# Patient Record
Sex: MALE | Race: OTHER | Employment: OTHER | URBAN - METROPOLITAN AREA
[De-identification: names, ages, dates, MRNs, and addresses within clinical notes are randomized per-mention and may not be internally consistent; named-entity substitution may affect disease eponyms.]

---

## 2017-01-01 ENCOUNTER — HOSPITAL ENCOUNTER (OUTPATIENT)
Dept: RADIATION ONCOLOGY | Facility: HOSPITAL | Age: 67
End: 2017-01-01
Attending: RADIOLOGY
Payer: MEDICARE

## 2017-01-03 ENCOUNTER — TELEPHONE (OUTPATIENT)
Dept: HEMATOLOGY/ONCOLOGY | Facility: HOSPITAL | Age: 67
End: 2017-01-03

## 2017-01-05 PROCEDURE — 77338 DESIGN MLC DEVICE FOR IMRT: CPT | Performed by: RADIOLOGY

## 2017-01-05 PROCEDURE — 77301 RADIOTHERAPY DOSE PLAN IMRT: CPT | Performed by: RADIOLOGY

## 2017-01-05 PROCEDURE — 77300 RADIATION THERAPY DOSE PLAN: CPT | Performed by: RADIOLOGY

## 2017-01-06 ENCOUNTER — TELEPHONE (OUTPATIENT)
Dept: HEMATOLOGY/ONCOLOGY | Facility: HOSPITAL | Age: 67
End: 2017-01-06

## 2017-01-09 ENCOUNTER — HOSPITAL ENCOUNTER (OUTPATIENT)
Dept: RADIATION ONCOLOGY | Facility: HOSPITAL | Age: 67
Discharge: HOME | End: 2017-01-09
Attending: RADIOLOGY
Payer: MEDICARE

## 2017-01-09 ENCOUNTER — APPOINTMENT (OUTPATIENT)
Dept: RADIATION ONCOLOGY | Facility: HOSPITAL | Age: 67
End: 2017-01-09
Attending: RADIOLOGY
Payer: MEDICARE

## 2017-01-09 VITALS — BODY MASS INDEX: 21 KG/M2 | WEIGHT: 143.5 LBS

## 2017-01-09 DIAGNOSIS — C22.1 CHOLANGIOCARCINOMA OF BILIARY TRACT (HCC): Primary | ICD-10-CM

## 2017-01-09 PROCEDURE — 77386 HC IMRT COMPLEX: CPT | Performed by: RADIOLOGY

## 2017-01-09 PROCEDURE — 77280 THER RAD SIMULAJ FIELD SMPL: CPT | Performed by: RADIOLOGY

## 2017-01-09 NOTE — PROGRESS NOTES
[facility] Radiation Treatment Management Note 1-5    Patient:  Kvng Mar  Age:  77year old  Visit Diagnosis:    1.  Cholangiocarcinoma of biliary tract ([address]      Primary Rad/Onc:  Dr. Abhijeet Sotelo    Site Delivered Dose (Gy) Pre

## 2017-01-10 ENCOUNTER — TELEPHONE (OUTPATIENT)
Dept: HEMATOLOGY/ONCOLOGY | Facility: HOSPITAL | Age: 67
End: 2017-01-10

## 2017-01-10 PROCEDURE — 77386 HC IMRT COMPLEX: CPT | Performed by: RADIOLOGY

## 2017-01-10 NOTE — TELEPHONE ENCOUNTER
MD Xavier Cardenas APN        Cc: Darien Heredia RN                     Daily.  No break        JACQUELINE Mcginnis MD       Cc: Darien Heredia RN                     Is xeloda BID daily as per your note or Mond

## 2017-01-11 PROCEDURE — 77386 HC IMRT COMPLEX: CPT | Performed by: RADIOLOGY

## 2017-01-12 ENCOUNTER — APPOINTMENT (OUTPATIENT)
Dept: RADIATION ONCOLOGY | Facility: HOSPITAL | Age: 67
End: 2017-01-12
Payer: MEDICARE

## 2017-01-12 PROCEDURE — 77386 HC IMRT COMPLEX: CPT | Performed by: RADIOLOGY

## 2017-01-13 PROCEDURE — 77386 HC IMRT COMPLEX: CPT | Performed by: RADIOLOGY

## 2017-01-16 ENCOUNTER — OFFICE VISIT (OUTPATIENT)
Dept: HEMATOLOGY/ONCOLOGY | Facility: HOSPITAL | Age: 67
End: 2017-01-16
Attending: SPECIALIST
Payer: MEDICARE

## 2017-01-16 ENCOUNTER — HOSPITAL ENCOUNTER (OUTPATIENT)
Dept: RADIATION ONCOLOGY | Facility: HOSPITAL | Age: 67
Discharge: HOME | End: 2017-01-16
Attending: RADIOLOGY
Payer: MEDICARE

## 2017-01-16 VITALS
WEIGHT: 144 LBS | RESPIRATION RATE: 20 BRPM | TEMPERATURE: 97 F | OXYGEN SATURATION: 99 % | HEART RATE: 78 BPM | SYSTOLIC BLOOD PRESSURE: 115 MMHG | BODY MASS INDEX: 20.62 KG/M2 | DIASTOLIC BLOOD PRESSURE: 70 MMHG | HEIGHT: 70 IN

## 2017-01-16 DIAGNOSIS — E87.1 HYPONATREMIA: ICD-10-CM

## 2017-01-16 DIAGNOSIS — C77.9 CHOLANGIOCARCINOMA METASTATIC TO LYMPH NODE (HCC): Primary | ICD-10-CM

## 2017-01-16 DIAGNOSIS — C22.1 CHOLANGIOCARCINOMA OF BILIARY TRACT (HCC): Primary | ICD-10-CM

## 2017-01-16 DIAGNOSIS — C22.1 CHOLANGIOCARCINOMA METASTATIC TO LYMPH NODE (HCC): Primary | ICD-10-CM

## 2017-01-16 DIAGNOSIS — C22.1 CHOLANGIOCARCINOMA OF BILIARY TRACT (HCC): ICD-10-CM

## 2017-01-16 LAB
ALBUMIN SERPL-MCNC: 3.3 G/DL (ref 3.5–4.8)
ALP LIVER SERPL-CCNC: 103 U/L (ref 45–117)
ALT SERPL-CCNC: 15 U/L (ref 17–63)
AST SERPL-CCNC: 13 U/L (ref 15–41)
BASOPHILS # BLD AUTO: 0.07 X10(3) UL (ref 0–0.1)
BASOPHILS NFR BLD AUTO: 0.8 %
BILIRUB SERPL-MCNC: 0.6 MG/DL (ref 0.1–2)
BUN BLD-MCNC: 8 MG/DL (ref 8–20)
CALCIUM BLD-MCNC: 9.7 MG/DL (ref 8.3–10.3)
CHLORIDE: 104 MMOL/L (ref 101–111)
CO2: 29 MMOL/L (ref 22–32)
CREAT BLD-MCNC: 0.77 MG/DL (ref 0.7–1.3)
EOSINOPHIL # BLD AUTO: 0.28 X10(3) UL (ref 0–0.3)
EOSINOPHIL NFR BLD AUTO: 3.1 %
ERYTHROCYTE [DISTWIDTH] IN BLOOD BY AUTOMATED COUNT: 22.7 % (ref 11.5–16)
GLUCOSE BLD-MCNC: 99 MG/DL (ref 70–99)
HCT VFR BLD AUTO: 33.6 % (ref 37–53)
HGB BLD-MCNC: 11.2 G/DL (ref 13–17)
IMMATURE GRANULOCYTE COUNT: 0.05 X10(3) UL (ref 0–1)
IMMATURE GRANULOCYTE RATIO %: 0.6 %
LYMPHOCYTES # BLD AUTO: 1.52 X10(3) UL (ref 0.9–4)
LYMPHOCYTES NFR BLD AUTO: 16.9 %
M PROTEIN MFR SERPL ELPH: 7.3 G/DL (ref 6.1–8.3)
MCH RBC QN AUTO: 33.5 PG (ref 27–33.2)
MCHC RBC AUTO-ENTMCNC: 33.3 G/DL (ref 31–37)
MCV RBC AUTO: 100.6 FL (ref 80–99)
MONOCYTES # BLD AUTO: 1.26 X10(3) UL (ref 0.1–0.6)
MONOCYTES NFR BLD AUTO: 14 %
NEUTROPHIL ABS PRELIM: 5.84 X10 (3) UL (ref 1.3–6.7)
NEUTROPHILS # BLD AUTO: 5.84 X10(3) UL (ref 1.3–6.7)
NEUTROPHILS NFR BLD AUTO: 64.6 %
PLATELET # BLD AUTO: 325 10(3)UL (ref 150–450)
PLATELET MORPHOLOGY: NORMAL
POTASSIUM SERPL-SCNC: 4.3 MMOL/L (ref 3.6–5.1)
RBC # BLD AUTO: 3.34 X10(6)UL (ref 3.8–5.8)
RED CELL DISTRIBUTION WIDTH-SD: 83.2 FL (ref 35.1–46.3)
SODIUM SERPL-SCNC: 138 MMOL/L (ref 136–144)
WBC # BLD AUTO: 9 X10(3) UL (ref 4–13)

## 2017-01-16 PROCEDURE — 77386 HC IMRT COMPLEX: CPT | Performed by: RADIOLOGY

## 2017-01-16 PROCEDURE — 99213 OFFICE O/P EST LOW 20 MIN: CPT | Performed by: SPECIALIST

## 2017-01-16 NOTE — PROGRESS NOTES
Patient is here today for follow up with Larissa Sheridan for cholangiocarcinoma. Patient denies pain. Patient is currently on Xeloda therapy 1150mg BID daily - concurrent radiation. Stated appetite is good.  Medication list, medical history and toxicities were

## 2017-01-16 NOTE — PROGRESS NOTES
[facility] Radiation Treatment Management Note 6-10    Patient:  Gopal Watson  Age:  77year old  Visit Diagnosis:    1.  Cholangiocarcinoma of biliary tract ([address]      Primary Rad/Onc:  Dr. Mary Jimenez    Site Delivered Dose (Gy) Pr

## 2017-01-16 NOTE — PROGRESS NOTES
[facility] Progress Note      Patient Name: Ilan Brandt   YOB: 1950  Medical Record Number: [id number]  Attending Physician: Philly Cali. Henry Bush M.D.      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 On 10/13/2016 patient began adjuvant chemotherapy with gemcitabine and capecitabine.  Cycle 1 was K, Phytonadione, 100 MCG Oral Tab Take 1 tablet by mouth. Disp:  Rfl:    SELENIUM OR Take 1 tablet by mouth daily. Disp:  Rfl:    furosemide (LASIX) 20 MG Oral Tab Take 1 tablet (20 mg total) by mouth daily.  Disp: 30 tablet Rfl: 0   maalox/diphenhydramine/ anicteric. ENMT                 External nose normal; external ears normal.  Neck   Supple. Respiratory          Normal effort; no respiratory distress; clear to auscultation bilaterally. Cardiovascular     Regular rate and rhythm; normal S1S2.   Abdomen Basophil % 0.8 %   Immature Granulocyte % 0.6 %   -RBC MORPHOLOGY SCAN   Collection Time: 01/16/17 10:06 AM   Result Value Ref Range   RBC Morphology See morphology below (A) Normal   Platelet Morphology Normal Normal   Microcytosis Small (A) (none)   Ma

## 2017-01-17 PROCEDURE — 77386 HC IMRT COMPLEX: CPT | Performed by: RADIOLOGY

## 2017-01-18 PROCEDURE — 77386 HC IMRT COMPLEX: CPT | Performed by: RADIOLOGY

## 2017-01-19 PROCEDURE — 77386 HC IMRT COMPLEX: CPT | Performed by: RADIOLOGY

## 2017-01-20 ENCOUNTER — OFFICE VISIT (OUTPATIENT)
Dept: HEMATOLOGY/ONCOLOGY | Facility: HOSPITAL | Age: 67
End: 2017-01-20
Attending: SPECIALIST
Payer: MEDICARE

## 2017-01-20 PROCEDURE — 77386 HC IMRT COMPLEX: CPT | Performed by: RADIOLOGY

## 2017-01-20 PROCEDURE — 77336 RADIATION PHYSICS CONSULT: CPT | Performed by: RADIOLOGY

## 2017-01-23 ENCOUNTER — NURSE ONLY (OUTPATIENT)
Dept: HEMATOLOGY/ONCOLOGY | Facility: HOSPITAL | Age: 67
End: 2017-01-23
Attending: SPECIALIST
Payer: MEDICARE

## 2017-01-23 ENCOUNTER — HOSPITAL ENCOUNTER (OUTPATIENT)
Dept: RADIATION ONCOLOGY | Facility: HOSPITAL | Age: 67
Discharge: HOME | End: 2017-01-23
Attending: RADIOLOGY
Payer: MEDICARE

## 2017-01-23 VITALS
WEIGHT: 141.38 LBS | HEART RATE: 67 BPM | TEMPERATURE: 99 F | SYSTOLIC BLOOD PRESSURE: 111 MMHG | DIASTOLIC BLOOD PRESSURE: 55 MMHG | RESPIRATION RATE: 20 BRPM | BODY MASS INDEX: 20 KG/M2

## 2017-01-23 DIAGNOSIS — C77.9 CHOLANGIOCARCINOMA METASTATIC TO LYMPH NODE (HCC): Primary | ICD-10-CM

## 2017-01-23 DIAGNOSIS — C22.1 CHOLANGIOCARCINOMA METASTATIC TO LYMPH NODE (HCC): Primary | ICD-10-CM

## 2017-01-23 DIAGNOSIS — C22.1 CHOLANGIOCARCINOMA OF BILIARY TRACT (HCC): ICD-10-CM

## 2017-01-23 DIAGNOSIS — C22.1 CHOLANGIOCARCINOMA OF BILIARY TRACT (HCC): Primary | ICD-10-CM

## 2017-01-23 LAB
ALBUMIN SERPL-MCNC: 3.3 G/DL (ref 3.5–4.8)
ALP LIVER SERPL-CCNC: 90 U/L (ref 45–117)
ALT SERPL-CCNC: 16 U/L (ref 17–63)
AST SERPL-CCNC: 18 U/L (ref 15–41)
BASOPHILS # BLD AUTO: 0.07 X10(3) UL (ref 0–0.1)
BASOPHILS NFR BLD AUTO: 0.9 %
BILIRUB SERPL-MCNC: 0.6 MG/DL (ref 0.1–2)
BUN BLD-MCNC: 8 MG/DL (ref 8–20)
CALCIUM BLD-MCNC: 8.9 MG/DL (ref 8.3–10.3)
CHLORIDE: 101 MMOL/L (ref 101–111)
CO2: 28 MMOL/L (ref 22–32)
CREAT BLD-MCNC: 0.71 MG/DL (ref 0.7–1.3)
EOSINOPHIL # BLD AUTO: 0.85 X10(3) UL (ref 0–0.3)
EOSINOPHIL NFR BLD AUTO: 10.9 %
ERYTHROCYTE [DISTWIDTH] IN BLOOD BY AUTOMATED COUNT: 21.9 % (ref 11.5–16)
GLUCOSE BLD-MCNC: 79 MG/DL (ref 70–99)
HCT VFR BLD AUTO: 33.5 % (ref 37–53)
HGB BLD-MCNC: 11.6 G/DL (ref 13–17)
IMMATURE GRANULOCYTE COUNT: 0.03 X10(3) UL (ref 0–1)
IMMATURE GRANULOCYTE RATIO %: 0.4 %
LYMPHOCYTES # BLD AUTO: 0.99 X10(3) UL (ref 0.9–4)
LYMPHOCYTES NFR BLD AUTO: 12.7 %
M PROTEIN MFR SERPL ELPH: 7 G/DL (ref 6.1–8.3)
MCH RBC QN AUTO: 34.5 PG (ref 27–33.2)
MCHC RBC AUTO-ENTMCNC: 34.6 G/DL (ref 31–37)
MCV RBC AUTO: 99.7 FL (ref 80–99)
MONOCYTES # BLD AUTO: 1.09 X10(3) UL (ref 0.1–0.6)
MONOCYTES NFR BLD AUTO: 13.9 %
NEUTROPHIL ABS PRELIM: 4.79 X10 (3) UL (ref 1.3–6.7)
NEUTROPHILS # BLD AUTO: 4.79 X10(3) UL (ref 1.3–6.7)
NEUTROPHILS NFR BLD AUTO: 61.2 %
PLATELET # BLD AUTO: 214 10(3)UL (ref 150–450)
PLATELET MORPHOLOGY: NORMAL
POTASSIUM SERPL-SCNC: 3.8 MMOL/L (ref 3.6–5.1)
RBC # BLD AUTO: 3.36 X10(6)UL (ref 3.8–5.8)
RED CELL DISTRIBUTION WIDTH-SD: 79.1 FL (ref 35.1–46.3)
SODIUM SERPL-SCNC: 136 MMOL/L (ref 136–144)
WBC # BLD AUTO: 7.8 X10(3) UL (ref 4–13)

## 2017-01-23 PROCEDURE — 36415 COLL VENOUS BLD VENIPUNCTURE: CPT

## 2017-01-23 PROCEDURE — 77386 HC IMRT COMPLEX: CPT | Performed by: RADIOLOGY

## 2017-01-23 PROCEDURE — 80053 COMPREHEN METABOLIC PANEL: CPT

## 2017-01-23 PROCEDURE — 85025 COMPLETE CBC W/AUTO DIFF WBC: CPT

## 2017-01-23 RX ORDER — CAPECITABINE 500 MG/1
TABLET, FILM COATED ORAL
COMMUNITY
Start: 2017-01-12 | End: 2017-03-23 | Stop reason: ALTCHOICE

## 2017-01-23 NOTE — PROGRESS NOTES
[facility] Radiation Treatment Management Note 11-15    Patient:  Genoveva Shukla  Age:  77year old  Visit Diagnosis:    1.  Cholangiocarcinoma of biliary tract ([address]      Primary Rad/Onc:  Dr. Laura Valdes    Site Delivered Dose (Gy) P

## 2017-01-23 NOTE — PROGRESS NOTES
NUTRITION F/U NOTE:     Pertinent Dx/PMH: Cholangiocarcinoma of common bile duct, T3N1M0          TX: RT w/ daily capecitabine     Other pertinent subjective/objective information:  TX: 08/16/2016 pt underwent Whipple, splenectomy, cholecystectomy, left ad

## 2017-01-24 ENCOUNTER — TELEPHONE (OUTPATIENT)
Dept: HEMATOLOGY/ONCOLOGY | Facility: HOSPITAL | Age: 67
End: 2017-01-24

## 2017-01-24 PROCEDURE — 77386 HC IMRT COMPLEX: CPT | Performed by: RADIOLOGY

## 2017-01-25 PROCEDURE — 77386 HC IMRT COMPLEX: CPT | Performed by: RADIOLOGY

## 2017-01-26 PROCEDURE — 77386 HC IMRT COMPLEX: CPT | Performed by: RADIOLOGY

## 2017-01-27 PROCEDURE — 77336 RADIATION PHYSICS CONSULT: CPT | Performed by: RADIOLOGY

## 2017-01-27 PROCEDURE — 77386 HC IMRT COMPLEX: CPT | Performed by: RADIOLOGY

## 2017-01-30 ENCOUNTER — NURSE ONLY (OUTPATIENT)
Dept: HEMATOLOGY/ONCOLOGY | Facility: HOSPITAL | Age: 67
End: 2017-01-30
Attending: SPECIALIST
Payer: MEDICARE

## 2017-01-30 ENCOUNTER — HOSPITAL ENCOUNTER (OUTPATIENT)
Dept: RADIATION ONCOLOGY | Facility: HOSPITAL | Age: 67
Discharge: HOME | End: 2017-01-30
Attending: RADIOLOGY
Payer: MEDICARE

## 2017-01-30 ENCOUNTER — TELEPHONE (OUTPATIENT)
Dept: HEMATOLOGY/ONCOLOGY | Facility: HOSPITAL | Age: 67
End: 2017-01-30

## 2017-01-30 VITALS
BODY MASS INDEX: 20 KG/M2 | RESPIRATION RATE: 20 BRPM | WEIGHT: 142.69 LBS | DIASTOLIC BLOOD PRESSURE: 66 MMHG | TEMPERATURE: 97 F | HEART RATE: 46 BPM | SYSTOLIC BLOOD PRESSURE: 120 MMHG

## 2017-01-30 DIAGNOSIS — C77.9 CHOLANGIOCARCINOMA METASTATIC TO LYMPH NODE (HCC): Primary | ICD-10-CM

## 2017-01-30 DIAGNOSIS — C22.1 CHOLANGIOCARCINOMA OF BILIARY TRACT (HCC): Primary | ICD-10-CM

## 2017-01-30 DIAGNOSIS — C22.1 CHOLANGIOCARCINOMA METASTATIC TO LYMPH NODE (HCC): Primary | ICD-10-CM

## 2017-01-30 LAB
ALBUMIN SERPL-MCNC: 3.3 G/DL (ref 3.5–4.8)
ALP LIVER SERPL-CCNC: 87 U/L (ref 45–117)
ALT SERPL-CCNC: 17 U/L (ref 17–63)
AST SERPL-CCNC: 18 U/L (ref 15–41)
BASOPHILS # BLD AUTO: 0.03 X10(3) UL (ref 0–0.1)
BASOPHILS NFR BLD AUTO: 0.4 %
BILIRUB SERPL-MCNC: 0.6 MG/DL (ref 0.1–2)
BUN BLD-MCNC: 6 MG/DL (ref 8–20)
CALCIUM BLD-MCNC: 9.3 MG/DL (ref 8.3–10.3)
CHLORIDE: 98 MMOL/L (ref 101–111)
CO2: 29 MMOL/L (ref 22–32)
CREAT BLD-MCNC: 0.75 MG/DL (ref 0.7–1.3)
EOSINOPHIL # BLD AUTO: 0.3 X10(3) UL (ref 0–0.3)
EOSINOPHIL NFR BLD AUTO: 4.5 %
ERYTHROCYTE [DISTWIDTH] IN BLOOD BY AUTOMATED COUNT: 21.2 % (ref 11.5–16)
GLUCOSE BLD-MCNC: 106 MG/DL (ref 70–99)
HCT VFR BLD AUTO: 33.1 % (ref 37–53)
HGB BLD-MCNC: 11.2 G/DL (ref 13–17)
IMMATURE GRANULOCYTE COUNT: 0.04 X10(3) UL (ref 0–1)
IMMATURE GRANULOCYTE RATIO %: 0.6 %
LYMPHOCYTES # BLD AUTO: 0.59 X10(3) UL (ref 0.9–4)
LYMPHOCYTES NFR BLD AUTO: 8.8 %
M PROTEIN MFR SERPL ELPH: 7 G/DL (ref 6.1–8.3)
MCH RBC QN AUTO: 35 PG (ref 27–33.2)
MCHC RBC AUTO-ENTMCNC: 33.8 G/DL (ref 31–37)
MCV RBC AUTO: 103.4 FL (ref 80–99)
MONOCYTES # BLD AUTO: 0.95 X10(3) UL (ref 0.1–0.6)
MONOCYTES NFR BLD AUTO: 14.2 %
NEUTROPHIL ABS PRELIM: 4.79 X10 (3) UL (ref 1.3–6.7)
NEUTROPHILS # BLD AUTO: 4.79 X10(3) UL (ref 1.3–6.7)
NEUTROPHILS NFR BLD AUTO: 71.5 %
PLATELET # BLD AUTO: 141 10(3)UL (ref 150–450)
PLATELET MORPHOLOGY: NORMAL
POTASSIUM SERPL-SCNC: 3.9 MMOL/L (ref 3.6–5.1)
RBC # BLD AUTO: 3.2 X10(6)UL (ref 3.8–5.8)
RED CELL DISTRIBUTION WIDTH-SD: 80 FL (ref 35.1–46.3)
SODIUM SERPL-SCNC: 132 MMOL/L (ref 136–144)
WBC # BLD AUTO: 6.7 X10(3) UL (ref 4–13)

## 2017-01-30 PROCEDURE — 36415 COLL VENOUS BLD VENIPUNCTURE: CPT

## 2017-01-30 PROCEDURE — 80053 COMPREHEN METABOLIC PANEL: CPT

## 2017-01-30 PROCEDURE — 85025 COMPLETE CBC W/AUTO DIFF WBC: CPT

## 2017-01-30 PROCEDURE — 77386 HC IMRT COMPLEX: CPT | Performed by: RADIOLOGY

## 2017-01-30 NOTE — PROGRESS NOTES
[facility] Radiation Treatment Management Note 11-15    Patient:  Tisha Shows  Age:  77year old  Visit Diagnosis:    1.  Cholangiocarcinoma of biliary tract ([address]      Primary Rad/Onc:  Dr. Lani Avila    Site Delivered Dose (Gy) P

## 2017-01-31 ENCOUNTER — SNF/IP PROF CHARGE ONLY (OUTPATIENT)
Dept: HEMATOLOGY/ONCOLOGY | Facility: HOSPITAL | Age: 67
End: 2017-01-31

## 2017-01-31 DIAGNOSIS — C22.1 CHOLANGIOCARCINOMA OF BILIARY TRACT (HCC): ICD-10-CM

## 2017-01-31 DIAGNOSIS — C77.9 CHOLANGIOCARCINOMA METASTATIC TO LYMPH NODE (HCC): ICD-10-CM

## 2017-01-31 DIAGNOSIS — C22.1 CHOLANGIOCARCINOMA (HCC): Primary | ICD-10-CM

## 2017-01-31 DIAGNOSIS — C22.1 CHOLANGIOCARCINOMA METASTATIC TO LYMPH NODE (HCC): ICD-10-CM

## 2017-01-31 PROCEDURE — 77386 HC IMRT COMPLEX: CPT | Performed by: RADIOLOGY

## 2017-02-01 ENCOUNTER — HOSPITAL ENCOUNTER (OUTPATIENT)
Dept: RADIATION ONCOLOGY | Facility: HOSPITAL | Age: 67
Discharge: HOME | End: 2017-02-01
Attending: RADIOLOGY
Payer: MEDICARE

## 2017-02-01 PROCEDURE — 77386 HC IMRT COMPLEX: CPT | Performed by: RADIOLOGY

## 2017-02-02 PROCEDURE — 77386 HC IMRT COMPLEX: CPT | Performed by: RADIOLOGY

## 2017-02-02 NOTE — PROGRESS NOTES
NUTRITION F/U NOTE:       Pertinent Dx/PMH: Cholangiocarcinoma of common bile duct, T3N1M0          TX: RT w/ daily capecitabine     Other pertinent subjective/objective information:  08/16/2016 pt underwent Whipple, splenectomy, cholecystectomy, left adre

## 2017-02-03 ENCOUNTER — OFFICE VISIT (OUTPATIENT)
Dept: HEMATOLOGY/ONCOLOGY | Facility: HOSPITAL | Age: 67
End: 2017-02-03
Attending: SPECIALIST
Payer: MEDICARE

## 2017-02-03 PROCEDURE — 77386 HC IMRT COMPLEX: CPT | Performed by: RADIOLOGY

## 2017-02-03 PROCEDURE — 77336 RADIATION PHYSICS CONSULT: CPT | Performed by: RADIOLOGY

## 2017-02-06 ENCOUNTER — NURSE ONLY (OUTPATIENT)
Dept: HEMATOLOGY/ONCOLOGY | Facility: HOSPITAL | Age: 67
End: 2017-02-06
Attending: SPECIALIST
Payer: MEDICARE

## 2017-02-06 ENCOUNTER — TELEPHONE (OUTPATIENT)
Dept: HEMATOLOGY/ONCOLOGY | Facility: HOSPITAL | Age: 67
End: 2017-02-06

## 2017-02-06 ENCOUNTER — HOSPITAL ENCOUNTER (OUTPATIENT)
Dept: RADIATION ONCOLOGY | Facility: HOSPITAL | Age: 67
Discharge: HOME | End: 2017-02-06
Attending: RADIOLOGY
Payer: MEDICARE

## 2017-02-06 VITALS
DIASTOLIC BLOOD PRESSURE: 75 MMHG | TEMPERATURE: 98 F | RESPIRATION RATE: 20 BRPM | WEIGHT: 141.69 LBS | HEART RATE: 48 BPM | BODY MASS INDEX: 20 KG/M2 | SYSTOLIC BLOOD PRESSURE: 128 MMHG

## 2017-02-06 DIAGNOSIS — C77.9 CHOLANGIOCARCINOMA METASTATIC TO LYMPH NODE (HCC): Primary | ICD-10-CM

## 2017-02-06 DIAGNOSIS — C22.1 CHOLANGIOCARCINOMA OF BILIARY TRACT (HCC): Primary | ICD-10-CM

## 2017-02-06 DIAGNOSIS — C22.1 CHOLANGIOCARCINOMA METASTATIC TO LYMPH NODE (HCC): Primary | ICD-10-CM

## 2017-02-06 LAB
ALBUMIN SERPL-MCNC: 3.5 G/DL (ref 3.5–4.8)
ALP LIVER SERPL-CCNC: 109 U/L (ref 45–117)
ALT SERPL-CCNC: 26 U/L (ref 17–63)
AST SERPL-CCNC: 29 U/L (ref 15–41)
BASOPHILS # BLD AUTO: 0.02 X10(3) UL (ref 0–0.1)
BASOPHILS NFR BLD AUTO: 0.3 %
BILIRUB SERPL-MCNC: 0.6 MG/DL (ref 0.1–2)
BUN BLD-MCNC: 8 MG/DL (ref 8–20)
CALCIUM BLD-MCNC: 9.1 MG/DL (ref 8.3–10.3)
CHLORIDE: 96 MMOL/L (ref 101–111)
CO2: 24 MMOL/L (ref 22–32)
CREAT BLD-MCNC: 0.78 MG/DL (ref 0.7–1.3)
EOSINOPHIL # BLD AUTO: 0.21 X10(3) UL (ref 0–0.3)
EOSINOPHIL NFR BLD AUTO: 3.4 %
ERYTHROCYTE [DISTWIDTH] IN BLOOD BY AUTOMATED COUNT: 20.8 % (ref 11.5–16)
GLUCOSE BLD-MCNC: 105 MG/DL (ref 70–99)
HCT VFR BLD AUTO: 35.2 % (ref 37–53)
HGB BLD-MCNC: 12.4 G/DL (ref 13–17)
IMMATURE GRANULOCYTE COUNT: 0.02 X10(3) UL (ref 0–1)
IMMATURE GRANULOCYTE RATIO %: 0.3 %
LYMPHOCYTES # BLD AUTO: 0.45 X10(3) UL (ref 0.9–4)
LYMPHOCYTES NFR BLD AUTO: 7.4 %
M PROTEIN MFR SERPL ELPH: 7.7 G/DL (ref 6.1–8.3)
MCH RBC QN AUTO: 35.8 PG (ref 27–33.2)
MCHC RBC AUTO-ENTMCNC: 35.2 G/DL (ref 31–37)
MCV RBC AUTO: 101.7 FL (ref 80–99)
MONOCYTES # BLD AUTO: 0.84 X10(3) UL (ref 0.1–0.6)
MONOCYTES NFR BLD AUTO: 13.8 %
NEUTROPHIL ABS PRELIM: 4.56 X10 (3) UL (ref 1.3–6.7)
NEUTROPHILS # BLD AUTO: 4.56 X10(3) UL (ref 1.3–6.7)
NEUTROPHILS NFR BLD AUTO: 74.8 %
PLATELET # BLD AUTO: 129 10(3)UL (ref 150–450)
PLATELET MORPHOLOGY: NORMAL
POTASSIUM SERPL-SCNC: 3.9 MMOL/L (ref 3.6–5.1)
RBC # BLD AUTO: 3.46 X10(6)UL (ref 3.8–5.8)
RED CELL DISTRIBUTION WIDTH-SD: 76.9 FL (ref 35.1–46.3)
SODIUM SERPL-SCNC: 130 MMOL/L (ref 136–144)
WBC # BLD AUTO: 6.1 X10(3) UL (ref 4–13)

## 2017-02-06 PROCEDURE — 85025 COMPLETE CBC W/AUTO DIFF WBC: CPT

## 2017-02-06 PROCEDURE — 77386 HC IMRT COMPLEX: CPT | Performed by: RADIOLOGY

## 2017-02-06 PROCEDURE — 80053 COMPREHEN METABOLIC PANEL: CPT

## 2017-02-06 PROCEDURE — 36415 COLL VENOUS BLD VENIPUNCTURE: CPT

## 2017-02-06 NOTE — TELEPHONE ENCOUNTER
MD Geovany Joy, TOREY                     He needs to restart the salt tablets if he has stopped. Other labs ok.

## 2017-02-07 PROCEDURE — 77300 RADIATION THERAPY DOSE PLAN: CPT | Performed by: RADIOLOGY

## 2017-02-07 PROCEDURE — 77386 HC IMRT COMPLEX: CPT | Performed by: RADIOLOGY

## 2017-02-07 PROCEDURE — 77338 DESIGN MLC DEVICE FOR IMRT: CPT | Performed by: RADIOLOGY

## 2017-02-08 PROCEDURE — 77386 HC IMRT COMPLEX: CPT | Performed by: RADIOLOGY

## 2017-02-09 PROCEDURE — 77386 HC IMRT COMPLEX: CPT | Performed by: RADIOLOGY

## 2017-02-10 PROCEDURE — 77386 HC IMRT COMPLEX: CPT | Performed by: RADIOLOGY

## 2017-02-10 PROCEDURE — 77336 RADIATION PHYSICS CONSULT: CPT | Performed by: RADIOLOGY

## 2017-02-10 NOTE — PATIENT INSTRUCTIONS
POST-RADIATION INSTRUCTIONS:   - Please stop at the  or call [phone] FOR A FOLLOW-UP WITH DR. Stevo Stewart in April  - Migue Cottrell as planned  - Migue Harris, Dr Sarah Ibarra as needed  - SIDE EFFECTS OF RADIATION WILL GRADUAL

## 2017-02-13 ENCOUNTER — OFFICE VISIT (OUTPATIENT)
Dept: HEMATOLOGY/ONCOLOGY | Facility: HOSPITAL | Age: 67
End: 2017-02-13
Attending: SPECIALIST
Payer: MEDICARE

## 2017-02-13 ENCOUNTER — HOSPITAL ENCOUNTER (OUTPATIENT)
Dept: RADIATION ONCOLOGY | Facility: HOSPITAL | Age: 67
Discharge: HOME | End: 2017-02-13
Attending: RADIOLOGY
Payer: MEDICARE

## 2017-02-13 VITALS
BODY MASS INDEX: 20.11 KG/M2 | WEIGHT: 140.5 LBS | HEART RATE: 68 BPM | DIASTOLIC BLOOD PRESSURE: 70 MMHG | SYSTOLIC BLOOD PRESSURE: 137 MMHG | RESPIRATION RATE: 20 BRPM | TEMPERATURE: 97 F | OXYGEN SATURATION: 100 % | HEIGHT: 70 IN

## 2017-02-13 DIAGNOSIS — E87.1 HYPONATREMIA: ICD-10-CM

## 2017-02-13 DIAGNOSIS — C22.1 CHOLANGIOCARCINOMA OF BILIARY TRACT (HCC): Primary | ICD-10-CM

## 2017-02-13 DIAGNOSIS — C77.9 CHOLANGIOCARCINOMA METASTATIC TO LYMPH NODE (HCC): Primary | ICD-10-CM

## 2017-02-13 DIAGNOSIS — C22.1 CHOLANGIOCARCINOMA METASTATIC TO LYMPH NODE (HCC): Primary | ICD-10-CM

## 2017-02-13 DIAGNOSIS — C22.1 CHOLANGIOCARCINOMA OF BILIARY TRACT (HCC): ICD-10-CM

## 2017-02-13 PROCEDURE — 99213 OFFICE O/P EST LOW 20 MIN: CPT | Performed by: SPECIALIST

## 2017-02-13 PROCEDURE — 77280 THER RAD SIMULAJ FIELD SMPL: CPT | Performed by: RADIOLOGY

## 2017-02-13 PROCEDURE — 77386 HC IMRT COMPLEX: CPT | Performed by: RADIOLOGY

## 2017-02-13 RX ORDER — SODIUM CHLORIDE 1000 MG
1 TABLET, SOLUBLE MISCELLANEOUS 2 TIMES DAILY WITH MEALS
Qty: 60 TABLET | Refills: 0 | Status: SHIPPED | OUTPATIENT
Start: 2017-02-13 | End: 2017-03-15

## 2017-02-13 NOTE — PROGRESS NOTES
[facility] Progress Note      Patient Name: Donna Martin   YOB: 1950  Medical Record Number: [id number]  Attending Physician: Quinn Izaguirre. Heber Goldberg M.D.      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 On 10/13/2016 patient began adjuvant chemotherapy with gemcitabine and capecitabine.  Cycle 1 was Multiple Vitamins-Minerals (TAB-A-ADRIA MAXIMUM) Oral Tab Take 1 tablet by mouth daily. Disp:  Rfl:    vitamin E 400 UNITS Oral Cap Take 400 Units by mouth daily. Disp:  Rfl:         Allergies   Mr. Lauren Gillis has No Known Allergies.        Review of Systems Lynnette Graham M.D.   [facility]  Director, Bone and Soft Tissue Sarcoma Program  Section of Hematology/Oncology, [facility]

## 2017-02-13 NOTE — PROGRESS NOTES
Patient is here today for follow up with Dr. Yogesh Atkinson for cholangiocarcinoma. Patient denies pain. Stated mild to moderate fatigue. Appetite is good. Current dose of Xeloda is 1,150mg twice daily. Intermittent mild nausea - takes felipa with nausea.  Darshan May

## 2017-02-14 PROCEDURE — 77386 HC IMRT COMPLEX: CPT | Performed by: RADIOLOGY

## 2017-02-15 PROCEDURE — 77386 HC IMRT COMPLEX: CPT | Performed by: RADIOLOGY

## 2017-02-16 PROCEDURE — 77386 HC IMRT COMPLEX: CPT | Performed by: RADIOLOGY

## 2017-02-17 ENCOUNTER — MEDICAL CORRESPONDENCE (OUTPATIENT)
Dept: RADIATION ONCOLOGY | Facility: HOSPITAL | Age: 67
End: 2017-02-17

## 2017-02-17 PROCEDURE — 77336 RADIATION PHYSICS CONSULT: CPT | Performed by: RADIOLOGY

## 2017-02-17 PROCEDURE — 77386 HC IMRT COMPLEX: CPT | Performed by: RADIOLOGY

## 2017-02-24 PROCEDURE — 77336 RADIATION PHYSICS CONSULT: CPT | Performed by: RADIOLOGY

## 2017-02-28 ENCOUNTER — SNF/IP PROF CHARGE ONLY (OUTPATIENT)
Dept: HEMATOLOGY/ONCOLOGY | Facility: HOSPITAL | Age: 67
End: 2017-02-28

## 2017-02-28 DIAGNOSIS — C22.1 CHOLANGIOCARCINOMA (HCC): Primary | ICD-10-CM

## 2017-02-28 DIAGNOSIS — C22.1 CHOLANGIOCARCINOMA METASTATIC TO LYMPH NODE (HCC): ICD-10-CM

## 2017-02-28 DIAGNOSIS — C22.1 CHOLANGIOCARCINOMA OF BILIARY TRACT (HCC): ICD-10-CM

## 2017-02-28 DIAGNOSIS — C77.9 CHOLANGIOCARCINOMA METASTATIC TO LYMPH NODE (HCC): ICD-10-CM

## 2017-03-07 NOTE — PROGRESS NOTES
[facility] RADIATION ONCOLOGY TREATMENT SUMMARY    PATIENT:  Alma Teague MD: Dr. Ana Villanueva    DIAGNOSIS:  Adenocarcinoma of the common bile duct    CANCER HISTORY:  25-year-old man presented with pain, weight loss and jaundice wi

## 2017-03-20 ENCOUNTER — HOSPITAL ENCOUNTER (OUTPATIENT)
Dept: CT IMAGING | Facility: HOSPITAL | Age: 67
Discharge: HOME | End: 2017-03-20
Attending: SPECIALIST
Payer: MEDICARE

## 2017-03-20 DIAGNOSIS — C77.9 CHOLANGIOCARCINOMA METASTATIC TO LYMPH NODE (HCC): ICD-10-CM

## 2017-03-20 DIAGNOSIS — C22.1 CHOLANGIOCARCINOMA OF BILIARY TRACT (HCC): ICD-10-CM

## 2017-03-20 DIAGNOSIS — C22.1 CHOLANGIOCARCINOMA METASTATIC TO LYMPH NODE (HCC): ICD-10-CM

## 2017-03-20 PROCEDURE — 71260 CT THORAX DX C+: CPT

## 2017-03-20 PROCEDURE — 74177 CT ABD & PELVIS W/CONTRAST: CPT

## 2017-03-23 ENCOUNTER — OFFICE VISIT (OUTPATIENT)
Dept: HEMATOLOGY/ONCOLOGY | Facility: HOSPITAL | Age: 67
End: 2017-03-23
Attending: SPECIALIST
Payer: MEDICARE

## 2017-03-23 ENCOUNTER — HOSPITAL ENCOUNTER (OUTPATIENT)
Dept: RADIATION ONCOLOGY | Facility: HOSPITAL | Age: 67
Discharge: HOME | End: 2017-03-23
Attending: RADIOLOGY
Payer: MEDICARE

## 2017-03-23 VITALS
DIASTOLIC BLOOD PRESSURE: 78 MMHG | WEIGHT: 148.81 LBS | HEIGHT: 70 IN | SYSTOLIC BLOOD PRESSURE: 142 MMHG | RESPIRATION RATE: 20 BRPM | OXYGEN SATURATION: 100 % | HEART RATE: 64 BPM | TEMPERATURE: 98 F | BODY MASS INDEX: 21.3 KG/M2

## 2017-03-23 DIAGNOSIS — C22.1 CHOLANGIOCARCINOMA METASTATIC TO LYMPH NODE (HCC): Primary | ICD-10-CM

## 2017-03-23 DIAGNOSIS — R16.0 LIVER MASS, LEFT LOBE: ICD-10-CM

## 2017-03-23 DIAGNOSIS — E87.1 HYPONATREMIA: ICD-10-CM

## 2017-03-23 DIAGNOSIS — C77.9 CHOLANGIOCARCINOMA METASTATIC TO LYMPH NODE (HCC): Primary | ICD-10-CM

## 2017-03-23 DIAGNOSIS — C22.1 CHOLANGIOCARCINOMA OF BILIARY TRACT (HCC): ICD-10-CM

## 2017-03-23 DIAGNOSIS — C22.1 CHOLANGIOCARCINOMA OF BILIARY TRACT (HCC): Primary | ICD-10-CM

## 2017-03-23 PROCEDURE — 99214 OFFICE O/P EST MOD 30 MIN: CPT | Performed by: SPECIALIST

## 2017-03-23 PROCEDURE — 99213 OFFICE O/P EST LOW 20 MIN: CPT

## 2017-03-23 NOTE — PATIENT INSTRUCTIONS
- CALL [phone] FOR A FOLLOW-UP WITH DR. Melva Riggs in August. Please call in July for an August appointment.  - Sanket Bustos today as planned  - CALL IF YOU HAVE ANY QUESTIONS/CONCERNS REGARDING RADIATION THERAPY 21

## 2017-03-23 NOTE — PROGRESS NOTES
Patient is here today for follow up with Greg Neely for Cholangiocarcinoma. Patient denies pain. Stated he is feeling good - gaining weight. Last dose of Xeloda was February 17th, 2017. Medication list and medical history were reviewed and updated.     Educ

## 2017-03-23 NOTE — PROGRESS NOTES
Nursing Follow-Up Note    Patient: Howard Espinal  YOB: 1950  Age: 77year old  Radiation Oncologist: Dr. Crissie Hatchet  Referring Physician:   Chief Complaint: Patient presents with:   Follow - Up    Date: 3/23/2017    Toxicities: n/a    Vit

## 2017-03-23 NOTE — PROGRESS NOTES
[facility] RADIATION ONCOLOGY FOLLOW UP    PATIENT:  Rossy Huang MD: Dr. Jaylin Cardoza  DIAGNOSIS:  Adenocarcinoma of the common bile duct (extrahepatic bile duct)    CANCER HISTORY:  60-year-old man presented with pain, weight loss 148 pounds, /78, heart rate 64 and regular, afebrile, pain score 0  Well-appearing and in no acute distress  No cervical or supraclavicular adenopathy  No axillary adenopathy  Heart shows regular rate and rhythm  Abdomen is soft nontender nondistende

## 2017-03-29 ENCOUNTER — OFFICE VISIT (OUTPATIENT)
Dept: INTERNAL MEDICINE CLINIC | Facility: CLINIC | Age: 67
End: 2017-03-29

## 2017-03-29 VITALS
SYSTOLIC BLOOD PRESSURE: 126 MMHG | DIASTOLIC BLOOD PRESSURE: 80 MMHG | WEIGHT: 147 LBS | TEMPERATURE: 98 F | HEIGHT: 71 IN | BODY MASS INDEX: 20.58 KG/M2 | RESPIRATION RATE: 16 BRPM | HEART RATE: 76 BPM

## 2017-03-29 DIAGNOSIS — R74.8 ELEVATED ALKALINE PHOSPHATASE LEVEL: Primary | ICD-10-CM

## 2017-03-29 DIAGNOSIS — Z01.818 PRE-OP EVALUATION: ICD-10-CM

## 2017-03-29 DIAGNOSIS — C22.1 CHOLANGIOCARCINOMA OF BILIARY TRACT (HCC): ICD-10-CM

## 2017-03-29 DIAGNOSIS — I49.3 PVC (PREMATURE VENTRICULAR CONTRACTION): ICD-10-CM

## 2017-03-29 DIAGNOSIS — R16.0 LIVER MASS: ICD-10-CM

## 2017-03-29 DIAGNOSIS — D69.6 THROMBOCYTOPENIA (HCC): ICD-10-CM

## 2017-03-29 PROCEDURE — 93000 ELECTROCARDIOGRAM COMPLETE: CPT | Performed by: INTERNAL MEDICINE

## 2017-03-29 PROCEDURE — 99214 OFFICE O/P EST MOD 30 MIN: CPT | Performed by: INTERNAL MEDICINE

## 2017-03-29 NOTE — PROGRESS NOTES
Patient presents with:  Pre-Op Exam: for liver biopsy by Dr. Gonsalo Valdes on 3/31/17. HPI:  Here for pre op for liver mass needing biopsy, US guided with elevated alk phos, hx of cholagiocarcinoma. Pt denies chest pain or sob. No abd pain.  Eating well at th Multiple Vitamins-Minerals (TAB-A-ADRIA MAXIMUM) Oral Tab Take 1 tablet by mouth daily. Disp:  Rfl:    vitamin E 400 UNITS Oral Cap Take 400 Units by mouth daily.  Disp:  Rfl:        Physical Exam  /80 mmHg  Pulse 76  Temp(Src) 98.2 °F (36.8 °C) (Ora

## 2017-03-30 NOTE — IMAGING NOTE
Called the patient and left messages regarding pre procedure instructions. .Instructed to be on NPO x 6 hours prior,be at the hospital not later than 1000am, have a  and the recovery is 3-4 hours,Not to take over the counter meds like ASA, Ibuprofen p

## 2017-03-31 ENCOUNTER — SNF/IP PROF CHARGE ONLY (OUTPATIENT)
Dept: HEMATOLOGY/ONCOLOGY | Facility: HOSPITAL | Age: 67
End: 2017-03-31

## 2017-03-31 ENCOUNTER — HOSPITAL ENCOUNTER (OUTPATIENT)
Dept: ULTRASOUND IMAGING | Facility: HOSPITAL | Age: 67
Discharge: HOME | End: 2017-03-31
Attending: NURSE PRACTITIONER
Payer: MEDICARE

## 2017-03-31 ENCOUNTER — APPOINTMENT (OUTPATIENT)
Dept: LAB | Facility: HOSPITAL | Age: 67
End: 2017-03-31
Attending: NURSE PRACTITIONER
Payer: MEDICARE

## 2017-03-31 VITALS
RESPIRATION RATE: 18 BRPM | SYSTOLIC BLOOD PRESSURE: 130 MMHG | WEIGHT: 148 LBS | OXYGEN SATURATION: 100 % | DIASTOLIC BLOOD PRESSURE: 79 MMHG | BODY MASS INDEX: 20.72 KG/M2 | HEIGHT: 71 IN | TEMPERATURE: 99 F | HEART RATE: 88 BPM

## 2017-03-31 DIAGNOSIS — C22.1 CHOLANGIOCARCINOMA METASTATIC TO LYMPH NODE (HCC): Primary | ICD-10-CM

## 2017-03-31 DIAGNOSIS — D36.15: ICD-10-CM

## 2017-03-31 DIAGNOSIS — D36.15: Primary | ICD-10-CM

## 2017-03-31 DIAGNOSIS — C22.1 CHOLANGIOCARCINOMA (HCC): ICD-10-CM

## 2017-03-31 DIAGNOSIS — C22.1 CHOLANGIOCARCINOMA OF BILIARY TRACT (HCC): ICD-10-CM

## 2017-03-31 DIAGNOSIS — C77.9 CHOLANGIOCARCINOMA METASTATIC TO LYMPH NODE (HCC): Primary | ICD-10-CM

## 2017-03-31 DIAGNOSIS — C77.9 CHOLANGIOCARCINOMA METASTATIC TO LYMPH NODE (HCC): ICD-10-CM

## 2017-03-31 DIAGNOSIS — C22.1 CHOLANGIOCARCINOMA METASTATIC TO LYMPH NODE (HCC): ICD-10-CM

## 2017-03-31 PROCEDURE — 47000 NEEDLE BIOPSY OF LIVER PERQ: CPT

## 2017-03-31 PROCEDURE — 88321 CONSLTJ&REPRT SLD PREP ELSWR: CPT

## 2017-03-31 PROCEDURE — 99152 MOD SED SAME PHYS/QHP 5/>YRS: CPT

## 2017-03-31 PROCEDURE — 36415 COLL VENOUS BLD VENIPUNCTURE: CPT

## 2017-03-31 PROCEDURE — 76942 ECHO GUIDE FOR BIOPSY: CPT

## 2017-03-31 PROCEDURE — 88307 TISSUE EXAM BY PATHOLOGIST: CPT | Performed by: NURSE PRACTITIONER

## 2017-03-31 PROCEDURE — 88313 SPECIAL STAINS GROUP 2: CPT | Performed by: NURSE PRACTITIONER

## 2017-03-31 PROCEDURE — 85610 PROTHROMBIN TIME: CPT

## 2017-03-31 PROCEDURE — 99153 MOD SED SAME PHYS/QHP EA: CPT

## 2017-03-31 RX ORDER — MIDAZOLAM HYDROCHLORIDE 1 MG/ML
INJECTION INTRAMUSCULAR; INTRAVENOUS
Status: COMPLETED
Start: 2017-03-31 | End: 2017-03-31

## 2017-03-31 RX ORDER — FLUMAZENIL 0.1 MG/ML
INJECTION, SOLUTION INTRAVENOUS
Status: DISCONTINUED
Start: 2017-03-31 | End: 2017-03-31 | Stop reason: WASHOUT

## 2017-03-31 RX ORDER — SODIUM CHLORIDE 9 MG/ML
INJECTION, SOLUTION INTRAVENOUS CONTINUOUS
Status: DISCONTINUED | OUTPATIENT
Start: 2017-03-31 | End: 2017-04-04

## 2017-03-31 RX ORDER — MIDAZOLAM HYDROCHLORIDE 1 MG/ML
1 INJECTION INTRAMUSCULAR; INTRAVENOUS EVERY 5 MIN PRN
Status: DISCONTINUED | OUTPATIENT
Start: 2017-03-31 | End: 2017-04-04

## 2017-03-31 RX ORDER — NALOXONE HYDROCHLORIDE 0.4 MG/ML
80 INJECTION, SOLUTION INTRAMUSCULAR; INTRAVENOUS; SUBCUTANEOUS AS NEEDED
Status: DISCONTINUED | OUTPATIENT
Start: 2017-03-31 | End: 2017-04-04

## 2017-03-31 RX ORDER — FLUMAZENIL 0.1 MG/ML
0.2 INJECTION, SOLUTION INTRAVENOUS AS NEEDED
Status: DISCONTINUED | OUTPATIENT
Start: 2017-03-31 | End: 2017-04-04

## 2017-03-31 RX ORDER — NALOXONE HYDROCHLORIDE 0.4 MG/ML
INJECTION, SOLUTION INTRAMUSCULAR; INTRAVENOUS; SUBCUTANEOUS
Status: DISCONTINUED
Start: 2017-03-31 | End: 2017-03-31 | Stop reason: WASHOUT

## 2017-03-31 RX ADMIN — SODIUM CHLORIDE: 9 INJECTION, SOLUTION INTRAVENOUS at 11:20:00

## 2017-03-31 RX ADMIN — MIDAZOLAM HYDROCHLORIDE 1 MG: 1 INJECTION INTRAMUSCULAR; INTRAVENOUS at 11:40:00

## 2017-03-31 NOTE — PROCEDURES
[facility]  Procedure Note    Eulalia Garduno Patient Status:  Outpatient    1950 MRN [id number]   Location [address] [place] [address] Attending David Mac, APANA   Hosp Day # 0 PCP Shalnoda Oconnell MD     Procedure: US guided liver biopsy    P

## 2017-03-31 NOTE — PRE-SEDATION ASSESSMENT
H&P dated 3/29/17 reviewed, no changes. Pt for US guided liver biopsy. Previous sedation without complication. Airway checked.   I have discussed with the patient the potential benefits, risks and side effects of this procedure, the likelihood of the patien

## 2017-03-31 NOTE — IMAGING NOTE
Dr. Hansel Pappas explained procedure to patient. Patient signed consent. Time out performed. Patient tolerated procedure well. Denies CP or SOB. Kept patient rested and comfortable. Report to [facility]. Transported to [address] [facility].

## 2017-04-01 PROBLEM — R16.0 LIVER MASS, LEFT LOBE: Status: ACTIVE | Noted: 2017-04-01

## 2017-04-01 NOTE — PROGRESS NOTES
[facility] Progress Note      Patient Name: Jorge Rico   YOB: 1950  Medical Record Number: [id number]  Attending Physician: Tabitha Brandt M.D.      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 On 10/13/2016 patient began adjuvant chemotherapy with gemcitabine and capecitabine.  Cycle 1 was (TAB-A-ADRIA MAXIMUM) Oral Tab Take 1 tablet by mouth daily. Disp:  Rfl:    vitamin E 400 UNITS Oral Cap Take 400 Units by mouth daily. Disp:  Rfl:         Allergies   Mr. Zaki Harper has No Known Allergies.        Review of Systems   Constitutional No fevers, chi mmol/L   Chloride 102 101-111 mmol/L   CO2 29.0 22.0-32.0 mmol/L   -CARBOHYDRATE ANTIGEN 19-9   Collection Time: 03/23/17 12:54 PM   Result Value Ref Range   Carbohydrate Ag 19-9 15.0 1.2-35.0 U/mL   -CBC W/ DIFFERENTIAL   Collection Time: 03/23/17 12:54 P Cholecystectomy with postoperative changes of Whipple. SPLEEN:  SplenectomyPANCREAS:  Postoperative changes of Whipple with pancreatic ductal dilatation within the remaining portion of the pancreas measuring 4 mm. ADRENALS:  Left adrenalectomy.  No right adre Emphysematous changes within the lungs. I independently visualized the radiologic images in addition to reviewing the written report: There is a new hypodensity in the left lobe of the liver worrisome for metastatic disease.      Impr

## 2017-04-10 ENCOUNTER — TELEPHONE (OUTPATIENT)
Dept: HEMATOLOGY/ONCOLOGY | Facility: HOSPITAL | Age: 67
End: 2017-04-10

## 2017-04-10 NOTE — TELEPHONE ENCOUNTER
Per Dr. Heber Goldberg. Biopsy is negative for cancer. Could not see liver mass on [employer] that was seen on CT. Dr. Heber Goldberg will discuss at GI conference. Will only call you if there is something to update.   Follow up in mid May - CT day or 2 prior to appointm

## 2017-05-12 NOTE — Clinical Note
FYI, TCM call made, see notes. DOM attempted to schedule a TCM HFU, patient states he will call himself to schedule. DOM sent message to MD's office along with condition update. no

## 2017-05-31 NOTE — PROGRESS NOTES
[facility] Progress Note      Patient Name: Sandip Coto   YOB: 1950  Medical Record Number: [id number]  Attending Physician: Nickolas Chakraborty. Vishal Jenkins M.D.      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 On 10/13/2016 patient began adjuvant chemotherapy with gemcitabine and capecitabine.  Cycle 1 was SELENIUM OR Take 1 tablet by mouth daily. Disp:  Rfl:    Cyanocobalamin (VITAMIN B 12 OR) Take 50 mcg by mouth daily. Disp:  Rfl:    IRON OR 1 tablet daily. Disp:  Rfl:    VITAMIN D-400 OR Take 1 capsule by mouth daily.  With vitamin A Disp:  Rfl:    vi Glucose 98 70-99 mg/dL   BUN 11 8-20 mg/dL   Creatinine 0.97 0.70-1.30 mg/dL   GFR 81 >=60   Calcium, Total 9.3 8.3-10.3 mg/dL   Alkaline Phosphatase 173 (H)  U/L   AST 22 15-41 U/L   Alt 25 17-63 U/L   Bilirubin, Total 0.5 0.1-2.0 mg/dL   Total Pr [facility]ematology/Oncology, [facility]

## 2017-06-02 ENCOUNTER — OFFICE VISIT (OUTPATIENT)
Dept: HEMATOLOGY/ONCOLOGY | Facility: HOSPITAL | Age: 67
End: 2017-06-02
Attending: SPECIALIST
Payer: MEDICARE

## 2017-06-02 ENCOUNTER — TELEPHONE (OUTPATIENT)
Dept: HEMATOLOGY/ONCOLOGY | Facility: HOSPITAL | Age: 67
End: 2017-06-02

## 2017-06-02 VITALS
RESPIRATION RATE: 20 BRPM | OXYGEN SATURATION: 99 % | WEIGHT: 148 LBS | HEART RATE: 70 BPM | TEMPERATURE: 97 F | BODY MASS INDEX: 21.19 KG/M2 | DIASTOLIC BLOOD PRESSURE: 79 MMHG | HEIGHT: 70 IN | SYSTOLIC BLOOD PRESSURE: 138 MMHG

## 2017-06-02 DIAGNOSIS — C22.1 CHOLANGIOCARCINOMA OF BILIARY TRACT (HCC): ICD-10-CM

## 2017-06-02 DIAGNOSIS — C22.1 CHOLANGIOCARCINOMA (HCC): Primary | ICD-10-CM

## 2017-06-02 DIAGNOSIS — C22.1 CHOLANGIOCARCINOMA METASTATIC TO LYMPH NODE (HCC): ICD-10-CM

## 2017-06-02 DIAGNOSIS — C77.9 CHOLANGIOCARCINOMA METASTATIC TO LYMPH NODE (HCC): ICD-10-CM

## 2017-06-02 PROCEDURE — 99213 OFFICE O/P EST LOW 20 MIN: CPT | Performed by: SPECIALIST

## 2017-06-02 NOTE — PROGRESS NOTES
Patient is here today for follow up with Hair Hernández for cholangiocarcinoma of biliary tract. Patient denies pain. Stated he is feeling good. Energy is improving. Medication list, medical history and toxicities were reviewed and updated.       Education Rec

## 2017-06-02 NOTE — TELEPHONE ENCOUNTER
MD Sheree Valentin, RN                     Let him know all labs look good including tumor marker which is normal.       Left detailed message on voicemail. Instructed patient to call as needed.

## 2017-06-08 ENCOUNTER — SOCIAL WORK SERVICES (OUTPATIENT)
Dept: HEMATOLOGY/ONCOLOGY | Facility: HOSPITAL | Age: 67
End: 2017-06-08

## 2017-06-08 ENCOUNTER — HOSPITAL ENCOUNTER (OUTPATIENT)
Dept: CT IMAGING | Facility: HOSPITAL | Age: 67
Discharge: HOME | End: 2017-06-08
Attending: SPECIALIST
Payer: MEDICARE

## 2017-06-08 ENCOUNTER — TELEPHONE (OUTPATIENT)
Dept: HEMATOLOGY/ONCOLOGY | Facility: HOSPITAL | Age: 67
End: 2017-06-08

## 2017-06-08 DIAGNOSIS — C22.1 CHOLANGIOCARCINOMA OF BILIARY TRACT (HCC): ICD-10-CM

## 2017-06-08 DIAGNOSIS — C77.9 CHOLANGIOCARCINOMA METASTATIC TO LYMPH NODE (HCC): ICD-10-CM

## 2017-06-08 DIAGNOSIS — C22.1 CHOLANGIOCARCINOMA METASTATIC TO LYMPH NODE (HCC): ICD-10-CM

## 2017-06-08 DIAGNOSIS — C22.1 CHOLANGIOCARCINOMA (HCC): ICD-10-CM

## 2017-06-08 PROCEDURE — 71260 CT THORAX DX C+: CPT | Performed by: SPECIALIST

## 2017-06-08 PROCEDURE — 74177 CT ABD & PELVIS W/CONTRAST: CPT | Performed by: SPECIALIST

## 2017-06-08 NOTE — TELEPHONE ENCOUNTER
When should he have a colonoscopy? Per Dr. Ganesh Noel -- per GI. Pt informed and verbalized understanding. Pt needs handicap sticker. Shannon Seals informed.

## 2017-06-09 ENCOUNTER — TELEPHONE (OUTPATIENT)
Dept: HEMATOLOGY/ONCOLOGY | Facility: HOSPITAL | Age: 67
End: 2017-06-09

## 2017-06-09 NOTE — TELEPHONE ENCOUNTER
MD Caprice Vela, RN                     Let him know CT scan shows no cancer. Want him to get repeat scan few days before his appt with me in September. Order is in [facility] [address]. He'll need oral contrast.       Patient notified.

## 2017-07-03 ENCOUNTER — TELEPHONE (OUTPATIENT)
Dept: HEMATOLOGY/ONCOLOGY | Facility: HOSPITAL | Age: 67
End: 2017-07-03

## 2017-07-03 NOTE — TELEPHONE ENCOUNTER
Pt states for the past couple of months he has lower back pain-- sacral, iliac area. He states it moves from side to side. It gets worse as the day goes on. He takes Excedrin with no relief. Dr. Franklin Coad informed.   Pt should take 600 mg ibuprofen every

## 2017-07-28 ENCOUNTER — HOSPITAL ENCOUNTER (EMERGENCY)
Facility: HOSPITAL | Age: 67
Discharge: HOME | End: 2017-07-28
Attending: EMERGENCY MEDICINE
Payer: MEDICARE

## 2017-07-28 ENCOUNTER — APPOINTMENT (OUTPATIENT)
Dept: GENERAL RADIOLOGY | Facility: HOSPITAL | Age: 67
End: 2017-07-28
Attending: EMERGENCY MEDICINE
Payer: MEDICARE

## 2017-07-28 ENCOUNTER — TELEPHONE (OUTPATIENT)
Dept: HEMATOLOGY/ONCOLOGY | Facility: HOSPITAL | Age: 67
End: 2017-07-28

## 2017-07-28 VITALS
BODY MASS INDEX: 20.76 KG/M2 | WEIGHT: 145 LBS | HEIGHT: 70 IN | OXYGEN SATURATION: 100 % | HEART RATE: 90 BPM | DIASTOLIC BLOOD PRESSURE: 86 MMHG | SYSTOLIC BLOOD PRESSURE: 144 MMHG | RESPIRATION RATE: 18 BRPM | TEMPERATURE: 97 F

## 2017-07-28 DIAGNOSIS — M43.10 RETROLISTHESIS OF VERTEBRAE: ICD-10-CM

## 2017-07-28 DIAGNOSIS — M54.40 BACK PAIN OF LUMBAR REGION WITH SCIATICA: Primary | ICD-10-CM

## 2017-07-28 DIAGNOSIS — IMO0001 SPINAL COMPRESSION FRACTURE, INITIAL ENCOUNTER: ICD-10-CM

## 2017-07-28 PROCEDURE — 72110 X-RAY EXAM L-2 SPINE 4/>VWS: CPT | Performed by: EMERGENCY MEDICINE

## 2017-07-28 PROCEDURE — 99283 EMERGENCY DEPT VISIT LOW MDM: CPT

## 2017-07-28 RX ORDER — RIBOFLAVIN (VITAMIN B2) 100 MG
100 TABLET ORAL DAILY
COMMUNITY

## 2017-07-28 RX ORDER — CYCLOBENZAPRINE HCL 10 MG
10 TABLET ORAL 3 TIMES DAILY PRN
Qty: 15 TABLET | Refills: 0 | Status: SHIPPED | OUTPATIENT
Start: 2017-07-28 | End: 2018-04-30 | Stop reason: ALTCHOICE

## 2017-07-28 NOTE — ED NOTES
Patient to and from radiology without difficulty. Patient in NAD, using phone in bed.   Patient aware and agree with plan, will continue to monitor while awaiting x-ray result

## 2017-07-28 NOTE — ED PROVIDER NOTES
Patient Seen in: [facility] Emergency Department    History   Patient presents with:  Back Pain (musculoskeletal)    Stated Complaint: back pain    HPI    61-year-old male presents with back pain.   Patient reports 3 months of bilateral lower back pa resection segment 4b liver mass. Medications :   Ascorbic Acid (VITAMIN C) 100 MG Oral Tab,  Take 100 mg by mouth daily. Cyclobenzaprine HCl 10 MG Oral Tab,  Take 1 tablet (10 mg total) by mouth 3 (three) times daily as needed for Muscle spasms.    Gin (Temporal)   Resp 18   Ht 177.8 cm (5' 10\")   Wt 65.8 kg   SpO2 100%   BMI 20.81 kg/m²         Physical Exam    General:  Vitals as listed. No acute distress   HEENT: Sclerae anicteric. Conjunctivae show no pallor.   Oropharynx clear, mucous membranes mo along its right aspect which is new in the interval. Height loss involving the posterior T12 endplate is stable.   There is marked disc narrowing L4-L5 and moderate narrowing the remaining levels including L5-S1 with there is extensive facet arthritis at mu visit.     Follow-up:  Aj Mclean MD  [address] [place] [address]  [phone]            Medications Prescribed:  Current Discharge Medication List    START taking these medications    Cyclobenzaprine HCl 10 MG Oral Tab  Take

## 2017-07-28 NOTE — ED INITIAL ASSESSMENT (HPI)
Patient reports with back pain since May, thinks he strained his back muscles while fixing his car. Pain continues to increase. 2 Norco's taken at 8:30 this am.  Intermittent tingling down right, left, and then both legs.   Denies loss of bladder or bow

## 2017-08-15 ENCOUNTER — TELEPHONE (OUTPATIENT)
Dept: HEMATOLOGY/ONCOLOGY | Facility: HOSPITAL | Age: 67
End: 2017-08-15

## 2017-08-16 ENCOUNTER — NURSE ONLY (OUTPATIENT)
Dept: HEMATOLOGY/ONCOLOGY | Facility: HOSPITAL | Age: 67
End: 2017-08-16
Attending: SPECIALIST
Payer: MEDICARE

## 2017-08-16 DIAGNOSIS — C22.1 CHOLANGIOCARCINOMA METASTATIC TO LYMPH NODE (HCC): Primary | ICD-10-CM

## 2017-08-16 DIAGNOSIS — C77.9 CHOLANGIOCARCINOMA METASTATIC TO LYMPH NODE (HCC): Primary | ICD-10-CM

## 2017-08-16 DIAGNOSIS — C22.1 CHOLANGIOCARCINOMA OF BILIARY TRACT (HCC): ICD-10-CM

## 2017-08-16 LAB
ALBUMIN SERPL-MCNC: 3.3 G/DL (ref 3.5–4.8)
ALP LIVER SERPL-CCNC: 163 U/L (ref 45–117)
ALT SERPL-CCNC: 31 U/L (ref 17–63)
AST SERPL-CCNC: 25 U/L (ref 15–41)
BASOPHILS # BLD AUTO: 0.03 X10(3) UL (ref 0–0.1)
BASOPHILS NFR BLD AUTO: 0.4 %
BILIRUB SERPL-MCNC: 0.6 MG/DL (ref 0.1–2)
BUN BLD-MCNC: 11 MG/DL (ref 8–20)
CALCIUM BLD-MCNC: 9.1 MG/DL (ref 8.3–10.3)
CARBOHYDRATE AG 19-9: 4.9 U/ML (ref 1.2–35)
CHLORIDE: 101 MMOL/L (ref 101–111)
CO2: 23 MMOL/L (ref 22–32)
CREAT BLD-MCNC: 0.9 MG/DL (ref 0.7–1.3)
EOSINOPHIL # BLD AUTO: 0.14 X10(3) UL (ref 0–0.3)
EOSINOPHIL NFR BLD AUTO: 2 %
ERYTHROCYTE [DISTWIDTH] IN BLOOD BY AUTOMATED COUNT: 14.9 % (ref 11.5–16)
GLUCOSE BLD-MCNC: 145 MG/DL (ref 70–99)
HCT VFR BLD AUTO: 35.4 % (ref 37–53)
HGB BLD-MCNC: 12.1 G/DL (ref 13–17)
IMMATURE GRANULOCYTE COUNT: 0.05 X10(3) UL (ref 0–1)
IMMATURE GRANULOCYTE RATIO %: 0.7 %
LYMPHOCYTES # BLD AUTO: 1.52 X10(3) UL (ref 0.9–4)
LYMPHOCYTES NFR BLD AUTO: 21.7 %
M PROTEIN MFR SERPL ELPH: 7.4 G/DL (ref 6.1–8.3)
MCH RBC QN AUTO: 30.5 PG (ref 27–33.2)
MCHC RBC AUTO-ENTMCNC: 34.2 G/DL (ref 31–37)
MCV RBC AUTO: 89.2 FL (ref 80–99)
MONOCYTES # BLD AUTO: 0.85 X10(3) UL (ref 0.1–0.6)
MONOCYTES NFR BLD AUTO: 12.1 %
NEUTROPHIL ABS PRELIM: 4.41 X10 (3) UL (ref 1.3–6.7)
NEUTROPHILS # BLD AUTO: 4.41 X10(3) UL (ref 1.3–6.7)
NEUTROPHILS NFR BLD AUTO: 63.1 %
PLATELET # BLD AUTO: 235 10(3)UL (ref 150–450)
POTASSIUM SERPL-SCNC: 3.6 MMOL/L (ref 3.6–5.1)
RBC # BLD AUTO: 3.97 X10(6)UL (ref 3.8–5.8)
RED CELL DISTRIBUTION WIDTH-SD: 48 FL (ref 35.1–46.3)
SODIUM SERPL-SCNC: 134 MMOL/L (ref 136–144)
WBC # BLD AUTO: 7 X10(3) UL (ref 4–13)

## 2017-08-16 PROCEDURE — 80053 COMPREHEN METABOLIC PANEL: CPT

## 2017-08-16 PROCEDURE — 85025 COMPLETE CBC W/AUTO DIFF WBC: CPT

## 2017-08-16 PROCEDURE — 36415 COLL VENOUS BLD VENIPUNCTURE: CPT

## 2017-08-16 PROCEDURE — 86301 IMMUNOASSAY TUMOR CA 19-9: CPT

## 2017-08-28 PROBLEM — M81.0 OSTEOPOROSIS: Status: ACTIVE | Noted: 2017-08-28

## 2017-08-28 PROCEDURE — 84403 ASSAY OF TOTAL TESTOSTERONE: CPT | Performed by: PHYSICIAN ASSISTANT

## 2017-08-28 PROCEDURE — 83970 ASSAY OF PARATHORMONE: CPT | Performed by: PHYSICIAN ASSISTANT

## 2017-08-29 ENCOUNTER — TELEPHONE (OUTPATIENT)
Dept: INTERNAL MEDICINE CLINIC | Facility: CLINIC | Age: 67
End: 2017-08-29

## 2017-08-29 NOTE — TELEPHONE ENCOUNTER
Outreach. Spoke with pt informed him he is due for his [id number]. Pt stated he will call back to schedule.

## 2017-09-01 ENCOUNTER — OFFICE VISIT (OUTPATIENT)
Dept: HEMATOLOGY/ONCOLOGY | Facility: HOSPITAL | Age: 67
End: 2017-09-01
Attending: SPECIALIST
Payer: MEDICARE

## 2017-09-01 VITALS
OXYGEN SATURATION: 97 % | HEIGHT: 70 IN | WEIGHT: 142 LBS | TEMPERATURE: 98 F | HEART RATE: 67 BPM | BODY MASS INDEX: 20.33 KG/M2 | SYSTOLIC BLOOD PRESSURE: 133 MMHG | RESPIRATION RATE: 18 BRPM | DIASTOLIC BLOOD PRESSURE: 78 MMHG

## 2017-09-01 DIAGNOSIS — C77.9 CHOLANGIOCARCINOMA METASTATIC TO LYMPH NODE (HCC): ICD-10-CM

## 2017-09-01 DIAGNOSIS — K86.89 SECONDARY PANCREATIC INSUFFICIENCY: ICD-10-CM

## 2017-09-01 DIAGNOSIS — R14.1 GAS PAIN: ICD-10-CM

## 2017-09-01 DIAGNOSIS — C22.1 CHOLANGIOCARCINOMA METASTATIC TO LYMPH NODE (HCC): ICD-10-CM

## 2017-09-01 DIAGNOSIS — C22.1 CHOLANGIOCARCINOMA OF BILIARY TRACT (HCC): Primary | ICD-10-CM

## 2017-09-01 LAB — CARBOHYDRATE AG 19-9: 2.2 U/ML (ref 1.2–35)

## 2017-09-01 PROCEDURE — 99214 OFFICE O/P EST MOD 30 MIN: CPT | Performed by: SPECIALIST

## 2017-09-01 NOTE — PROGRESS NOTES
Patient is here today for follow up with Grisel Fenton for cholangiocarcinoma. Patient denies pain. Stated has had intermittent severe abdominal pain or right sided pain rates a 10 last for about 5 minutes than resolves. Appetite is good.  Medication list and

## 2017-09-01 NOTE — PROGRESS NOTES
[facility] Progress Note      Patient Name: Donna Martni   YOB: 1950  Medical Record Number: [id number]  Attending Physician: Quinn Izaguirre. Heber Goldberg M.D.      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 On 10/13/2016 patient began adjuvant chemotherapy with gemcitabine and capecitabine.  Cycle 1 was mouth 3 (three) times daily as needed for Muscle spasms. Disp: 15 tablet Rfl: 0   Zinc 50 MG Oral Cap Take by mouth daily. Disp:  Rfl:    Vitamin K, Phytonadione, 100 MCG Oral Tab Take 1 tablet by mouth.  Disp:  Rfl:    SELENIUM OR Take 1 tablet by mouth da discussions today. Laboratory    Recent Results (from the past 24 hour(s))  -CARBOHYDRATE ANTIGEN 19-9   Collection Time: 09/01/17 10:03 AM   Result Value Ref Range   Carbohydrate Ag 19-9 2.2 1.2 - 35.0 U/mL       Impression and Plan   1.    Extrahepatic

## 2017-09-08 ENCOUNTER — HOSPITAL ENCOUNTER (OUTPATIENT)
Dept: CT IMAGING | Facility: HOSPITAL | Age: 67
Discharge: HOME | End: 2017-09-08
Attending: SPECIALIST
Payer: MEDICARE

## 2017-09-08 DIAGNOSIS — C77.9 CHOLANGIOCARCINOMA METASTATIC TO LYMPH NODE (HCC): ICD-10-CM

## 2017-09-08 DIAGNOSIS — C22.1 CHOLANGIOCARCINOMA METASTATIC TO LYMPH NODE (HCC): ICD-10-CM

## 2017-09-08 DIAGNOSIS — C22.1 CHOLANGIOCARCINOMA OF BILIARY TRACT (HCC): ICD-10-CM

## 2017-09-08 PROCEDURE — 74177 CT ABD & PELVIS W/CONTRAST: CPT | Performed by: SPECIALIST

## 2017-09-08 PROCEDURE — 71260 CT THORAX DX C+: CPT | Performed by: SPECIALIST

## 2017-09-12 ENCOUNTER — TELEPHONE (OUTPATIENT)
Dept: HEMATOLOGY/ONCOLOGY | Facility: HOSPITAL | Age: 67
End: 2017-09-12

## 2017-09-12 NOTE — TELEPHONE ENCOUNTER
MD Syeda Heath, TOREY             Let him know that CT scan negative for cancer     Left detailed message on voicemail regarding negative results.

## 2017-09-20 PROBLEM — M54.50 ACUTE BILATERAL LOW BACK PAIN WITHOUT SCIATICA: Status: ACTIVE | Noted: 2017-09-20

## 2017-10-13 ENCOUNTER — OFFICE VISIT (OUTPATIENT)
Dept: INTERNAL MEDICINE CLINIC | Facility: CLINIC | Age: 67
End: 2017-10-13

## 2017-10-13 VITALS
BODY MASS INDEX: 20.04 KG/M2 | DIASTOLIC BLOOD PRESSURE: 70 MMHG | SYSTOLIC BLOOD PRESSURE: 136 MMHG | HEART RATE: 76 BPM | OXYGEN SATURATION: 98 % | WEIGHT: 140 LBS | HEIGHT: 70 IN | RESPIRATION RATE: 17 BRPM

## 2017-10-13 DIAGNOSIS — M80.80XA OTHER OSTEOPOROSIS WITH CURRENT PATHOLOGICAL FRACTURE, INITIAL ENCOUNTER: ICD-10-CM

## 2017-10-13 DIAGNOSIS — Z00.00 MEDICARE ANNUAL WELLNESS VISIT, SUBSEQUENT: Primary | ICD-10-CM

## 2017-10-13 DIAGNOSIS — D17.79 MYELOLIPOMA OF LEFT ADRENAL GLAND: ICD-10-CM

## 2017-10-13 DIAGNOSIS — C22.1 CHOLANGIOCARCINOMA METASTATIC TO LYMPH NODE (HCC): ICD-10-CM

## 2017-10-13 DIAGNOSIS — D35.02 PHEOCHROMOCYTOMA, LEFT: ICD-10-CM

## 2017-10-13 DIAGNOSIS — M54.50 ACUTE BILATERAL LOW BACK PAIN WITHOUT SCIATICA: ICD-10-CM

## 2017-10-13 DIAGNOSIS — C22.1 CHOLANGIOCARCINOMA OF BILIARY TRACT (HCC): ICD-10-CM

## 2017-10-13 DIAGNOSIS — Z12.5 SCREENING PSA (PROSTATE SPECIFIC ANTIGEN): ICD-10-CM

## 2017-10-13 DIAGNOSIS — R16.0 LIVER MASS, LEFT LOBE: ICD-10-CM

## 2017-10-13 DIAGNOSIS — D64.9 NORMOCYTIC NORMOCHROMIC ANEMIA: ICD-10-CM

## 2017-10-13 DIAGNOSIS — Z72.0 TOBACCO ABUSE: ICD-10-CM

## 2017-10-13 DIAGNOSIS — Z13.220 SCREENING, LIPID: ICD-10-CM

## 2017-10-13 DIAGNOSIS — E87.1 HYPONATREMIA: ICD-10-CM

## 2017-10-13 DIAGNOSIS — I70.0 AORTIC ATHEROSCLEROSIS (HCC): ICD-10-CM

## 2017-10-13 DIAGNOSIS — D63.8 ANEMIA OF CHRONIC DISEASE: ICD-10-CM

## 2017-10-13 DIAGNOSIS — D69.6 THROMBOCYTOPENIA (HCC): ICD-10-CM

## 2017-10-13 DIAGNOSIS — D36.15: ICD-10-CM

## 2017-10-13 DIAGNOSIS — S22.000A CLOSED COMPRESSION FRACTURE OF THORACIC VERTEBRA, INITIAL ENCOUNTER (HCC): ICD-10-CM

## 2017-10-13 DIAGNOSIS — Z00.00 ENCOUNTER FOR ANNUAL HEALTH EXAMINATION: ICD-10-CM

## 2017-10-13 DIAGNOSIS — C22.1 CHOLANGIOCARCINOMA (HCC): ICD-10-CM

## 2017-10-13 DIAGNOSIS — S32.010A CLOSED COMPRESSION FRACTURE OF FIRST LUMBAR VERTEBRA, INITIAL ENCOUNTER: ICD-10-CM

## 2017-10-13 DIAGNOSIS — C77.9 CHOLANGIOCARCINOMA METASTATIC TO LYMPH NODE (HCC): ICD-10-CM

## 2017-10-13 PROCEDURE — 90670 PCV13 VACCINE IM: CPT | Performed by: INTERNAL MEDICINE

## 2017-10-13 PROCEDURE — 99397 PER PM REEVAL EST PAT 65+ YR: CPT | Performed by: INTERNAL MEDICINE

## 2017-10-13 PROCEDURE — G0439 PPPS, SUBSEQ VISIT: HCPCS | Performed by: INTERNAL MEDICINE

## 2017-10-13 PROCEDURE — G0009 ADMIN PNEUMOCOCCAL VACCINE: HCPCS | Performed by: INTERNAL MEDICINE

## 2017-10-13 PROCEDURE — 96160 PT-FOCUSED HLTH RISK ASSMT: CPT | Performed by: INTERNAL MEDICINE

## 2017-10-13 NOTE — PROGRESS NOTES
HPI:   Cammie Lo is a 77year old male who presents for a MA (Medicare Advantage) [facility] (Once per calendar year). Here for supervisit. Pt has thoracic and lumbar compression frctures, getting PT, prolia. Managed by ortho.  Pt states pain is m Marked as Taking for the 10/13/17 encounter (Office Visit) with Nikhil Ortiz MD:  Ascorbic Acid (VITAMIN C) 100 MG Oral Tab Take 100 mg by mouth daily. Zinc 50 MG Oral Cap Take by mouth daily.    Vitamin K, Phytonadione, 100 MCG Oral Tab Take 1 tablet incontinence  MUSCULOSKELETAL: denies back pain  NEURO: denies headaches  PSYCHE: denies depression or anxiety  HEMATOLOGIC: denies hx of anemia  ENDOCRINE: denies thyroid history  ALL/ASTHMA: denies hx of allergy or asthma    EXAM:   /70   Pulse 76 Administered Date(s) Administered   • Influenza Vaccine, High Dose, Preserv Free 10/06/2017   • Pneumococcal (Prevnar 13) 10/13/2017   • Prolia Im Inj, Up To 60 Mg 09/01/2017       ASSESSMENT AND OTHER RELEVANT CHRONIC CONDITIONS:   Gilbert Simpson is a 6 Directive:  Living Will on file in [facility]? Zheng Gasca does not have a Living Will on file in [facility].  Discussed with patient and provided information       Healthcare Power of [place] on file in Epic:    Zheng Gasca does not have a Power of [place] fo 1-Yes    Do you have any tripping hazards?: 0-No    Are you on multiple medications?: 0-No    Does pain affect your day to day activities?: 1-Yes     Have you had any memory issues?: 0-No    Fall/Risk Scorin    Scoring Interpretation: 0 - 3 No Risk Screening      Ophthalmology Visit Annually: Diabetics, FHx Glaucoma, AA>50, > 65 No flowsheet data found.     Prostate Cancer Screening      PSA  Annually PSA due on 11/14/2000  Update Health Maintenance if applicable   Immunizations      Influenza

## 2017-10-13 NOTE — PATIENT INSTRUCTIONS
Flora Bryan's SCREENING SCHEDULE   Tests on this list are recommended by your physician but may not be covered, or covered at this frequency, by your insurer. Please check with your insurance carrier before scheduling to verify coverage.     PREVENTATI Sigmoidoscopy Screen  Covered every 5 years No results found for this or any previous visit. No flowsheet data found. Fecal Occult Blood   Covered Annually No results found for: FOB, OCCULTSTOOL No flowsheet data found.      Barium Enema-   uncomfortabl found for this or any previous visit. This may be covered with your pharmacy  prescription benefits     Recommended Websites for Advanced Directives    [email or web address] pdf  An information packet, including necessary

## 2017-10-23 ENCOUNTER — LAB ENCOUNTER (OUTPATIENT)
Dept: LAB | Age: 67
End: 2017-10-23
Attending: INTERNAL MEDICINE
Payer: MEDICARE

## 2017-10-23 DIAGNOSIS — Z13.220 SCREENING, LIPID: ICD-10-CM

## 2017-10-23 DIAGNOSIS — C22.1 CHOLANGIOCARCINOMA OF BILIARY TRACT (HCC): ICD-10-CM

## 2017-10-23 DIAGNOSIS — D69.6 THROMBOCYTOPENIA (HCC): ICD-10-CM

## 2017-10-23 DIAGNOSIS — Z12.5 SCREENING PSA (PROSTATE SPECIFIC ANTIGEN): ICD-10-CM

## 2017-10-23 PROCEDURE — 80053 COMPREHEN METABOLIC PANEL: CPT | Performed by: INTERNAL MEDICINE

## 2017-10-23 PROCEDURE — G0103 PSA SCREENING: HCPCS | Performed by: INTERNAL MEDICINE

## 2017-10-23 PROCEDURE — 85025 COMPLETE CBC W/AUTO DIFF WBC: CPT | Performed by: INTERNAL MEDICINE

## 2017-10-23 PROCEDURE — 80061 LIPID PANEL: CPT | Performed by: INTERNAL MEDICINE

## 2017-10-23 PROCEDURE — 84443 ASSAY THYROID STIM HORMONE: CPT | Performed by: INTERNAL MEDICINE

## 2018-03-27 ENCOUNTER — TELEPHONE (OUTPATIENT)
Dept: HEMATOLOGY/ONCOLOGY | Facility: HOSPITAL | Age: 68
End: 2018-03-27

## 2018-04-19 ENCOUNTER — APPOINTMENT (OUTPATIENT)
Dept: LAB | Age: 68
End: 2018-04-19
Attending: PHYSICIAN ASSISTANT
Payer: MEDICARE

## 2018-04-19 DIAGNOSIS — Z51.81 MEDICATION MONITORING ENCOUNTER: ICD-10-CM

## 2018-04-19 PROCEDURE — 36415 COLL VENOUS BLD VENIPUNCTURE: CPT

## 2018-04-19 PROCEDURE — 82310 ASSAY OF CALCIUM: CPT

## 2018-04-23 ENCOUNTER — OFFICE VISIT (OUTPATIENT)
Dept: HEMATOLOGY/ONCOLOGY | Facility: HOSPITAL | Age: 68
End: 2018-04-23
Attending: SPECIALIST
Payer: MEDICARE

## 2018-04-23 VITALS
HEIGHT: 70 IN | DIASTOLIC BLOOD PRESSURE: 84 MMHG | WEIGHT: 148 LBS | HEART RATE: 91 BPM | BODY MASS INDEX: 21.19 KG/M2 | OXYGEN SATURATION: 97 % | RESPIRATION RATE: 18 BRPM | TEMPERATURE: 98 F | SYSTOLIC BLOOD PRESSURE: 144 MMHG

## 2018-04-23 DIAGNOSIS — R14.0 BLOATING: ICD-10-CM

## 2018-04-23 DIAGNOSIS — K86.89 SECONDARY PANCREATIC INSUFFICIENCY: ICD-10-CM

## 2018-04-23 DIAGNOSIS — K86.89 PANCREATIC INSUFFICIENCY: ICD-10-CM

## 2018-04-23 DIAGNOSIS — C22.1 CHOLANGIOCARCINOMA (HCC): ICD-10-CM

## 2018-04-23 DIAGNOSIS — C22.1 CHOLANGIOCARCINOMA OF BILIARY TRACT (HCC): Primary | ICD-10-CM

## 2018-04-23 PROCEDURE — 99215 OFFICE O/P EST HI 40 MIN: CPT | Performed by: SPECIALIST

## 2018-04-23 NOTE — PROGRESS NOTES
Patient is here today for follow up with Irasema Flores for Cholangiocarcinoma. Patient denies pain. Stated appetite is good. Medication list and medical history were reviewed and updated.      Education Record    Learner:  Patient and Spouse    Disease / Lisandra Goncalves

## 2018-04-25 ENCOUNTER — TELEPHONE (OUTPATIENT)
Dept: HEMATOLOGY/ONCOLOGY | Facility: HOSPITAL | Age: 68
End: 2018-04-25

## 2018-04-25 NOTE — TELEPHONE ENCOUNTER
MD Tanika Latif, RN             Please call patient. Case discussed in conference. Dr. Case Londono wants a better CT scan to make sure all is OK. CT chest, abdomen, pelvis order in Epic.  Needs oral contrast. Should get it done before he

## 2018-04-30 ENCOUNTER — OFFICE VISIT (OUTPATIENT)
Dept: INTERNAL MEDICINE CLINIC | Facility: CLINIC | Age: 68
End: 2018-04-30

## 2018-04-30 VITALS
SYSTOLIC BLOOD PRESSURE: 132 MMHG | TEMPERATURE: 98 F | BODY MASS INDEX: 21.19 KG/M2 | RESPIRATION RATE: 14 BRPM | HEIGHT: 70 IN | DIASTOLIC BLOOD PRESSURE: 70 MMHG | WEIGHT: 148 LBS | HEART RATE: 70 BPM

## 2018-04-30 DIAGNOSIS — D63.8 ANEMIA OF CHRONIC DISEASE: ICD-10-CM

## 2018-04-30 DIAGNOSIS — S32.010A CLOSED COMPRESSION FRACTURE OF FIRST LUMBAR VERTEBRA, INITIAL ENCOUNTER: ICD-10-CM

## 2018-04-30 DIAGNOSIS — D36.15: ICD-10-CM

## 2018-04-30 DIAGNOSIS — D64.9 NORMOCYTIC NORMOCHROMIC ANEMIA: ICD-10-CM

## 2018-04-30 DIAGNOSIS — M81.8 OTHER OSTEOPOROSIS WITHOUT CURRENT PATHOLOGICAL FRACTURE: ICD-10-CM

## 2018-04-30 DIAGNOSIS — E87.1 HYPONATREMIA: ICD-10-CM

## 2018-04-30 DIAGNOSIS — Z72.0 TOBACCO ABUSE: ICD-10-CM

## 2018-04-30 DIAGNOSIS — D35.02 PHEOCHROMOCYTOMA, LEFT: ICD-10-CM

## 2018-04-30 DIAGNOSIS — C77.9 CHOLANGIOCARCINOMA METASTATIC TO LYMPH NODE (HCC): ICD-10-CM

## 2018-04-30 DIAGNOSIS — Z00.00 ENCOUNTER FOR ANNUAL HEALTH EXAMINATION: ICD-10-CM

## 2018-04-30 DIAGNOSIS — S22.000A CLOSED COMPRESSION FRACTURE OF THORACIC VERTEBRA, INITIAL ENCOUNTER (HCC): ICD-10-CM

## 2018-04-30 DIAGNOSIS — D17.79 MYELOLIPOMA OF LEFT ADRENAL GLAND: ICD-10-CM

## 2018-04-30 DIAGNOSIS — C22.1 CHOLANGIOCARCINOMA METASTATIC TO LYMPH NODE (HCC): ICD-10-CM

## 2018-04-30 DIAGNOSIS — D69.6 THROMBOCYTOPENIA (HCC): ICD-10-CM

## 2018-04-30 DIAGNOSIS — Z00.00 MEDICARE ANNUAL WELLNESS VISIT, SUBSEQUENT: Primary | ICD-10-CM

## 2018-04-30 DIAGNOSIS — I70.0 AORTIC ATHEROSCLEROSIS (HCC): ICD-10-CM

## 2018-04-30 DIAGNOSIS — R16.0 LIVER MASS, LEFT LOBE: ICD-10-CM

## 2018-04-30 DIAGNOSIS — C22.1 CHOLANGIOCARCINOMA OF BILIARY TRACT (HCC): ICD-10-CM

## 2018-04-30 DIAGNOSIS — Z12.11 SCREEN FOR COLON CANCER: ICD-10-CM

## 2018-04-30 PROBLEM — M54.50 ACUTE BILATERAL LOW BACK PAIN WITHOUT SCIATICA: Status: RESOLVED | Noted: 2017-09-20 | Resolved: 2018-04-30

## 2018-04-30 PROCEDURE — 90732 PPSV23 VACC 2 YRS+ SUBQ/IM: CPT | Performed by: INTERNAL MEDICINE

## 2018-04-30 PROCEDURE — G0439 PPPS, SUBSEQ VISIT: HCPCS | Performed by: INTERNAL MEDICINE

## 2018-04-30 PROCEDURE — G0009 ADMIN PNEUMOCOCCAL VACCINE: HCPCS | Performed by: INTERNAL MEDICINE

## 2018-04-30 PROCEDURE — 99397 PER PM REEVAL EST PAT 65+ YR: CPT | Performed by: INTERNAL MEDICINE

## 2018-04-30 PROCEDURE — 96160 PT-FOCUSED HLTH RISK ASSMT: CPT | Performed by: INTERNAL MEDICINE

## 2018-04-30 NOTE — PROGRESS NOTES
HPI:   Vikram Cedeño is a 79year old male who presents for a MA (Medicare Advantage) [facility] (Once per calendar year). Here for supervisit. Pt has cholangiocarcinoma, he is stable, has intermittent abd pain. No n/v. Weight is stable.    Annual P Team:  Sanya Chin MD as PCP - Harjit Balderas MD (Carlee Lay)  Dianne Marte MD (Carlee Lay)  Ewa Simmons MD (ENDOCRINOLOGY)  Jaiden Parsons MD (Surgical Oncology)  Bola Louis MD (Radiation Oncology)  Roxane Nowak K, Phytonadione, 100 MCG Oral Tab Take 1 tablet by mouth. SELENIUM OR Take 1 tablet by mouth daily. Cyanocobalamin (VITAMIN B 12 OR) Take 50 mcg by mouth daily. IRON OR 1 tablet daily. VITAMIN D-400 OR Take 1 capsule by mouth daily.  With vitamin /70   Pulse 70   Temp 98.3 °F (36.8 °C) (Oral)   Resp 14   Ht 70\"   Wt 148 lb   BMI 21.24 kg/m²   Estimated body mass index is 21.24 kg/m² as calculated from the following:    Height as of this encounter: 70\".     Weight as of this encounter: 148 CONDITIONS:   Micky Ortiz is a 79year old male who presents for a Medicare Assessment.      PLAN SUMMARY:   Diagnoses and all orders for this visit:    Medicare annual wellness visit, subsequent    Pheochromocytoma, left  -s/p surgery, stable  Thrombocy well-being?: Social Interaction;Games; Visiting Friends; Visiting Family    This section provided for quick review of chart, separate sheet to patient  [address] [place],[address] Internal Lab or Procedure External Lab or Procedure   Diab abusers     Tetanus Toxoid  Only covered with a cut with metal- TD and TDaP Not covered by Medicare Part B) No vaccine history found This may be covered with your prescription benefits, but Medicare does not cover unless Medically needed    Zoster   Not co

## 2018-04-30 NOTE — PATIENT INSTRUCTIONS
Jenny Bryan's SCREENING SCHEDULE   Tests on this list are recommended by your physician but may not be covered, or covered at this frequency, by your insurer. Please check with your insurance carrier before scheduling to verify coverage.     PREVENTATI every 10 years- more often if abnormal Colonoscopy,5 Years due on 12/13/2016 Update Health Maintenance if applicable    Flex Sigmoidoscopy Screen  Covered every 5 years No results found for this or any previous visit. No flowsheet data found.      Fecal Occ visit. This may be covered with your prescription benefits, but Medicare does not cover unless Medically needed    Zoster (Not covered by Medicare Part B) No orders found for this or any previous visit.  This may be covered with your pharmacy  prescription

## 2018-05-01 ENCOUNTER — HOSPITAL ENCOUNTER (OUTPATIENT)
Dept: CT IMAGING | Facility: HOSPITAL | Age: 68
Discharge: HOME | End: 2018-05-01
Attending: SPECIALIST
Payer: MEDICARE

## 2018-05-01 DIAGNOSIS — C22.1 CHOLANGIOCARCINOMA OF BILIARY TRACT (HCC): ICD-10-CM

## 2018-05-01 PROCEDURE — 71260 CT THORAX DX C+: CPT | Performed by: SPECIALIST

## 2018-05-01 PROCEDURE — 74177 CT ABD & PELVIS W/CONTRAST: CPT | Performed by: SPECIALIST

## 2018-05-03 ENCOUNTER — OFFICE VISIT (OUTPATIENT)
Dept: HEMATOLOGY/ONCOLOGY | Facility: HOSPITAL | Age: 68
End: 2018-05-03
Attending: SPECIALIST
Payer: MEDICARE

## 2018-05-03 VITALS
SYSTOLIC BLOOD PRESSURE: 160 MMHG | DIASTOLIC BLOOD PRESSURE: 80 MMHG | RESPIRATION RATE: 18 BRPM | HEIGHT: 70 IN | WEIGHT: 148.81 LBS | OXYGEN SATURATION: 97 % | HEART RATE: 94 BPM | BODY MASS INDEX: 21.3 KG/M2 | TEMPERATURE: 97 F

## 2018-05-03 DIAGNOSIS — K86.89 SECONDARY PANCREATIC INSUFFICIENCY: ICD-10-CM

## 2018-05-03 DIAGNOSIS — C22.1 CHOLANGIOCARCINOMA METASTATIC TO LYMPH NODE (HCC): Primary | ICD-10-CM

## 2018-05-03 DIAGNOSIS — C22.1 CHOLANGIOCARCINOMA (HCC): ICD-10-CM

## 2018-05-03 DIAGNOSIS — C77.9 CHOLANGIOCARCINOMA METASTATIC TO LYMPH NODE (HCC): Primary | ICD-10-CM

## 2018-05-03 DIAGNOSIS — C22.1 CHOLANGIOCARCINOMA OF BILIARY TRACT (HCC): ICD-10-CM

## 2018-05-03 PROCEDURE — 99213 OFFICE O/P EST LOW 20 MIN: CPT | Performed by: SPECIALIST

## 2018-05-03 NOTE — PROGRESS NOTES
Patient is here today for follow up with Mary Jo Osborne for cholangiocarcinoma. Patient denies pain. Stated constipation from pancrelipase. Medication list and medical history were reviewed and updated.      Education Record    Learner:  Patient and Spouse

## 2018-05-03 NOTE — PROGRESS NOTES
BRIEF NUTRITION NOTE: oncologist referral     DX: Cholangiocarcinoma of common bile duct, T3N1M0     TX: 01/09/2017 patient began adjuvant chemoradiotherapy with capecitabine. He completed treatment on 02/13/2017; On 08/16/2016 he underwent Whipple, splenec

## 2018-05-12 NOTE — PROGRESS NOTES
[facility] Progress Note      Patient Name: Nikki Montenegro   YOB: 1950  Medical Record Number: [id number]  Attending Physician: Martha Burrows. Tyson Duenas M.D.      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 On 10/13/2016 patient began adjuvant chemotherapy with gemcitabine and capecitabine.  Cycle 1 was 72591 units Oral Cap DR Particles Take 2 tablets by mouth 4 (four) times daily. Take before meals and snacks. Use 2 tablets for meals and 1 tablet for snacks. Disp: 240 capsule Rfl: 3   Ascorbic Acid (VITAMIN C) 100 MG Oral Tab Take 100 mg by mouth daily. normal S1S2. Abdomen Soft; no masses; no hepatosplenomegaly. Extremities          No lower extremity edema. Integumentary      Skin is warm and dry; no rashes. Neurologic           Alert and oriented x 3; motor and sensory grossly intact.   Psychiatri x10(3) uL   Neutrophil % 61.5 %   Lymphocyte % 21.4 %   Monocyte % 13.0 %   Eosinophil % 2.9 %   Basophil % 0.9 %   Immature Granulocyte % 0.3 %     Radiology  CT abdomen/pelvis: I independently visualized the radiologic images in addition to reviewing the

## 2018-05-12 NOTE — PROGRESS NOTES
[facility] Progress Note      Patient Name: Ashkan Mckoy   YOB: 1950  Medical Record Number: [id number]  Attending Physician: Emili Foster. Cassie Lopez M.D.      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 On 10/13/2016 patient began adjuvant chemotherapy with gemcitabine and capecitabine.  Cycle 1 was 1 tablet by mouth daily. Disp:  Rfl:    Cyanocobalamin (VITAMIN B 12 OR) Take 50 mcg by mouth daily. Disp:  Rfl:    IRON OR 1 tablet daily. Disp:  Rfl:    VITAMIN D-400 OR Take 1 capsule by mouth daily.  With vitamin A Disp:  Rfl:    vitamin A 87206 UNITS upper lobe  predominance. biapical ground-glass density measures 3 mm on the right and 4 mm on the left, without significant change. Small,  2 mm, right apical nodule is stable. No new nodule appreciated. MEDIASTINUM:  No mass or adenopathy.   Few small s .    Impression and Plan   1. Extrahepatic cholangiocarcinoma of common bile duct: No clinical, radiographic, or laboratory evidence of recurrent disease. Continue active surveillance.      Planned Follow Up   Patient will return for follow up in 3 months

## 2018-08-02 ENCOUNTER — OFFICE VISIT (OUTPATIENT)
Dept: HEMATOLOGY/ONCOLOGY | Facility: HOSPITAL | Age: 68
End: 2018-08-02
Attending: SPECIALIST
Payer: MEDICARE

## 2018-08-02 VITALS
HEIGHT: 70 IN | HEART RATE: 82 BPM | RESPIRATION RATE: 18 BRPM | BODY MASS INDEX: 21.02 KG/M2 | OXYGEN SATURATION: 99 % | WEIGHT: 146.81 LBS | TEMPERATURE: 97 F | DIASTOLIC BLOOD PRESSURE: 80 MMHG | SYSTOLIC BLOOD PRESSURE: 175 MMHG

## 2018-08-02 DIAGNOSIS — C22.1 CHOLANGIOCARCINOMA OF BILIARY TRACT (HCC): Primary | ICD-10-CM

## 2018-08-02 LAB
ALBUMIN SERPL-MCNC: 3.6 G/DL (ref 3.5–4.8)
ALBUMIN/GLOB SERPL: 0.9 {RATIO} (ref 1–2)
ALP LIVER SERPL-CCNC: 110 U/L (ref 45–117)
ALT SERPL-CCNC: 29 U/L (ref 17–63)
ANION GAP SERPL CALC-SCNC: 6 MMOL/L (ref 0–18)
AST SERPL-CCNC: 25 U/L (ref 15–41)
BASOPHILS # BLD AUTO: 0.05 X10(3) UL (ref 0–0.1)
BASOPHILS NFR BLD AUTO: 0.6 %
BILIRUB SERPL-MCNC: 0.6 MG/DL (ref 0.1–2)
BUN BLD-MCNC: 10 MG/DL (ref 8–20)
BUN/CREAT SERPL: 9.7 (ref 10–20)
CALCIUM BLD-MCNC: 9 MG/DL (ref 8.3–10.3)
CANCER AG19-9 SERPL-ACNC: 11.9 U/ML (ref ?–37)
CHLORIDE SERPL-SCNC: 102 MMOL/L (ref 101–111)
CO2 SERPL-SCNC: 26 MMOL/L (ref 22–32)
CREAT BLD-MCNC: 1.03 MG/DL (ref 0.7–1.3)
EOSINOPHIL # BLD AUTO: 0.38 X10(3) UL (ref 0–0.3)
EOSINOPHIL NFR BLD AUTO: 4.9 %
ERYTHROCYTE [DISTWIDTH] IN BLOOD BY AUTOMATED COUNT: 13.8 % (ref 11.5–16)
GLOBULIN PLAS-MCNC: 4.1 G/DL (ref 2.5–3.7)
GLUCOSE BLD-MCNC: 106 MG/DL (ref 70–99)
HCT VFR BLD AUTO: 38.6 % (ref 37–53)
HGB BLD-MCNC: 13.3 G/DL (ref 13–17)
IMMATURE GRANULOCYTE COUNT: 0.02 X10(3) UL (ref 0–1)
IMMATURE GRANULOCYTE RATIO %: 0.3 %
LYMPHOCYTES # BLD AUTO: 1.73 X10(3) UL (ref 0.9–4)
LYMPHOCYTES NFR BLD AUTO: 22.2 %
M PROTEIN MFR SERPL ELPH: 7.7 G/DL (ref 6.1–8.3)
MCH RBC QN AUTO: 31.6 PG (ref 27–33.2)
MCHC RBC AUTO-ENTMCNC: 34.5 G/DL (ref 31–37)
MCV RBC AUTO: 91.7 FL (ref 80–99)
MONOCYTES # BLD AUTO: 0.86 X10(3) UL (ref 0.1–1)
MONOCYTES NFR BLD AUTO: 11 %
NEUTROPHIL ABS PRELIM: 4.76 X10 (3) UL (ref 1.3–6.7)
NEUTROPHILS # BLD AUTO: 4.76 X10(3) UL (ref 1.3–6.7)
NEUTROPHILS NFR BLD AUTO: 61 %
OSMOLALITY SERPL CALC.SUM OF ELEC: 277 MOSM/KG (ref 275–295)
PLATELET # BLD AUTO: 219 10(3)UL (ref 150–450)
POTASSIUM SERPL-SCNC: 4.3 MMOL/L (ref 3.6–5.1)
RBC # BLD AUTO: 4.21 X10(6)UL (ref 3.8–5.8)
RED CELL DISTRIBUTION WIDTH-SD: 46.6 FL (ref 35.1–46.3)
SODIUM SERPL-SCNC: 134 MMOL/L (ref 136–144)
WBC # BLD AUTO: 7.8 X10(3) UL (ref 4–13)

## 2018-08-02 PROCEDURE — 99213 OFFICE O/P EST LOW 20 MIN: CPT | Performed by: SPECIALIST

## 2018-08-02 NOTE — PROGRESS NOTES
[facility] Progress Note      Patient Name: [facility]   YOB: 1950  Medical Record Number: [id number]  Attending Physician: Rajendra Jackson M.D.      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 On 10/13/2016 patient began adjuvant chemotherapy with gemcitabine and capecitabine.  Cycle 1 was Cyanocobalamin (VITAMIN B 12 OR) Take 50 mcg by mouth daily. Disp:  Rfl:    IRON OR 1 tablet daily. Disp:  Rfl:    VITAMIN D-400 OR Take 1 capsule by mouth daily.  With vitamin A Disp:  Rfl:    vitamin A 43870 UNITS Oral Cap Take 10,000 Units by mouth d Non- 75 >=60   GFR, -American 86 >=60   Calcium, Total 9.0 8.3 - 10.3 mg/dL   Alkaline Phosphatase 110 45 - 117 U/L   AST 25 15 - 41 U/L   Alt 29 17 - 63 U/L   Bilirubin, Total 0.6 0.1 - 2.0 mg/dL   Total Protein 7.7 6.1 - 8.3 g/dL cholangiocarcinoma. Electronically signed by:    ANISH [facility]  Director, Bone and Soft Tissue Sarcoma Program  Section of Hematology/Oncology, [address] [facility]

## 2018-08-02 NOTE — PROGRESS NOTES
Patient is here today for follow up with [employer] for Cholangiocarcinoma biliary tract. Patient stated intermittent abdominal pain \"gas spasms\". Decreased appetite at times. Medication list and medical history were reviewed and updated.      Education R

## 2018-10-18 ENCOUNTER — NURSE ONLY (OUTPATIENT)
Dept: HEMATOLOGY/ONCOLOGY | Facility: HOSPITAL | Age: 68
End: 2018-10-18
Attending: SPECIALIST
Payer: MEDICARE

## 2018-10-18 DIAGNOSIS — M81.8 OTHER OSTEOPOROSIS WITHOUT CURRENT PATHOLOGICAL FRACTURE: ICD-10-CM

## 2018-10-18 PROCEDURE — 36415 COLL VENOUS BLD VENIPUNCTURE: CPT

## 2018-10-18 PROCEDURE — 82306 VITAMIN D 25 HYDROXY: CPT

## 2018-10-18 PROCEDURE — 82310 ASSAY OF CALCIUM: CPT

## 2018-10-18 NOTE — PROGRESS NOTES
Results reviewed and reveal vitamin D deficiency. Patient to start 12 week course of 50,000 IU vitamin D PO once weekly, then transition to a daily maintenance dose of 2,000-5,000 IU vitamin D3 daily.  Recommend Prolia injection be held for 3-4 weeks to all

## 2018-10-27 NOTE — PROGRESS NOTES
[facility] Progress Note      Patient Name: Ry Mcmahon   YOB: 1950  Medical Record Number: [id number]  Attending Physician: Dee Dee Hutchinson. Clark Concepcion M.D.      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 On 10/13/2016 patient began adjuvant chemotherapy with gemcitabine and capecitabine. Cycle 1 was uncomplicated.  Cyc MCG Oral Tab Take 1 tablet by mouth. Disp:  Rfl:    SELENIUM OR Take 1 tablet by mouth daily. Disp:  Rfl:    Cyanocobalamin (VITAMIN B 12 OR) Take 50 mcg by mouth daily. Disp:  Rfl:    IRON OR 1 tablet daily.  Disp:  Rfl:    VITAMIN D-400 OR Take 1 capsul 3.6 - 5.1 mmol/L    Chloride 98 (L) 101 - 111 mmol/L    CO2 26.0 22.0 - 32.0 mmol/L    Anion Gap 8 0 - 18 mmol/L    BUN 10 8 - 20 mg/dL    Creatinine 1.06 0.70 - 1.30 mg/dL    BUN/CREA Ratio 9.4 (L) 10.0 - 20.0    Calcium, Total 8.6 8.3 - 10.3 mg/dL    Neel when measured at a similar level. There are scattered 2-3 mm subpleural nodules in the left upper lobe unchanged. MEDIASTINUM:  Stable mediastinal and hilar lymph nodes. Largest lymph node in the AP window measures 1.6 x 1.0 cm.   CARDIAC:  No pericardia Extrahepatic cholangiocarcinoma of common bile duct: No clinical, radiographic, or laboratory evidence of recurrent disease. Continue active surveillance. Obtain CT imaging studies in 6 months when he returns from [place].      2.   Abdominal bloating: Refe

## 2018-10-30 ENCOUNTER — HOSPITAL ENCOUNTER (OUTPATIENT)
Dept: CT IMAGING | Facility: HOSPITAL | Age: 68
Discharge: HOME | End: 2018-10-30
Attending: SPECIALIST
Payer: MEDICARE

## 2018-10-30 DIAGNOSIS — C22.1 CHOLANGIOCARCINOMA OF BILIARY TRACT (HCC): ICD-10-CM

## 2018-10-30 PROCEDURE — 71260 CT THORAX DX C+: CPT | Performed by: SPECIALIST

## 2018-10-30 PROCEDURE — 74177 CT ABD & PELVIS W/CONTRAST: CPT | Performed by: SPECIALIST

## 2018-10-30 PROCEDURE — 82565 ASSAY OF CREATININE: CPT

## 2018-11-02 ENCOUNTER — SOCIAL WORK SERVICES (OUTPATIENT)
Dept: HEMATOLOGY/ONCOLOGY | Facility: HOSPITAL | Age: 68
End: 2018-11-02

## 2018-11-02 ENCOUNTER — OFFICE VISIT (OUTPATIENT)
Dept: HEMATOLOGY/ONCOLOGY | Facility: HOSPITAL | Age: 68
End: 2018-11-02
Attending: SPECIALIST
Payer: MEDICARE

## 2018-11-02 VITALS
BODY MASS INDEX: 22 KG/M2 | OXYGEN SATURATION: 98 % | WEIGHT: 153 LBS | RESPIRATION RATE: 18 BRPM | SYSTOLIC BLOOD PRESSURE: 166 MMHG | DIASTOLIC BLOOD PRESSURE: 77 MMHG | TEMPERATURE: 96 F | HEART RATE: 78 BPM

## 2018-11-02 DIAGNOSIS — C22.1 CHOLANGIOCARCINOMA OF BILIARY TRACT (HCC): Primary | ICD-10-CM

## 2018-11-02 DIAGNOSIS — C22.1 CHOLANGIOCARCINOMA (HCC): ICD-10-CM

## 2018-11-02 DIAGNOSIS — C77.9 CHOLANGIOCARCINOMA METASTATIC TO LYMPH NODE (HCC): ICD-10-CM

## 2018-11-02 DIAGNOSIS — C22.1 CHOLANGIOCARCINOMA METASTATIC TO LYMPH NODE (HCC): ICD-10-CM

## 2018-11-02 DIAGNOSIS — R14.0 BLOATING: ICD-10-CM

## 2018-11-02 PROCEDURE — 99214 OFFICE O/P EST MOD 30 MIN: CPT | Performed by: SPECIALIST

## 2018-11-02 NOTE — PROGRESS NOTES
Patient is here today for follow up with Osvaldo Godoy for cholangiocarcinoma. Patient stated intermittent pain in abdomen - gas / bloating. Patient feeling good other than gas and bloating. Medication list and medical history were reviewed and updated.

## 2018-11-02 NOTE — PROGRESS NOTES
IBAN completed patients handicapped form for a permanent placard. IBAN mailed to [employer] as requested by patient.

## 2018-11-06 ENCOUNTER — SOCIAL WORK SERVICES (OUTPATIENT)
Dept: HEMATOLOGY/ONCOLOGY | Facility: HOSPITAL | Age: 68
End: 2018-11-06

## 2018-11-12 ENCOUNTER — NURSE ONLY (OUTPATIENT)
Dept: HEMATOLOGY/ONCOLOGY | Facility: HOSPITAL | Age: 68
End: 2018-11-12
Attending: SPECIALIST
Payer: MEDICARE

## 2018-11-12 PROCEDURE — 83970 ASSAY OF PARATHORMONE: CPT | Performed by: PHYSICIAN ASSISTANT

## 2018-11-12 PROCEDURE — 82306 VITAMIN D 25 HYDROXY: CPT | Performed by: PHYSICIAN ASSISTANT

## 2018-11-12 PROCEDURE — 36415 COLL VENOUS BLD VENIPUNCTURE: CPT

## 2018-11-12 PROCEDURE — 36415 COLL VENOUS BLD VENIPUNCTURE: CPT | Performed by: PHYSICIAN ASSISTANT

## 2018-12-05 ENCOUNTER — LAB ENCOUNTER (OUTPATIENT)
Dept: LAB | Facility: HOSPITAL | Age: 68
End: 2018-12-05
Attending: INTERNAL MEDICINE
Payer: MEDICARE

## 2018-12-05 DIAGNOSIS — K92.89 GAS BLOAT SYNDROME: ICD-10-CM

## 2018-12-05 PROCEDURE — 83516 IMMUNOASSAY NONANTIBODY: CPT

## 2018-12-05 PROCEDURE — 82784 ASSAY IGA/IGD/IGG/IGM EACH: CPT

## 2018-12-06 ENCOUNTER — LAB ENCOUNTER (OUTPATIENT)
Dept: LAB | Facility: HOSPITAL | Age: 68
End: 2018-12-06
Attending: INTERNAL MEDICINE
Payer: MEDICARE

## 2018-12-06 DIAGNOSIS — K92.89 GAS BLOAT SYNDROME: ICD-10-CM

## 2018-12-06 PROCEDURE — 82656 EL-1 FECAL QUAL/SEMIQ: CPT

## 2018-12-16 NOTE — PROGRESS NOTES
Date: 2018    To: Cammiegarrison Lo  : 1950    I hope this letter finds you doing well. I am writing to inform you of the following:          The results of your recent lab tests showed Severe exocrine pancreatic insufficiency       Please call

## 2019-01-01 ENCOUNTER — EXTERNAL RECORD (OUTPATIENT)
Dept: HEALTH INFORMATION MANAGEMENT | Facility: OTHER | Age: 69
End: 2019-01-01

## 2019-01-01 NOTE — PROGRESS NOTES
[facility] Radiation Treatment Management Note 26-30    Patient:  Cammie Lo  Age:  77year old  Visit Diagnosis:      1.  Cholangiocarcinoma of biliary tract ([address]      Primary Rad/Onc:  Dr. Ocampo Prim    Site Delivered Dose (Gy)
5498

## 2019-01-03 PROBLEM — K86.81 EXOCRINE PANCREATIC INSUFFICIENCY (HCC): Status: ACTIVE | Noted: 2019-01-03

## 2019-01-03 PROBLEM — K86.81 EXOCRINE PANCREATIC INSUFFICIENCY: Status: ACTIVE | Noted: 2019-01-03

## 2019-04-02 ENCOUNTER — TELEPHONE (OUTPATIENT)
Dept: HEMATOLOGY/ONCOLOGY | Facility: HOSPITAL | Age: 69
End: 2019-04-02

## 2019-04-02 NOTE — TELEPHONE ENCOUNTER
Spoke with Dr. Kayce Calvin. Patient to see Gastroenterology. Patient to bring CT reports. (does not have CD's). Patient aware. Patient wife called has flight home tonight. Telephone call to 's office. Will expedite appointment for patient.  Will

## 2019-04-02 NOTE — TELEPHONE ENCOUNTER
Spoke with patient wife. Instructed patient has an appointment tomorrow at 820am with Shaina Short. Wife will inform patient. If patient cannot wait till morning may go to the ER. Wife [facility] will relay message to patient.

## 2019-04-03 ENCOUNTER — LAB ENCOUNTER (OUTPATIENT)
Dept: LAB | Facility: HOSPITAL | Age: 69
End: 2019-04-03
Attending: INTERNAL MEDICINE
Payer: MEDICARE

## 2019-04-03 ENCOUNTER — HOSPITAL ENCOUNTER (OUTPATIENT)
Dept: CT IMAGING | Facility: HOSPITAL | Age: 69
Discharge: HOME | End: 2019-04-03
Attending: INTERNAL MEDICINE
Payer: MEDICARE

## 2019-04-03 DIAGNOSIS — R10.84 GENERALIZED ABDOMINAL PAIN: ICD-10-CM

## 2019-04-03 PROCEDURE — 36415 COLL VENOUS BLD VENIPUNCTURE: CPT

## 2019-04-03 PROCEDURE — 74177 CT ABD & PELVIS W/CONTRAST: CPT | Performed by: INTERNAL MEDICINE

## 2019-04-03 PROCEDURE — 85025 COMPLETE CBC W/AUTO DIFF WBC: CPT

## 2019-04-03 PROCEDURE — 82565 ASSAY OF CREATININE: CPT

## 2019-04-03 NOTE — IMAGING NOTE
Pt with hx of pheochromocytoma dx in 2016. Pt states it was removed along with 4 other tumors in 8/16. I spoke with Dr Aly Doll about his history. In addition, last three BP recorded in Epic were elevated.  Order received to hold glucagon from the enterograph

## 2019-04-19 ENCOUNTER — OFFICE VISIT (OUTPATIENT)
Dept: HEMATOLOGY/ONCOLOGY | Facility: HOSPITAL | Age: 69
End: 2019-04-19
Attending: SPECIALIST
Payer: MEDICARE

## 2019-04-19 ENCOUNTER — TELEPHONE (OUTPATIENT)
Dept: HEMATOLOGY/ONCOLOGY | Facility: HOSPITAL | Age: 69
End: 2019-04-19

## 2019-04-19 VITALS
BODY MASS INDEX: 22 KG/M2 | SYSTOLIC BLOOD PRESSURE: 161 MMHG | OXYGEN SATURATION: 99 % | RESPIRATION RATE: 18 BRPM | HEART RATE: 85 BPM | WEIGHT: 152 LBS | TEMPERATURE: 98 F | DIASTOLIC BLOOD PRESSURE: 95 MMHG

## 2019-04-19 DIAGNOSIS — E87.1 HYPONATREMIA: ICD-10-CM

## 2019-04-19 DIAGNOSIS — C22.1 CHOLANGIOCARCINOMA (HCC): ICD-10-CM

## 2019-04-19 DIAGNOSIS — R14.0 BLOATING: ICD-10-CM

## 2019-04-19 DIAGNOSIS — C22.1 CHOLANGIOCARCINOMA METASTATIC TO LYMPH NODE (HCC): ICD-10-CM

## 2019-04-19 DIAGNOSIS — C22.1 CHOLANGIOCARCINOMA OF BILIARY TRACT (HCC): Primary | ICD-10-CM

## 2019-04-19 DIAGNOSIS — C77.9 CHOLANGIOCARCINOMA METASTATIC TO LYMPH NODE (HCC): ICD-10-CM

## 2019-04-19 DIAGNOSIS — C77.2 SECONDARY MALIGNANT NEOPLASM OF INTRA-ABDOMINAL LYMPH NODES (HCC): ICD-10-CM

## 2019-04-19 PROCEDURE — 99215 OFFICE O/P EST HI 40 MIN: CPT | Performed by: SPECIALIST

## 2019-04-19 NOTE — TELEPHONE ENCOUNTER
MD Esperanza Loredo, TOREY             Let him know that his tumor marker is normal but is now 13 when it was 5. Probably means nothing but I want him to come back and see me in 3 months instead of 6 months.      Also his sodium level is l

## 2019-04-19 NOTE — PROGRESS NOTES
Patient is here today for follow up with Becky Dave for Cholangiocarcinoma. Patient stated pain in his abdomen cramping. Seen by Dr. Uyen Joshua. Patient is currently on [employer]. Medication list and medical history were reviewed and updated.      Education Rec

## 2019-04-22 ENCOUNTER — TELEPHONE (OUTPATIENT)
Dept: HEMATOLOGY/ONCOLOGY | Facility: HOSPITAL | Age: 69
End: 2019-04-22

## 2019-04-22 ENCOUNTER — NURSE ONLY (OUTPATIENT)
Dept: HEMATOLOGY/ONCOLOGY | Facility: HOSPITAL | Age: 69
End: 2019-04-22
Attending: SPECIALIST
Payer: MEDICARE

## 2019-04-22 DIAGNOSIS — E87.1 HYPONATREMIA: Primary | ICD-10-CM

## 2019-04-22 PROCEDURE — 84300 ASSAY OF URINE SODIUM: CPT | Performed by: SPECIALIST

## 2019-04-22 PROCEDURE — 36415 COLL VENOUS BLD VENIPUNCTURE: CPT

## 2019-04-23 ENCOUNTER — TELEPHONE (OUTPATIENT)
Dept: SURGERY | Facility: CLINIC | Age: 69
End: 2019-04-23

## 2019-04-23 NOTE — TELEPHONE ENCOUNTER
Call made to patient for updated information as to why he has appointment with Dr. Nilsa Marie. Patient is complaining of persistent abdominal pain and spasms.  Reports he is followed by his GI MD but these symptoms have not resolved and he would like Dr. Carlos Enrique Villasenor

## 2019-04-24 ENCOUNTER — OFFICE VISIT (OUTPATIENT)
Dept: SURGERY | Facility: CLINIC | Age: 69
End: 2019-04-24
Payer: COMMERCIAL

## 2019-04-24 ENCOUNTER — OFFICE VISIT (OUTPATIENT)
Dept: HEMATOLOGY/ONCOLOGY | Facility: HOSPITAL | Age: 69
End: 2019-04-24
Attending: SPECIALIST
Payer: MEDICARE

## 2019-04-24 VITALS
OXYGEN SATURATION: 98 % | BODY MASS INDEX: 21.93 KG/M2 | HEART RATE: 89 BPM | WEIGHT: 153.19 LBS | RESPIRATION RATE: 18 BRPM | DIASTOLIC BLOOD PRESSURE: 92 MMHG | SYSTOLIC BLOOD PRESSURE: 162 MMHG | HEIGHT: 70 IN

## 2019-04-24 DIAGNOSIS — C22.1 CHOLANGIOCARCINOMA OF BILIARY TRACT (HCC): Primary | ICD-10-CM

## 2019-04-24 DIAGNOSIS — R10.10 PAIN OF UPPER ABDOMEN: ICD-10-CM

## 2019-04-24 DIAGNOSIS — D35.02 PHEOCHROMOCYTOMA, LEFT: ICD-10-CM

## 2019-04-24 PROCEDURE — 99213 OFFICE O/P EST LOW 20 MIN: CPT | Performed by: SURGERY

## 2019-04-25 PROBLEM — R10.9 UNSPECIFIED ABDOMINAL PAIN: Status: ACTIVE | Noted: 2019-04-25

## 2019-04-25 NOTE — PROGRESS NOTES
[facility] Surgical Oncology    Patient Name:  Zheng Gasca   YOB: 1950   Gender:  Male   Appt Date:  4/24/2019   Provider:  Antoni Whelan MD   Insurance:  Darby Goldberg, MD   Address: [employer] prior to meals.      Vital Signs:  BP (!) 162/92 (BP Location: Left arm, Patient Position: Sitting, Cuff Size: adult)   Pulse 89   Resp 18   Ht 1.778 m (5' 10\")   Wt 69.5 kg (153 lb 3.2 oz)   SpO2 98%   BMI 21.98 kg/m²      Medications Reviewed: right   • Pancreatic cancer [facility])     chloangiocarcinoma   • Personal history of antineoplastic chemotherapy     Completed chemo 2/17/17   • Pheochromocytoma, left     Dx in 6/2016: 1.7 cm mass near inferior adrenal gland      Reviewed:  Past Surgical Hist loss/gain  · PSYCH: no mood changes         Physical Examination:  Constitutional: General Appearance: healthy-appearing, well-nourished, and well-developed. Level of Distress: NAD. Eyes: Sclerae: non-icteric. Neck: Neck: supple.    Lymph Nodes: Lymph N

## 2019-04-26 ENCOUNTER — APPOINTMENT (OUTPATIENT)
Dept: CT IMAGING | Facility: HOSPITAL | Age: 69
End: 2019-04-26
Attending: EMERGENCY MEDICINE
Payer: MEDICARE

## 2019-04-26 ENCOUNTER — HOSPITAL ENCOUNTER (OUTPATIENT)
Facility: HOSPITAL | Age: 69
Setting detail: OBSERVATION
Discharge: LEFT BEFORE DISCHARGE | End: 2019-04-28
Attending: EMERGENCY MEDICINE | Admitting: HOSPITALIST
Payer: MEDICARE

## 2019-04-26 DIAGNOSIS — R10.9 INTRACTABLE ABDOMINAL PAIN: Primary | ICD-10-CM

## 2019-04-26 PROBLEM — E87.6 HYPOKALEMIA: Status: ACTIVE | Noted: 2019-04-26

## 2019-04-26 PROBLEM — R73.9 HYPERGLYCEMIA: Status: ACTIVE | Noted: 2019-04-26

## 2019-04-26 PROCEDURE — 74177 CT ABD & PELVIS W/CONTRAST: CPT | Performed by: EMERGENCY MEDICINE

## 2019-04-26 PROCEDURE — 99220 INITIAL OBSERVATION CARE,LEVL III: CPT | Performed by: HOSPITALIST

## 2019-04-26 RX ORDER — ONDANSETRON 2 MG/ML
4 INJECTION INTRAMUSCULAR; INTRAVENOUS ONCE
Status: COMPLETED | OUTPATIENT
Start: 2019-04-26 | End: 2019-04-26

## 2019-04-26 RX ORDER — HYDROMORPHONE HYDROCHLORIDE 1 MG/ML
1 INJECTION, SOLUTION INTRAMUSCULAR; INTRAVENOUS; SUBCUTANEOUS ONCE
Status: DISCONTINUED | OUTPATIENT
Start: 2019-04-26 | End: 2019-04-28

## 2019-04-26 RX ORDER — HYDROMORPHONE HYDROCHLORIDE 1 MG/ML
1 INJECTION, SOLUTION INTRAMUSCULAR; INTRAVENOUS; SUBCUTANEOUS ONCE
Status: COMPLETED | OUTPATIENT
Start: 2019-04-26 | End: 2019-04-26

## 2019-04-26 RX ORDER — SODIUM CHLORIDE 9 MG/ML
1000 INJECTION, SOLUTION INTRAVENOUS ONCE
Status: COMPLETED | OUTPATIENT
Start: 2019-04-26 | End: 2019-04-26

## 2019-04-26 RX ORDER — DICYCLOMINE HYDROCHLORIDE 10 MG/ML
20 INJECTION INTRAMUSCULAR ONCE
Status: COMPLETED | OUTPATIENT
Start: 2019-04-26 | End: 2019-04-26

## 2019-04-26 RX ORDER — SODIUM CHLORIDE 9 MG/ML
1000 INJECTION, SOLUTION INTRAVENOUS ONCE
Status: COMPLETED | OUTPATIENT
Start: 2019-04-26 | End: 2019-04-27

## 2019-04-26 NOTE — ED INITIAL ASSESSMENT (HPI)
Patient with c/o abdominal pain after endoscopy yesterday. Patient states he has been having chronic abdominal pain for years but this is more severe and constant. Patient denies bloating but c/o lack of appetite.

## 2019-04-27 PROCEDURE — 99225 SUBSEQUENT OBSERVATION CARE: CPT | Performed by: STUDENT IN AN ORGANIZED HEALTH CARE EDUCATION/TRAINING PROGRAM

## 2019-04-27 RX ORDER — SODIUM CHLORIDE 9 MG/ML
INJECTION, SOLUTION INTRAVENOUS CONTINUOUS
Status: DISCONTINUED | OUTPATIENT
Start: 2019-04-27 | End: 2019-04-27

## 2019-04-27 RX ORDER — ONDANSETRON 2 MG/ML
4 INJECTION INTRAMUSCULAR; INTRAVENOUS EVERY 4 HOURS PRN
Status: DISCONTINUED | OUTPATIENT
Start: 2019-04-27 | End: 2019-04-28

## 2019-04-27 RX ORDER — HYOSCYAMINE SULFATE 0.125 MG
0.12 TABLET,DISINTEGRATING ORAL 3 TIMES DAILY PRN
Status: DISCONTINUED | OUTPATIENT
Start: 2019-04-27 | End: 2019-04-28

## 2019-04-27 RX ORDER — MORPHINE SULFATE 4 MG/ML
1 INJECTION, SOLUTION INTRAMUSCULAR; INTRAVENOUS EVERY 2 HOUR PRN
Status: DISCONTINUED | OUTPATIENT
Start: 2019-04-27 | End: 2019-04-28

## 2019-04-27 RX ORDER — PANTOPRAZOLE SODIUM 40 MG/1
40 TABLET, DELAYED RELEASE ORAL
Status: DISCONTINUED | OUTPATIENT
Start: 2019-04-27 | End: 2019-04-27

## 2019-04-27 RX ORDER — POTASSIUM CHLORIDE 20 MEQ/1
40 TABLET, EXTENDED RELEASE ORAL EVERY 4 HOURS
Status: COMPLETED | OUTPATIENT
Start: 2019-04-27 | End: 2019-04-27

## 2019-04-27 RX ORDER — HYDROMORPHONE HYDROCHLORIDE 1 MG/ML
0.5 INJECTION, SOLUTION INTRAMUSCULAR; INTRAVENOUS; SUBCUTANEOUS EVERY 2 HOUR PRN
Status: ACTIVE | OUTPATIENT
Start: 2019-04-27 | End: 2019-04-27

## 2019-04-27 RX ORDER — MORPHINE SULFATE 4 MG/ML
2 INJECTION, SOLUTION INTRAMUSCULAR; INTRAVENOUS EVERY 2 HOUR PRN
Status: DISCONTINUED | OUTPATIENT
Start: 2019-04-27 | End: 2019-04-27

## 2019-04-27 RX ORDER — HYDRALAZINE HYDROCHLORIDE 20 MG/ML
10 INJECTION INTRAMUSCULAR; INTRAVENOUS EVERY 4 HOURS PRN
Status: DISCONTINUED | OUTPATIENT
Start: 2019-04-27 | End: 2019-04-28

## 2019-04-27 RX ORDER — SODIUM CHLORIDE 9 MG/ML
INJECTION, SOLUTION INTRAVENOUS CONTINUOUS
Status: DISCONTINUED | OUTPATIENT
Start: 2019-04-27 | End: 2019-04-28

## 2019-04-27 RX ORDER — HYDROCODONE BITARTRATE AND ACETAMINOPHEN 5; 325 MG/1; MG/1
1 TABLET ORAL EVERY 6 HOURS PRN
Status: DISCONTINUED | OUTPATIENT
Start: 2019-04-27 | End: 2019-04-28

## 2019-04-27 RX ORDER — ONDANSETRON 2 MG/ML
4 INJECTION INTRAMUSCULAR; INTRAVENOUS EVERY 6 HOURS PRN
Status: DISCONTINUED | OUTPATIENT
Start: 2019-04-27 | End: 2019-04-28

## 2019-04-27 RX ORDER — METOCLOPRAMIDE HYDROCHLORIDE 5 MG/ML
10 INJECTION INTRAMUSCULAR; INTRAVENOUS EVERY 8 HOURS PRN
Status: DISCONTINUED | OUTPATIENT
Start: 2019-04-27 | End: 2019-04-28

## 2019-04-27 RX ORDER — ENOXAPARIN SODIUM 100 MG/ML
40 INJECTION SUBCUTANEOUS DAILY
Status: DISCONTINUED | OUTPATIENT
Start: 2019-04-27 | End: 2019-04-28

## 2019-04-27 RX ORDER — SODIUM CHLORIDE 9 MG/ML
INJECTION, SOLUTION INTRAVENOUS CONTINUOUS
Status: ACTIVE | OUTPATIENT
Start: 2019-04-27 | End: 2019-04-27

## 2019-04-27 NOTE — PLAN OF CARE
NURSING ADMISSION NOTE      Patient admitted via Cart  Oriented to room. Safety precautions initiated. Bed in low position. Call light in reach. Alert and oriented X 4, chronic abd pain, more severe after EGD.  Pt rated pain at 3 on a scale of 1-10

## 2019-04-27 NOTE — ED PROVIDER NOTES
Patient Seen in: [facility] Emergency Department    History   Patient presents with:  Abdomen/Flank Pain (GI/)    Stated Complaint: abd pain    HPI    Patient is a 58-year-old male who presents emergency room with a history of abdominal pain after 7/25/2016    Performed by Nikolas Jung MD at [address] [place] [address] (EUS) N/A 6/23/2016    Performed by Nikolas Jung MD at [facility] ENDOSCOPY   • ESOPHAGOGASTRODUODENOSCOPY (EGD) N/A 6/23/2016    Performed by Nikolas Jung MD at membranes are moist.  NECK: There is no focal tenderness to palpation appreciated. There is no JVD. No meningeal signs or nuchal rigidity appreciated. No stridor. LUNGS: Clear to auscultation bilaterally with no wheeze.  There is good equal air entry bilat -----------         ------                     CBC W/ DIFFERENTIAL[[id number]]          Abnormal            Final result                 Please view results for these tests on the individual orders.      EKG    Rate, intervals and axes as noted on specified.       Medications Prescribed:  Current Discharge Medication List        Present on Admission  Date Reviewed: 4/25/2019          ICD-10-CM Noted POA    Hyperglycemia R73.9 4/26/2019 Yes    Hypokalemia E87.6 4/26/2019 Yes    Intractable abdominal p

## 2019-04-27 NOTE — CONSULTS
[facility]                       Gastroenterology [address] [facility] Gastroenterology    Eulalia Garduno Patient Status:  Observation    1950 MRN [id number]   [facility] 4NW-A Attending Ventura Cates MD   [facility] Rolando Delgado MD at [facility] ENDOSCOPY   • ESOPHAGOGASTRODUODENOSCOPY (EGD) N/A 6/23/2016    Performed by Rolando Delgado MD at [address] [place] [address]      over 35 yrs ago   • OTHER      right index finger surgery   • OTHER SURGICAL HISTORY  6/2 (OMNIPAQUE) 350 MG/ML injection 81 mL 81 mL Intravenous ONCE PRN   HYDROmorphone HCl (DILAUDID) 1 MG/ML injection 1 mg 1 mg Intravenous Once   [COMPLETED] 0.9%  NaCl infusion 1,000 mL Intravenous Once   [COMPLETED] Pantoprazole Sodium (PROTONIX) 40 mg in S stroke, or frequent headaches    PE: BP (!) 167/92 (BP Location: Left arm)   Pulse 84   Temp 98.3 °F (36.8 °C) (Oral)   Resp 17   Ht 70\"   Wt 152 lb   SpO2 97%   BMI 21.81 kg/m²   Gen: AAO x 3, able to speak in complete sentences  HENT: NCAT, EOMI, PERRL, There is wall thickening of the 2nd and 3rd portions of the duodenum. This may be reactive. Differential includes duodenitis. Stable postsurgical changes of Whipple procedure.      Stable fluid-filled scattered small bowel loops throughout the abdomen

## 2019-04-27 NOTE — ED NOTES
Patient requesting pain medication rating pain a 3-4/10 but then stated he would rather wait and would call when he needed them.

## 2019-04-27 NOTE — PROGRESS NOTES
[facility]  Progress Note     Mg Serrano Patient Status:  Observation    1950 MRN [id number]   [facility] 4NW-A Attending Migdalia Wilde MD   Hosp Day # 0 PCP Татьяна Valle MD     Chief Complaint: Abdominal pain     S: P Creatinine Clearance: 72.5 mL/min (based on SCr of 0.95 mg/dL). Recent Labs   Lab 04/26/19 1817   PTP 13.2   INR 0.96       Recent Labs   Lab 04/26/19 1817   TROP <0.045            Imaging: Imaging data reviewed in Epic.     Medications:   • pancrelipa

## 2019-04-27 NOTE — PROGRESS NOTES
0888-- rounded;new orders written;explained to pt.  New orders;  0930--spouse at bedside;pt.stated \"im leaving with or without permission;they don't know and can't treat me for my ulcers\";tried to calm pt.and explained what doctor Wendy Finch just o

## 2019-04-27 NOTE — H&P
[facility]  History and Physical     Eulalia Garduno Patient Status:  Emergency    1950 MRN [id number]   Location [address] [facility] Attending Rose Mary Duffy MD   Hosp Day # 0 PCP Shalonda Oconnell MD     Chief Complaint 12/13/11    Dr. Sofia Higginbotham repeat 5 yrs   • ENDOSCOPIC RETROGRADE CHOLANGIOPANCREATOGRAPHY (ERCP) N/A 8/8/2016    Performed by Severo Posey, MD at [address] [facility] (ERCP) N/A 7/25/2016    Performed by Kya Barnes Take 1 capsule (40 mg total) by mouth daily. Disp: 90 capsule Rfl: 3   Hyoscyamine Sulfate 0.125 MG Oral Tab Take 1 tablet (125 mcg total) by mouth 3 (three) times daily as needed for Cramping.  Disp: 90 tablet Rfl: 1   ERGOCALCIFEROL 18636 units Oral Cap T deformity. Abdomen: Soft, TTP in epigastric and umbilical regions, nondistended. Positive bowel sounds. No rebound, guarding or organomegaly. Neurologic: No focal neurological deficits. CNII-XII grossly intact. Musculoskeletal: Moves all extremities.

## 2019-04-28 VITALS
BODY MASS INDEX: 20.36 KG/M2 | TEMPERATURE: 98 F | SYSTOLIC BLOOD PRESSURE: 150 MMHG | DIASTOLIC BLOOD PRESSURE: 116 MMHG | OXYGEN SATURATION: 96 % | HEIGHT: 70 IN | WEIGHT: 142.19 LBS | RESPIRATION RATE: 18 BRPM | HEART RATE: 94 BPM

## 2019-04-28 PROCEDURE — 99217 OBSERVATION CARE DISCHARGE: CPT | Performed by: STUDENT IN AN ORGANIZED HEALTH CARE EDUCATION/TRAINING PROGRAM

## 2019-04-28 NOTE — PROGRESS NOTES
[facility] Progress Note      Patient Name: Kory David   YOB: 1950  Medical Record Number: [id number]  Attending Physician: Katherine Potter. Kayce Calvin M.D.      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 On 10/13/2016 patient began adjuvant chemotherapy with gemcitabine and capecitabine. Cycle 1 was uncomplicated.  Cyc Take 100 mg by mouth daily. Disp:  Rfl:    Zinc 50 MG Oral Cap Take by mouth daily. Disp:  Rfl:    Vitamin K, Phytonadione, 100 MCG Oral Tab Take 1 tablet by mouth. Disp:  Rfl:    SELENIUM OR Take 1 tablet by mouth daily.  Disp:  Rfl:    Cyanocobalamin (VIT 04/19/19 10:37 AM   Result Value Ref Range    Glucose 80 70 - 99 mg/dL    Sodium 128 (L) 136 - 145 mmol/L    Potassium 4.4 3.5 - 5.1 mmol/L    Chloride 98 98 - 112 mmol/L    CO2 25.0 21.0 - 32.0 mmol/L    Anion Gap 5 0 - 18 mmol/L    BUN 12 7 - 18 mg/dL BOWEL/MESENTERY:  The appendix is normal.  The pancreatic 0 enteric anastomosis is identified and does not appear unusual.  The small bowel loops are fluid-filled part of the protocol. No obstruction.   There is no definite evidence of obstruction or infla

## 2019-04-28 NOTE — PLAN OF CARE
Pt voiced \"feels better and wants to eat, can not wait for test\" (MRCP). stated' No one is giving him answers\" Pt educated on the importance of the procedure and pt states understanding yet still insisting AMA. AMA papers signed and MD notified.

## 2019-04-28 NOTE — DISCHARGE SUMMARY
[facility]PITAL[facility]  DISCHARGE SUMMARY     Gilbert Simpson Patient Status:  Observation    1950 MRN [id number]   [facility] 4NW-A Attending No att. providers found   Hosp Day # 0 PCP Humble Anand MD     Date of Admission: 2019 readmission after discharge from the hospital.    Procedures during hospitalization:   • CT A/P    Incidental or significant findings and recommendations (brief descriptions):  • As above    Lab/Test results pending at Discharge:   · no    Consultants:  • Next 30 Days 4/28/2019 - 5/28/2019    None          Vital signs:  Temp:  [98.2 °F (36.8 °C)-98.6 °F (37 °C)] 98.2 °F (36.8 °C)  Pulse:  [89-94] 94  Resp:  [18] 18  BP: (150-164)/() 150/116    Physical Exam:    Patient left AMA prior to rounding on hi

## 2019-04-29 ENCOUNTER — PATIENT OUTREACH (OUTPATIENT)
Dept: CASE MANAGEMENT | Age: 69
End: 2019-04-29

## 2019-04-29 ENCOUNTER — TELEPHONE (OUTPATIENT)
Dept: INTERNAL MEDICINE CLINIC | Facility: CLINIC | Age: 69
End: 2019-04-29

## 2019-04-29 DIAGNOSIS — Z02.9 ENCOUNTERS FOR UNSPECIFIED ADMINISTRATIVE PURPOSE: ICD-10-CM

## 2019-04-29 NOTE — PROGRESS NOTES
NUTRITION CONSULT AS PER SURGICAL ONCOLOGIST    DX: Cholangiocarcinoma of common bile duct, T3N1M0      TX: 01/09/2017 patient began adjuvant chemoradiotherapy with capecitabine. He completed treatment on 02/13/2017; On 08/16/2016 he underwent Whipple, sple

## 2019-04-29 NOTE — TELEPHONE ENCOUNTER
Patient was at [facility] with intractable abdominal pain, patient left AMA on 4/18/19. [facility] attempted to schedule a TCM HFU, patient states he will call himself to schedule.      Condition update: , Patient with history of Cholangiocarcinoma, S/P Whipp

## 2019-04-29 NOTE — PROGRESS NOTES
Initial Post Discharge Follow Up   Discharge Date: 4/28/19  Contact Date: 4/29/2019    Consent Verification:  Assessment Completed With: Patient  HIPAA Verified?   Yes    Discharge Dx:    Abdominal pain, epigastric area  HX: cholangiocarcinoma, s/P Whipp diagnoses? He would like to get to the bottom of what is causing his pain. • Are you able to perform normal daily activities of living as you have prior to your hospital stay? no  o If No, What are some barriers or concerns?   When he is in pain, he states anything? yes  • Are there any reasons that keep you from taking your medication as prescribed? No  Are you having any concerns with constipation? No    Referrals/orders at D/C:  Home Health ordered at D/C? No     DME ordered at D/C? No     Services ordered provided you with a copy of the order, you must bring this order with you to  the oral contrast and drinking instructions.  Please plan on picking up oral contrast no later than the day before your exam as you will be drinking contrast at home prior Reviewed/scheduled/rescheduled PCP TCM/HFU appointment with pt:  Yes, NCM attempted to schedule a TCM HFU, patient states he will call himself to schedule.        Have you made all of your follow up appointments? no    Is there any reason as to why you dmitriy

## 2019-05-01 ENCOUNTER — TELEPHONE (OUTPATIENT)
Dept: HEMATOLOGY/ONCOLOGY | Facility: HOSPITAL | Age: 69
End: 2019-05-01

## 2019-05-01 ENCOUNTER — OFFICE VISIT (OUTPATIENT)
Dept: HEMATOLOGY/ONCOLOGY | Facility: HOSPITAL | Age: 69
End: 2019-05-01
Attending: SPECIALIST
Payer: MEDICARE

## 2019-05-01 VITALS
HEIGHT: 70 IN | BODY MASS INDEX: 21.28 KG/M2 | SYSTOLIC BLOOD PRESSURE: 167 MMHG | OXYGEN SATURATION: 99 % | DIASTOLIC BLOOD PRESSURE: 78 MMHG | RESPIRATION RATE: 18 BRPM | WEIGHT: 148.63 LBS | TEMPERATURE: 98 F | HEART RATE: 87 BPM

## 2019-05-01 DIAGNOSIS — C22.1 CHOLANGIOCARCINOMA OF BILIARY TRACT (HCC): Primary | ICD-10-CM

## 2019-05-01 DIAGNOSIS — E87.1 HYPONATREMIA: ICD-10-CM

## 2019-05-01 PROCEDURE — 99213 OFFICE O/P EST LOW 20 MIN: CPT | Performed by: CLINICAL NURSE SPECIALIST

## 2019-05-01 NOTE — PROGRESS NOTES
[facility] Progress Note    Patient Name: Brayden Ruelas   YOB: 1950   Medical Record Number: [id number]   CSN: [id number]   Date of visit: 5/1/2019   Provider: JEWEL Banda  Referring Physician: No ref.  provider found    Proble paraaortic lymph node measuring 1.7 cm, a left adrenal gland mass measuring 1.8 cm, dilated pancreatic duct, dilated common hepatic duct. A plastic stent was placed.  Fine needle aspiration of paraaortic lymph node was negative for malignancy and fine needl pain.  He was found to have duodenitis on CT. He was placed on PPI. He presents today for an acute visit. Reported cramping in the hands, feet. He had episode of tremors and lightheadedness. All symptoms resolved after optimizing his oral intake.   He 10,000 Units by mouth daily. , Disp: , Rfl:   •  Multiple Vitamins-Minerals (TAB-A-ADRIA MAXIMUM) Oral Tab, Take 1 tablet by mouth daily. , Disp: , Rfl:   •  vitamin E 400 UNITS Oral Cap, Take 400 Units by mouth daily. , Disp: , Rfl:     Review of Systems:   A

## 2019-05-01 NOTE — PROGRESS NOTES
Patient presents with: Follow - Up: APN assessment; sick call    Pt is here for a sick call; pt reports he had 1 episode of foot/leg cramping; intermittent finger tingling on both hands; and states he is dizzy & light headed at times.  Pt is concerned abou

## 2019-05-01 NOTE — TELEPHONE ENCOUNTER
Per  patient to see APN for acute care visit and labs. Will see patient at 1pm today. PSR notified. Apn Notified.

## 2019-05-25 ENCOUNTER — HOSPITAL ENCOUNTER (OUTPATIENT)
Dept: MRI IMAGING | Facility: HOSPITAL | Age: 69
Discharge: HOME | End: 2019-05-25
Attending: INTERNAL MEDICINE
Payer: MEDICARE

## 2019-05-25 DIAGNOSIS — C22.1 CHOLANGIOCARCINOMA (HCC): ICD-10-CM

## 2019-05-25 DIAGNOSIS — R10.9 ABDOMINAL PAIN, UNSPECIFIED ABDOMINAL LOCATION: ICD-10-CM

## 2019-05-25 PROCEDURE — 76376 3D RENDER W/INTRP POSTPROCES: CPT | Performed by: INTERNAL MEDICINE

## 2019-05-25 PROCEDURE — 74181 MRI ABDOMEN W/O CONTRAST: CPT | Performed by: INTERNAL MEDICINE

## 2019-06-24 ENCOUNTER — APPOINTMENT (OUTPATIENT)
Dept: GENERAL RADIOLOGY | Facility: HOSPITAL | Age: 69
DRG: 233 | End: 2019-06-24
Attending: EMERGENCY MEDICINE
Payer: MEDICARE

## 2019-06-24 ENCOUNTER — TELEPHONE (OUTPATIENT)
Dept: INTERNAL MEDICINE CLINIC | Facility: CLINIC | Age: 69
End: 2019-06-24

## 2019-06-24 ENCOUNTER — APPOINTMENT (OUTPATIENT)
Dept: CT IMAGING | Facility: HOSPITAL | Age: 69
DRG: 233 | End: 2019-06-24
Attending: EMERGENCY MEDICINE
Payer: MEDICARE

## 2019-06-24 ENCOUNTER — APPOINTMENT (OUTPATIENT)
Dept: ULTRASOUND IMAGING | Facility: HOSPITAL | Age: 69
DRG: 233 | End: 2019-06-24
Attending: EMERGENCY MEDICINE
Payer: MEDICARE

## 2019-06-24 ENCOUNTER — OFFICE VISIT (OUTPATIENT)
Dept: INTERNAL MEDICINE CLINIC | Facility: CLINIC | Age: 69
End: 2019-06-24
Payer: COMMERCIAL

## 2019-06-24 ENCOUNTER — HOSPITAL ENCOUNTER (INPATIENT)
Facility: HOSPITAL | Age: 69
LOS: 8 days | Discharge: HOME | DRG: 233 | End: 2019-07-02
Attending: EMERGENCY MEDICINE | Admitting: HOSPITALIST
Payer: MEDICARE

## 2019-06-24 ENCOUNTER — LAB ENCOUNTER (OUTPATIENT)
Dept: LAB | Facility: HOSPITAL | Age: 69
DRG: 233 | End: 2019-06-24
Attending: NURSE PRACTITIONER
Payer: MEDICARE

## 2019-06-24 VITALS
DIASTOLIC BLOOD PRESSURE: 78 MMHG | HEIGHT: 70 IN | OXYGEN SATURATION: 97 % | WEIGHT: 154 LBS | BODY MASS INDEX: 22.05 KG/M2 | SYSTOLIC BLOOD PRESSURE: 138 MMHG | TEMPERATURE: 98 F | HEART RATE: 82 BPM

## 2019-06-24 DIAGNOSIS — R77.8 ELEVATED TROPONIN: Primary | ICD-10-CM

## 2019-06-24 DIAGNOSIS — M79.89 LEG SWELLING: ICD-10-CM

## 2019-06-24 DIAGNOSIS — R06.02 SOB (SHORTNESS OF BREATH) ON EXERTION: ICD-10-CM

## 2019-06-24 DIAGNOSIS — M79.662 PAIN OF LEFT CALF: Primary | ICD-10-CM

## 2019-06-24 DIAGNOSIS — J96.00 ACUTE RESPIRATORY FAILURE, UNSPECIFIED WHETHER WITH HYPOXIA OR HYPERCAPNIA (HCC): ICD-10-CM

## 2019-06-24 DIAGNOSIS — M79.662 PAIN OF LEFT CALF: ICD-10-CM

## 2019-06-24 PROBLEM — R79.89 ELEVATED TROPONIN: Status: ACTIVE | Noted: 2019-06-24

## 2019-06-24 LAB
ALBUMIN SERPL-MCNC: 3.5 G/DL (ref 3.4–5)
ALBUMIN/GLOB SERPL: 0.9 {RATIO} (ref 1–2)
ALLENS TEST: POSITIVE
ALLENS TEST: POSITIVE
ALP LIVER SERPL-CCNC: 195 U/L (ref 45–117)
ALT SERPL-CCNC: 31 U/L (ref 16–61)
ANION GAP SERPL CALC-SCNC: 8 MMOL/L (ref 0–18)
APTT PPP: 25.8 SECONDS (ref 25.4–36.1)
ARTERIAL BLD GAS O2 SATURATION: 92 % (ref 92–100)
ARTERIAL BLD GAS O2 SATURATION: 97 % (ref 92–100)
ARTERIAL BLOOD GAS BASE EXCESS: -13.5 MMOL/L (ref ?–2)
ARTERIAL BLOOD GAS BASE EXCESS: -4.5 MMOL/L (ref ?–2)
ARTERIAL BLOOD GAS HCO3: 15.1 MEQ/L (ref 22–26)
ARTERIAL BLOOD GAS HCO3: 20.6 MEQ/L (ref 22–26)
ARTERIAL BLOOD GAS PCO2: 37 MM HG (ref 35–45)
ARTERIAL BLOOD GAS PCO2: 45 MM HG (ref 35–45)
ARTERIAL BLOOD GAS PH: 7.14 (ref 7.35–7.45)
ARTERIAL BLOOD GAS PH: 7.36 (ref 7.35–7.45)
ARTERIAL BLOOD GAS PO2: 301 MM HG (ref 80–105)
ARTERIAL BLOOD GAS PO2: 67 MM HG (ref 80–105)
AST SERPL-CCNC: 26 U/L (ref 15–37)
BASOPHILS # BLD AUTO: 0.04 X10(3) UL (ref 0–0.2)
BASOPHILS NFR BLD AUTO: 0.5 %
BILIRUB SERPL-MCNC: 0.8 MG/DL (ref 0.1–2)
BUN BLD-MCNC: 13 MG/DL (ref 7–18)
BUN/CREAT SERPL: 10.9 (ref 10–20)
CALCIUM BLD-MCNC: 8.4 MG/DL (ref 8.5–10.1)
CALCULATED O2 SATURATION: 100 % (ref 92–100)
CALCULATED O2 SATURATION: 93 % (ref 92–100)
CARBOXYHEMOGLOBIN: 2 % SAT (ref 0–3)
CARBOXYHEMOGLOBIN: 2 % SAT (ref 0–3)
CHLORIDE SERPL-SCNC: 104 MMOL/L (ref 98–112)
CO2 SERPL-SCNC: 23 MMOL/L (ref 21–32)
CREAT BLD-MCNC: 1.19 MG/DL (ref 0.7–1.3)
DEPRECATED RDW RBC AUTO: 50.4 FL (ref 35.1–46.3)
DEPRECATED RDW RBC AUTO: 50.9 FL (ref 35.1–46.3)
EOSINOPHIL # BLD AUTO: 0.16 X10(3) UL (ref 0–0.7)
EOSINOPHIL NFR BLD AUTO: 1.8 %
ERYTHROCYTE [DISTWIDTH] IN BLOOD BY AUTOMATED COUNT: 15 % (ref 11–15)
ERYTHROCYTE [DISTWIDTH] IN BLOOD BY AUTOMATED COUNT: 15.1 % (ref 11–15)
EXPIRATORY PRESSURE: 6 CM H2O
FIO2: 100 %
FIO2: 60 %
GLOBULIN PLAS-MCNC: 4 G/DL (ref 2.8–4.4)
GLUCOSE BLD-MCNC: 91 MG/DL (ref 70–99)
HCT VFR BLD AUTO: 33.2 % (ref 39–53)
HCT VFR BLD AUTO: 34.1 % (ref 39–53)
HGB BLD-MCNC: 11.3 G/DL (ref 13–17.5)
HGB BLD-MCNC: 11.4 G/DL (ref 13–17.5)
IMM GRANULOCYTES # BLD AUTO: 0.03 X10(3) UL (ref 0–1)
IMM GRANULOCYTES NFR BLD: 0.3 %
INSP PRESSURE: 14 CM H2O
IONIZED CALCIUM: 1.15 MMOL/L (ref 1.12–1.32)
IONIZED CALCIUM: 1.17 MMOL/L (ref 1.12–1.32)
LACTIC ACID ARTERIAL: 8.6 MMOL/L (ref 0.5–2)
LACTIC ACID ARTERIAL: <1.6 MMOL/L (ref 0.5–2)
LYMPHOCYTES # BLD AUTO: 1.67 X10(3) UL (ref 1–4)
LYMPHOCYTES NFR BLD AUTO: 19.3 %
M PROTEIN MFR SERPL ELPH: 7.5 G/DL (ref 6.4–8.2)
MCH RBC QN AUTO: 30.9 PG (ref 26–34)
MCH RBC QN AUTO: 31 PG (ref 26–34)
MCHC RBC AUTO-ENTMCNC: 33.4 G/DL (ref 31–37)
MCHC RBC AUTO-ENTMCNC: 34 G/DL (ref 31–37)
MCV RBC AUTO: 91 FL (ref 80–100)
MCV RBC AUTO: 92.4 FL (ref 80–100)
METHEMOGLOBIN: 0.4 % SAT (ref 0.4–1.5)
METHEMOGLOBIN: 0.5 % SAT (ref 0.4–1.5)
MONOCYTES # BLD AUTO: 0.98 X10(3) UL (ref 0.1–1)
MONOCYTES NFR BLD AUTO: 11.3 %
NEUTROPHILS # BLD AUTO: 5.77 X10 (3) UL (ref 1.5–7.7)
NEUTROPHILS # BLD AUTO: 5.77 X10(3) UL (ref 1.5–7.7)
NEUTROPHILS NFR BLD AUTO: 66.8 %
OSMOLALITY SERPL CALC.SUM OF ELEC: 280 MOSM/KG (ref 275–295)
P/F RATIO: 116.9 MMHG
P/F RATIO: 302.2 MMHG
PATIENT TEMPERATURE: 97.4 F
PATIENT TEMPERATURE: 98.2 F
PEEP: 5 CM H2O
PLATELET # BLD AUTO: 293 10(3)UL (ref 150–450)
PLATELET # BLD AUTO: 317 10(3)UL (ref 150–450)
POTASSIUM BLOOD GAS: 3.5 MMOL/L (ref 3.6–5.1)
POTASSIUM BLOOD GAS: 3.7 MMOL/L (ref 3.6–5.1)
POTASSIUM SERPL-SCNC: 3.8 MMOL/L (ref 3.5–5.1)
RBC # BLD AUTO: 3.65 X10(6)UL (ref 3.8–5.8)
RBC # BLD AUTO: 3.69 X10(6)UL (ref 3.8–5.8)
SODIUM BLOOD GAS: 135 MMOL/L (ref 136–144)
SODIUM BLOOD GAS: 136 MMOL/L (ref 136–144)
SODIUM SERPL-SCNC: 135 MMOL/L (ref 136–145)
TIDAL VOLUME: 500 ML
TOTAL HEMOGLOBIN: 10.9 G/DL (ref 12.6–17.4)
TOTAL HEMOGLOBIN: 11.9 G/DL (ref 12.6–17.4)
TROPONIN I SERPL-MCNC: 1.06 NG/ML (ref ?–0.04)
TROPONIN I SERPL-MCNC: 1.07 NG/ML (ref ?–0.04)
VENT RATE: 18 /MIN
WBC # BLD AUTO: 16.8 X10(3) UL (ref 4–11)
WBC # BLD AUTO: 8.7 X10(3) UL (ref 4–11)

## 2019-06-24 PROCEDURE — 99214 OFFICE O/P EST MOD 30 MIN: CPT | Performed by: NURSE PRACTITIONER

## 2019-06-24 PROCEDURE — 71045 X-RAY EXAM CHEST 1 VIEW: CPT | Performed by: EMERGENCY MEDICINE

## 2019-06-24 PROCEDURE — 36415 COLL VENOUS BLD VENIPUNCTURE: CPT

## 2019-06-24 PROCEDURE — 85378 FIBRIN DEGRADE SEMIQUANT: CPT

## 2019-06-24 PROCEDURE — 5A1935Z RESPIRATORY VENTILATION, LESS THAN 24 CONSECUTIVE HOURS: ICD-10-PCS | Performed by: EMERGENCY MEDICINE

## 2019-06-24 PROCEDURE — 71275 CT ANGIOGRAPHY CHEST: CPT | Performed by: EMERGENCY MEDICINE

## 2019-06-24 PROCEDURE — 0BH17EZ INSERTION OF ENDOTRACHEAL AIRWAY INTO TRACHEA, VIA NATURAL OR ARTIFICIAL OPENING: ICD-10-PCS | Performed by: EMERGENCY MEDICINE

## 2019-06-24 PROCEDURE — 93970 EXTREMITY STUDY: CPT | Performed by: EMERGENCY MEDICINE

## 2019-06-24 PROCEDURE — 99291 CRITICAL CARE FIRST HOUR: CPT | Performed by: INTERNAL MEDICINE

## 2019-06-24 RX ORDER — ERGOCALCIFEROL (VITAMIN D2) 1250 MCG
50000 CAPSULE ORAL WEEKLY
COMMUNITY
End: 2019-06-24 | Stop reason: CLARIF

## 2019-06-24 RX ORDER — NITROGLYCERIN 0.4 MG/1
0.4 TABLET SUBLINGUAL EVERY 5 MIN PRN
Status: DISCONTINUED | OUTPATIENT
Start: 2019-06-24 | End: 2019-06-27

## 2019-06-24 RX ORDER — ONDANSETRON 2 MG/ML
4 INJECTION INTRAMUSCULAR; INTRAVENOUS EVERY 6 HOURS PRN
Status: DISCONTINUED | OUTPATIENT
Start: 2019-06-24 | End: 2019-06-27

## 2019-06-24 RX ORDER — AMOXICILLIN AND CLAVULANATE POTASSIUM 875; 125 MG/1; MG/1
1 TABLET, FILM COATED ORAL 2 TIMES DAILY
Qty: 20 TABLET | Refills: 0 | Status: ON HOLD | OUTPATIENT
Start: 2019-06-24 | End: 2019-07-02

## 2019-06-24 RX ORDER — NITROGLYCERIN 20 MG/100ML
INJECTION INTRAVENOUS
Status: COMPLETED | OUTPATIENT
Start: 2019-06-24 | End: 2019-06-24

## 2019-06-24 RX ORDER — FUROSEMIDE 10 MG/ML
INJECTION INTRAMUSCULAR; INTRAVENOUS
Status: COMPLETED
Start: 2019-06-24 | End: 2019-06-24

## 2019-06-24 RX ORDER — HEPARIN SODIUM 5000 [USP'U]/ML
60 INJECTION INTRAVENOUS; SUBCUTANEOUS ONCE
Status: COMPLETED | OUTPATIENT
Start: 2019-06-24 | End: 2019-06-25

## 2019-06-24 RX ORDER — METOCLOPRAMIDE HYDROCHLORIDE 5 MG/ML
10 INJECTION INTRAMUSCULAR; INTRAVENOUS EVERY 8 HOURS PRN
Status: DISCONTINUED | OUTPATIENT
Start: 2019-06-24 | End: 2019-06-27

## 2019-06-24 RX ORDER — LORAZEPAM 2 MG/ML
INJECTION INTRAMUSCULAR
Status: COMPLETED
Start: 2019-06-24 | End: 2019-06-24

## 2019-06-24 RX ORDER — NITROGLYCERIN 20 MG/100ML
INJECTION INTRAVENOUS
Status: DISPENSED
Start: 2019-06-24 | End: 2019-06-25

## 2019-06-24 RX ORDER — HEPARIN SODIUM AND DEXTROSE 10000; 5 [USP'U]/100ML; G/100ML
12 INJECTION INTRAVENOUS ONCE
Status: COMPLETED | OUTPATIENT
Start: 2019-06-24 | End: 2019-06-25

## 2019-06-24 RX ORDER — LORAZEPAM 2 MG/ML
2 INJECTION INTRAMUSCULAR ONCE
Status: COMPLETED | OUTPATIENT
Start: 2019-06-24 | End: 2019-06-24

## 2019-06-24 RX ORDER — ASPIRIN 325 MG
325 TABLET ORAL DAILY
Status: DISCONTINUED | OUTPATIENT
Start: 2019-06-24 | End: 2019-07-02

## 2019-06-24 RX ORDER — IPRATROPIUM BROMIDE AND ALBUTEROL SULFATE 2.5; .5 MG/3ML; MG/3ML
3 SOLUTION RESPIRATORY (INHALATION) ONCE
Status: COMPLETED | OUTPATIENT
Start: 2019-06-24 | End: 2019-06-24

## 2019-06-24 RX ORDER — HEPARIN SODIUM AND DEXTROSE 10000; 5 [USP'U]/100ML; G/100ML
INJECTION INTRAVENOUS CONTINUOUS
Status: DISCONTINUED | OUTPATIENT
Start: 2019-06-25 | End: 2019-06-26

## 2019-06-24 RX ORDER — ASPIRIN 81 MG/1
324 TABLET, CHEWABLE ORAL ONCE
Status: DISCONTINUED | OUTPATIENT
Start: 2019-06-24 | End: 2019-06-24

## 2019-06-24 RX ORDER — FUROSEMIDE 10 MG/ML
40 INJECTION INTRAMUSCULAR; INTRAVENOUS ONCE
Status: COMPLETED | OUTPATIENT
Start: 2019-06-24 | End: 2019-06-24

## 2019-06-24 RX ORDER — ACETAMINOPHEN 325 MG/1
650 TABLET ORAL EVERY 6 HOURS PRN
Status: DISCONTINUED | OUTPATIENT
Start: 2019-06-24 | End: 2019-06-25

## 2019-06-24 RX ORDER — ETOMIDATE 2 MG/ML
INJECTION INTRAVENOUS
Status: COMPLETED | OUTPATIENT
Start: 2019-06-24 | End: 2019-06-24

## 2019-06-24 NOTE — ED INITIAL ASSESSMENT (HPI)
Pt here with c/o CB, worsening over the last few days. Sent over from PCP for r/o DVT/PE, + dimer. LLE edema.

## 2019-06-24 NOTE — PROGRESS NOTES
Efrain Luna is a 76year old male. Patient presents with:  Cough: LG. Room 10. Cough, nasal congestin, watery and itchy eyes for a couple of days      HPI:   Presents for eval of cough and sinus congestion. Flew to [place] ago.   Upon arrival daily. Disp:  Rfl:    Zinc 50 MG Oral Cap Take by mouth daily. Disp:  Rfl:    Vitamin K, Phytonadione, 100 MCG Oral Tab Take 1 tablet by mouth. Disp:  Rfl:    SELENIUM OR Take 1 tablet by mouth daily.  Disp:  Rfl:    Cyanocobalamin (VITAMIN B 12 OR) Take 50 abdominal pain and denies heartburn, no diarrhea or constipation  MUSCULOSKELETAL:  No arthralgias or myalgias  NEURO: denies headaches,     EXAM:   /78 (BP Location: Left arm, Patient Position: Sitting, Cuff Size: adult)   Pulse 82   Temp 98 °F (36.

## 2019-06-24 NOTE — ED PROVIDER NOTES
Patient Seen in: [facility] Emergency Department    History   Patient presents with:  Abnormal Result (metabolic, cardiac)  Dyspnea CB SOB (respiratory)    Stated Complaint: sent by doctor for positive d-dimer, recent travel to [place] [address] with l 6/23/16.     EUS/EGD/FNA   • PLACEMENT, BILE DUCT STENT     • REMOVAL GALLBLADDER     • WHIPPLE  8/16/2016    Pancreaticoduodenectomy with regional lymphadenectomy, Left adrenalectomy with resection of retroperitoneal periadrenal tumor, Omental pedicle fla normal limits   ABG PANEL W ELECT AND LACTATE - Abnormal; Notable for the following components:    ABG pH 7.14 (*)     ABG pO2 301 (*)     ABG HCO3 15.1 (*)     ABG Base Excess -13.5 (*)     Total Hemoglobin 11.9 (*)     Potassium Blood Gas 3.5 (*)     Lac flash pulmonary edema. Patient was ordered Lasix and BiPAP but he was still having trouble oxygenating so the decision was made to prophylactically intubate this patient to better oxygenate them.   The patient was preoxygenated and treated pretreated with

## 2019-06-24 NOTE — TELEPHONE ENCOUNTER
Wife calling to say Pt has been experiencing SOB, started last night. Does not know why, may allergies.

## 2019-06-24 NOTE — TELEPHONE ENCOUNTER
Patient indicates he had a flight to [place] and back for business on Saturday, shook a lot of hands, last night woke up in the middle of the night and had to catch his breath, felt like he had to take a deep breath,, nasal congestion, dry cough, eyes watering

## 2019-06-25 ENCOUNTER — APPOINTMENT (OUTPATIENT)
Dept: GENERAL RADIOLOGY | Facility: HOSPITAL | Age: 69
DRG: 233 | End: 2019-06-25
Attending: INTERNAL MEDICINE
Payer: MEDICARE

## 2019-06-25 ENCOUNTER — APPOINTMENT (OUTPATIENT)
Dept: CV DIAGNOSTICS | Facility: HOSPITAL | Age: 69
DRG: 233 | End: 2019-06-25
Attending: INTERNAL MEDICINE
Payer: MEDICARE

## 2019-06-25 LAB
ADENOVIRUS PCR:: NEGATIVE
ALLENS TEST: POSITIVE
ANION GAP SERPL CALC-SCNC: 8 MMOL/L (ref 0–18)
APTT PPP: 40.7 SECONDS (ref 25.4–36.1)
APTT PPP: 40.9 SECONDS (ref 25.4–36.1)
APTT PPP: 42.5 SECONDS (ref 25.4–36.1)
ARTERIAL BLD GAS O2 SATURATION: 96 % (ref 92–100)
ARTERIAL BLD GAS O2 SATURATION: 97 % (ref 92–100)
ARTERIAL BLOOD GAS BASE EXCESS: -2.8 MMOL/L (ref ?–2)
ARTERIAL BLOOD GAS BASE EXCESS: -3.5 MMOL/L (ref ?–2)
ARTERIAL BLOOD GAS BASE EXCESS: -3.7 MMOL/L (ref ?–2)
ARTERIAL BLOOD GAS BASE EXCESS: -4.1 MMOL/L (ref ?–2)
ARTERIAL BLOOD GAS HCO3: 20.1 MEQ/L (ref 22–26)
ARTERIAL BLOOD GAS HCO3: 20.2 MEQ/L (ref 22–26)
ARTERIAL BLOOD GAS HCO3: 20.8 MEQ/L (ref 22–26)
ARTERIAL BLOOD GAS HCO3: 21.4 MEQ/L (ref 22–26)
ARTERIAL BLOOD GAS PCO2: 32 MM HG (ref 35–45)
ARTERIAL BLOOD GAS PCO2: 33 MM HG (ref 35–45)
ARTERIAL BLOOD GAS PCO2: 34 MM HG (ref 35–45)
ARTERIAL BLOOD GAS PCO2: 35 MM HG (ref 35–45)
ARTERIAL BLOOD GAS PH: 7.39 (ref 7.35–7.45)
ARTERIAL BLOOD GAS PH: 7.4 (ref 7.35–7.45)
ARTERIAL BLOOD GAS PH: 7.41 (ref 7.35–7.45)
ARTERIAL BLOOD GAS PH: 7.42 (ref 7.35–7.45)
ARTERIAL BLOOD GAS PO2: 108 MM HG (ref 80–105)
ARTERIAL BLOOD GAS PO2: 117 MM HG (ref 80–105)
ARTERIAL BLOOD GAS PO2: 123 MM HG (ref 80–105)
ARTERIAL BLOOD GAS PO2: 98 MM HG (ref 80–105)
ATRIAL RATE: 132 BPM
ATRIAL RATE: 84 BPM
ATRIAL RATE: 87 BPM
B PERT DNA SPEC QL NAA+PROBE: NEGATIVE
BASOPHILS # BLD AUTO: 0.05 X10(3) UL (ref 0–0.2)
BASOPHILS NFR BLD AUTO: 0.4 %
BILIRUB UR QL STRIP.AUTO: NEGATIVE
BUN BLD-MCNC: 13 MG/DL (ref 7–18)
BUN/CREAT SERPL: 9.9 (ref 10–20)
C PNEUM DNA SPEC QL NAA+PROBE: NEGATIVE
CALCIUM BLD-MCNC: 8.2 MG/DL (ref 8.5–10.1)
CALCULATED O2 SATURATION: 98 % (ref 92–100)
CALCULATED O2 SATURATION: 98 % (ref 92–100)
CALCULATED O2 SATURATION: 99 % (ref 92–100)
CALCULATED O2 SATURATION: 99 % (ref 92–100)
CANCER AG19-9 SERPL-ACNC: 14 U/ML (ref ?–37)
CARBOXYHEMOGLOBIN: 1 % SAT (ref 0–3)
CARBOXYHEMOGLOBIN: 1.1 % SAT (ref 0–3)
CHLORIDE SERPL-SCNC: 105 MMOL/L (ref 98–112)
CHOLEST SMN-MCNC: 129 MG/DL (ref ?–200)
CLARITY UR REFRACT.AUTO: CLEAR
CO2 SERPL-SCNC: 22 MMOL/L (ref 21–32)
COLOR UR AUTO: YELLOW
CORONAVIRUS 229E PCR:: NEGATIVE
CORONAVIRUS HKU1 PCR:: NEGATIVE
CORONAVIRUS NL63 PCR:: NEGATIVE
CORONAVIRUS OC43 PCR:: NEGATIVE
CREAT BLD-MCNC: 1.31 MG/DL (ref 0.7–1.3)
CRP SERPL-MCNC: 1.37 MG/DL (ref ?–0.3)
DEPRECATED RDW RBC AUTO: 50.7 FL (ref 35.1–46.3)
EOSINOPHIL # BLD AUTO: 0.09 X10(3) UL (ref 0–0.7)
EOSINOPHIL NFR BLD AUTO: 0.7 %
ERYTHROCYTE [DISTWIDTH] IN BLOOD BY AUTOMATED COUNT: 15.3 % (ref 11–15)
FIO2: 40 %
FIO2: 50 %
FLUAV RNA SPEC QL NAA+PROBE: NEGATIVE
FLUBV RNA SPEC QL NAA+PROBE: NEGATIVE
GLUCOSE BLD-MCNC: 154 MG/DL (ref 70–99)
GLUCOSE UR STRIP.AUTO-MCNC: NEGATIVE MG/DL
HAV IGM SER QL: 2.1 MG/DL (ref 1.6–2.6)
HAV IGM SER QL: 2.1 MG/DL (ref 1.6–2.6)
HCT VFR BLD AUTO: 32.4 % (ref 39–53)
HDLC SERPL-MCNC: 55 MG/DL (ref 40–59)
HGB BLD-MCNC: 11 G/DL (ref 13–17.5)
IMM GRANULOCYTES # BLD AUTO: 0.06 X10(3) UL (ref 0–1)
IMM GRANULOCYTES NFR BLD: 0.5 %
KETONES UR STRIP.AUTO-MCNC: NEGATIVE MG/DL
L/M: 12 L/MIN
LACTATE SERPL-SCNC: 1 MMOL/L (ref 0.4–2)
LDLC SERPL CALC-MCNC: 61 MG/DL (ref ?–100)
LEUKOCYTE ESTERASE UR QL STRIP.AUTO: NEGATIVE
LYMPHOCYTES # BLD AUTO: 0.94 X10(3) UL (ref 1–4)
LYMPHOCYTES NFR BLD AUTO: 7.3 %
MCH RBC QN AUTO: 31.2 PG (ref 26–34)
MCHC RBC AUTO-ENTMCNC: 34 G/DL (ref 31–37)
MCV RBC AUTO: 91.8 FL (ref 80–100)
METAPNEUMOVIRUS PCR:: NEGATIVE
METHEMOGLOBIN: 0.4 % SAT (ref 0.4–1.5)
METHEMOGLOBIN: 0.5 % SAT (ref 0.4–1.5)
MONOCYTES # BLD AUTO: 1.05 X10(3) UL (ref 0.1–1)
MONOCYTES NFR BLD AUTO: 8.2 %
MYCOPLASMA PNEUMONIA PCR:: NEGATIVE
NEUTROPHILS # BLD AUTO: 10.69 X10 (3) UL (ref 1.5–7.7)
NEUTROPHILS # BLD AUTO: 10.69 X10(3) UL (ref 1.5–7.7)
NEUTROPHILS NFR BLD AUTO: 82.9 %
NITRITE UR QL STRIP.AUTO: NEGATIVE
NONHDLC SERPL-MCNC: 74 MG/DL (ref ?–130)
NT-PROBNP SERPL-MCNC: ABNORMAL PG/ML (ref ?–125)
OSMOLALITY SERPL CALC.SUM OF ELEC: 283 MOSM/KG (ref 275–295)
P AXIS: 55 DEGREES
P AXIS: 68 DEGREES
P AXIS: 70 DEGREES
P-R INTERVAL: 124 MS
P-R INTERVAL: 126 MS
P-R INTERVAL: 150 MS
P/F RATIO: 238.6 MMHG
P/F RATIO: 249.7 MMHG
P/F RATIO: 275.3 MMHG
P/F RATIO: 313.3 MMHG
PARAINFLUENZA 1 PCR:: NEGATIVE
PARAINFLUENZA 2 PCR:: NEGATIVE
PARAINFLUENZA 3 PCR:: NEGATIVE
PARAINFLUENZA 4 PCR:: NEGATIVE
PATIENT TEMPERATURE: 98 F
PEEP: 5 CM H2O
PH UR STRIP.AUTO: 5 [PH] (ref 4.5–8)
PLATELET # BLD AUTO: 294 10(3)UL (ref 150–450)
POTASSIUM SERPL-SCNC: 3.7 MMOL/L (ref 3.5–5.1)
POTASSIUM SERPL-SCNC: 4.1 MMOL/L (ref 3.5–5.1)
PRESSURE SUPPORT: 5 CM H2O
PROCALCITONIN SERPL-MCNC: 0.61 NG/ML
PROT UR STRIP.AUTO-MCNC: NEGATIVE MG/DL
Q-T INTERVAL: 316 MS
Q-T INTERVAL: 394 MS
Q-T INTERVAL: 414 MS
QRS DURATION: 118 MS
QRS DURATION: 130 MS
QRS DURATION: 130 MS
QTC CALCULATION (BEZET): 468 MS
QTC CALCULATION (BEZET): 474 MS
QTC CALCULATION (BEZET): 489 MS
R AXIS: 74 DEGREES
R AXIS: 85 DEGREES
R AXIS: 93 DEGREES
RBC # BLD AUTO: 3.53 X10(6)UL (ref 3.8–5.8)
RBC UR QL AUTO: NEGATIVE
RHINOVIRUS/ENTERO PCR:: NEGATIVE
RSV RNA SPEC QL NAA+PROBE: NEGATIVE
SED RATE-ML: 34 MM/HR (ref 0–12)
SODIUM SERPL-SCNC: 135 MMOL/L (ref 136–145)
SP GR UR STRIP.AUTO: 1.01 (ref 1–1.03)
T AXIS: -41 DEGREES
T AXIS: 0 DEGREES
T AXIS: 115 DEGREES
TIDAL VOLUME: 450 ML
TIDAL VOLUME: 500 ML
TOTAL HEMOGLOBIN: 10.4 G/DL (ref 12.6–17.4)
TOTAL HEMOGLOBIN: 10.4 G/DL (ref 12.6–17.4)
TOTAL HEMOGLOBIN: 10.8 G/DL (ref 12.6–17.4)
TOTAL HEMOGLOBIN: 11.2 G/DL (ref 12.6–17.4)
TRIGL SERPL-MCNC: 65 MG/DL (ref 30–149)
TROPONIN I SERPL-MCNC: 1.06 NG/ML (ref ?–0.04)
TROPONIN I SERPL-MCNC: 1.36 NG/ML (ref ?–0.04)
TROPONIN I SERPL-MCNC: 1.91 NG/ML (ref ?–0.04)
UROBILINOGEN UR STRIP.AUTO-MCNC: <2 MG/DL
VENT RATE: 18 /MIN
VENT RATE: 18 /MIN
VENTRICULAR RATE: 132 BPM
VENTRICULAR RATE: 84 BPM
VENTRICULAR RATE: 87 BPM
VLDLC SERPL CALC-MCNC: 13 MG/DL (ref 0–30)
WBC # BLD AUTO: 12.9 X10(3) UL (ref 4–11)

## 2019-06-25 PROCEDURE — 99232 SBSQ HOSP IP/OBS MODERATE 35: CPT | Performed by: HOSPITALIST

## 2019-06-25 PROCEDURE — 71045 X-RAY EXAM CHEST 1 VIEW: CPT | Performed by: INTERNAL MEDICINE

## 2019-06-25 PROCEDURE — 93306 TTE W/DOPPLER COMPLETE: CPT | Performed by: INTERNAL MEDICINE

## 2019-06-25 PROCEDURE — 99222 1ST HOSP IP/OBS MODERATE 55: CPT | Performed by: INTERNAL MEDICINE

## 2019-06-25 RX ORDER — ACETAMINOPHEN 650 MG/1
650 SUPPOSITORY RECTAL EVERY 6 HOURS PRN
Status: DISCONTINUED | OUTPATIENT
Start: 2019-06-25 | End: 2019-06-27

## 2019-06-25 RX ORDER — FUROSEMIDE 10 MG/ML
40 INJECTION INTRAMUSCULAR; INTRAVENOUS
Status: DISCONTINUED | OUTPATIENT
Start: 2019-06-25 | End: 2019-06-26

## 2019-06-25 RX ORDER — IPRATROPIUM BROMIDE AND ALBUTEROL SULFATE 2.5; .5 MG/3ML; MG/3ML
3 SOLUTION RESPIRATORY (INHALATION)
Status: DISCONTINUED | OUTPATIENT
Start: 2019-06-25 | End: 2019-06-27

## 2019-06-25 RX ORDER — MIDAZOLAM HYDROCHLORIDE 1 MG/ML
2 INJECTION INTRAMUSCULAR; INTRAVENOUS EVERY 5 MIN PRN
Status: DISCONTINUED | OUTPATIENT
Start: 2019-06-25 | End: 2019-06-26

## 2019-06-25 RX ORDER — LISINOPRIL 2.5 MG/1
2.5 TABLET ORAL DAILY
Status: DISCONTINUED | OUTPATIENT
Start: 2019-06-25 | End: 2019-06-25

## 2019-06-25 RX ORDER — BISACODYL 10 MG
10 SUPPOSITORY, RECTAL RECTAL
Status: DISCONTINUED | OUTPATIENT
Start: 2019-06-25 | End: 2019-06-27

## 2019-06-25 RX ORDER — CHLORHEXIDINE GLUCONATE 0.12 MG/ML
15 RINSE ORAL
Status: DISCONTINUED | OUTPATIENT
Start: 2019-06-25 | End: 2019-06-25

## 2019-06-25 RX ORDER — POLYETHYLENE GLYCOL 3350 17 G/17G
17 POWDER, FOR SOLUTION ORAL DAILY PRN
Status: DISCONTINUED | OUTPATIENT
Start: 2019-06-25 | End: 2019-06-27

## 2019-06-25 RX ORDER — POTASSIUM CHLORIDE 1.5 G/1.77G
40 POWDER, FOR SOLUTION ORAL ONCE
Status: COMPLETED | OUTPATIENT
Start: 2019-06-25 | End: 2019-06-25

## 2019-06-25 RX ORDER — SODIUM CHLORIDE 9 MG/ML
INJECTION, SOLUTION INTRAVENOUS
Status: ACTIVE | OUTPATIENT
Start: 2019-06-26 | End: 2019-06-26

## 2019-06-25 RX ORDER — DEXMEDETOMIDINE HYDROCHLORIDE 4 UG/ML
INJECTION, SOLUTION INTRAVENOUS CONTINUOUS
Status: DISPENSED | OUTPATIENT
Start: 2019-06-25 | End: 2019-06-25

## 2019-06-25 RX ORDER — NITROGLYCERIN 20 MG/100ML
INJECTION INTRAVENOUS CONTINUOUS
Status: DISCONTINUED | OUTPATIENT
Start: 2019-06-25 | End: 2019-06-26

## 2019-06-25 RX ORDER — ACETAMINOPHEN 325 MG/1
650 TABLET ORAL EVERY 6 HOURS PRN
Status: DISCONTINUED | OUTPATIENT
Start: 2019-06-25 | End: 2019-06-27

## 2019-06-25 RX ORDER — CAPTOPRIL 12.5 MG/1
6.25 TABLET ORAL
Status: DISCONTINUED | OUTPATIENT
Start: 2019-06-25 | End: 2019-06-25

## 2019-06-25 RX ORDER — SODIUM PHOSPHATE, DIBASIC AND SODIUM PHOSPHATE, MONOBASIC 7; 19 G/133ML; G/133ML
1 ENEMA RECTAL ONCE AS NEEDED
Status: DISCONTINUED | OUTPATIENT
Start: 2019-06-25 | End: 2019-06-27

## 2019-06-25 RX ORDER — ACETAMINOPHEN 160 MG/5ML
650 SOLUTION ORAL EVERY 6 HOURS PRN
Status: DISCONTINUED | OUTPATIENT
Start: 2019-06-25 | End: 2019-06-27

## 2019-06-25 NOTE — RESPIRATORY THERAPY NOTE
Received pt in ER, complaining unable to breath. Bronchodilator Neb Tx given. Start Bipap, and anthony ABG, critical PH,LAC results verified and called MD. Around 2225 Advanced ETT 3cm, secured @ 26 cm at lips.

## 2019-06-25 NOTE — ED NOTES
Pt extremely anxious. Pt states he is now unable to breath. Pt sats 97% on 3 l pnc. Encouraged pt to take slow deep breaths. Pt continuing to be anxious, jumping up, spitting phlegm on the floor. Ativan given, respiratory contacted for abel tx.  Dr Kartik Meeks i

## 2019-06-25 NOTE — CONSULTS
[facility]  Report of Consultation    Nicole Rodriguez Patient Status:  Inpatient    1950 MRN [id number]   [facility] 6NE-A Attending Jason Van MD   Hosp Day # 1 PCP MD Shawna Swan presentation as well. EKG: SR with nonspecific IVCD. Inferior TW inversions  Nuclear stress (2016)L EF=33%. inferor fixed defect  Echo (2016): EF=45-50%. No sig valvular abnormalities.     CT chest neg for PE  LE doppler neg for DVT    Labs:  Trop = segment 4b liver mass.    • WHIPPLE PROCEDURE N/A 8/16/2016    Performed by Burgess Timmy MD at [facility] MAIN OR     Family History   Problem Relation Age of Onset   • Heart Disorder Father    • Diabetes Father    • Cancer Mother    • Diabetes Mother    • Hypert rhythm, S1, S2 normal, no murmur, rub or gallop. Lungs: Clear without wheezes, rales, rhonchi or dullness. Normal excursions and effort. Abdomen: Soft, non-tender. Extremities: Without clubbing, cyanosis or edema. Peripheral pulses are 2+.   Neurologi

## 2019-06-25 NOTE — HISTORICAL OFFICE NOTE
Pete Victorino  : 1950  ACCOUNT: [id number]  [phone]  PCP: Dr. Amanda Pop    TODAY'S DATE: 2016  DICTATED BY: Patria Bell M.D.]    CHIEF COMPLAINT: [Hospital D/C.]    HPI: Sanford Vargas 2016, Kimberly Corrales, a 72year old male, presented wi Prince Mcwilliams shortly. RISK FACTORS:  CAD - Family    REVIEW OF SYSTEMS:   CONS: no fever, chills, or recent weight changes. EYES: denies significant visual changes. ENMT: denies difficulties with hearing, otherwise negative. CV: see HPI; denies claudication. peripheral edema. DECISION MAKING: I think he has a mild cardiomyopathy most likely ischemic though for now we will keep it unclassified. He has no heart failure symptoms. His volume status looks fine.  At this point, I am going to try him on a very low

## 2019-06-25 NOTE — PROGRESS NOTES
[facility]  Progress Note     Mery Cardoso Patient Status:  Inpatient    1950 MRN [id number]   [facility] 6NE-A Attending Paola Edge MD   Hosp Day # 1 PCP Fernando Vogt MD     Chief Complaint:  Acute SOB, CP required i support  2. Continue diuretics  3. US negative for LE DVT  4. CTA negative for PE  5. pulm following   6. Propofol gtt   7. Acidosis improving / ABG now   8. WBC increased   9.  RVP/ sputum P   10. + PCT 0.6 no abx will discuss w/ pulm   2. Flash Pulm Edema

## 2019-06-25 NOTE — H&P
[facility]  History and Physical     Valdemar Layton Patient Status:  Inpatient    1950 MRN [id number]   [facility] 6NE-A Attending Kaylin Cordero MD   Hosp Day # 0 PCP Selam Mora MD     Chief Complaint: CP, SOB    [facility]      over 35 yrs ago   • OTHER      right index finger surgery   • OTHER SURGICAL HISTORY  6/23/16.     EUS/EGD/FNA   • PLACEMENT, BILE DUCT STENT     • REMOVAL GALLBLADDER     • WHIPPLE  8/16/2016 D-400 OR Take 1 capsule by mouth daily. With vitamin A Disp:  Rfl:    vitamin A 90586 UNITS Oral Cap Take 10,000 Units by mouth daily. Disp:  Rfl:    Multiple Vitamins-Minerals (TAB-A-ADRIA MAXIMUM) Oral Tab Take 1 tablet by mouth daily.  Disp:  Rfl:    kiran Failure  1. Continue vent support  2. Continue diuretics  3. US negative for LE DVT  4. CTA negative for PE  2. Flash Pulm Edema  1. Diuretics as above  2. Check ECHO  3. Cardiology to eval  3. NSTEMI/Elevated troponin  1. Cardiology to eval  2.  Start hepa

## 2019-06-25 NOTE — PLAN OF CARE
Received pt from ED at 2300, intubated, sedated with Propofol gtt, NTG gtt infusing for HTN. For full assessment and medication titrations, see charting. NTG gtt stopped, patient's BP very sensitive to Propofol.  OG tube inserted to LIS, confirmed by Suzette Alexander

## 2019-06-25 NOTE — PLAN OF CARE
Pt progressively hypotensive this am, now with bp 60/41 confirmed with manual check. Did get dose of captopril at 11am likely contributing but was trending down prior to that. Will start low dose levo and dc captopril for now.  Titrate sbp greater than 90 o

## 2019-06-25 NOTE — CONSULTS
[facility]  Report of Consultation    Howard Espinal Patient Status:  Inpatient    1950 MRN [id number]   [facility] 6NE-A Attending Steve Ricks MD   Hosp Day # 1 PCP Zuleyka Huertas MD     Reason for Consultation: Ventilator m morning. Chest x-ray last night done after intubation demonstrates diffuse pulmonary infiltrates consistent with pulmonary edema.   He does have an echocardiogram from 2016 that demonstrates a depressed ejection fraction (EF 45-50%) with segmental wall mot mass.   • WHIPPLE PROCEDURE N/A 8/16/2016    Performed by Parmjit Gipson MD at [facility] MAIN OR     Family History   Problem Relation Age of Onset   • Heart Disorder Father    • Diabetes Father    • Cancer Mother    • Diabetes Mother    • Hypertension Mother 875-125 MG Oral Tab Take 1 tablet by mouth 2 (two) times daily for 10 days.  Disp: 20 tablet Rfl: 0      Review of Systems: The patient is intubated and sedated-he cannot participate in a complete review of systems    Vital signs:  /85   Pulse 82   Te ABGHCO3 20.6*   ABGBE -4.5*   TEMP 97.4   ELVIRA Positive   SITE Right Radial   DEV  adult   THGB 10.9*     Echo: (August, 2016)  1. Left ventricle: The cavity size was normal. Increased ventricular mass.     There was mild concentric hypertrophy.  Sy lymphadenectomy. 2.       Left adrenalectomy with resection of retroperitoneal periadrenal tumor. 3.       Omental pedicle flap. 4.       Splenectomy.   5.       Wedge resection segment 4b liver mass  PATHOLOGY REPORT (August 2016):  A- Biopsy, liver fibrous tissue, negative for tumor.   -Five incidental lymph nodes, negative for tumor (0/5 lymph nodes). H- Excision, hepatic artery lymph nodes:   -Two lymph nodes, negative for tumor (0/2 lymph nodes).    I- Excision, spleen:   -Spleen with vascular c

## 2019-06-25 NOTE — PROGRESS NOTES
Cardiology Addendum:    TTE reviewed:    1. Left ventricle: The cavity size was increased. Wall thickness was normal.     Systolic function was moderately reduced. The estimated ejection fraction     was 30-35%.  Moderate diffuse hypokinesis with regional v

## 2019-06-25 NOTE — CONSULTS
Hem/Onc Report of Consultation    Patient Name: Dennis Mendez   YOB: 1950   Medical Record Number: [id number]   CSN: [id number]   Consulting Physician: Dr. Sen Ornelas   Referring Provider(s): Jeny Plunkett NP  Date of Consultation: 6/25/2019 pancreatic head. Pathology showed adenocarcinoma. CT chest, abdomen, pelvis on 08/07/2016 showed no evidence of distant metastasis. On 08/16/2016 he underwent Whipple, splenectomy, cholecystectomy, left adrenalectomy, and liver biopsy.  Pathology showed poo Pheochromocytoma, left     Dx in 6/2016: 1.7 cm mass near inferior adrenal gland       Past Surgical History:  Past Surgical History:   Procedure Laterality Date   • COLONOSCOPY  12/13/11    Dr. Gil Carrington repeat 5 yrs   • ENDOSCOPIC RETROGRADE [facility] History      Occupation: retired    Social Needs      Financial resource strain: Not on [employer] insecurity:        Worry: Not on file        Inability: Not on file      Transportation needs:        Medical: Not on file        Non-medical: Not on montserrat with his wife.        Allergies:   No Known Allergies    Current Medications:    propofol (DIPRIVAN) infusion 5-100 mcg/kg/min (Dosing Weight) Intravenous Continuous   nitroGLYCERIN infusion 50mg in D5W 250ml 5-400 mcg/min Intravenous Continuous   [COMPLETE azithromycin (ZITHROMAX) 500 mg in sodium chloride 0.9% 250 mL IVPB 500 mg Intravenous Q24H   Piperacillin Sod-Tazobactam So (ZOSYN) 3.375 g in dextrose 5 % 100 mL ADD-vantage 3.375 g Intravenous Q8H   [COMPLETED] iohexol (OMNIPAQUE) 350 MG/ML injection mouth 3 (three) times daily with meals. Ascorbic Acid (VITAMIN C) 100 MG Oral Tab Take 100 mg by mouth daily. Zinc 50 MG Oral Cap Take 1 capsule by mouth daily. Cyanocobalamin (VITAMIN B 12 OR) Take 50 mcg by mouth daily.      IRON OR 1 tablet daily Creatinine 1.19 0.70 - 1.30 mg/dL    BUN/CREA Ratio 10.9 10.0 - 20.0    Calcium, Total 8.4 (L) 8.5 - 10.1 mg/dL    Calculated Osmolality 280 275 - 295 mOsm/kg    GFR, Non- 62 >=60    GFR, -American 72 >=60    AST 26 15 - 37 U/L    AL Granulocyte % 0.3 %   EKG 12-LEAD    Collection Time: 06/24/19  6:10 PM   Result Value Ref Range    Ventricular rate 87 BPM    Atrial rate 87 BPM    P-R Interval 150 ms    QRS Duration 130 ms    Q-T Interval 394 ms    QTC Calculation (Bezet) 474 ms    P Ax 0.70 - 1.30 mg/dL    BUN/CREA Ratio 9.9 (L) 10.0 - 20.0    Calcium, Total 8.2 (L) 8.5 - 10.1 mg/dL    Calculated Osmolality 283 275 - 295 mOsm/kg    GFR, Non- 56 (L) >=60    GFR, -American 64 >=60   TROPONIN I    Collection Time: 06/ 1.360 (HH) <0.045 ng/mL   LIPID PANEL    Collection Time: 06/25/19  6:31 AM   Result Value Ref Range    Cholesterol, Total 129 <200 mg/dL    HDL Cholesterol 55 40 - 59 mg/dL    Triglycerides 65 30 - 149 mg/dL    LDL Cholesterol 61 <100 mg/dL    VLDL 13 0 - 45 mm Hg    ABG pO2 98 80 - 105 mm Hg    ABG HCO3 20.8 (L) 22.0 - 26.0 mEq/L    ABG Base Excess -3.7 (L) -2.0 - 2.0 mmol/L    ABG O2 Saturation 96 92 - 100 %    Calculated O2 Saturation 98 92 - 100 %    Patient Temperature 98.0 F    Total Hemoglobin 11.2 ( artery hypertension. Atheromatous calcifications of the aorta. LUNGS:  Interlobular septal thickening. Dependent atelectasis bilaterally.   3 mm right upper lobe nodule on image number 103. 5 mm right lower lobe pulmonary nodule on image number 171. 5 mm (CPT=71045), 6/24/2019, 22:01. EDWARD , XR CHEST AP PORTABLE  (CPT=71010), 8/25/2016, 18:03. INDICATIONS:  sent by doctor for positive d-dimer, recent travel to [place] [address] with left leg swelling.        FINDINGS:  Endotracheal tube with tip approxi effusions versus reaction. No new focal consolidation or   pneumothorax. CONCLUSION:    1. Compared to 6/24/19 chest radiograph, overall improvement of bilateral increased interstitial and airspace opacities .       Impression/Plan    History of cholang

## 2019-06-25 NOTE — ED NOTES
Pt returned from CT. Ok per Dr Danii Garrido for pt to eat and drink. Vss. Both pt and his wife continue to challenge writer. Demanding to know when CT result will be back. Demanding to know when he will be released to home.  I explained repeatedly to pt and his

## 2019-06-25 NOTE — RESPIRATORY THERAPY NOTE
Patient received on full ventilator support on AC VC+ 18/500/60%+5. RN increasing sedation, respirations at 27/minute. Patient suctioned orally and via ETT and produced a moderate amount of thin white secretions. Subglottic suction in place.  Will follow up

## 2019-06-25 NOTE — PAYOR COMM NOTE
--------------  ADMISSION REVIEW     [facility] #[facility]  [id number]  Authorization Number: N/A    ED Provider Notes        Patient Seen in: [facility] Emergency Department    History   Patient presents with:  Abnormal Resu by Marlen Graves MD at [facility] ENDOSCOPY   • HERNIA SURGERY      over 35 yrs ago   • OTHER      right index finger surgery   • OTHER SURGICAL HISTORY  6/23/16.     EUS/EGD/FNA   • PLACEMENT, BILE DUCT STENT     • REMOVAL GALLBLADDER     • WHIPPLE  8/16/2016 RDW-SD 50.4 (*)     All other components within normal limits   CBC WITH DIFFERENTIAL WITH PLATELET    Narrative: The following orders were created for panel order CBC WITH DIFFERENTIAL WITH PLATELET.   Procedure                               Abnormalit end-tidal CO2 monitor. Chest x-ray does show pulmonary edema patient was started on to prevent as well as a nitroglycerin drip.     Disposition and Plan     Clinical Impression:  Elevated troponin  (primary encounter diagnosis)  Acute respiratory failure, 1. Acute Respiratory Failure  1. Continue vent support  2. Continue diuretics  3. US negative for LE DVT  4. CTA negative for PE  2. Flash Pulm Edema  1. Diuretics as above  2. Check ECHO  3. Cardiology to eval  3. NSTEMI/Elevated troponin  1.  Gideon [place] improving / ABG now   8. WBC increased   9. RVP/ sputum P   10. + PCT 0.6 no abx will discuss w/ pulm   2. Flash Pulm Edema  1. Diuretics as above  2. Check ECHO  3. Cardiology / pulm following   4. Lasix IV BID   5. Nebs q4 hrs  3.  NSTEMI/Elevated troponi Hg.    Impressions:  This study is compared with previous dated 08/09/2016: Left  ventricle chamber size now dilated with slight decrease in overall LVEF to  30-35% from 45-50%.     Cardiology Addendum  LVEF appears to be worse.  Hemodynamically he is lucille started.

## 2019-06-26 ENCOUNTER — ANESTHESIA EVENT (OUTPATIENT)
Dept: CARDIAC SURGERY | Facility: HOSPITAL | Age: 69
End: 2019-06-26

## 2019-06-26 ENCOUNTER — APPOINTMENT (OUTPATIENT)
Dept: GENERAL RADIOLOGY | Facility: HOSPITAL | Age: 69
DRG: 233 | End: 2019-06-26
Attending: INTERNAL MEDICINE
Payer: MEDICARE

## 2019-06-26 ENCOUNTER — APPOINTMENT (OUTPATIENT)
Dept: INTERVENTIONAL RADIOLOGY/VASCULAR | Facility: HOSPITAL | Age: 69
DRG: 233 | End: 2019-06-26
Attending: INTERNAL MEDICINE
Payer: MEDICARE

## 2019-06-26 ENCOUNTER — APPOINTMENT (OUTPATIENT)
Dept: ULTRASOUND IMAGING | Facility: HOSPITAL | Age: 69
DRG: 233 | End: 2019-06-26
Attending: PHYSICIAN ASSISTANT
Payer: MEDICARE

## 2019-06-26 LAB
ANION GAP SERPL CALC-SCNC: 10 MMOL/L (ref 0–18)
ANTIBODY SCREEN: NEGATIVE
APTT PPP: 44.7 SECONDS (ref 25.4–36.1)
APTT PPP: 46.3 SECONDS (ref 25.4–36.1)
BASOPHILS # BLD AUTO: 0.04 X10(3) UL (ref 0–0.2)
BASOPHILS NFR BLD AUTO: 0.4 %
BUN BLD-MCNC: 16 MG/DL (ref 7–18)
BUN/CREAT SERPL: 10.7 (ref 10–20)
CALCIUM BLD-MCNC: 8 MG/DL (ref 8.5–10.1)
CHLORIDE SERPL-SCNC: 102 MMOL/L (ref 98–112)
CO2 SERPL-SCNC: 25 MMOL/L (ref 21–32)
CREAT BLD-MCNC: 1.5 MG/DL (ref 0.7–1.3)
DEPRECATED RDW RBC AUTO: 49.1 FL (ref 35.1–46.3)
EOSINOPHIL # BLD AUTO: 0.18 X10(3) UL (ref 0–0.7)
EOSINOPHIL NFR BLD AUTO: 2 %
ERYTHROCYTE [DISTWIDTH] IN BLOOD BY AUTOMATED COUNT: 14.9 % (ref 11–15)
EST. AVERAGE GLUCOSE BLD GHB EST-MCNC: 105 MG/DL (ref 68–126)
GLUCOSE BLD-MCNC: 101 MG/DL (ref 70–99)
HBA1C MFR BLD HPLC: 5.3 % (ref ?–5.7)
HCT VFR BLD AUTO: 31 % (ref 39–53)
HGB BLD-MCNC: 10.7 G/DL (ref 13–17.5)
IMM GRANULOCYTES # BLD AUTO: 0.03 X10(3) UL (ref 0–1)
IMM GRANULOCYTES NFR BLD: 0.3 %
ISTAT ACTIVATED CLOTTING TIME: 131 SECONDS (ref 74–137)
LYMPHOCYTES # BLD AUTO: 1.49 X10(3) UL (ref 1–4)
LYMPHOCYTES NFR BLD AUTO: 16.6 %
MCH RBC QN AUTO: 31.1 PG (ref 26–34)
MCHC RBC AUTO-ENTMCNC: 34.5 G/DL (ref 31–37)
MCV RBC AUTO: 90.1 FL (ref 80–100)
MONOCYTES # BLD AUTO: 1.08 X10(3) UL (ref 0.1–1)
MONOCYTES NFR BLD AUTO: 12 %
NEUTROPHILS # BLD AUTO: 6.15 X10 (3) UL (ref 1.5–7.7)
NEUTROPHILS # BLD AUTO: 6.15 X10(3) UL (ref 1.5–7.7)
NEUTROPHILS NFR BLD AUTO: 68.7 %
OSMOLALITY SERPL CALC.SUM OF ELEC: 285 MOSM/KG (ref 275–295)
PLATELET # BLD AUTO: 288 10(3)UL (ref 150–450)
POTASSIUM SERPL-SCNC: 3.1 MMOL/L (ref 3.5–5.1)
POTASSIUM SERPL-SCNC: 4.2 MMOL/L (ref 3.5–5.1)
RBC # BLD AUTO: 3.44 X10(6)UL (ref 3.8–5.8)
RH BLOOD TYPE: POSITIVE
SODIUM SERPL-SCNC: 137 MMOL/L (ref 136–145)
T4 FREE SERPL-MCNC: 1.6 NG/DL (ref 0.8–1.7)
TSI SER-ACNC: 1.09 MIU/ML (ref 0.36–3.74)
WBC # BLD AUTO: 9 X10(3) UL (ref 4–11)

## 2019-06-26 PROCEDURE — B216YZZ FLUOROSCOPY OF RIGHT AND LEFT HEART USING OTHER CONTRAST: ICD-10-PCS | Performed by: INTERNAL MEDICINE

## 2019-06-26 PROCEDURE — 99232 SBSQ HOSP IP/OBS MODERATE 35: CPT | Performed by: HOSPITALIST

## 2019-06-26 PROCEDURE — 4A023N7 MEASUREMENT OF CARDIAC SAMPLING AND PRESSURE, LEFT HEART, PERCUTANEOUS APPROACH: ICD-10-PCS | Performed by: INTERNAL MEDICINE

## 2019-06-26 PROCEDURE — 93454 CORONARY ARTERY ANGIO S&I: CPT | Performed by: INTERNAL MEDICINE

## 2019-06-26 PROCEDURE — 99223 1ST HOSP IP/OBS HIGH 75: CPT | Performed by: SPECIALIST

## 2019-06-26 PROCEDURE — 93880 EXTRACRANIAL BILAT STUDY: CPT | Performed by: PHYSICIAN ASSISTANT

## 2019-06-26 PROCEDURE — 71045 X-RAY EXAM CHEST 1 VIEW: CPT | Performed by: INTERNAL MEDICINE

## 2019-06-26 RX ORDER — HEPARIN SODIUM AND DEXTROSE 10000; 5 [USP'U]/100ML; G/100ML
1300 INJECTION INTRAVENOUS CONTINUOUS
Status: DISCONTINUED | OUTPATIENT
Start: 2019-06-26 | End: 2019-06-27

## 2019-06-26 RX ORDER — MIDAZOLAM HYDROCHLORIDE 1 MG/ML
INJECTION INTRAMUSCULAR; INTRAVENOUS
Status: COMPLETED
Start: 2019-06-26 | End: 2019-06-26

## 2019-06-26 RX ORDER — HEPARIN SODIUM 5000 [USP'U]/ML
INJECTION, SOLUTION INTRAVENOUS; SUBCUTANEOUS
Status: COMPLETED
Start: 2019-06-26 | End: 2019-06-26

## 2019-06-26 RX ORDER — DOCUSATE SODIUM 100 MG/1
100 CAPSULE, LIQUID FILLED ORAL 2 TIMES DAILY
Status: DISCONTINUED | OUTPATIENT
Start: 2019-06-26 | End: 2019-06-27

## 2019-06-26 RX ORDER — POTASSIUM CHLORIDE 20 MEQ/1
40 TABLET, EXTENDED RELEASE ORAL EVERY 4 HOURS
Status: COMPLETED | OUTPATIENT
Start: 2019-06-26 | End: 2019-06-26

## 2019-06-26 RX ORDER — LIDOCAINE HYDROCHLORIDE 10 MG/ML
INJECTION, SOLUTION EPIDURAL; INFILTRATION; INTRACAUDAL; PERINEURAL
Status: COMPLETED
Start: 2019-06-26 | End: 2019-06-26

## 2019-06-26 NOTE — ANESTHESIA PREPROCEDURE EVALUATION
PRE-OP EVALUATION    Patient Name: Hyun Pinedo    Pre-op Diagnosis: CAD    Procedure(s):  Coronary artery bypass graft  IP 1431    Surgeon(s) and Role:     * Tal Queen MD - Primary    Pre-op vitals reviewed.   Temp: 98.7 °F (37.1 °C)  Pulse: 77  Resp PEG 3350 (MIRALAX) powder packet 17 g 17 g Oral Daily PRN   [MAR Hold] magnesium hydroxide (MILK OF MAGNESIA) 400 MG/5ML suspension 30 mL 30 mL Oral Daily PRN   [MAR Hold] bisacodyl (DULCOLAX) rectal suppository 10 mg 10 mg Rectal Daily PRN   [MAR Hold] FL [COMPLETED] Heparin Sodium (Porcine) 5000 UNIT/ML injection 4,100 Units 60 Units/kg Intravenous Once   [COMPLETED] heparin (PORCINE) 78339ipbip/250mL infusion INITIAL DOSE 12 Units/kg/hr Intravenous Once       Outpatient Medications:    Denosumab 60 MG/M consistent with abnormal left ventricular     relaxation - grade 1 diastolic dysfunction. 2. Mitral valve: Mildly calcified annulus. Mitral valve demonstrates     non-specific thickening of the leaflets.  Transvalvular velocity was     within the normal ra • OTHER SURGICAL HISTORY  6/23/16.     EUS/EGD/FNA   • PLACEMENT, BILE DUCT STENT     • REMOVAL GALLBLADDER     • WHIPPLE  8/16/2016    Pancreaticoduodenectomy with regional lymphadenectomy, Left adrenalectomy with resection of retroperitoneal periadrenal labs and vitals and results have been reviewed. Anesthetic plan reviewed with the patient and all questions answered. We discussed GA w/ETT and postoperative ventilation and CCU admission.   We discussed placement of additional lines including arterial ca

## 2019-06-26 NOTE — RESPIRATORY THERAPY NOTE
Pt on room air. Duonebs Q4H. Pt tolerating well. BS diminished bilaterally. Will continue to monitor.

## 2019-06-26 NOTE — PROGRESS NOTES
[facility]  Progress Note    Vikram Cedeño Patient Status:  Inpatient    1950 MRN [id number]   [facility] 6NE-A Attending Felipe Blancas MD   Hosp Day # 2 PCP Shahbaz Cardona MD     STATUS UPDATE: Over the course of the day yester non-distended, no masses, no guarding, no   rebound, positive BS   Extremity: No edema, no cyanosis   Neurological: Alert, interactive, no focal deficits    Lab Data Review:  Recent Labs   Lab 06/24/19  1802 06/24/19  2334 06/25/19  0631 06/26/19  0407   R today.  2. History of coronary artery disease/segmental wall motion abnormality and depressed ejection fraction in 2016  3. Status post Whipple procedure for treatment of invasive cholangiocarcinoma with lymph node involvement  4.  Status post adjuvant chem

## 2019-06-26 NOTE — PLAN OF CARE
Rec pt @ 3390. Awake, alert, & approp. Denies c/o pain, nausea, or sob. Up to chair this am. Astrid well. Plan of care for cardiac cath today. Explained to pt and family. Questions and concerns addressed.

## 2019-06-26 NOTE — PROGRESS NOTES
[facility]  Progress Note     Rachel Briseno Patient Status:  Inpatient    1950 MRN [id number]   [facility] 6NE-A Attending Lyn Ramos MD   Hosp Day # 2 PCP Marylu Calderon MD     Chief Complaint:  Acute SOB, CP required i 1.500*        Imaging: Imaging data reviewed in Epic. ASSESSMENT / PLAN:   1. Acute Respiratory Failure- ? etiology     1. Extubated 6/25   2. On  Diuretics- held this am for LHC, and higher cr level   3. US negative for LE DVT  4. CTA negative for PE  5.

## 2019-06-26 NOTE — CDS QUERY
Heart Failure  CLINICAL DOCUMENTATION CLARIFICATION FORM  Clinical information (provided below) includes a diagnosis of Heart Failure.  For accurate ICD-10-CM code assignment to reflect severity of illness and risk of mortality,  PLEASE CHECK ALL DIAGNOSES 40mm Hg.  6/26 PULM:  Dyspnea culminating in intubation associated with a chest x-ray finding consistent with pulmonary edema etiology to be determined.   Cardiac etiology seems likely based on his past history although infectious etiology must be ruled out

## 2019-06-26 NOTE — PLAN OF CARE
Assumed care at [address] [facility], patient resting in bed, A/Ox4, SANTOS, VSS, NSR per tele, for full assessment see charting. Heparin infusing per ACS protocol. Plan for angiogram in the morning, consent signed. NPO at midnight.  Potassium replaced per electrolyte protoco

## 2019-06-26 NOTE — DIETARY NOTE
[address]     Admitting diagnosis:  Elevated troponin [R74.8]  Acute respiratory failure, unspecified whether with hypoxia or hypercapnia (HCC) [J96.00]    Ht:  17.8 cm  Wt: 65.3 kg (143 lb 15.4 oz).  This is 80

## 2019-06-26 NOTE — PROGRESS NOTES
77 y/o male with SEMI, EF 30%, CKD, hx Whipples for pancreatic CA, pheochromocytoma resection, smoker for cath    LV not done  Left main 90% distal  Cx 80% prox  LAD 70% prox  % with collaterals from LAD    Discuss with CV either surgery or Impella

## 2019-06-27 ENCOUNTER — ANESTHESIA (OUTPATIENT)
Dept: CARDIAC SURGERY | Facility: HOSPITAL | Age: 69
End: 2019-06-27

## 2019-06-27 ENCOUNTER — APPOINTMENT (OUTPATIENT)
Dept: GENERAL RADIOLOGY | Facility: HOSPITAL | Age: 69
DRG: 233 | End: 2019-06-27
Attending: THORACIC SURGERY (CARDIOTHORACIC VASCULAR SURGERY)
Payer: MEDICARE

## 2019-06-27 ENCOUNTER — APPOINTMENT (OUTPATIENT)
Dept: GENERAL RADIOLOGY | Facility: HOSPITAL | Age: 69
DRG: 233 | End: 2019-06-27
Attending: INTERNAL MEDICINE
Payer: MEDICARE

## 2019-06-27 LAB
ANION GAP SERPL CALC-SCNC: 6 MMOL/L (ref 0–18)
APTT PPP: 31.1 SECONDS (ref 25.4–36.1)
APTT PPP: 34.5 SECONDS (ref 25.4–36.1)
APTT PPP: 69.8 SECONDS (ref 25.4–36.1)
ARTERIAL BLD GAS O2 SATURATION: 97 % (ref 92–100)
ARTERIAL BLOOD GAS BASE EXCESS: -1.9 MMOL/L (ref ?–2)
ARTERIAL BLOOD GAS HCO3: 25.2 MEQ/L (ref 22–26)
ARTERIAL BLOOD GAS PCO2: 54 MM HG (ref 35–45)
ARTERIAL BLOOD GAS PH: 7.29 (ref 7.35–7.45)
ARTERIAL BLOOD GAS PO2: 134 MM HG (ref 80–105)
BUN BLD-MCNC: 12 MG/DL (ref 7–18)
BUN BLD-MCNC: 14 MG/DL (ref 7–18)
BUN/CREAT SERPL: 12.8 (ref 10–20)
CALCIUM BLD-MCNC: 7.5 MG/DL (ref 8.5–10.1)
CALCIUM BLD-MCNC: 7.8 MG/DL (ref 8.5–10.1)
CALCULATED O2 SATURATION: 99 % (ref 92–100)
CARBOXYHEMOGLOBIN: 1 % SAT (ref 0–3)
CHLORIDE SERPL-SCNC: 110 MMOL/L (ref 98–112)
CHLORIDE SERPL-SCNC: 110 MMOL/L (ref 98–112)
CO2 SERPL-SCNC: 23 MMOL/L (ref 21–32)
CO2 SERPL-SCNC: 24 MMOL/L (ref 21–32)
CREAT BLD-MCNC: 1.09 MG/DL (ref 0.7–1.3)
CREAT BLD-MCNC: 1.31 MG/DL (ref 0.7–1.3)
DEPRECATED RDW RBC AUTO: 53.9 FL (ref 35.1–46.3)
ERYTHROCYTE [DISTWIDTH] IN BLOOD BY AUTOMATED COUNT: 15.7 % (ref 11–15)
FIBRINOGEN: 327 MG/DL (ref 200–446)
FIBRINOGEN: 372 MG/DL (ref 200–446)
FIO2: 70 %
GLUCOSE BLD-MCNC: 101 MG/DL (ref 70–99)
GLUCOSE BLD-MCNC: 107 MG/DL (ref 70–99)
GLUCOSE BLD-MCNC: 111 MG/DL (ref 70–99)
GLUCOSE BLD-MCNC: 115 MG/DL (ref 70–99)
GLUCOSE BLD-MCNC: 115 MG/DL (ref 70–99)
GLUCOSE BLD-MCNC: 116 MG/DL (ref 70–99)
GLUCOSE BLD-MCNC: 119 MG/DL (ref 70–99)
GLUCOSE BLD-MCNC: 120 MG/DL (ref 70–99)
GLUCOSE BLD-MCNC: 125 MG/DL (ref 70–99)
GLUCOSE BLD-MCNC: 127 MG/DL (ref 70–99)
GLUCOSE BLD-MCNC: 128 MG/DL (ref 70–99)
GLUCOSE BLD-MCNC: 132 MG/DL (ref 70–99)
GLUCOSE BLD-MCNC: 147 MG/DL (ref 70–99)
GLUCOSE BLD-MCNC: 149 MG/DL (ref 70–99)
GLUCOSE BLD-MCNC: 92 MG/DL (ref 70–99)
HAV IGM SER QL: 2.9 MG/DL (ref 1.6–2.6)
HCT VFR BLD AUTO: 29 % (ref 39–53)
HGB BLD-MCNC: 9.7 G/DL (ref 13–17.5)
INR BLD: 1.5 (ref 0.9–1.1)
INR BLD: 1.65 (ref 0.9–1.1)
ISTAT ACTIVATED CLOTTING TIME: 120 SECONDS (ref 74–137)
ISTAT ACTIVATED CLOTTING TIME: 125 SECONDS (ref 74–137)
ISTAT ACTIVATED CLOTTING TIME: 125 SECONDS (ref 74–137)
ISTAT ACTIVATED CLOTTING TIME: 312 SECONDS (ref 74–137)
ISTAT ACTIVATED CLOTTING TIME: 356 SECONDS (ref 74–137)
ISTAT ACTIVATED CLOTTING TIME: 444 SECONDS (ref 74–137)
ISTAT ACTIVATED CLOTTING TIME: 499 SECONDS (ref 74–137)
ISTAT BLOOD GAS BASE DEFICIT: -2 MMOL/L
ISTAT BLOOD GAS BASE DEFICIT: -3 MMOL/L
ISTAT BLOOD GAS BASE DEFICIT: -3 MMOL/L
ISTAT BLOOD GAS BASE DEFICIT: -6 MMOL/L
ISTAT BLOOD GAS BASE DEFICIT: -6 MMOL/L
ISTAT BLOOD GAS FIO2: 70 %
ISTAT BLOOD GAS HCO3: 20.3 MEQ/L (ref 22–26)
ISTAT BLOOD GAS HCO3: 22 MEQ/L (ref 22–26)
ISTAT BLOOD GAS HCO3: 23 MEQ/L (ref 22–26)
ISTAT BLOOD GAS HCO3: 24.3 MEQ/L (ref 22–26)
ISTAT BLOOD GAS HCO3: 24.8 MEQ/L (ref 22–26)
ISTAT BLOOD GAS HCO3: 24.8 MEQ/L (ref 22–26)
ISTAT BLOOD GAS HCO3: 25.2 MEQ/L (ref 22–26)
ISTAT BLOOD GAS O2 SATURATION: 100 % (ref 92–100)
ISTAT BLOOD GAS O2 SATURATION: 77 % (ref 60–85)
ISTAT BLOOD GAS O2 SATURATION: 80 % (ref 60–85)
ISTAT BLOOD GAS O2 SATURATION: 85 % (ref 60–85)
ISTAT BLOOD GAS O2 SATURATION: 97 % (ref 92–100)
ISTAT BLOOD GAS O2 SATURATION: 98 % (ref 92–100)
ISTAT BLOOD GAS O2 SATURATION: 98 % (ref 92–100)
ISTAT BLOOD GAS PCO2: 37.9 MMHG (ref 38–50)
ISTAT BLOOD GAS PCO2: 41.6 MMHG (ref 38–50)
ISTAT BLOOD GAS PCO2: 42.3 MMHG (ref 35–45)
ISTAT BLOOD GAS PCO2: 47.6 MMHG (ref 38–50)
ISTAT BLOOD GAS PCO2: 57.5 MMHG (ref 35–45)
ISTAT BLOOD GAS PCO2: 59 MMHG (ref 35–45)
ISTAT BLOOD GAS PCO2: 64.2 MMHG (ref 35–45)
ISTAT BLOOD GAS PH: 7.19 (ref 7.35–7.45)
ISTAT BLOOD GAS PH: 7.22 (ref 7.35–7.45)
ISTAT BLOOD GAS PH: 7.25 (ref 7.32–7.43)
ISTAT BLOOD GAS PH: 7.25 (ref 7.35–7.45)
ISTAT BLOOD GAS PH: 7.29 (ref 7.35–7.45)
ISTAT BLOOD GAS PH: 7.36 (ref 7.32–7.43)
ISTAT BLOOD GAS PH: 7.39 (ref 7.32–7.43)
ISTAT BLOOD GAS PO2: 115 MMHG (ref 80–105)
ISTAT BLOOD GAS PO2: 124 MMHG (ref 80–105)
ISTAT BLOOD GAS PO2: 127 MMHG (ref 80–105)
ISTAT BLOOD GAS PO2: 188 MMHG (ref 80–105)
ISTAT BLOOD GAS PO2: <70 MMHG (ref 35–40)
ISTAT BLOOD GAS TCO2: 22 MMOL/L (ref 22–32)
ISTAT BLOOD GAS TCO2: 24 MMOL/L (ref 22–32)
ISTAT BLOOD GAS TCO2: 24 MMOL/L (ref 22–32)
ISTAT BLOOD GAS TCO2: 26 MMOL/L (ref 22–32)
ISTAT BLOOD GAS TCO2: 26 MMOL/L (ref 22–32)
ISTAT BLOOD GAS TCO2: 27 MMOL/L (ref 22–32)
ISTAT BLOOD GAS TCO2: 27 MMOL/L (ref 22–32)
ISTAT HEMATOCRIT: 20 % (ref 37–53)
ISTAT HEMATOCRIT: 22 % (ref 37–53)
ISTAT HEMATOCRIT: 24 % (ref 37–53)
ISTAT HEMATOCRIT: 26 % (ref 37–53)
ISTAT HEMATOCRIT: 26 % (ref 37–53)
ISTAT HEMATOCRIT: 27 % (ref 37–53)
ISTAT HEMATOCRIT: >60 % (ref 37–53)
ISTAT IONIZED CALCIUM: 0.96 MMOL/L (ref 1.12–1.32)
ISTAT IONIZED CALCIUM: 0.96 MMOL/L (ref 1.12–1.32)
ISTAT IONIZED CALCIUM: 1.01 MMOL/L (ref 1.12–1.32)
ISTAT IONIZED CALCIUM: 1.06 MMOL/L (ref 1.12–1.32)
ISTAT IONIZED CALCIUM: 1.14 MMOL/L (ref 1.12–1.32)
ISTAT IONIZED CALCIUM: 1.17 MMOL/L (ref 1.12–1.32)
ISTAT IONIZED CALCIUM: 1.17 MMOL/L (ref 1.12–1.32)
ISTAT PATIENT TEMPERATURE: 32 DEGREE
ISTAT PATIENT TEMPERATURE: 32 DEGREE
ISTAT PATIENT TEMPERATURE: 36 DEGREE
ISTAT PATIENT TEMPERATURE: 97.4 DEGREE
ISTAT POTASSIUM: 4 MMOL/L (ref 3.6–5.1)
ISTAT POTASSIUM: 4.4 MMOL/L (ref 3.6–5.1)
ISTAT POTASSIUM: 4.4 MMOL/L (ref 3.6–5.1)
ISTAT POTASSIUM: 4.5 MMOL/L (ref 3.6–5.1)
ISTAT POTASSIUM: 5.3 MMOL/L (ref 3.6–5.1)
ISTAT SODIUM: 132 MMOL/L (ref 136–145)
ISTAT SODIUM: 135 MMOL/L (ref 136–145)
ISTAT SODIUM: 137 MMOL/L (ref 136–145)
ISTAT SODIUM: 137 MMOL/L (ref 136–145)
ISTAT SODIUM: 138 MMOL/L (ref 136–145)
ISTAT SODIUM: 138 MMOL/L (ref 136–145)
ISTAT SODIUM: 140 MMOL/L (ref 136–145)
MCH RBC QN AUTO: 31.4 PG (ref 26–34)
MCHC RBC AUTO-ENTMCNC: 33.4 G/DL (ref 31–37)
MCV RBC AUTO: 93.9 FL (ref 80–100)
METHEMOGLOBIN: 0.6 % SAT (ref 0.4–1.5)
OSMOLALITY SERPL CALC.SUM OF ELEC: 289 MOSM/KG (ref 275–295)
P/F RATIO: 342.8 MMHG
PATIENT TEMPERATURE: 97.8 F
PEEP: 5 CM H2O
PLATELET # BLD AUTO: 123 10(3)UL (ref 150–450)
PLATELET # BLD AUTO: 154 10(3)UL (ref 150–450)
PLATELET # BLD AUTO: 264 10(3)UL (ref 150–450)
POTASSIUM SERPL-SCNC: 3.9 MMOL/L (ref 3.5–5.1)
POTASSIUM SERPL-SCNC: 4.5 MMOL/L (ref 3.5–5.1)
PSA SERPL DL<=0.01 NG/ML-MCNC: 18.9 SECONDS (ref 12.5–14.7)
PSA SERPL DL<=0.01 NG/ML-MCNC: 20.4 SECONDS (ref 12.5–14.7)
RBC # BLD AUTO: 3.09 X10(6)UL (ref 3.8–5.8)
SODIUM SERPL-SCNC: 139 MMOL/L (ref 136–145)
SODIUM SERPL-SCNC: 140 MMOL/L (ref 136–145)
TIDAL VOLUME: 400 ML
TOTAL HEMOGLOBIN: 10.1 G/DL (ref 12.6–17.4)
VENT RATE: 20 /MIN
WBC # BLD AUTO: 14.7 X10(3) UL (ref 4–11)

## 2019-06-27 PROCEDURE — 021109W BYPASS CORONARY ARTERY, TWO ARTERIES FROM AORTA WITH AUTOLOGOUS VENOUS TISSUE, OPEN APPROACH: ICD-10-PCS | Performed by: THORACIC SURGERY (CARDIOTHORACIC VASCULAR SURGERY)

## 2019-06-27 PROCEDURE — 02100Z9 BYPASS CORONARY ARTERY, ONE ARTERY FROM LEFT INTERNAL MAMMARY, OPEN APPROACH: ICD-10-PCS | Performed by: THORACIC SURGERY (CARDIOTHORACIC VASCULAR SURGERY)

## 2019-06-27 PROCEDURE — 71045 X-RAY EXAM CHEST 1 VIEW: CPT | Performed by: THORACIC SURGERY (CARDIOTHORACIC VASCULAR SURGERY)

## 2019-06-27 PROCEDURE — 06BQ4ZZ EXCISION OF LEFT SAPHENOUS VEIN, PERCUTANEOUS ENDOSCOPIC APPROACH: ICD-10-PCS | Performed by: THORACIC SURGERY (CARDIOTHORACIC VASCULAR SURGERY)

## 2019-06-27 PROCEDURE — 71045 X-RAY EXAM CHEST 1 VIEW: CPT | Performed by: INTERNAL MEDICINE

## 2019-06-27 PROCEDURE — 99232 SBSQ HOSP IP/OBS MODERATE 35: CPT | Performed by: INTERNAL MEDICINE

## 2019-06-27 PROCEDURE — 5A1221Z PERFORMANCE OF CARDIAC OUTPUT, CONTINUOUS: ICD-10-PCS | Performed by: THORACIC SURGERY (CARDIOTHORACIC VASCULAR SURGERY)

## 2019-06-27 PROCEDURE — 99232 SBSQ HOSP IP/OBS MODERATE 35: CPT | Performed by: HOSPITALIST

## 2019-06-27 PROCEDURE — S0028 INJECTION, FAMOTIDINE, 20 MG: HCPCS

## 2019-06-27 RX ORDER — DEXTROSE MONOHYDRATE 25 G/50ML
50 INJECTION, SOLUTION INTRAVENOUS
Status: DISCONTINUED | OUTPATIENT
Start: 2019-06-27 | End: 2019-06-28 | Stop reason: SDUPTHER

## 2019-06-27 RX ORDER — ASPIRIN 325 MG
325 TABLET ORAL ONCE
Status: COMPLETED | OUTPATIENT
Start: 2019-06-27 | End: 2019-06-27

## 2019-06-27 RX ORDER — MIDAZOLAM HYDROCHLORIDE 1 MG/ML
1 INJECTION INTRAMUSCULAR; INTRAVENOUS EVERY 30 MIN PRN
Status: DISCONTINUED | OUTPATIENT
Start: 2019-06-27 | End: 2019-06-28

## 2019-06-27 RX ORDER — VANCOMYCIN HYDROCHLORIDE 1 G/20ML
INJECTION, POWDER, LYOPHILIZED, FOR SOLUTION INTRAVENOUS
Status: DISCONTINUED | OUTPATIENT
Start: 2019-06-27 | End: 2019-06-27 | Stop reason: HOSPADM

## 2019-06-27 RX ORDER — ONDANSETRON 2 MG/ML
4 INJECTION INTRAMUSCULAR; INTRAVENOUS EVERY 6 HOURS PRN
Status: DISCONTINUED | OUTPATIENT
Start: 2019-06-27 | End: 2019-07-02

## 2019-06-27 RX ORDER — POTASSIUM CHLORIDE 29.8 MG/ML
40 INJECTION INTRAVENOUS AS NEEDED
Status: DISCONTINUED | OUTPATIENT
Start: 2019-06-27 | End: 2019-06-30

## 2019-06-27 RX ORDER — DOBUTAMINE HYDROCHLORIDE 200 MG/100ML
INJECTION INTRAVENOUS CONTINUOUS PRN
Status: DISCONTINUED | OUTPATIENT
Start: 2019-06-27 | End: 2019-06-28

## 2019-06-27 RX ORDER — MAGNESIUM SULFATE 1 G/100ML
1 INJECTION INTRAVENOUS AS NEEDED
Status: DISCONTINUED | OUTPATIENT
Start: 2019-06-27 | End: 2019-06-30

## 2019-06-27 RX ORDER — MORPHINE SULFATE 4 MG/ML
4 INJECTION, SOLUTION INTRAMUSCULAR; INTRAVENOUS
Status: DISCONTINUED | OUTPATIENT
Start: 2019-06-27 | End: 2019-07-01

## 2019-06-27 RX ORDER — DOCUSATE SODIUM 100 MG/1
100 CAPSULE, LIQUID FILLED ORAL 2 TIMES DAILY
Status: DISCONTINUED | OUTPATIENT
Start: 2019-06-27 | End: 2019-07-02

## 2019-06-27 RX ORDER — TEMAZEPAM 15 MG/1
15 CAPSULE ORAL NIGHTLY PRN
Status: DISCONTINUED | OUTPATIENT
Start: 2019-06-27 | End: 2019-07-02

## 2019-06-27 RX ORDER — MORPHINE SULFATE 4 MG/ML
8 INJECTION, SOLUTION INTRAMUSCULAR; INTRAVENOUS
Status: DISCONTINUED | OUTPATIENT
Start: 2019-06-27 | End: 2019-07-01

## 2019-06-27 RX ORDER — ALBUMIN, HUMAN INJ 5% 5 %
250 SOLUTION INTRAVENOUS ONCE AS NEEDED
Status: COMPLETED | OUTPATIENT
Start: 2019-06-27 | End: 2019-06-28

## 2019-06-27 RX ORDER — POTASSIUM CHLORIDE 14.9 MG/ML
20 INJECTION INTRAVENOUS AS NEEDED
Status: DISCONTINUED | OUTPATIENT
Start: 2019-06-27 | End: 2019-06-30

## 2019-06-27 RX ORDER — POLYETHYLENE GLYCOL 3350 17 G/17G
1 POWDER, FOR SOLUTION ORAL DAILY PRN
Status: DISCONTINUED | OUTPATIENT
Start: 2019-06-27 | End: 2019-07-02

## 2019-06-27 RX ORDER — BISACODYL 10 MG
10 SUPPOSITORY, RECTAL RECTAL
Status: DISCONTINUED | OUTPATIENT
Start: 2019-06-27 | End: 2019-07-02

## 2019-06-27 RX ORDER — CEFAZOLIN SODIUM/WATER 2 G/20 ML
SYRINGE (ML) INTRAVENOUS
Status: DISCONTINUED | OUTPATIENT
Start: 2019-06-27 | End: 2019-06-27 | Stop reason: HOSPADM

## 2019-06-27 RX ORDER — CHLORHEXIDINE GLUCONATE 0.12 MG/ML
15 RINSE ORAL
Status: DISCONTINUED | OUTPATIENT
Start: 2019-06-27 | End: 2019-06-28

## 2019-06-27 RX ORDER — DEXTROSE AND SODIUM CHLORIDE 5; .45 G/100ML; G/100ML
INJECTION, SOLUTION INTRAVENOUS CONTINUOUS
Status: DISCONTINUED | OUTPATIENT
Start: 2019-06-27 | End: 2019-06-28

## 2019-06-27 RX ORDER — ATORVASTATIN CALCIUM 10 MG/1
10 TABLET, FILM COATED ORAL NIGHTLY
Status: DISCONTINUED | OUTPATIENT
Start: 2019-06-27 | End: 2019-07-02

## 2019-06-27 RX ORDER — MAGNESIUM SULFATE HEPTAHYDRATE 40 MG/ML
2 INJECTION, SOLUTION INTRAVENOUS AS NEEDED
Status: DISCONTINUED | OUTPATIENT
Start: 2019-06-27 | End: 2019-06-30

## 2019-06-27 RX ORDER — CEFAZOLIN SODIUM/WATER 2 G/20 ML
2 SYRINGE (ML) INTRAVENOUS EVERY 8 HOURS
Status: COMPLETED | OUTPATIENT
Start: 2019-06-27 | End: 2019-06-29

## 2019-06-27 RX ORDER — NITROGLYCERIN 20 MG/100ML
INJECTION INTRAVENOUS CONTINUOUS PRN
Status: DISCONTINUED | OUTPATIENT
Start: 2019-06-27 | End: 2019-06-28

## 2019-06-27 RX ORDER — MORPHINE SULFATE 4 MG/ML
2 INJECTION, SOLUTION INTRAMUSCULAR; INTRAVENOUS
Status: DISCONTINUED | OUTPATIENT
Start: 2019-06-27 | End: 2019-07-01

## 2019-06-27 RX ORDER — ASPIRIN 300 MG
300 SUPPOSITORY, RECTAL RECTAL ONCE
Status: COMPLETED | OUTPATIENT
Start: 2019-06-27 | End: 2019-06-27

## 2019-06-27 RX ORDER — SODIUM CHLORIDE 9 MG/ML
INJECTION, SOLUTION INTRAVENOUS CONTINUOUS
Status: DISCONTINUED | OUTPATIENT
Start: 2019-06-27 | End: 2019-06-30

## 2019-06-27 RX ORDER — HYDROCODONE BITARTRATE AND ACETAMINOPHEN 10; 325 MG/1; MG/1
2 TABLET ORAL EVERY 4 HOURS PRN
Status: DISCONTINUED | OUTPATIENT
Start: 2019-06-27 | End: 2019-07-01

## 2019-06-27 RX ORDER — HYDROCODONE BITARTRATE AND ACETAMINOPHEN 10; 325 MG/1; MG/1
1 TABLET ORAL EVERY 4 HOURS PRN
Status: DISCONTINUED | OUTPATIENT
Start: 2019-06-27 | End: 2019-07-01

## 2019-06-27 RX ORDER — PANTOPRAZOLE SODIUM 40 MG/1
40 TABLET, DELAYED RELEASE ORAL
Status: DISCONTINUED | OUTPATIENT
Start: 2019-06-28 | End: 2019-07-02

## 2019-06-27 RX ORDER — DEXMEDETOMIDINE HYDROCHLORIDE 4 UG/ML
INJECTION, SOLUTION INTRAVENOUS CONTINUOUS
Status: DISCONTINUED | OUTPATIENT
Start: 2019-06-27 | End: 2019-06-28

## 2019-06-27 RX ORDER — DEXTROSE AND SODIUM CHLORIDE 5; .45 G/100ML; G/100ML
INJECTION, SOLUTION INTRAVENOUS CONTINUOUS
Status: DISCONTINUED | OUTPATIENT
Start: 2019-06-28 | End: 2019-06-28

## 2019-06-27 RX ORDER — IPRATROPIUM BROMIDE AND ALBUTEROL SULFATE 2.5; .5 MG/3ML; MG/3ML
3 SOLUTION RESPIRATORY (INHALATION) EVERY 4 HOURS PRN
Status: DISCONTINUED | OUTPATIENT
Start: 2019-06-27 | End: 2019-07-02

## 2019-06-27 NOTE — CM/SW NOTE
06/27/19 1100   CM/SW Referral Data   Referral Source Physician   Reason for Referral Discharge planning   Informant Patient;Spouse; Children   Social History   Recreational Drug/Alcohol Use no   Major Changes Last 6 Months no   Domestic/Partner Violence

## 2019-06-27 NOTE — PROGRESS NOTES
[facility]  Progress Note        Vikram Cedeño Patient Status:  Inpatient    1950 MRN [id number]   [facility] 6NE-A Attending Cesario Masterson MD   Hosp Day # 3 PCP Shahbaz Cardona MD       Subjective:   Intubated, sedated    Obje to lad, svg to om, svg to ramus)- EF 30-35%   . Hemodynamically stable on current inotropic/vasoactive support (dobs 5, levo 4). CO/CI pending. CVP 9. PAP 36/16.  EKG sr with slightly prolonged qt (still cool), nonspecific st-t wave changes but without acut

## 2019-06-27 NOTE — PROGRESS NOTES
[facility]  Progress Note     Jak Larson Patient Status:  Inpatient    1950 MRN [id number]   [facility] 6NE-A Attending Deonte Nichols MD   Hosp Day # 3 PCP Mata Harrison MD     Chief Complaint:  Acute SOB, CP required i (based on SCr of 1.09 mg/dL). No results for input(s): PTP, INR in the last 168 hours. Recent Labs   Lab 06/24/19  2334 06/25/19  0121 06/25/19  0631   TROP 1.060* 1.360* 1.910*        Imaging: Imaging data reviewed in Epic. ASSESSMENT / PLAN:   1.  [place]

## 2019-06-27 NOTE — PROGRESS NOTES
06/27/19 0858   Clinical Encounter Type   Visited With Patient  (Patient's wife at bedside)   Patient's Supportive Strategies/Resources  offered emotional support including active listening and acknowledgement of the concerns of the patient   Re

## 2019-06-27 NOTE — PHYSICAL THERAPY NOTE
Physical Therapy    PT order received, Chart reviewed, pt is having CABG today. PT will require new orders after surgery, please re-order when appropriate.

## 2019-06-27 NOTE — PROGRESS NOTES
[facility]  Progress Note    Mg Serrano Patient Status:  Inpatient    1950 MRN [id number]   [facility] 6NE-A Attending Nigel Dove MD   Hosp Day # 3 PCP Татьяна Valle MD     STATUS UPDATE: Subsequent to rounding yesterday, deficits    Lab Data Review:  Recent Labs   Lab 06/24/19  1802 06/24/19  2334 06/25/19  0631 06/26/19  0407 06/27/19  0417   RBC 3.65* 3.69* 3.53* 3.44*  --    HGB 11.3* 11.4* 11.0* 10.7*  --    HCT 33.2* 34.1* 32.4* 31.0*  --    MCV 91.0 92.4 91.8 90.1  - motion abnormality and depressed ejection fraction in 2016  3. Status post Whipple procedure for treatment of invasive cholangiocarcinoma with lymph node involvement-oncology note appreciated  4.  Status post adjuvant chemotherapy and radiation therapy (?)

## 2019-06-27 NOTE — PROCEDURES
[facility]    PATIENT'S NAME: Kyreenellystephanie Buchanan   ATTENDING PHYSICIAN: Reena Alonso M.D. OPERATING PHYSICIAN: Florin Ramos M.D.    PATIENT ACCOUNT#:   [de-identified]    LOCATION:  [address] [place] [address]  MEDICAL RECORD #:   [id number]       DATE OF BIRTH:  [birth year]

## 2019-06-27 NOTE — CONSULTS
THE [facility] Report of Consultation      Patient Name: Donna Martin   YOB: 1950  Medical Record Number: [id number]  Consulting Physician: Quinn Izaguirre. Heber Goldberg M.D.    Referring Physician: Darin Mobley MD    Date of Consultat (SUBLIMAZE) 0.05 MG/ML injection      [COMPLETED] Midazolam HCl (VERSED) 2 MG/2ML injection      [COMPLETED] Midazolam HCl (VERSED) 2 MG/2ML injection      [COMPLETED] Midazolam HCl (VERSED) 2 MG/2ML injection      mupirocin (BACTROBAN) 2% nasal ointment O ipratropium-albuterol (DUONEB) nebulizer solution 3 mL 3 mL Nebulization Once   [COMPLETED] furosemide (LASIX) injection 40 mg 40 mg Intravenous Once   [COMPLETED] LORazepam (ATIVAN) injection 2 mg 2 mg Intravenous Once   [COMPLETED] nitroGLYCERIN infusion (Temporal)   Resp 22   Wt 65.3 kg (143 lb 15.4 oz)   SpO2 96%   BMI 20.66 kg/m²     Physical Examination   Constitutional NAD. Head Normocephalic and atraumatic. Eyes Conjunctiva clear; sclera anicteric.   ENMT External nose normal; external ears normal. 06/24/19  6:02 PM   Result Value Ref Range    Hold Lavender Auto Resulted    RAINBOW DRAW LIGHT GREEN    Collection Time: 06/24/19  6:02 PM   Result Value Ref Range    Hold Lt Green Auto Resulted    RAINBOW DRAW GOLD    Collection Time: 06/24/19  6:02 PM HCO3 15.1 (L) 22.0 - 26.0 mEq/L    ABG Base Excess -13.5 (L) -2.0 - 2.0 mmol/L    ABG O2 Saturation 97 92 - 100 %    Calculated O2 Saturation 100 92 - 100 %    Patient Temperature 98.2 F    Total Hemoglobin 11.9 (L) 12.6 - 17.4 g/dL    Carboxyhemoglobin 2. HGB 11.4 (L) 13.0 - 17.5 g/dL    HCT 34.1 (L) 39.0 - 53.0 %    .0 150.0 - 450.0 10(3)uL    MCV 92.4 80.0 - 100.0 fL    MCH 30.9 26.0 - 34.0 pg    MCHC 33.4 31.0 - 37.0 g/dL    RDW 15.0 11.0 - 15.0 %    RDW-SD 50.9 (H) 35.1 - 46.3 fL   PTT, ACTIVA (H) 25.4 - 36.1 seconds   POTASSIUM    Collection Time: 06/25/19  6:31 AM   Result Value Ref Range    Potassium 4.1 3.5 - 5.1 mmol/L   CBC W/ DIFFERENTIAL    Collection Time: 06/25/19  6:31 AM   Result Value Ref Range    WBC 12.9 (H) 4.0 - 11.0 x10(3) uL Respiratory Brigid isolated     Sputum Smear 1+ WBCs seen     Sputum Smear 3+ Gram positive cocci in pairs    RESPIRATORY PANEL FLU EXPANDED    Collection Time: 06/25/19  8:13 AM   Result Value Ref Range    Adenovirus PCR: Negative Negative    Coronavirus 2 Collection Time: 06/25/19  9:26 AM   Result Value Ref Range    Lactic Acid 1.0 0.4 - 2.0 mmol/L   BLOOD CULTURE    Collection Time: 06/25/19  9:26 AM   Result Value Ref Range    Blood Culture Result No Growth 1 Day    ARTERIAL BLOOD GAS    Collection Ti 26.0 mEq/L    ABG Base Excess -2.8 (L) -2.0 - 2.0 mmol/L    ABG O2 Saturation 97 92 - 100 %    Calculated O2 Saturation 99 92 - 100 %    Patient Temperature 98.0 F    Total Hemoglobin 10.4 (L) 12.6 - 17.4 g/dL    Carboxyhemoglobin 1.1 0.0 - 3.0 % SAT    Me HGB 10.7 (L) 13.0 - 17.5 g/dL    HCT 31.0 (L) 39.0 - 53.0 %    .0 150.0 - 450.0 10(3)uL    MCV 90.1 80.0 - 100.0 fL    MCH 31.1 26.0 - 34.0 pg    MCHC 34.5 31.0 - 37.0 g/dL    RDW 14.9 11.0 - 15.0 %    RDW-SD 49.1 (H) 35.1 - 46.3 fL    Neutrophil Ab ONLY)(CPT=71260/89799), 10/30/2018, 10:54. FRAN , CT CHEST+ABDOMEN+PELVIS(ALL CNTRST ONLY)(CPT=71260/07533), 5/01/2018, 14:44. INDICATIONS:  sent by doctor for positive d-dimer, recent travel to [address] with left leg swelling.      TECHNIQUE: calcifications. CHEST WALL:  Normal.  LIMITED ABDOMEN:  Intrahepatic biliary air. Postoperative changes within the abdomen again noted. Colonic diverticulosis. BONES:  Degenerative changes the stable wedging deformities.     =====  CONCLUSION:    1.  No off. Please call with questions. Electronically signed by:    Laura Patel M.D.   [facility]  Director, Bone and Soft Tissue Sarcoma Program  Section of Hematology/Oncology, [facility]

## 2019-06-27 NOTE — PLAN OF CARE
Rec pt @ 1630, post CABG. Recovery per protocol/  Breathing even & unlab. See flowsheets for gtts and titrations. Dr. Antoinette Vargas updated with ABGs.

## 2019-06-27 NOTE — CONSULTS
Pharmacy consulted to dose post-op antibiotics. SCr 1.09 mg/dL; estimated CrCl 59.4 mL/min. Ok for Ancef 2 grams IV every 8 hours x5 doses and vancomycin 1 gram IV every 12 hours x2 doses as ordered.      Trang Kiser, PharmD  06/27/19 4:47 PM

## 2019-06-27 NOTE — OCCUPATIONAL THERAPY NOTE
OT order received, Chart reviewed, pt to go to [facility] today, OT will required new orders after surgery, please re-order when approp.

## 2019-06-27 NOTE — ANESTHESIA POSTPROCEDURE EVALUATION
[address] [facility] Patient Status:  Inpatient   Age/Gender 76year old male MRN [id number]   [facility] 6NE-A Attending Tayo Morales MD   [facility] Day # 3 PCP Abdiel Cabrera MD       Anesthesia Post-op Note    Procedure(s):  Cor

## 2019-06-27 NOTE — PLAN OF CARE
Assumed care at 1, patient resting in bed, A/Ox4, SANTOS, VSS, NSR per tele, for full assessment see charting. NPO at midnight for scheduled CABG.  CHG surgical scrub bath given at 2100 and 0500, linens changed, no leads down center of chest. Heparin gtt re

## 2019-06-27 NOTE — PROGRESS NOTES
Anesthesia Ventilator Management    Patient is s/p CABG x 3  POD#0. Patient is currently sedated and intubated. Vent settings: PRVC 350/19/5/70%  ABG, CXR pending    Will wean FiO2 as tolerated. Plan for extubation after CPAP trial, likely tomorrow.

## 2019-06-28 ENCOUNTER — APPOINTMENT (OUTPATIENT)
Dept: GENERAL RADIOLOGY | Facility: HOSPITAL | Age: 69
DRG: 233 | End: 2019-06-28
Attending: THORACIC SURGERY (CARDIOTHORACIC VASCULAR SURGERY)
Payer: MEDICARE

## 2019-06-28 LAB
ARTERIAL BLD GAS O2 SATURATION: 97 % (ref 92–100)
ARTERIAL BLOOD GAS BASE EXCESS: -3.6 MMOL/L (ref ?–2)
ARTERIAL BLOOD GAS HCO3: 20.1 MEQ/L (ref 22–26)
ARTERIAL BLOOD GAS PCO2: 32 MM HG (ref 35–45)
ARTERIAL BLOOD GAS PH: 7.42 (ref 7.35–7.45)
ARTERIAL BLOOD GAS PO2: 117 MM HG (ref 80–105)
ATRIAL RATE: 79 BPM
ATRIAL RATE: 95 BPM
BASOPHILS # BLD AUTO: 0.01 X10(3) UL (ref 0–0.2)
BASOPHILS NFR BLD AUTO: 0.1 %
BUN BLD-MCNC: 15 MG/DL (ref 7–18)
CALCIUM BLD-MCNC: 7.7 MG/DL (ref 8.5–10.1)
CALCULATED O2 SATURATION: 99 % (ref 92–100)
CARBOXYHEMOGLOBIN: 0.8 % SAT (ref 0–3)
CHLORIDE SERPL-SCNC: 113 MMOL/L (ref 98–112)
CO2 SERPL-SCNC: 22 MMOL/L (ref 21–32)
CREAT BLD-MCNC: 1.2 MG/DL (ref 0.7–1.3)
DEPRECATED RDW RBC AUTO: 53.3 FL (ref 35.1–46.3)
EOSINOPHIL # BLD AUTO: 0 X10(3) UL (ref 0–0.7)
EOSINOPHIL NFR BLD AUTO: 0 %
ERYTHROCYTE [DISTWIDTH] IN BLOOD BY AUTOMATED COUNT: 15.5 % (ref 11–15)
FIO2: 40 %
GLUCOSE BLD-MCNC: 106 MG/DL (ref 70–99)
GLUCOSE BLD-MCNC: 109 MG/DL (ref 70–99)
GLUCOSE BLD-MCNC: 114 MG/DL (ref 70–99)
GLUCOSE BLD-MCNC: 116 MG/DL (ref 70–99)
GLUCOSE BLD-MCNC: 117 MG/DL (ref 70–99)
GLUCOSE BLD-MCNC: 118 MG/DL (ref 70–99)
GLUCOSE BLD-MCNC: 118 MG/DL (ref 70–99)
GLUCOSE BLD-MCNC: 123 MG/DL (ref 70–99)
GLUCOSE BLD-MCNC: 124 MG/DL (ref 70–99)
GLUCOSE BLD-MCNC: 93 MG/DL (ref 70–99)
GLUCOSE BLD-MCNC: 96 MG/DL (ref 70–99)
HAV IGM SER QL: 2.4 MG/DL (ref 1.6–2.6)
HCT VFR BLD AUTO: 30.1 % (ref 39–53)
HGB BLD-MCNC: 9.8 G/DL (ref 13–17.5)
IMM GRANULOCYTES # BLD AUTO: 0.07 X10(3) UL (ref 0–1)
IMM GRANULOCYTES NFR BLD: 0.5 %
LYMPHOCYTES # BLD AUTO: 0.32 X10(3) UL (ref 1–4)
LYMPHOCYTES NFR BLD AUTO: 2.3 %
MCH RBC QN AUTO: 30.5 PG (ref 26–34)
MCHC RBC AUTO-ENTMCNC: 32.6 G/DL (ref 31–37)
MCV RBC AUTO: 93.8 FL (ref 80–100)
METHEMOGLOBIN: 0.4 % SAT (ref 0.4–1.5)
MONOCYTES # BLD AUTO: 0.87 X10(3) UL (ref 0.1–1)
MONOCYTES NFR BLD AUTO: 6.1 %
NEUTROPHILS # BLD AUTO: 12.91 X10 (3) UL (ref 1.5–7.7)
NEUTROPHILS # BLD AUTO: 12.91 X10(3) UL (ref 1.5–7.7)
NEUTROPHILS NFR BLD AUTO: 91 %
P AXIS: 32 DEGREES
P AXIS: 66 DEGREES
P-R INTERVAL: 132 MS
P-R INTERVAL: 168 MS
P/F RATIO: 230.8 MMHG
PATIENT TEMPERATURE: 99 F
PEEP: 5 CM H2O
PLATELET # BLD AUTO: 174 10(3)UL (ref 150–450)
POTASSIUM SERPL-SCNC: 4.4 MMOL/L (ref 3.5–5.1)
PRESSURE SUPPORT: 5 CM H2O
Q-T INTERVAL: 398 MS
Q-T INTERVAL: 406 MS
QRS DURATION: 100 MS
QRS DURATION: 116 MS
QTC CALCULATION (BEZET): 465 MS
QTC CALCULATION (BEZET): 500 MS
R AXIS: 79 DEGREES
R AXIS: 83 DEGREES
RBC # BLD AUTO: 3.21 X10(6)UL (ref 3.8–5.8)
SODIUM SERPL-SCNC: 142 MMOL/L (ref 136–145)
T AXIS: 86 DEGREES
T AXIS: 87 DEGREES
TOTAL HEMOGLOBIN: 9.8 G/DL (ref 12.6–17.4)
VENTRICULAR RATE: 79 BPM
VENTRICULAR RATE: 95 BPM
WBC # BLD AUTO: 14.2 X10(3) UL (ref 4–11)

## 2019-06-28 PROCEDURE — 99232 SBSQ HOSP IP/OBS MODERATE 35: CPT | Performed by: HOSPITALIST

## 2019-06-28 PROCEDURE — 71045 X-RAY EXAM CHEST 1 VIEW: CPT | Performed by: THORACIC SURGERY (CARDIOTHORACIC VASCULAR SURGERY)

## 2019-06-28 PROCEDURE — 99232 SBSQ HOSP IP/OBS MODERATE 35: CPT | Performed by: INTERNAL MEDICINE

## 2019-06-28 RX ORDER — KETOROLAC TROMETHAMINE 30 MG/ML
30 INJECTION, SOLUTION INTRAMUSCULAR; INTRAVENOUS ONCE
Status: COMPLETED | OUTPATIENT
Start: 2019-06-28 | End: 2019-06-28

## 2019-06-28 RX ORDER — DEXTROSE MONOHYDRATE 25 G/50ML
50 INJECTION, SOLUTION INTRAVENOUS
Status: DISCONTINUED | OUTPATIENT
Start: 2019-06-28 | End: 2019-07-02

## 2019-06-28 RX ORDER — ACETAMINOPHEN 10 MG/ML
1000 INJECTION, SOLUTION INTRAVENOUS EVERY 6 HOURS PRN
Status: DISCONTINUED | OUTPATIENT
Start: 2019-06-28 | End: 2019-06-28

## 2019-06-28 RX ORDER — ALBUMIN, HUMAN INJ 5% 5 %
250 SOLUTION INTRAVENOUS
Status: COMPLETED | OUTPATIENT
Start: 2019-06-28 | End: 2019-06-28

## 2019-06-28 RX ORDER — ALPRAZOLAM 0.25 MG/1
0.25 TABLET ORAL 3 TIMES DAILY PRN
Status: DISCONTINUED | OUTPATIENT
Start: 2019-06-28 | End: 2019-07-02

## 2019-06-28 RX ORDER — ALBUMIN, HUMAN INJ 5% 5 %
SOLUTION INTRAVENOUS
Status: COMPLETED
Start: 2019-06-28 | End: 2019-06-28

## 2019-06-28 NOTE — OCCUPATIONAL THERAPY NOTE
OCCUPATIONAL THERAPY EVALUATION - INPATIENT     Room Number: 6356/7977-L  Evaluation Date: 6/28/2019  Type of Evaluation: Initial  Presenting Problem: CABG    Physician Order: IP Consult to Occupational Therapy  Reason for Therapy: ADL/IADL Dysfunction and ULTRASOUND (EUS) N/A 7/25/2016    Performed by Elina Vyas MD at [address] [place] [address] (EUS) N/A 6/23/2016    Performed by Elina Vyas MD at [facility] ENDOSCOPY   • ESOPHAGOGASTRODUODENOSCOPY (EGD) N/A 6/23/2016    Performed by Mona Campos deficits    PERCEPTION  Overall Perception Status:   WFL - within functional limits    SENSATION  Light touch:  impaired  Per pt some numbness/tingling    Communication: Pt able to appropriately participate in conversation during session.      Behavioral/Em mobility. Provided pt and family CV binder with information. Encouraged hand pumps and OOB activity during the day. All in agreement no additional questions voiced.    Patient End of Session: In bed;Needs met;Call light within reach;RN aware of session/f transfer training;UE strengthening/ROM; Endurance training;Patient/Family education;Patient/Family training;Equipment eval/education; Compensatory technique education;Continued evaluation  Rehab Potential : Good  Frequency (Obs): Daily  Number of Visits to [facility]

## 2019-06-28 NOTE — OPERATIVE REPORT
[facility]    PATIENT'S NAME: Faith Hernandez   ATTENDING PHYSICIAN: Gurmeet Mckinnon M.D.    OPERATING PHYSICIAN: Jerry Schmitt MD   PATIENT ACCOUNT#:   [id number]    LOCATION:  [address] [place] [address]  MEDICAL RECORD #:   [id number]       DATE OF BIRTH:  [birth year] placed. Transesophageal echo was placed. Transcranial oximetry was placed. Patient prepped and draped. Sternotomy was performed. Left internal mammary artery was taken down. This was a good quality conduit.   Greater saphenous graft was harvested endo

## 2019-06-28 NOTE — RESPIRATORY THERAPY NOTE
Received Pt on full support at start of shift this morning. CPAP trial initiated around 0700. Weaning parameters and ABG called to Dr. Gigi Morillo. Pt extubated to 3 L/NC. Tolerating well.

## 2019-06-28 NOTE — PLAN OF CARE
Patient care assumed 1900. Patient intubated and sedated with propofol and precedex, has dobutamine and insulin running as well. Siria catheter in place.  Planned to remain intubated overnight; per anesthesia, RN was to wait for CV Surg's \"ok\" before extu

## 2019-06-28 NOTE — CONSULTS
[facility]  Endocrinology Consultation    [facility]  [facility]    1950 MRN [id number]   [facility] 6NE-A Attending Damian Rich MD   Hosp Day # 4 PCP Ashly Robertson MD     Reason for Consultation:  Post-o STENT     • REMOVAL GALLBLADDER     • WHIPPLE  8/16/2016    Pancreaticoduodenectomy with regional lymphadenectomy, Left adrenalectomy with resection of retroperitoneal periadrenal tumor, Omental pedicle flap, Splenectomy, and Wedge resection segment 4b ira (PF) 4 MG/ML injection 4 mg, 4 mg, Intravenous, Q1H PRN **OR** morphINE sulfate (PF) 4 MG/ML injection 8 mg, 8 mg, Intravenous, Q1H PRN  •  temazepam (RESTORIL) cap 15 mg, 15 mg, Oral, Nightly PRN  •  Albumin Human (ALBUMINAR) 5 % solution 250 mL, 250 mL, IV push, 40 mg, Intravenous, QAM AC **OR** Pantoprazole Sodium (PROTONIX) EC tab 40 mg, 40 mg, Oral, QAM AC  •  ceFAZolin sodium (ANCEF/KEFZOL) 2 GM/20ML premix IV syringe 2 g, 2 g, Intravenous, Q8H  •  Insulin Regular Human (NOVOLIN R) 100 Units in sodium and lower extremities spontaneously w/o pain  Neuro: patellar reflexes intact bilaterally, no hand tremors bilaterally      Labs  Reviewed in Epic    Component      Latest Ref Rng & Units 6/26/2019   HEMOGLOBIN A1c      <5.7 % 5.3       Lab Results   [place]

## 2019-06-28 NOTE — PROGRESS NOTES
[facility]  Progress Note    Charles Mason Patient Status:  Inpatient    1950 MRN [id number]   [facility] 6NE-A Attending Pastora Card MD   Hosp Day # 4 PCP Anel Koo MD       Subjective:  Up to chair.  Some dizziness at Lines and catheters as described above.        Medications:    • Insulin Aspart Pen  1-20 Units Subcutaneous TID AC   • [START ON 6/29/2019] Insulin Aspart Pen  1-25 Units Subcutaneous Once   • Chlorhexidine Gluconate  15 mL Mouth/Throat Yvette@OPENLANE   Charles gonzalez Extubated this am. Ambulating. Tele - stable sinus. ECG - no acute ischemia post CABG. Off dobs. Sitting up in the chair. Family at bedside. CXR with no real congestion, but more atelectasis.     Pt admitted with acute respiratory respiratory failure

## 2019-06-28 NOTE — PROGRESS NOTES
[facility]  CV Surgery Progress Note    Barbchidi Kras Patient Status:  Inpatient    1950 MRN [id number]   [facility] 6NE-A Attending Melissa Maddox MD   Hosp Day # 4 PCP Rodrigue Ng MD     Subjective:    Patient seen this a increased interstitial markings are seen bilaterally which are stable and possibly related   to mild fibrosis. Improving discoid atelectasis at the right lung base is noted.   No large pleural effusion is appreciated.     =====  CONCLUSION:  Status post ca

## 2019-06-28 NOTE — DIETARY NOTE
Nutrition Short Note    Dietitian consult received per cardiac rehab/CHF protocol. Pt to be educated by cardiac rehab staff and encouraged to attend outpatient classes taught by RD. RD available PRN.     Alcon Mathias MS, RD, LDN

## 2019-06-28 NOTE — PLAN OF CARE
Rec pt @ 0730. Awakens easily, currently on CPAP trail. Extubated @ 0800 Astrid well. rochelle Santiago, art line d/c'd after Dobs weaned to off. Endo phoned to convert IV Insulin to Sub Q. Up to bedside to stand. Dizzy, BP 70s. Albumin x 1 given.   Returned

## 2019-06-28 NOTE — PROGRESS NOTES
[facility]  Progress Note    Mickyevan Ortiz Patient Status:  Inpatient    1950 MRN [id number]   [facility] 6NE-A Attending Renetta Crawley MD   Hosp Day # 4 PCP Amanda Pop MD     Subjective:  Micky Ortiz is a(n) 76 year o RDW 15.3* 14.9  --   --  15.7* 15.5*   NEPRELIM 10.69* 6.15  --   --   --  12.91*   WBC 12.9* 9.0  --   --  14.7* 14.2*   .0 288.0   < > 123.0* 154.0 174.0    < > = values in this interval not displayed.      Recent Labs   Lab 06/27/19  0417 06/27/

## 2019-06-28 NOTE — PROGRESS NOTES
[facility]  Progress Note     Howard Espinal Patient Status:  Inpatient    1950 MRN [id number]   [facility] 6NE-A Attending Steve Ricks MD   Hosp Day # 4 PCP Zuleyka Huertas MD     Chief Complaint:  Acute SOB, CP required i --   --   --    TP 7.5  --   --   --   --     < > = values in this interval not displayed. Estimated Creatinine Clearance: 55.8 mL/min (based on SCr of 1.2 mg/dL).   Recent Labs   Lab 06/27/19  1536 06/27/19  1645   PTP 20.4* 18.9*   INR 1.65* 1.50* of pain at chest tube site this morning  General- NAD  Chest- CTAB  [facility]- RRR  JOSIANE Briseno, BS+    Michael Tate M.D.   [facility]

## 2019-06-28 NOTE — OCCUPATIONAL THERAPY NOTE
OT order received. Attempted to see pt for eval, but spoke with RN who stated pt would be more appropriate in the afternoon. Will f/u again later.

## 2019-06-28 NOTE — CM/SW NOTE
SW met with pt. PT/OT did evals and advise \" home\". Pt lives with wife who is retired and will be able to assist with his needs at [place]. Pt was referred to [facility] prior to surgery. Vy Ramirez Half  /Dischage Planner  [phone]  Pa

## 2019-06-28 NOTE — PROGRESS NOTES
06/28/19 8513   Clinical Encounter Type   Visited With Family; Patient   Continue Visiting   (Ciales remains available at pager #[phone] as needed/requested.)   Patient's Supportive Strategies/Resources Patient has supportive family.    Anabaptist Encounter

## 2019-06-28 NOTE — PHYSICAL THERAPY NOTE
PHYSICAL THERAPY EVALUATION - INPATIENT     Room Number: 5959/1067-L  Evaluation Date: 6/28/2019  Type of Evaluation: Initial  Physician Order: PT Eval and Treat    Presenting Problem: S/p CABG,  NSTEMI, acute respiratory failure  Reason for Therapy: • COLONOSCOPY  12/13/11    Dr. Gil Carrington repeat 5 yrs   • ENDOSCOPIC RETROGRADE CHOLANGIOPANCREATOGRAPHY (ERCP) N/A 8/8/2016    Performed by Can Beavers MD at [address] [place] [address] (ERCP) N/A 7/25/2016    Per None                PAIN ASSESSMENT  Ratin  Location: Low back, occasionally chest at surgical site  Management Techniques: Activity promotion; Body mechanics;Repositioning    COGNITION  · Overall Cognitive Status:  WFL - within functional limits    RAN completed rolling both right and left w/ supervision assist.  Pt completed log roll to the left w/ min a of 1 for trunk to achieve upright posture. Pt denied dizziness upon sitting, but bp decreased.   Allowed pt several minutes sitting eob to determine if education; Family education;Gait training;Strengthening;Stair training;Transfer training;Balance training  Rehab Potential : Good  Frequency (Obs): 5x/week  Number of Visits to Meet Established Goals: 3      CURRENT GOALS    Goal #1 Patient is able to demon

## 2019-06-29 ENCOUNTER — APPOINTMENT (OUTPATIENT)
Dept: GENERAL RADIOLOGY | Facility: HOSPITAL | Age: 69
DRG: 233 | End: 2019-06-29
Attending: INTERNAL MEDICINE
Payer: MEDICARE

## 2019-06-29 ENCOUNTER — APPOINTMENT (OUTPATIENT)
Dept: GENERAL RADIOLOGY | Facility: HOSPITAL | Age: 69
DRG: 233 | End: 2019-06-29
Attending: HOSPITALIST
Payer: MEDICARE

## 2019-06-29 LAB
ANION GAP SERPL CALC-SCNC: 5 MMOL/L (ref 0–18)
BASOPHILS # BLD AUTO: 0.02 X10(3) UL (ref 0–0.2)
BASOPHILS NFR BLD AUTO: 0.2 %
BILIRUB UR QL STRIP.AUTO: NEGATIVE
BUN BLD-MCNC: 25 MG/DL (ref 7–18)
BUN/CREAT SERPL: 14.6 (ref 10–20)
CALCIUM BLD-MCNC: 7 MG/DL (ref 8.5–10.1)
CHLORIDE SERPL-SCNC: 108 MMOL/L (ref 98–112)
CLARITY UR REFRACT.AUTO: CLEAR
CO2 SERPL-SCNC: 24 MMOL/L (ref 21–32)
COLOR UR AUTO: YELLOW
CREAT BLD-MCNC: 1.71 MG/DL (ref 0.7–1.3)
CREAT UR-SCNC: 148 MG/DL
DEPRECATED RDW RBC AUTO: 57.5 FL (ref 35.1–46.3)
EOSINOPHIL # BLD AUTO: 0.01 X10(3) UL (ref 0–0.7)
EOSINOPHIL NFR BLD AUTO: 0.1 %
ERYTHROCYTE [DISTWIDTH] IN BLOOD BY AUTOMATED COUNT: 16.1 % (ref 11–15)
GLUCOSE BLD-MCNC: 129 MG/DL (ref 70–99)
GLUCOSE BLD-MCNC: 131 MG/DL (ref 70–99)
GLUCOSE UR STRIP.AUTO-MCNC: NEGATIVE MG/DL
HAV IGM SER QL: 2.4 MG/DL (ref 1.6–2.6)
HCT VFR BLD AUTO: 29.9 % (ref 39–53)
HGB BLD-MCNC: 9.7 G/DL (ref 13–17.5)
IMM GRANULOCYTES # BLD AUTO: 0.03 X10(3) UL (ref 0–1)
IMM GRANULOCYTES NFR BLD: 0.3 %
KETONES UR STRIP.AUTO-MCNC: 20 MG/DL
LEUKOCYTE ESTERASE UR QL STRIP.AUTO: NEGATIVE
LYMPHOCYTES # BLD AUTO: 0.83 X10(3) UL (ref 1–4)
LYMPHOCYTES NFR BLD AUTO: 8.1 %
MCH RBC QN AUTO: 31.3 PG (ref 26–34)
MCHC RBC AUTO-ENTMCNC: 32.4 G/DL (ref 31–37)
MCV RBC AUTO: 96.5 FL (ref 80–100)
MICROALBUMIN UR-MCNC: 8.38 MG/DL
MICROALBUMIN/CREAT 24H UR-RTO: 56.6 UG/MG (ref ?–30)
MONOCYTES # BLD AUTO: 1.46 X10(3) UL (ref 0.1–1)
MONOCYTES NFR BLD AUTO: 14.2 %
NEUTROPHILS # BLD AUTO: 7.92 X10 (3) UL (ref 1.5–7.7)
NEUTROPHILS # BLD AUTO: 7.92 X10(3) UL (ref 1.5–7.7)
NEUTROPHILS NFR BLD AUTO: 77.1 %
NITRITE UR QL STRIP.AUTO: NEGATIVE
OSMOLALITY SERPL CALC.SUM OF ELEC: 290 MOSM/KG (ref 275–295)
PH UR STRIP.AUTO: 5 [PH] (ref 4.5–8)
PHOSPHATE SERPL-MCNC: 2.6 MG/DL (ref 2.5–4.9)
PLATELET # BLD AUTO: 153 10(3)UL (ref 150–450)
POTASSIUM SERPL-SCNC: 4.4 MMOL/L (ref 3.5–5.1)
POTASSIUM SERPL-SCNC: 6.2 MMOL/L (ref 3.5–5.1)
PROT UR STRIP.AUTO-MCNC: 30 MG/DL
RBC # BLD AUTO: 3.1 X10(6)UL (ref 3.8–5.8)
RBC UR QL AUTO: NEGATIVE
SODIUM SERPL-SCNC: 137 MMOL/L (ref 136–145)
SODIUM SERPL-SCNC: 33 MMOL/L
SP GR UR STRIP.AUTO: 1.03 (ref 1–1.03)
UROBILINOGEN UR STRIP.AUTO-MCNC: <2 MG/DL
WBC # BLD AUTO: 10.3 X10(3) UL (ref 4–11)

## 2019-06-29 PROCEDURE — 99223 1ST HOSP IP/OBS HIGH 75: CPT | Performed by: INTERNAL MEDICINE

## 2019-06-29 PROCEDURE — 74019 RADEX ABDOMEN 2 VIEWS: CPT | Performed by: HOSPITALIST

## 2019-06-29 PROCEDURE — 71045 X-RAY EXAM CHEST 1 VIEW: CPT | Performed by: INTERNAL MEDICINE

## 2019-06-29 PROCEDURE — 99232 SBSQ HOSP IP/OBS MODERATE 35: CPT | Performed by: INTERNAL MEDICINE

## 2019-06-29 PROCEDURE — 99232 SBSQ HOSP IP/OBS MODERATE 35: CPT | Performed by: HOSPITALIST

## 2019-06-29 RX ORDER — SODIUM CHLORIDE 9 MG/ML
INJECTION, SOLUTION INTRAVENOUS CONTINUOUS
Status: DISCONTINUED | OUTPATIENT
Start: 2019-06-29 | End: 2019-06-30

## 2019-06-29 RX ORDER — MAGNESIUM HYDROXIDE/ALUMINUM HYDROXICE/SIMETHICONE 120; 1200; 1200 MG/30ML; MG/30ML; MG/30ML
30 SUSPENSION ORAL DAILY PRN
Status: DISCONTINUED | OUTPATIENT
Start: 2019-06-29 | End: 2019-07-02

## 2019-06-29 RX ORDER — SODIUM POLYSTYRENE SULFONATE 4.1 MEQ/G
30 POWDER, FOR SUSPENSION ORAL; RECTAL ONCE
Status: COMPLETED | OUTPATIENT
Start: 2019-06-29 | End: 2019-06-29

## 2019-06-29 RX ORDER — DEXTROSE MONOHYDRATE 25 G/50ML
50 INJECTION, SOLUTION INTRAVENOUS ONCE
Status: COMPLETED | OUTPATIENT
Start: 2019-06-29 | End: 2019-06-29

## 2019-06-29 RX ORDER — SCOLOPAMINE TRANSDERMAL SYSTEM 1 MG/1
1 PATCH, EXTENDED RELEASE TRANSDERMAL
Status: DISCONTINUED | OUTPATIENT
Start: 2019-06-29 | End: 2019-07-02

## 2019-06-29 NOTE — PROGRESS NOTES
[facility]  Endocrinology Progress Note    Efrain Luna Patient Status:  Inpatient    1950 MRN [id number]   [facility] 6NE-A Attending Lynnette Posada MD   Hosp Day # 5 PCP Werner Ha MD     CC: Patient presents with:  Sharonda Mtichell HEMOGLOBIN A1c      <5.7 % 5.3        Lab Results   Component Value Date    WBC 10.3 06/29/2019    HGB 9.7 06/29/2019    HCT 29.9 06/29/2019    .0 06/29/2019    CREATSERUM 1.71 06/29/2019    BUN 25 06/29/2019     06/29/2019    K 4.4 06/29/20

## 2019-06-29 NOTE — HOME CARE LIAISON
TNL ATTEMPTED TO MEET WITH PTNT TO DISCUSS HH ON DC. RN IN ROOM FOR PTNT CARE.  WILL FOLLOW UP    Vignesh Tan

## 2019-06-29 NOTE — PROGRESS NOTES
[facility]   CVS Progress Note    Desiree Keyur Patient Status:  Inpatient    1950 MRN [id number]   [facility] 6NE-A Attending Colleen Burciaga MD   Hosp Day # 5 PCP Jonas Eldridge MD     Subjective:  Pt seen and examined.  Celso Hodgkin solution 3 mL 3 mL Nebulization Q4H PRN   0.9% NaCl infusion  Intravenous Continuous   HYDROcodone-acetaminophen (NORCO)  MG per tab 1 tablet 1 tablet Oral Q4H PRN   Or      HYDROcodone-acetaminophen (NORCO)  MG per tab 2 tablet 2 tablet Oral Q 06/29/2019     06/29/2019    CO2 24.0 06/29/2019     06/29/2019    CA 7.0 06/29/2019       Chest Xray: Mildly enlarged cardiac silhouette is noted. No significant change.       Physical Exam:  Neuro: intact  Lungs: Diminished  Heart: RRR, S1S2

## 2019-06-29 NOTE — PROGRESS NOTES
[facility] Cardiology  Progress Note    Mery Cardoso Patient Status:  Inpatient    1950 MRN [id number]   [facility] 6NE-A Attending True Regalado MD   [facility] Day # 5 PCP Fernando Vogt MD     Subjective: Mild incision 06/27/19  1645 06/28/19  0405 06/29/19  0426   WBC 12.9* 9.0  --   --  14.7* 14.2* 10.3   HGB 11.0* 10.7*  --   --  9.7* 9.8* 9.7*   MCV 91.8 90.1  --   --  93.9 93.8 96.5   .0 288.0 264.0 123.0* 154.0 174.0 153.0   INR  --   --   --  1.65* 1.50*  - CONCLUSION:  1. Interval removal of lines and tubes. 2. There is prominent gaseous distention of the stomach with elevation of the left hemidiaphragm.  3. Airspace opacity involving the right suprahilar and upper lobe medially may be the sequela of atel TECHNIQUE:  Real time, grey scale,      CONCLUSION:  No acute deep vein thrombosis.     Dictated by: Thor Tompkins MD on 6/24/2019 at 23:13     Approved by: Thor Tompkins MD            Medications:    Current Facility-Administered Medications:  Alum & Or      docusate sodium (COLACE) liquid 100 mg 100 mg Oral BID   magnesium hydroxide (MILK OF MAGNESIA) 400 MG/5ML suspension 10 mL 10 mL Oral BID PRN   PEG 3350 (MIRALAX) powder packet 17 g 1 packet Oral Daily PRN   bisacodyl (DULCOLAX) rectal supposito 6.2  Plat 153    CXR - no congestion, but chronic interstitial changes.     Plan:  - ASA, and statin  - start low dose BB - off dobs - NSVT 4 beats in tele this am  - renal joined the case (ATN, high K)  - IVF hydration and follow metabolic panel  - K sintia

## 2019-06-29 NOTE — PROGRESS NOTES
[facility]  Progress Note    Hyun Pinedo Patient Status:  Inpatient    1950 MRN [id number]   [facility] 6NE-A Attending Steven Castillo MD   Hosp Day # 5 PCP Winifred Allen MD     Subjective:  Hyun Pinedo is a(n) 76 year o in this interval not displayed. Recent Labs   Lab 06/27/19  1645 06/28/19  0405 06/29/19  0426 06/29/19  0838   * 116* 131*  --    BUN 12 15 25*  --    CREATSERUM 1.31* 1.20 1.71*  --    GFRAA 64 71 47*  --    GFRNAA 56* 62 40*  --    CA 7.8* 7.

## 2019-06-29 NOTE — PLAN OF CARE
Pt. Up to restroom with walker. Mild surgical discomfort, currently declines pain medications. Encouraged I.S. Use. Pt. States he feels bloated, is burping and passing flatus. Complaints of nausea developed at 0200, zofran given.   Pt. Had emesis this m

## 2019-06-29 NOTE — CONSULTS
[facility]  Report of Consultation    Zheng Gasca Patient Status:  Inpatient    1950 MRN [id number]   [facility] 6NE-A Attending Jax Rahman MD   Hosp Day # 5 PCP Kamron South MD     Reason for Consultation:  MATT/hyperk Cat Goodman MD at [address] [place] [address] (EUS) N/A 6/23/2016    Performed by aCt Goodman MD at [facility] ENDOSCOPY   • ESOPHAGOGASTRODUODENOSCOPY (EGD) N/A 6/23/2016    Performed by Cat Goodman MD at [address] [place] [address] **OR** dextrose 50 % injection 50 mL, 50 mL, Intravenous, Q15 Min PRN **OR** glucose (DEX4) oral liquid 30 g, 30 g, Oral, Q15 Min PRN **OR** Glucose-Vitamin C (DEX-4) 4-6 GM-MG chewable tab 8 tablet, 8 tablet, Oral, Q15 Min PRN  •  ALPRAZolam (XANAX) tab 0 Sodium (PROTONIX) 40 mg in Sodium Chloride 0.9 % 10 mL IV push, 40 mg, Intravenous, QAM AC **OR** Pantoprazole Sodium (PROTONIX) EC tab 40 mg, 40 mg, Oral, QAM AC  •  ceFAZolin sodium (ANCEF/KEFZOL) 2 GM/20ML premix IV syringe 2 g, 2 g, Intravenous, Q8H  • 06/29/2019    .0 06/29/2019    CREATSERUM 1.71 06/29/2019    BUN 25 06/29/2019     06/29/2019    K 6.2 06/29/2019     06/29/2019    CO2 24.0 06/29/2019     06/29/2019    CA 7.0 06/29/2019    MG 2.4 06/29/2019    PHOS 2.6 06/29/201 start NS at 100 today. Check urine studies and follow closely    #2. Hyperkalemia- in setting of MATT. Receiving kayexalate enema, insulin/d50 and iv calcium. Will repeat later today    #3. CAD- s/p CABG. Has been stable.   CV surgery and cards are foll

## 2019-06-29 NOTE — PROGRESS NOTES
[facility]  Progress Note     Howard Espinal Patient Status:  Inpatient    1950 MRN [id number]   [facility] 6NE-A Attending Steve Ricks MD   Hosp Day # 5 PCP Zuleyka Huertas MD     Chief Complaint: SOB, Chest pain    S:  na --   --    ALT 31  --   --   --   --   --    BILT 0.8  --   --   --   --   --    TP 7.5  --   --   --   --   --     < > = values in this interval not displayed. Estimated Creatinine Clearance: 39.5 mL/min (A) (based on SCr of 1.71 mg/dL (H)).   Recent L

## 2019-06-30 ENCOUNTER — APPOINTMENT (OUTPATIENT)
Dept: GENERAL RADIOLOGY | Facility: HOSPITAL | Age: 69
DRG: 233 | End: 2019-06-30
Attending: HOSPITALIST
Payer: MEDICARE

## 2019-06-30 LAB
ALBUMIN SERPL-MCNC: 3.1 G/DL (ref 3.4–5)
ALBUMIN/GLOB SERPL: 1.1 {RATIO} (ref 1–2)
ALP LIVER SERPL-CCNC: 103 U/L (ref 45–117)
ALT SERPL-CCNC: 85 U/L (ref 16–61)
ANION GAP SERPL CALC-SCNC: 7 MMOL/L (ref 0–18)
AST SERPL-CCNC: 125 U/L (ref 15–37)
BILIRUB SERPL-MCNC: 1 MG/DL (ref 0.1–2)
BLOOD TYPE BARCODE: 5100
BUN BLD-MCNC: 34 MG/DL (ref 7–18)
BUN/CREAT SERPL: 28.3 (ref 10–20)
CALCIUM BLD-MCNC: 7.2 MG/DL (ref 8.5–10.1)
CHLORIDE SERPL-SCNC: 110 MMOL/L (ref 98–112)
CO2 SERPL-SCNC: 21 MMOL/L (ref 21–32)
CREAT BLD-MCNC: 1.2 MG/DL (ref 0.7–1.3)
GLOBULIN PLAS-MCNC: 2.9 G/DL (ref 2.8–4.4)
GLUCOSE BLD-MCNC: 132 MG/DL (ref 70–99)
M PROTEIN MFR SERPL ELPH: 6 G/DL (ref 6.4–8.2)
OSMOLALITY SERPL CALC.SUM OF ELEC: 295 MOSM/KG (ref 275–295)
POTASSIUM SERPL-SCNC: 5 MMOL/L (ref 3.5–5.1)
POTASSIUM SERPL-SCNC: 5.1 MMOL/L (ref 3.5–5.1)
SODIUM SERPL-SCNC: 138 MMOL/L (ref 136–145)

## 2019-06-30 PROCEDURE — 99232 SBSQ HOSP IP/OBS MODERATE 35: CPT | Performed by: HOSPITALIST

## 2019-06-30 PROCEDURE — 74019 RADEX ABDOMEN 2 VIEWS: CPT | Performed by: HOSPITALIST

## 2019-06-30 PROCEDURE — 99233 SBSQ HOSP IP/OBS HIGH 50: CPT | Performed by: INTERNAL MEDICINE

## 2019-06-30 PROCEDURE — 99232 SBSQ HOSP IP/OBS MODERATE 35: CPT | Performed by: INTERNAL MEDICINE

## 2019-06-30 RX ORDER — METOPROLOL TARTRATE 50 MG/1
50 TABLET, FILM COATED ORAL
Status: DISCONTINUED | OUTPATIENT
Start: 2019-06-30 | End: 2019-07-01

## 2019-06-30 NOTE — PROGRESS NOTES
[facility]  Progress Note     Dolly Eaton Patient Status:  Inpatient    1950 MRN [id number]   [facility] 6NE-A Attending Gerri Mccarthy MD   Hosp Day # 6 PCP Joycelyn Bello MD     Chief Complaint: SOB, Chest pain    S:  Lissette Keith    < > 113* 108  --  110   CO2 23.0   < > 22.0 24.0  --  21.0   ALKPHO 195*  --   --   --   --  103   AST 26  --   --   --   --  125*   ALT 31  --   --   --   --  85*   BILT 0.8  --   --   --   --  1.0   TP 7.5  --   --   --   --  6.0*    < > = radhika

## 2019-06-30 NOTE — PROGRESS NOTES
2000- received pt alert and orientated. Vss. Pt sitting on side of bed, feeling like he needs to pass gas. No apparent distress noted.

## 2019-06-30 NOTE — PROGRESS NOTES
[facility]  Nephrology Progress Note    Sandra Poster Patient Status:  Inpatient    1950 MRN [id number]   [facility] 6NE-A Attending Lilian Wagoner MD   Hosp Day # 6 PCP Esther Hernandez MD       SUBJECTIVE:  Feels much better t --    < > 139 140 142 137  --  138   K 3.7 4.1   < > 3.9 4.5 4.4 6.2* 4.4 5.1     --    < > 110 110 113* 108  --  110   CO2 22.0  --    < > 23.0 24.0 22.0 24.0  --  21.0   BUN 13  --    < > 14 12 15 25*  --  34*   CREATSERUM 1.31*  --    < > 1.09 1.3 morphINE sulfate (PF) 4 MG/ML injection 2 mg 2 mg Intravenous Q1H PRN   Or      morphINE sulfate (PF) 4 MG/ML injection 4 mg 4 mg Intravenous Q1H PRN   Or      morphINE sulfate (PF) 4 MG/ML injection 8 mg 8 mg Intravenous Q1H PRN   temazepam (RESTORIL) c family by bedside.           Fidel Stagers  6/30/2019  7:20 AM

## 2019-06-30 NOTE — PLAN OF CARE
Pt. Received as a transfer from CCU to [address] [place] [address]. Pt. Saturating well on RA. IS to 1250. SR on tele. RAC IV flushing well, saline locked. Sternal incision and L leg x1 CDI with steri strips. TEDs, SCDs in place. No BM since surgery, gave MOM. Pt.  Is up wit needed  - Assess and instruct to report SOB or any respiratory difficulty  - Respiratory Therapy support as indicated  - Manage/alleviate anxiety  - Monitor for signs/symptoms of CO2 retention  Outcome: Progressing     Problem: Impaired Functional Mobility

## 2019-06-30 NOTE — PROGRESS NOTES
[facility]   CVS Progress Note    Onalondra Watson Patient Status:  Inpatient    1950 MRN [id number]   [facility] 6NE-A Attending Marla Jenkins MD   Hosp Day # 6 PCP Rodolfo Tillman MD     Subjective:  Pt seen and examined.  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

## 2019-06-30 NOTE — HOME CARE LIAISON
MET WITH PTNT AND OFFERED CHOICE  OF AGENCIES. PTNT AGREEABLE TO [facility]. MET WITH PTNT TO DISCUSS HOME HEALTH SERVICES AND COVERAGE CRITERIA. PTNT AGREEABLE TO Jn Shah[facility]. PTNT GIVEN RESIDENTIAL BROCHURE.  RESIDENTIAL WITH PROVIDE SN/PT ON DISC

## 2019-06-30 NOTE — PROGRESS NOTES
[facility]  Endocrinology Progress Note    Dennis Mendez Patient Status:  Inpatient    1950 MRN [id number]   [facility] [place] [facility] 6NE-A Attending Melissa Maddox MD   Hosp Day # 6 PCP Rodrigue Ng MD     CC: Patient presents with:  Rachel Guy      Lab Results   Component Value Date    CREATSERUM 1.20 06/30/2019    BUN 34 06/30/2019     06/30/2019    K 5.1 06/30/2019     06/30/2019    CO2 21.0 06/30/2019     06/30/2019    CA 7.2 06/30/2019    ALB 3.1 06/30/2019    ALKPHO 103

## 2019-06-30 NOTE — PROGRESS NOTES
[facility]  Progress Note    Damaris Valera Patient Status:  Inpatient    1950 MRN [id number]   [facility] 6NE-A Attending Rajat Gibson MD   [address] Day # 6 PCP Jessica Mccartney MD     Subjective:  Damaris Valera is a(n) 76 year o 288.0   < > 154.0 174.0 153.0    < > = values in this interval not displayed.      Recent Labs   Lab 06/28/19  0405 06/29/19  0426 06/29/19  0838 06/30/19  0427   * 131*  --  132*   BUN 15 25*  --  34*   CREATSERUM 1.20 1.71*  --  1.20   GFRAA 71 47*

## 2019-06-30 NOTE — PROGRESS NOTES
[facility]  Progress Note    Shruthi Gusmaner Patient Status:  Inpatient    1950 MRN [id number]   [facility] 6NE-A Attending Kyleigh Felton MD   [address] Day # 6 PCP Romy Owens MD     Assessment:  #POD 3 CABG with EF 30-35%.  Up i 06/30/2019    CA 7.2 06/30/2019    ALT 85 06/30/2019     06/30/2019    ALB 3.1 06/30/2019        Lab Results   Component Value Date    TROP 1.910 () 06/25/2019    TROP 1.360 () 06/25/2019    TROP 1.060 () 06/24/2019        Medications:    • sc

## 2019-07-01 LAB
ANION GAP SERPL CALC-SCNC: 4 MMOL/L (ref 0–18)
ATRIAL RATE: 70 BPM
BUN BLD-MCNC: 36 MG/DL (ref 7–18)
BUN/CREAT SERPL: 35 (ref 10–20)
CALCIUM BLD-MCNC: 6.9 MG/DL (ref 8.5–10.1)
CHLORIDE SERPL-SCNC: 109 MMOL/L (ref 98–112)
CO2 SERPL-SCNC: 21 MMOL/L (ref 21–32)
CREAT BLD-MCNC: 1.03 MG/DL (ref 0.7–1.3)
GLUCOSE BLD-MCNC: 105 MG/DL (ref 70–99)
GLUCOSE BLD-MCNC: 107 MG/DL (ref 70–99)
GLUCOSE BLD-MCNC: 115 MG/DL (ref 70–99)
GLUCOSE BLD-MCNC: 125 MG/DL (ref 70–99)
GLUCOSE BLD-MCNC: 129 MG/DL (ref 70–99)
GLUCOSE BLD-MCNC: 134 MG/DL (ref 70–99)
GLUCOSE BLD-MCNC: 136 MG/DL (ref 70–99)
GLUCOSE BLD-MCNC: 137 MG/DL (ref 70–99)
GLUCOSE BLD-MCNC: 137 MG/DL (ref 70–99)
GLUCOSE BLD-MCNC: 160 MG/DL (ref 70–99)
GLUCOSE BLD-MCNC: 321 MG/DL (ref 70–99)
GLUCOSE BLD-MCNC: 95 MG/DL (ref 70–99)
GLUCOSE BLD-MCNC: 95 MG/DL (ref 70–99)
HAV IGM SER QL: 2.6 MG/DL (ref 1.6–2.6)
OSMOLALITY SERPL CALC.SUM OF ELEC: 286 MOSM/KG (ref 275–295)
P AXIS: 50 DEGREES
P-R INTERVAL: 132 MS
POTASSIUM SERPL-SCNC: 4.1 MMOL/L (ref 3.5–5.1)
Q-T INTERVAL: 480 MS
QRS DURATION: 120 MS
QTC CALCULATION (BEZET): 518 MS
R AXIS: 61 DEGREES
SODIUM SERPL-SCNC: 134 MMOL/L (ref 136–145)
T AXIS: 123 DEGREES
VENTRICULAR RATE: 70 BPM

## 2019-07-01 PROCEDURE — 99232 SBSQ HOSP IP/OBS MODERATE 35: CPT | Performed by: INTERNAL MEDICINE

## 2019-07-01 PROCEDURE — 99232 SBSQ HOSP IP/OBS MODERATE 35: CPT | Performed by: HOSPITALIST

## 2019-07-01 RX ORDER — LISINOPRIL 5 MG/1
5 TABLET ORAL DAILY
Status: DISCONTINUED | OUTPATIENT
Start: 2019-07-01 | End: 2019-07-02

## 2019-07-01 RX ORDER — METOPROLOL TARTRATE 50 MG/1
50 TABLET, FILM COATED ORAL
Status: DISCONTINUED | OUTPATIENT
Start: 2019-07-02 | End: 2019-07-02

## 2019-07-01 RX ORDER — ACETAMINOPHEN 500 MG
1000 TABLET ORAL EVERY 6 HOURS PRN
Status: DISCONTINUED | OUTPATIENT
Start: 2019-07-01 | End: 2019-07-02

## 2019-07-01 RX ORDER — AMIODARONE HYDROCHLORIDE 200 MG/1
400 TABLET ORAL
Status: DISCONTINUED | OUTPATIENT
Start: 2019-07-01 | End: 2019-07-02

## 2019-07-01 NOTE — PROGRESS NOTES
[facility]  Progress Note    Mg Serrano Patient Status:  Inpatient    1950 MRN [id number]   [facility] 8NE-A Attending John Krishnamurthy MD   Hosp Day # 7 PCP Татьяна Valle MD     Subjective:  Mg Serrano is a(n) 76 year o --  29.0* 30.1* 29.9*   MCV 90.1  --  93.9 93.8 96.5   MCH 31.1  --  31.4 30.5 31.3   MCHC 34.5  --  33.4 32.6 32.4   RDW 14.9  --  15.7* 15.5* 16.1*   NEPRELIM 6.15  --   --  12.91* 7.92*   WBC 9.0  --  14.7* 14.2* 10.3   .0   < > 154.0 174.0 153. 0 pulmonary nodules  · Pt seen by [facility], discharge planning    Lisa Cancer Katelynn VILLATORO.  7/1/2019  9:58 AM

## 2019-07-01 NOTE — PROGRESS NOTES
[facility]  Endocrinology Progress Note    Hiro More Patient Status:  Inpatient    1950 MRN [id number]   [facility] 6NE-A Attending Susan Cruz MD   Hosp Day # 7 PCP Rachel Gonzalez MD     CC: Patient presents with:  Sammi Lucas Component Value Date    CREATSERUM 1.03 07/01/2019    BUN 36 07/01/2019     07/01/2019    K 4.1 07/01/2019     07/01/2019    CO2 21.0 07/01/2019    GLU 95 07/01/2019    CA 6.9 07/01/2019    MG 2.6 07/01/2019    PGLU 107 07/01/2019       Recen

## 2019-07-01 NOTE — DIETARY NOTE
[facility]      Admitting diagnosis:  Elevated troponin [R74.8]  Acute respiratory failure, unspecified whether with hypoxia or hypercapnia (HCC) [J96.00]     Ht:  17.8 cm  Wt: 65.3 kg (143 lb 15.4 oz).  This is

## 2019-07-01 NOTE — PROGRESS NOTES
[facility]  Progress Note     Sandra Poster Patient Status:  Inpatient    1950 MRN [id number]   [facility] 6NE-A Attending Fox Estevez MD   Hosp Day # 7 PCP Esther Hernandez MD     Chief Complaint: SOB, Chest pain    S:  Fe TP 7.5  --   --   --  6.0*  --   --     < > = values in this interval not displayed. Estimated Creatinine Clearance: 68.8 mL/min (based on SCr of 1.03 mg/dL).   Recent Labs   Lab 06/27/19  1536 06/27/19  1645   PTP 20.4* 18.9*   INR 1.65* 1.50*     Re TIFF Sher     Patient seen and examined    S- no complaints, hyperglycemic this morning after drinking gatorade  General- NAd  Chest- CTAB  CVS- RRR  Abdo- soft, NT, BS+    Plan  Continue to increase activity  Dc planning     Derek Smallwood M.D.   Domenica Whatley

## 2019-07-01 NOTE — PROGRESS NOTES
[facility]  Nephrology Progress Note    Jak Larson Patient Status:  Inpatient    1950 MRN [id number]   [facility] 6NE-A Attending Keanu Valdez MD   Hosp Day # 7 PCP Mata Harrison MD       SUBJECTIVE:  Feels well this AM, 07/01/19  0502   NA  --    < > 140 142 137  --  138  --  134*   K 4.1   < > 4.5 4.4 6.2* 4.4 5.1 5.0 4.1   CL  --    < > 110 113* 108  --  110  --  109   CO2  --    < > 24.0 22.0 24.0  --  21.0  --  21.0   BUN  --    < > 12 15 25*  --  34*  --  36*   CREAT morphINE sulfate (PF) 4 MG/ML injection 4 mg 4 mg Intravenous Q1H PRN   Or      morphINE sulfate (PF) 4 MG/ML injection 8 mg 8 mg Intravenous Q1H PRN   temazepam (RESTORIL) cap 15 mg 15 mg Oral Nightly PRN   atorvastatin (LIPITOR) tab 10 mg 10 mg Oral Ni

## 2019-07-01 NOTE — PROGRESS NOTES
[facility]  CV Surgery Progress Note    Shruthi Kinney Patient Status:  Inpatient    1950 MRN [id number]   [facility] 8NE-A Attending Kyleigh Felton MD   Hosp Day # 7 PCP Romy Owens MD     Subjective:  Patient seen this Am

## 2019-07-01 NOTE — PHYSICAL THERAPY NOTE
PHYSICAL THERAPY TREATMENT NOTE - INPATIENT    Room Number: 5781/1405-P     Session: 1   Number of Visits to Meet Established Goals: 3    Presenting Problem: S/p CABG,  NSTEMI, acute respiratory failure    Problem List  Principal Problem:    Coronary ligia ESOPHAGOGASTRODUODENOSCOPY (EGD) N/A 6/23/2016    Performed by Cody Skinner MD at [facility] N/A 6/27/2019    Performed by Bharat Lombardo MD at . Ginger Souza      over 35 yrs ago   • OTHER      rig railing?: None       AM-PAC Score:  Raw Score: 24   Approx Degree of Impairment: 0%   Standardized Score (AM-PAC Scale): 61.14   CMS Modifier (G-Code): CH    FUNCTIONAL ABILITY STATUS  Gait Assessment   Gait Assistance: Modified independent  Distance (ft): Good  Frequency (Obs): 5x/week    CURRENT GOALS     Goal #1 Patient is able to demonstrate supine - sit EOB @ level: modified independent      Goal #2 Patient is able to demonstrate transfers EOB to/from [facility] at assistance level: modified independent      G

## 2019-07-01 NOTE — PROGRESS NOTES
[facility]  Cardiology Progress Note    Subjective:  No chest pain or shortness of breath. Tele review shows a short burst Afib w/ RVR late last evening.     Objective:  /75 (BP Location: Left arm)   Pulse 72   Temp 98.3 °F (36.8 °C) (Oral)   Re AICD.  BB increased and no recurrent NSVT. · Afib, brief episode, newly diagnosed - reviewed with Dr. Tonya Carias, plan for Amiodarone 400mg PO TID. Will lower to once daily BB w/ addition of amiodarone.   · HTN  · MATT  · Dyslipidemia  · Hx Whipple for invasiv

## 2019-07-01 NOTE — PLAN OF CARE
Assumed care of patient around 0730. Patient alert and oriented sitting in chair. Denies chest pain or incisional pain. Normal sinus on tele. Cardiology notified of 16 second run of Afib on tele overnight. Will plan to start amiodarone today.   Denies short medications as ordered  - Initiate emergency measures for life threatening arrhythmias  - Monitor electrolytes and administer replacement therapy as ordered  Outcome: Progressing     Problem: RESPIRATORY - ADULT  Goal: Achieves optimal ventilation and oxyg

## 2019-07-01 NOTE — PLAN OF CARE
Assumed care of pt at 1900. Pt denies pain. Pt is A & O x 4. Pt is NSR on tele, S1 and S2 present and pt denies cardiac symptoms. Lung sounds clear/ diminished bilaterally. Abdomen soft and round. Last known BM on 6/30/19. No edema noted.  Pt has mid-sterna Smoking Cessation handout, if applicable  - Encourage broncho-pulmonary hygiene including cough, deep breathe, Incentive Spirometry  - Assess the need for suctioning and perform as needed  - Assess and instruct to report SOB or any respiratory difficulty

## 2019-07-02 VITALS
WEIGHT: 158.5 LBS | HEART RATE: 58 BPM | RESPIRATION RATE: 18 BRPM | SYSTOLIC BLOOD PRESSURE: 123 MMHG | BODY MASS INDEX: 23 KG/M2 | DIASTOLIC BLOOD PRESSURE: 69 MMHG | OXYGEN SATURATION: 95 % | TEMPERATURE: 98 F

## 2019-07-02 LAB
GLUCOSE BLD-MCNC: 100 MG/DL (ref 70–99)
GLUCOSE BLD-MCNC: 120 MG/DL (ref 70–99)

## 2019-07-02 PROCEDURE — 99232 SBSQ HOSP IP/OBS MODERATE 35: CPT | Performed by: HOSPITALIST

## 2019-07-02 PROCEDURE — 99232 SBSQ HOSP IP/OBS MODERATE 35: CPT | Performed by: NURSE PRACTITIONER

## 2019-07-02 RX ORDER — AMIODARONE HYDROCHLORIDE 200 MG/1
200 TABLET ORAL DAILY
Status: DISCONTINUED | OUTPATIENT
Start: 2019-07-08 | End: 2019-07-02

## 2019-07-02 RX ORDER — METOPROLOL SUCCINATE 50 MG/1
50 TABLET, EXTENDED RELEASE ORAL DAILY
Qty: 30 TABLET | Refills: 3 | Status: ON HOLD | OUTPATIENT
Start: 2019-07-02 | End: 2019-07-09

## 2019-07-02 RX ORDER — AMIODARONE HYDROCHLORIDE 200 MG/1
200 TABLET ORAL DAILY
Qty: 30 TABLET | Refills: 0 | Status: SHIPPED | OUTPATIENT
Start: 2019-07-08 | End: 2019-09-20

## 2019-07-02 RX ORDER — AMIODARONE HYDROCHLORIDE 400 MG/1
400 TABLET ORAL
Qty: 18 TABLET | Refills: 0 | Status: ON HOLD | OUTPATIENT
Start: 2019-07-02 | End: 2019-07-09

## 2019-07-02 RX ORDER — ATORVASTATIN CALCIUM 10 MG/1
10 TABLET, FILM COATED ORAL NIGHTLY
Qty: 30 TABLET | Refills: 3 | Status: SHIPPED | OUTPATIENT
Start: 2019-07-02 | End: 2020-05-06

## 2019-07-02 RX ORDER — LISINOPRIL 5 MG/1
5 TABLET ORAL DAILY
Qty: 30 TABLET | Refills: 3 | Status: SHIPPED | OUTPATIENT
Start: 2019-07-03 | End: 2020-05-06

## 2019-07-02 RX ORDER — ASPIRIN 325 MG
325 TABLET ORAL DAILY
Qty: 30 TABLET | Refills: 0 | Status: SHIPPED | COMMUNITY
Start: 2019-07-03 | End: 2019-08-02 | Stop reason: DRUGHIGH

## 2019-07-02 NOTE — PROGRESS NOTES
[facility]    Electrophysiology Follow Up Note      Shruthi Gusmaner Patient Status:  Inpatient    1950 MRN [id number]   [facility] 8NE-A Attending Kyleigh Felton MD   Hosp Day # 8 PCP Romy Owens MD     Reason (DUONEB) nebulizer solution 3 mL, 3 mL, Nebulization, Q4H PRN  •  temazepam (RESTORIL) cap 15 mg, 15 mg, Oral, Nightly PRN  •  atorvastatin (LIPITOR) tab 10 mg, 10 mg, Oral, Nightly  •  docusate sodium (COLACE) cap 100 mg, 100 mg, Oral, BID **OR** docusate displayed.        Recent Labs   Lab 06/26/19  0407  06/27/19  1645 06/28/19  0405 06/29/19  0426   RBC 3.44*  --  3.09* 3.21* 3.10*   HGB 10.7*  --  9.7* 9.8* 9.7*   HCT 31.0*  --  29.0* 30.1* 29.9*   MCV 90.1  --  93.9 93.8 96.5   MCH 31.1  --  31.4 30.5 3 up:  Dr. Shannon Ibarra per routine  EP clinic with Maddie Bowman 3 months after repeat TTE to assess LVEF and discuss ICD if needed      Diagnosis:  Patient Active Problem List:     Pheochromocytoma, left     Anemia of chronic disease     Ileus ([address]     [place]

## 2019-07-02 NOTE — PROGRESS NOTES
[facility]  Cardiology Progress Note    Subjective:  No chest pain or shortness of breath. No recurrent afib over night. Yesterday afternoon had 2 short runs WCT c/w NSVT - (4) beats, (7) beats ~330pm - no recurrence since.     Objective:  /67 BB w/ addition of amiodarone. If recurs would require consideration for systemic anticoagulation.   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

## 2019-07-02 NOTE — PLAN OF CARE
Assumed care of patient around 0730. Patient alert and oriented sitting in chair. 1st walk completed with night shift, requiring 3L nasal cannula to recover. CV surgery, pulmonary, and primary notified.  Will complete O2 walk today to determine is O2 requir Encourage broncho-pulmonary hygiene including cough, deep breathe, Incentive Spirometry  - Assess the need for suctioning and perform as needed  - Assess and instruct to report SOB or any respiratory difficulty  - Respiratory Therapy support as indicated

## 2019-07-02 NOTE — PLAN OF CARE
Patient ambulated 150 ft on room air. O2 saturation maintained 94% or greater then entire time with minimal shortness of breath.

## 2019-07-02 NOTE — PROGRESS NOTES
[facility]  Progress Note    Sandra Poster Patient Status:  Inpatient    1950 MRN [id number]   [facility] 8NE-A Attending Lilian Wagoner MD   Hosp Day # 8 PCP Esther Hernandez MD     Subjective:  Sandra Poster is a(n) 76 year o --  31.4 30.5 31.3   MCHC 34.5  --  33.4 32.6 32.4   RDW 14.9  --  15.7* 15.5* 16.1*   NEPRELIM 6.15  --   --  12.91* 7.92*   WBC 9.0  --  14.7* 14.2* 10.3   .0   < > 154.0 174.0 153.0    < > = values in this interval not displayed.      Recent Labs recommended a CT scan in mid September.   · Pt seen by [facility], discharge planning    Johann Pace SR.  7/2/2019  9:31 AM

## 2019-07-02 NOTE — PROGRESS NOTES
[facility]  CV Surgery Progress Note    [facility] Patient Status:  Inpatient    1950 MRN [id number]   [facility] 8NE-A Attending Burgess Neisha MD   Hosp Day # 8 PCP Joycelyn Bello MD     Subjective:  Patient seen this AM

## 2019-07-02 NOTE — DISCHARGE SUMMARY
[facility]ITAL[facility]  DISCHARGE SUMMARY     Carlee Haro Patient Status:  Inpatient    1950 MRN [id number]   [facility] 8NE-A Attending Mickey Almendarez MD   [address] Day # 8 PCP Lobo Oliveira MD     Date of Admission: 2019  Date of demonstrated elevated D Dimer. Brief Synopsis:   51-year-old male presented with worsening shortness of breath. Patient presented to PCP d-dimer was done this prompted him being sent to the ER. Ultrasound was negative for DVT.   CTA of the chest was d minor hypertension diuretic use. This has returned to normal.  Patient has been normal sinus rhythm has been started back on beta-blocker amnio aspirin statin. Will need follow-up echocardiogram if his EF remains low-will need ICD done.     Patient has be metoprolol  • Follow-up with Dr. Jose Manuel Mccartney  cardiology consult follow-up with Dr. Radha GORDON, follow-up with PCP follow-up with Dr. Rio Rivera  •     Lab/Test results pending at Discharge:   · None    Consultants:  • Cardiology, nephrology, endocrinology, pharm ZENPEP      Take 1 capsule (20,000 Units total) by mouth 3 (three) times daily with meals. Quantity:  90 capsule  Refills:  11     VITAMIN B 12 OR      Take 50 mcg by mouth daily. Refills:  0     vitamin C 100 MG Tabs      Take 100 mg by mouth daily. [address]  [address] Francis Brown MD  [address]  [address] [address] [facility] [place]  [phone]    In

## 2019-07-02 NOTE — PROGRESS NOTES
[facility]  Progress Note     Jak Larson Patient Status:  Inpatient    1950 MRN [id number]   [facility] 6NE-A Attending Deonte Nichols MD   Hosp Day # 8 PCP Mata Harrison MD     Chief Complaint: SOB, Chest pain    S:   H values in this interval not displayed. Estimated Creatinine Clearance: 69.8 mL/min (based on SCr of 1.03 mg/dL).   Recent Labs   Lab 06/27/19  1536 06/27/19  1645   PTP 20.4* 18.9*   INR 1.65* 1.50*     No results for input(s): TROP, CK in the last 168

## 2019-07-02 NOTE — OCCUPATIONAL THERAPY NOTE
OCCUPATIONAL THERAPY TREATMENT NOTE - INPATIENT     Room Number: 6284/7527-B  Session: 1   Number of Visits to Meet Established Goals: 5    Presenting Problem: CABG    History related to current admission: Pt was experiencing SOB at home and found to have Marina Villalobos MD at [address] [place] [address] (EUS) N/A 6/23/2016    Performed by Marina Villalobos MD at [facility] ENDOSCOPY   • ESOPHAGOGASTRODUODENOSCOPY (EGD) N/A 6/23/2016    Performed by Marina Villalobos MD at [address] [place] [address] (G-Code): CK    FUNCTIONAL TRANSFER ASSESSMENT  Supine to Sit : Supervision  Sit to Stand: Supervision    Skilled Therapy Provided: Pt received supine in bed, pt t/f to EOB with supervision.  Pt educated on information from the binder to include: energy con eval/education; Compensatory technique education;Continued evaluation  Rehab Potential : Good  Frequency (Obs): Daily      OT Goals:  ALL MET AS OF 7/2

## 2019-07-02 NOTE — PLAN OF CARE
Discharge instructions given. All new medications discussed. Follow up appointments discussed. Patient and wife indicated understanding. All questions answered. Post CABG instructions given. NURSING DISCHARGE NOTE    Discharged Home via Wheelchair.   Ac

## 2019-07-02 NOTE — PLAN OF CARE
Assumed care for pt at 19:30 on 7/1    As of 7/2, POD#5 s/p CABG midsternal + left upper leg incision steri strips coated in betadine RIOS    A&Ox4  Reports incisional pain, rating 5. Ache pain. Declines narcotics. Gave 1000mg tylenol prn q 6h.  Pt asleep af Smoking Cessation handout, if applicable  - Encourage broncho-pulmonary hygiene including cough, deep breathe, Incentive Spirometry  - Assess the need for suctioning and perform as needed  - Assess and instruct to report SOB or any respiratory difficulty

## 2019-07-03 ENCOUNTER — PATIENT OUTREACH (OUTPATIENT)
Dept: CASE MANAGEMENT | Age: 69
End: 2019-07-03

## 2019-07-03 ENCOUNTER — TELEPHONE (OUTPATIENT)
Dept: INTERNAL MEDICINE CLINIC | Facility: CLINIC | Age: 69
End: 2019-07-03

## 2019-07-03 DIAGNOSIS — Z02.9 ENCOUNTERS FOR UNSPECIFIED ADMINISTRATIVE PURPOSE: ICD-10-CM

## 2019-07-03 DIAGNOSIS — I21.4 NSTEMI (NON-ST ELEVATED MYOCARDIAL INFARCTION) (HCC): ICD-10-CM

## 2019-07-03 LAB
ATRIAL RATE: 66 BPM
P AXIS: 62 DEGREES
P-R INTERVAL: 126 MS
Q-T INTERVAL: 478 MS
QRS DURATION: 118 MS
QTC CALCULATION (BEZET): 501 MS
R AXIS: 44 DEGREES
T AXIS: 90 DEGREES
VENTRICULAR RATE: 66 BPM

## 2019-07-03 PROCEDURE — 1111F DSCHRG MED/CURRENT MED MERGE: CPT

## 2019-07-03 NOTE — PROGRESS NOTES
Initial Post Discharge Follow Up   Discharge Date: 7/2/19  Contact Date: 7/3/2019    Consent Verification:  Assessment Completed With: Patient  HIPAA Verified?   Yes    Discharge Dx:  STEMI, 6/24 CABG x3 with LIMA to the LAD, SVG to the ramus intermedius HCl 400 MG Oral Tab Take 1 tablet (400 mg total) by mouth 3x Daily Cardiac for 6 days. Disp: 18 tablet Rfl: 0   aspirin 325 MG Oral Tab Take 1 tablet (325 mg total) by mouth daily.  Disp: 30 tablet Rfl: 0   atorvastatin 10 MG Oral Tab Take 1 tablet (10 mg t Heart pillow  Have you received your (DME)? yes    Services ordered at D/C? Yes   What services:   Rehab, CHF, Stroke, PT, OT, Speech, Other:  Cardiac rehab   Have you scheduled these services?     yes    If yes, have you started these services? no, sche ABDOMEN PELVIS WITH ALL CONTRAST with [address] [facility])    Do not eat solid food 2 hours before appointment time. You may drink clear fluids up to 2 hours before appointment time.  At any time you may ta 10:00am-1:00pm  [address]   [facility], [address] INVASIVE SURGERY [facility] Reception Desk:  Mon-Fri 8:00am-8:00pm [id number] Alberto Contreras ProcDelonte Valdez 1    Drinking Instructions will be included when you  contrast or if your physi rate the care you received while in the hospital?  excellent     Interventions by NCM:  All d/c instructions reviewed with pt. Reviewed when to call MD vs when to go to ER/call 911. Reviewed s/s of MI, DVT, and infection with pt as well as wound care.   E

## 2019-07-03 NOTE — TELEPHONE ENCOUNTER
Spoke to pt for TCM today. Pt is scheduled to see PCP on 7/19/19. Pt requesting to see PCP only. TCM/HFU appt recommended by 7/9/19 as pt is a high risk for readmission. Unable to book sooner appt d/t schedule limitations. Please advise.      BOOK BY D

## 2019-07-03 NOTE — TELEPHONE ENCOUNTER
Pt has HFU scheduled already 7/19 with AS. Nothing is available sooner with [facility]. Spoke to pt's wife (Riri Trent per HIPAA) and confirmed appt.

## 2019-07-07 ENCOUNTER — HOSPITAL ENCOUNTER (INPATIENT)
Facility: HOSPITAL | Age: 69
LOS: 2 days | Discharge: HOME HEALTH | DRG: 394 | End: 2019-07-09
Attending: EMERGENCY MEDICINE | Admitting: HOSPITALIST
Payer: MEDICARE

## 2019-07-07 ENCOUNTER — APPOINTMENT (OUTPATIENT)
Dept: GENERAL RADIOLOGY | Facility: HOSPITAL | Age: 69
DRG: 394 | End: 2019-07-07
Attending: EMERGENCY MEDICINE
Payer: MEDICARE

## 2019-07-07 ENCOUNTER — APPOINTMENT (OUTPATIENT)
Dept: CT IMAGING | Facility: HOSPITAL | Age: 69
DRG: 394 | End: 2019-07-07
Attending: EMERGENCY MEDICINE
Payer: MEDICARE

## 2019-07-07 DIAGNOSIS — K66.8 PNEUMOPERITONEUM: Primary | ICD-10-CM

## 2019-07-07 DIAGNOSIS — Z95.1 HX OF CABG: ICD-10-CM

## 2019-07-07 DIAGNOSIS — R00.1 BRADYCARDIA: ICD-10-CM

## 2019-07-07 LAB
ALBUMIN SERPL-MCNC: 3 G/DL (ref 3.4–5)
ALBUMIN/GLOB SERPL: 0.7 {RATIO} (ref 1–2)
ALP LIVER SERPL-CCNC: 143 U/L (ref 45–117)
ALT SERPL-CCNC: 47 U/L (ref 16–61)
ANION GAP SERPL CALC-SCNC: 8 MMOL/L (ref 0–18)
AST SERPL-CCNC: 26 U/L (ref 15–37)
BASOPHILS # BLD AUTO: 0.04 X10(3) UL (ref 0–0.2)
BASOPHILS NFR BLD AUTO: 0.3 %
BILIRUB SERPL-MCNC: 0.9 MG/DL (ref 0.1–2)
BILIRUB UR QL STRIP.AUTO: NEGATIVE
BUN BLD-MCNC: 11 MG/DL (ref 7–18)
BUN/CREAT SERPL: 8.5 (ref 10–20)
CALCIUM BLD-MCNC: 7.6 MG/DL (ref 8.5–10.1)
CHLORIDE SERPL-SCNC: 105 MMOL/L (ref 98–112)
CLARITY UR REFRACT.AUTO: CLEAR
CO2 SERPL-SCNC: 20 MMOL/L (ref 21–32)
COLOR UR AUTO: YELLOW
CREAT BLD-MCNC: 1.29 MG/DL (ref 0.7–1.3)
DEPRECATED RDW RBC AUTO: 53.3 FL (ref 35.1–46.3)
EOSINOPHIL # BLD AUTO: 0.27 X10(3) UL (ref 0–0.7)
EOSINOPHIL NFR BLD AUTO: 2.2 %
ERYTHROCYTE [DISTWIDTH] IN BLOOD BY AUTOMATED COUNT: 15.6 % (ref 11–15)
GLOBULIN PLAS-MCNC: 4.3 G/DL (ref 2.8–4.4)
GLUCOSE BLD-MCNC: 156 MG/DL (ref 70–99)
GLUCOSE UR STRIP.AUTO-MCNC: NEGATIVE MG/DL
HCT VFR BLD AUTO: 32.6 % (ref 39–53)
HGB BLD-MCNC: 10.6 G/DL (ref 13–17.5)
HYALINE CASTS #/AREA URNS AUTO: PRESENT /LPF
IMM GRANULOCYTES # BLD AUTO: 0.1 X10(3) UL (ref 0–1)
IMM GRANULOCYTES NFR BLD: 0.8 %
KETONES UR STRIP.AUTO-MCNC: NEGATIVE MG/DL
LEUKOCYTE ESTERASE UR QL STRIP.AUTO: NEGATIVE
LIPASE SERPL-CCNC: 34 U/L (ref 73–393)
LYMPHOCYTES # BLD AUTO: 1.39 X10(3) UL (ref 1–4)
LYMPHOCYTES NFR BLD AUTO: 11.1 %
M PROTEIN MFR SERPL ELPH: 7.3 G/DL (ref 6.4–8.2)
MCH RBC QN AUTO: 30.9 PG (ref 26–34)
MCHC RBC AUTO-ENTMCNC: 32.5 G/DL (ref 31–37)
MCV RBC AUTO: 95 FL (ref 80–100)
MONOCYTES # BLD AUTO: 1.9 X10(3) UL (ref 0.1–1)
MONOCYTES NFR BLD AUTO: 15.2 %
NEUTROPHILS # BLD AUTO: 8.82 X10 (3) UL (ref 1.5–7.7)
NEUTROPHILS # BLD AUTO: 8.82 X10(3) UL (ref 1.5–7.7)
NEUTROPHILS NFR BLD AUTO: 70.4 %
NITRITE UR QL STRIP.AUTO: NEGATIVE
OSMOLALITY SERPL CALC.SUM OF ELEC: 279 MOSM/KG (ref 275–295)
PH UR STRIP.AUTO: 6 [PH] (ref 4.5–8)
PLATELET # BLD AUTO: 271 10(3)UL (ref 150–450)
POTASSIUM SERPL-SCNC: 4.4 MMOL/L (ref 3.5–5.1)
PROT UR STRIP.AUTO-MCNC: 100 MG/DL
RBC # BLD AUTO: 3.43 X10(6)UL (ref 3.8–5.8)
RBC UR QL AUTO: NEGATIVE
SODIUM SERPL-SCNC: 133 MMOL/L (ref 136–145)
SP GR UR STRIP.AUTO: 1.02 (ref 1–1.03)
TROPONIN I SERPL-MCNC: 0.07 NG/ML (ref ?–0.04)
UROBILINOGEN UR STRIP.AUTO-MCNC: 2 MG/DL
WBC # BLD AUTO: 12.5 X10(3) UL (ref 4–11)

## 2019-07-07 PROCEDURE — 74177 CT ABD & PELVIS W/CONTRAST: CPT | Performed by: EMERGENCY MEDICINE

## 2019-07-07 PROCEDURE — 71045 X-RAY EXAM CHEST 1 VIEW: CPT | Performed by: EMERGENCY MEDICINE

## 2019-07-07 RX ORDER — MORPHINE SULFATE 4 MG/ML
4 INJECTION, SOLUTION INTRAMUSCULAR; INTRAVENOUS ONCE
Status: COMPLETED | OUTPATIENT
Start: 2019-07-07 | End: 2019-07-07

## 2019-07-07 RX ORDER — SODIUM CHLORIDE 9 MG/ML
125 INJECTION, SOLUTION INTRAVENOUS CONTINUOUS
Status: DISCONTINUED | OUTPATIENT
Start: 2019-07-07 | End: 2019-07-08

## 2019-07-07 RX ORDER — SODIUM CHLORIDE 9 MG/ML
INJECTION, SOLUTION INTRAVENOUS CONTINUOUS
Status: CANCELLED | OUTPATIENT
Start: 2019-07-07 | End: 2019-07-08

## 2019-07-08 LAB
ANION GAP SERPL CALC-SCNC: 6 MMOL/L (ref 0–18)
ATRIAL RATE: 38 BPM
ATRIAL RATE: 55 BPM
BUN BLD-MCNC: 12 MG/DL (ref 7–18)
BUN/CREAT SERPL: 12.5 (ref 10–20)
CALCIUM BLD-MCNC: 7.1 MG/DL (ref 8.5–10.1)
CHLORIDE SERPL-SCNC: 110 MMOL/L (ref 98–112)
CO2 SERPL-SCNC: 21 MMOL/L (ref 21–32)
CREAT BLD-MCNC: 0.96 MG/DL (ref 0.7–1.3)
GLUCOSE BLD-MCNC: 94 MG/DL (ref 70–99)
HAV IGM SER QL: 1.8 MG/DL (ref 1.6–2.6)
OSMOLALITY SERPL CALC.SUM OF ELEC: 284 MOSM/KG (ref 275–295)
POTASSIUM SERPL-SCNC: 4.2 MMOL/L (ref 3.5–5.1)
Q-T INTERVAL: 566 MS
Q-T INTERVAL: 580 MS
QRS DURATION: 108 MS
QRS DURATION: 110 MS
QTC CALCULATION (BEZET): 483 MS
QTC CALCULATION (BEZET): 501 MS
R AXIS: 22 DEGREES
R AXIS: 41 DEGREES
SODIUM SERPL-SCNC: 137 MMOL/L (ref 136–145)
T AXIS: 114 DEGREES
T AXIS: 116 DEGREES
TROPONIN I SERPL-MCNC: 0.06 NG/ML (ref ?–0.04)
TROPONIN I SERPL-MCNC: 0.07 NG/ML (ref ?–0.04)
VENTRICULAR RATE: 44 BPM
VENTRICULAR RATE: 45 BPM

## 2019-07-08 PROCEDURE — 99222 1ST HOSP IP/OBS MODERATE 55: CPT | Performed by: INTERNAL MEDICINE

## 2019-07-08 PROCEDURE — 99223 1ST HOSP IP/OBS HIGH 75: CPT | Performed by: HOSPITALIST

## 2019-07-08 RX ORDER — AMIODARONE HYDROCHLORIDE 200 MG/1
200 TABLET ORAL DAILY
Status: DISCONTINUED | OUTPATIENT
Start: 2019-07-08 | End: 2019-07-09

## 2019-07-08 RX ORDER — ASPIRIN 325 MG
325 TABLET ORAL DAILY
Status: DISCONTINUED | OUTPATIENT
Start: 2019-07-08 | End: 2019-07-09

## 2019-07-08 RX ORDER — MAGNESIUM SULFATE HEPTAHYDRATE 40 MG/ML
2 INJECTION, SOLUTION INTRAVENOUS ONCE
Status: COMPLETED | OUTPATIENT
Start: 2019-07-08 | End: 2019-07-08

## 2019-07-08 RX ORDER — ENOXAPARIN SODIUM 100 MG/ML
40 INJECTION SUBCUTANEOUS DAILY
Status: DISCONTINUED | OUTPATIENT
Start: 2019-07-08 | End: 2019-07-08

## 2019-07-08 RX ORDER — METOCLOPRAMIDE HYDROCHLORIDE 5 MG/ML
10 INJECTION INTRAMUSCULAR; INTRAVENOUS EVERY 8 HOURS PRN
Status: DISCONTINUED | OUTPATIENT
Start: 2019-07-08 | End: 2019-07-09

## 2019-07-08 RX ORDER — MORPHINE SULFATE 4 MG/ML
2 INJECTION, SOLUTION INTRAMUSCULAR; INTRAVENOUS EVERY 2 HOUR PRN
Status: DISCONTINUED | OUTPATIENT
Start: 2019-07-08 | End: 2019-07-09

## 2019-07-08 RX ORDER — MORPHINE SULFATE 4 MG/ML
1 INJECTION, SOLUTION INTRAMUSCULAR; INTRAVENOUS EVERY 2 HOUR PRN
Status: DISCONTINUED | OUTPATIENT
Start: 2019-07-08 | End: 2019-07-09

## 2019-07-08 RX ORDER — ONDANSETRON 2 MG/ML
4 INJECTION INTRAMUSCULAR; INTRAVENOUS EVERY 6 HOURS PRN
Status: DISCONTINUED | OUTPATIENT
Start: 2019-07-08 | End: 2019-07-09

## 2019-07-08 RX ORDER — ATORVASTATIN CALCIUM 10 MG/1
10 TABLET, FILM COATED ORAL NIGHTLY
Status: DISCONTINUED | OUTPATIENT
Start: 2019-07-08 | End: 2019-07-09

## 2019-07-08 RX ORDER — ASPIRIN 325 MG
325 TABLET ORAL DAILY
Status: DISCONTINUED | OUTPATIENT
Start: 2019-07-08 | End: 2019-07-08

## 2019-07-08 NOTE — ED PROVIDER NOTES
Patient Seen in: [facility] Emergency Department    History   Patient presents with:  Abdomen/Flank Pain (GI/)    Stated Complaint: open hrt surg june 2019 near syncope  abd pain sudden onset      HPI    15-year-old male presents emergency room fo Alejandrina Walker MD at [address] [place] [address] (EUS) N/A 7/25/2016    Performed by Alejandrina Walker MD at [address] [place] [address] (EUS) N/A 6/23/2016    Performed by Alejandrina Walker MD at [place] [address] well-appearing, in no acute distress. HEENT:  no scleral icterus. Mucous membranes are moist, oropharynx is clear, uvula midline. Scalp is atraumatic. NECK: Neck is supple, there is no nuchal rigidity. HEART: Regular rate and rhythm, no murmurs. PLATELET    Narrative: The following orders were created for panel order CBC WITH DIFFERENTIAL WITH PLATELET.   Procedure                               Abnormality         Status                     ---------                               ----------- effusion    Disposition:  Admit  7/7/2019 10:10 pm    Follow-up:  No follow-up provider specified.       Medications Prescribed:  Current Discharge Medication List              Present on Admission  Date Reviewed: 6/24/2019          ICD-10-CM Noted POA    P

## 2019-07-08 NOTE — ED INITIAL ASSESSMENT (HPI)
Patient recently discharged from the hospital for bypass in June. Patient sts tonight about 1 hour ago left and right lower abd pain.  Denies n/v/d.

## 2019-07-08 NOTE — CONSULTS
[facility]  Cardiology Consultation    Gilbert Simpson Patient Status:  Inpatient    1950 MRN [id number]   [facility] [place] 6NE-A Attending Coral Gallo MD;Jennifer,*   Hosp Day # 1 PCP Bao Black MD     Reason for Consultation:  Venkat Dinh Conrad Vieira MD at [address] [place] [address] (EUS) N/A 7/25/2016    Performed by Conrad Vieira MD at [address] [place] [address] (EUS) N/A 6/23/2016    Performed by Conrad Vieira MD at [place] [address] HCl (REGLAN) injection 10 mg, 10 mg, Intravenous, Q8H PRN  •  aspirin tab 325 mg, 325 mg, Oral, Daily  •  Magnesium Sulfate IVPB premix SOLN 2 g, 2 g, Intravenous, Once    Review of Systems:  A comprehensive 13 point review of systems was negative, except anterior to the stomach within the left upper quadrant of the abdomen. This may represent a perforated hollow viscus or could be postoperative in etiology. Clinical correlation with the patient's   surgical history is recommended.   2. There is mild mucos

## 2019-07-08 NOTE — CONSULTS
Received call from ER about this patient with increased epigastric pain and a drop of free air by the stomach. The patient was admitted to CICU since he was recently s/p CABG.   Upon review of the chart, the patient is also s/p Whipple procedure on 8/16/20

## 2019-07-08 NOTE — CONSULTS
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 He received adjuvant chemotherapy with gemcitabine and capecitabine by Arnol Huynh.     In 4/2019 he was complaining of vague Past Surgical History:   Procedure Laterality Date   • Cabg     • Colonoscopy  12/13/11    Dr. Vega Figures repeat 5 yrs   • Hernia surgery      over 35 yrs ago   • Other      right index finger surgery   • Other surgical history  6/23/16.     EUS/EGD/FNA   • [place] Musculoskeletal: Extremities: no edema. Skin: Inspection and palpation: no jaundice. Document Review:  CT abd/pelvis:  CONCLUSION:    1.  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 [facility] received adjuva

## 2019-07-08 NOTE — H&P
[facility]  History and Physical     Desiree Baer Patient Status:  Inpatient    1950 MRN [id number]   [facility] 6NE-A Attending Paolo Chase MD   Hosp Day # 1 PCP Jonas Eldridge MD     Chief Complaint: Abdominal pain (EUS) N/A 6/23/2016    Performed by Yovani Arriola MD at [facility] ENDOSCOPY   • ESOPHAGOGASTRODUODENOSCOPY (EGD) N/A 6/23/2016    [facility]formed [facility] N/A 6/27/2019    Performed by Mariangel Virgen Oral Capsule Delayed Release Take 1 capsule (40 mg total) by mouth daily. Disp: 90 capsule Rfl: 3   pancrelipase, Lip-Prot-Amyl, 84425-00619 units Oral Cap DR Particles Take 1 capsule (20,000 Units total) by mouth 3 (three) times daily with meals.  Disp: 90 07/07/19 2016   GLU 95 156*   BUN 36* 11   CREATSERUM 1.03 1.29   GFRAA 86 65   GFRNAA 74 57*   CA 6.9* 7.6*   ALB  --  3.0*   * 133*   K 4.1 4.4    105   CO2 21.0 20.0*   ALKPHO  --  143*   AST  --  26   ALT  --  47   BILT  --  0.9   TP  --

## 2019-07-08 NOTE — CM/SW NOTE
Patient is current with [facility]  P:[phone]  F:[phone].     Latisha Nicholson LCSW

## 2019-07-08 NOTE — PROGRESS NOTES
[facility]  Progress Note     Jackie Nicholson Patient Status:  Inpatient    1950 MRN [id number]   [facility] 6NE-A Attending Rema Mahmood MD   Hosp Day # 1 PCP Felipe Howard MD     Chief Complaint:  abd pain   S:   Wants ul junctional rhythms? PAF   1. Cardiology  2. Hold metoprolol, amio   3. Tele  4. No AC for now   4. Expected post operative anemia, improving   5. HTN  6. Tobacco use   7. Post op afib   MIHKAIL ochoa on hold   8.  Hx of [place] 2016      PLAN   Cbc am   Follow

## 2019-07-08 NOTE — PROGRESS NOTES
I was notified by Dr Jordon Joy re this admission. Patient known to me. 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   He received adjuvant chemotherapy with gemcitabine a

## 2019-07-08 NOTE — PAYOR COMM NOTE
--------------  ADMISSION REVIEW     Payor: [facility] #:  [id number]  Authorization Number: [id number]    ED Provider Notes      Patient Seen in: [facility] Emergency Department    History   Patient presents with:  Abdomen/ ENDOSCOPY   • ENDOSCOPIC ULTRASOUND (EUS) N/A 7/25/2016    Performed by Lashonda Christopher MD at [address] [place] [address] (EUS) N/A 6/23/2016    Performed by Lashonda Christopher MD at [facility] ENDOSCOPY   • ESOPHAGOGASTRODUODENOSCOPY (EGD) N/A 6/23/ tenderness, dorsal pedal pulses present and equal bilaterally. SKIN: Warm, dry, intact, no rashes.       ED Course     Labs Reviewed   COMP METABOLIC PANEL (14) - Abnormal; Notable for the following components:       Result Value    Glucose 156 (*)     Sod bradycardia  Reading: Junctional bradycardia, inverted T waves anterior laterally       Disposition and Plan     Clinical Impression:  Pneumoperitoneum  (primary encounter diagnosis)  Bradycardia  Hx of CABG  Pleural effusion          CT ABD 7/7  CONCLUSIO Neurologic: No focal neurological deficits. CNII-XII grossly intact. Musculoskeletal: Moves all extremities. Extremities: No edema or cyanosis. Integument: No rashes or lesions. Psychiatric: Appropriate mood and affect.       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 Bryan Martines received adjuvant chemotherapy with gemcitabine and capecitabine.     6/27/2019: Coronary revascularization x3 by

## 2019-07-08 NOTE — PLAN OF CARE
0800 Patient frustrated and angry most of day. NPO and waiting to see MD'S . Denies sob or chest pain. Old surgery wound sternum steri C/D/I sternum stable. Using incentive spirometry up to chair. No abdominal pain. Dr. Sima Garcia here to see this afternoon .  A

## 2019-07-08 NOTE — PLAN OF CARE
Patient care assumed [id number] from [place] [address]. Patient stable and able to transfer over to bed without issue. HR in 40s with junctional rhythm, briefly in AFIB on monitor. No pain most of night.  Seen by hospitalist, will continue to monitor and be seen by

## 2019-07-09 VITALS
TEMPERATURE: 98 F | WEIGHT: 147.69 LBS | DIASTOLIC BLOOD PRESSURE: 72 MMHG | HEIGHT: 70 IN | OXYGEN SATURATION: 99 % | SYSTOLIC BLOOD PRESSURE: 120 MMHG | HEART RATE: 69 BPM | RESPIRATION RATE: 21 BRPM | BODY MASS INDEX: 21.14 KG/M2

## 2019-07-09 LAB
ANION GAP SERPL CALC-SCNC: 4 MMOL/L (ref 0–18)
BASOPHILS # BLD AUTO: 0.03 X10(3) UL (ref 0–0.2)
BASOPHILS NFR BLD AUTO: 0.3 %
BUN BLD-MCNC: 10 MG/DL (ref 7–18)
BUN/CREAT SERPL: 9.2 (ref 10–20)
CALCIUM BLD-MCNC: 7.3 MG/DL (ref 8.5–10.1)
CHLORIDE SERPL-SCNC: 109 MMOL/L (ref 98–112)
CO2 SERPL-SCNC: 23 MMOL/L (ref 21–32)
CREAT BLD-MCNC: 1.09 MG/DL (ref 0.7–1.3)
DEPRECATED RDW RBC AUTO: 51 FL (ref 35.1–46.3)
EOSINOPHIL # BLD AUTO: 0.29 X10(3) UL (ref 0–0.7)
EOSINOPHIL NFR BLD AUTO: 2.7 %
ERYTHROCYTE [DISTWIDTH] IN BLOOD BY AUTOMATED COUNT: 15.4 % (ref 11–15)
GLUCOSE BLD-MCNC: 95 MG/DL (ref 70–99)
HCT VFR BLD AUTO: 29.6 % (ref 39–53)
HGB BLD-MCNC: 10 G/DL (ref 13–17.5)
IMM GRANULOCYTES # BLD AUTO: 0.05 X10(3) UL (ref 0–1)
IMM GRANULOCYTES NFR BLD: 0.5 %
LYMPHOCYTES # BLD AUTO: 1.15 X10(3) UL (ref 1–4)
LYMPHOCYTES NFR BLD AUTO: 10.7 %
MCH RBC QN AUTO: 31.3 PG (ref 26–34)
MCHC RBC AUTO-ENTMCNC: 33.8 G/DL (ref 31–37)
MCV RBC AUTO: 92.5 FL (ref 80–100)
MONOCYTES # BLD AUTO: 1.66 X10(3) UL (ref 0.1–1)
MONOCYTES NFR BLD AUTO: 15.4 %
NEUTROPHILS # BLD AUTO: 7.57 X10 (3) UL (ref 1.5–7.7)
NEUTROPHILS # BLD AUTO: 7.57 X10(3) UL (ref 1.5–7.7)
NEUTROPHILS NFR BLD AUTO: 70.4 %
OSMOLALITY SERPL CALC.SUM OF ELEC: 281 MOSM/KG (ref 275–295)
PLATELET # BLD AUTO: 278 10(3)UL (ref 150–450)
POTASSIUM SERPL-SCNC: 4.2 MMOL/L (ref 3.5–5.1)
RBC # BLD AUTO: 3.2 X10(6)UL (ref 3.8–5.8)
SODIUM SERPL-SCNC: 136 MMOL/L (ref 136–145)
WBC # BLD AUTO: 10.8 X10(3) UL (ref 4–11)

## 2019-07-09 PROCEDURE — 99232 SBSQ HOSP IP/OBS MODERATE 35: CPT | Performed by: INTERNAL MEDICINE

## 2019-07-09 RX ORDER — METOPROLOL SUCCINATE 25 MG/1
12.5 TABLET, EXTENDED RELEASE ORAL
Qty: 15 TABLET | Refills: 11 | Status: SHIPPED | OUTPATIENT
Start: 2019-07-10 | End: 2019-08-02

## 2019-07-09 NOTE — PLAN OF CARE
Dr. Agustina Joshua and Dr Carole Keen here to see this am ok for discharge from their standpoints. Notified attending. Denies pain . Some sob with exertion. Sat's 97-99 % resting. Taking diet well. Wife here updated with care plan. Discharge today.

## 2019-07-09 NOTE — PROGRESS NOTES
[facility]  Progress Note    Rick Snider Patient Status:  Inpatient    1950 MRN [id number]   [facility] 6NE-A Attending Gene Guajardo MD   Hosp Day # 2 PCP Checo Chiu MD     Assessment:  1. Abdominal Pain - resolved.   2. 1.50 (H) 06/27/2019     Lab Results   Component Value Date     07/09/2019    K 4.2 07/09/2019     07/09/2019    CO2 23.0 07/09/2019    BUN 10 07/09/2019    CREATSERUM 1.09 07/09/2019    GLU 95 07/09/2019    CA 7.3 07/09/2019        Lab Results

## 2019-07-09 NOTE — PLAN OF CARE
Patient sitting at bedside at 1900 when care assumed. In similar spirits as yesterday, frustrated at being readmitted but understanding towards nursing staff. No complaints of pain overnight, put on [facility] for desaturation while sleeping to 87%.  Urinating an

## 2019-07-09 NOTE — PROGRESS NOTES
[facility]    Progress Note    Tisha Shows Patient Status:  Inpatient    1950 MRN [id number]   [facility] 6NE-A Attending Willam Marin MD   Hosp Day # 2 PCP Danelle Gupta MD   Attending:  I have reviewed the belowlisted note S/P Whipple procedure 2016 for distal bile duct carcinoma in addition to left adrenalectomy for pheochromocytoma. S/P recent coronary revascularization  -There are no acute surgical issues  -Advance to full liquids.  If patient tolerates okay to advance t

## 2019-07-09 NOTE — PROGRESS NOTES
[facility]  Progress Note     Hyun Pinedo Patient Status:  Inpatient    1950 MRN [id number]   [facility] 6NE-A Attending Brennan Vines MD   Hosp Day # 2 PCP Winifred Allen MD     Chief Complaint:  abd pain   S:    Feels g consistent with ulcer. 1. PPI    2. Tolerating diet   3. Dr Marium Muse for dc  No abx    2. CABG 6/28   1. CV   3. Bradycardia, asymptomatic, junctional rhythms? PAF   1. Cardiology  2.  metoprolol, at lower dose  3.  amio at lower dose    4.  Repeat

## 2019-07-10 ENCOUNTER — PATIENT OUTREACH (OUTPATIENT)
Dept: CASE MANAGEMENT | Age: 69
End: 2019-07-10

## 2019-07-10 DIAGNOSIS — Z95.1 HX OF CABG: ICD-10-CM

## 2019-07-10 DIAGNOSIS — Z02.9 ENCOUNTERS FOR UNSPECIFIED ADMINISTRATIVE PURPOSE: ICD-10-CM

## 2019-07-10 DIAGNOSIS — K66.8 PNEUMOPERITONEUM: ICD-10-CM

## 2019-07-10 DIAGNOSIS — R00.1 BRADYCARDIA: ICD-10-CM

## 2019-07-10 PROCEDURE — 1111F DSCHRG MED/CURRENT MED MERGE: CPT

## 2019-07-10 NOTE — PROGRESS NOTES
Initial Post Discharge Follow Up   Discharge Date: 7/9/19  Contact Date: 7/10/2019    Consent Verification:  Assessment Completed With: Patient  HIPAA Verified?   Yes    Discharge Dx:    Pneumoperitoneum, Bradycardia  HX: CABG 6/24/19, tobacco abuse    W Disp: 15 tablet Rfl: 11   amiodarone HCl 200 MG Oral Tab Take 1 tablet (200 mg total) by mouth daily. Disp: 30 tablet Rfl: 0   aspirin 325 MG Oral Tab Take 1 tablet (325 mg total) by mouth daily.  Disp: 30 tablet Rfl: 0   atorvastatin 10 MG Oral Tab Take 1 needs or concerns you need addressed before your next visit with your PCP?  (DME, meds, disease concerns, Etc): No     Follow up appointments:      Your appointments     Date & Time Appointment Department ([address] [place] [address], [address])    Jul 15, 2019 10:30 AM CDT HEMATOLOGY ON you.    If you need to  oral contrast and your doctor has provided you with a copy of the order, you must bring this order with you to  the oral contrast and drinking instructions.  Please plan on picking up oral contrast no later than the day [address] [facility] [address] [address]  [address] [place] [address] Advised patient to bring all medications and blood glucose meter/supplies if applicable

## 2019-07-10 NOTE — DISCHARGE SUMMARY
[facility] [place] [facility]  DISCHARGE SUMMARY     Zheng Gasca Patient Status:  Inpatient    1950 MRN [id number]   [facility] 6NE-A Attending No att. providers found   Hosp Day # 2 PCP Kamron South MD     Date of Admission: 2019  Kale abdominal pain came to the ER for evaluation had CT scan question of duodenitis admitted to the ICU patient was also found to have junctional bradycardia consults with Dr. Derick Aguillon and cardiology were obtained.   Patient has been monitored seen by Dr. Bart Staton decreased  • Follow-up with cardiology, PCP, GI CV surgery  • Will need echocardiogram per cardiology to reassess EF if does not improve will need consideration for ICD      Lab/Test results pending at Discharge:   · none     Consultants:  • Cardiology, on capsule  Refills:  11     VITAMIN B 12 OR      Take 50 mcg by mouth daily. Refills:  0     vitamin C 100 MG Tabs      Take 100 mg by mouth daily.    Refills:  0           Where to Get Your Medications      Please  your prescriptions at the location

## 2019-07-11 RX ORDER — ATORVASTATIN CALCIUM 10 MG/1
10 TABLET, FILM COATED ORAL
COMMUNITY
Start: 2019-07-02 | End: 2019-11-12 | Stop reason: SDUPTHER

## 2019-07-11 RX ORDER — LISINOPRIL 5 MG/1
5 TABLET ORAL
COMMUNITY
Start: 2019-07-03 | End: 2019-11-01 | Stop reason: SDUPTHER

## 2019-07-11 RX ORDER — OMEPRAZOLE 40 MG/1
40 CAPSULE, DELAYED RELEASE ORAL
COMMUNITY
Start: 2019-04-25 | End: 2019-11-01

## 2019-07-11 RX ORDER — RIBOFLAVIN (VITAMIN B2) 100 MG
100 TABLET ORAL
COMMUNITY
End: 2023-04-28

## 2019-07-11 RX ORDER — METOPROLOL SUCCINATE 25 MG/1
12.5 TABLET, EXTENDED RELEASE ORAL
COMMUNITY
Start: 2016-09-12 | End: 2019-07-12 | Stop reason: ALTCHOICE

## 2019-07-11 RX ORDER — AMIODARONE HYDROCHLORIDE 200 MG/1
200 TABLET ORAL
COMMUNITY
Start: 2019-07-08 | End: 2019-08-08 | Stop reason: SDUPTHER

## 2019-07-11 RX ORDER — ASPIRIN 325 MG
325 TABLET ORAL
COMMUNITY
Start: 2019-07-03 | End: 2019-07-12 | Stop reason: ALTCHOICE

## 2019-07-12 ENCOUNTER — OFFICE VISIT (OUTPATIENT)
Dept: CARDIOLOGY | Age: 69
End: 2019-07-12

## 2019-07-12 VITALS
HEIGHT: 70 IN | WEIGHT: 143 LBS | BODY MASS INDEX: 20.47 KG/M2 | HEART RATE: 58 BPM | DIASTOLIC BLOOD PRESSURE: 50 MMHG | SYSTOLIC BLOOD PRESSURE: 98 MMHG

## 2019-07-12 DIAGNOSIS — Z95.1 S/P CABG (CORONARY ARTERY BYPASS GRAFT): Primary | ICD-10-CM

## 2019-07-12 DIAGNOSIS — R00.1 BRADYCARDIA, UNSPECIFIED: ICD-10-CM

## 2019-07-12 DIAGNOSIS — I50.22 CHRONIC SYSTOLIC CONGESTIVE HEART FAILURE (CMD): ICD-10-CM

## 2019-07-12 DIAGNOSIS — I48.0 PAROXYSMAL ATRIAL FIBRILLATION (CMD): ICD-10-CM

## 2019-07-12 PROCEDURE — 93000 ELECTROCARDIOGRAM COMPLETE: CPT | Performed by: NURSE PRACTITIONER

## 2019-07-12 PROCEDURE — 99214 OFFICE O/P EST MOD 30 MIN: CPT | Performed by: NURSE PRACTITIONER

## 2019-07-12 SDOH — HEALTH STABILITY: MENTAL HEALTH: HOW OFTEN DO YOU HAVE A DRINK CONTAINING ALCOHOL?: NEVER

## 2019-07-12 ASSESSMENT — ENCOUNTER SYMPTOMS
SUSPICIOUS LESIONS: 0
HEMOPTYSIS: 0
WEIGHT GAIN: 0
FEVER: 0
HEMATOCHEZIA: 0
WEIGHT LOSS: 0
COUGH: 0
ALLERGIC/IMMUNOLOGIC COMMENTS: NO NEW FOOD ALLERGIES
CHILLS: 0
BRUISES/BLEEDS EASILY: 0

## 2019-07-15 ENCOUNTER — OFFICE VISIT (OUTPATIENT)
Dept: HEMATOLOGY/ONCOLOGY | Facility: HOSPITAL | Age: 69
End: 2019-07-15
Attending: SPECIALIST
Payer: MEDICARE

## 2019-07-15 VITALS
TEMPERATURE: 97 F | DIASTOLIC BLOOD PRESSURE: 75 MMHG | HEART RATE: 83 BPM | OXYGEN SATURATION: 98 % | WEIGHT: 146.81 LBS | HEIGHT: 70 IN | RESPIRATION RATE: 16 BRPM | BODY MASS INDEX: 21.02 KG/M2 | SYSTOLIC BLOOD PRESSURE: 133 MMHG

## 2019-07-19 ENCOUNTER — OFFICE VISIT (OUTPATIENT)
Dept: INTERNAL MEDICINE CLINIC | Facility: CLINIC | Age: 69
End: 2019-07-19
Payer: COMMERCIAL

## 2019-07-19 VITALS
RESPIRATION RATE: 16 BRPM | HEART RATE: 62 BPM | HEIGHT: 70 IN | DIASTOLIC BLOOD PRESSURE: 80 MMHG | BODY MASS INDEX: 20.76 KG/M2 | SYSTOLIC BLOOD PRESSURE: 130 MMHG | WEIGHT: 145 LBS

## 2019-07-19 DIAGNOSIS — I10 BENIGN ESSENTIAL HTN: ICD-10-CM

## 2019-07-19 DIAGNOSIS — Z09 HOSPITAL DISCHARGE FOLLOW-UP: ICD-10-CM

## 2019-07-19 DIAGNOSIS — J96.00 ACUTE RESPIRATORY FAILURE, UNSPECIFIED WHETHER WITH HYPOXIA OR HYPERCAPNIA (HCC): ICD-10-CM

## 2019-07-19 DIAGNOSIS — I21.4 NSTEMI (NON-ST ELEVATED MYOCARDIAL INFARCTION) (HCC): Primary | ICD-10-CM

## 2019-07-19 DIAGNOSIS — Z95.1 HX OF CABG: ICD-10-CM

## 2019-07-19 DIAGNOSIS — D50.0 BLOOD LOSS ANEMIA: ICD-10-CM

## 2019-07-19 PROCEDURE — 99495 TRANSJ CARE MGMT MOD F2F 14D: CPT | Performed by: INTERNAL MEDICINE

## 2019-07-19 PROCEDURE — 1111F DSCHRG MED/CURRENT MED MERGE: CPT | Performed by: INTERNAL MEDICINE

## 2019-07-22 NOTE — PROGRESS NOTES
HPI:    Mery Cardoso is a 76year old male here today for hospital follow up.    He was discharged from Inpatient hospital, [facility] to Home   Admission Date: 7/7/19   Discharge Date: 7/9/19  Hospital Discharge Diagnoses (since 6/22/2019)     Pne skin every 6 (six) months. Omeprazole 40 MG Oral Capsule Delayed Release Take 1 capsule (40 mg total) by mouth daily.    pancrelipase, Lip-Prot-Amyl, 38317-77717 units Oral Cap DR Particles Take 1 capsule (20,000 Units total) by mouth 3 (three) times benjamin heartburn, denies diarrhea  MUSCULOSKELETAL: see above  NEURO: denies headaches, denies dizziness, denies weakness  PSYCHE: denies depression or anxiety  HEMATOLOGIC: denies hx of anemia, or bruising, denies bleeding  ENDOCRINE: denies thyroid history  ALL Consults:  None       Transitional Care Management Certification:  I certify that the following are true:  Communication with the patient was made within 2 business days of discharge on date above   Medical Decision Making- Based on service period of disch

## 2019-07-31 ENCOUNTER — TELEPHONE (OUTPATIENT)
Dept: INTERNAL MEDICINE CLINIC | Facility: CLINIC | Age: 69
End: 2019-07-31

## 2019-07-31 DIAGNOSIS — Z12.5 SCREENING FOR MALIGNANT NEOPLASM OF PROSTATE: ICD-10-CM

## 2019-07-31 DIAGNOSIS — I25.10 CORONARY ARTERY DISEASE INVOLVING NATIVE CORONARY ARTERY OF NATIVE HEART, ANGINA PRESENCE UNSPECIFIED: ICD-10-CM

## 2019-07-31 DIAGNOSIS — D63.8 ANEMIA OF CHRONIC DISEASE: Primary | ICD-10-CM

## 2019-07-31 DIAGNOSIS — E87.6 HYPOKALEMIA: ICD-10-CM

## 2019-07-31 DIAGNOSIS — D69.6 THROMBOCYTOPENIA (HCC): ICD-10-CM

## 2019-07-31 DIAGNOSIS — I10 BENIGN ESSENTIAL HTN: ICD-10-CM

## 2019-07-31 NOTE — TELEPHONE ENCOUNTER
Supervisit on   Future Appointments   Date Time Provider Tanika Monica   10/2/2019  1:15 PM Vandana uHynh MD [facility]     Orders to quest aware must fast no call back required

## 2019-08-01 ENCOUNTER — OFFICE VISIT (OUTPATIENT)
Dept: CARDIOLOGY | Age: 69
End: 2019-08-01

## 2019-08-01 VITALS
BODY MASS INDEX: 21.47 KG/M2 | DIASTOLIC BLOOD PRESSURE: 64 MMHG | HEART RATE: 72 BPM | WEIGHT: 150 LBS | HEIGHT: 70 IN | SYSTOLIC BLOOD PRESSURE: 120 MMHG

## 2019-08-01 DIAGNOSIS — I25.10 CORONARY ARTERY DISEASE INVOLVING NATIVE CORONARY ARTERY OF NATIVE HEART WITHOUT ANGINA PECTORIS: ICD-10-CM

## 2019-08-01 DIAGNOSIS — Z95.1 S/P CABG (CORONARY ARTERY BYPASS GRAFT): Primary | ICD-10-CM

## 2019-08-01 DIAGNOSIS — I49.3 PVCS (PREMATURE VENTRICULAR CONTRACTIONS): ICD-10-CM

## 2019-08-01 DIAGNOSIS — I10 BENIGN ESSENTIAL HTN: ICD-10-CM

## 2019-08-01 DIAGNOSIS — I25.5 CARDIOMYOPATHY, ISCHEMIC: ICD-10-CM

## 2019-08-01 DIAGNOSIS — I48.0 PAROXYSMAL ATRIAL FIBRILLATION (CMD): ICD-10-CM

## 2019-08-01 PROBLEM — Z85.09 HISTORY OF CHOLANGIOCARCINOMA: Status: ACTIVE | Noted: 2019-08-01

## 2019-08-01 PROCEDURE — 3078F DIAST BP <80 MM HG: CPT | Performed by: INTERNAL MEDICINE

## 2019-08-01 PROCEDURE — 99214 OFFICE O/P EST MOD 30 MIN: CPT | Performed by: INTERNAL MEDICINE

## 2019-08-01 PROCEDURE — 3074F SYST BP LT 130 MM HG: CPT | Performed by: INTERNAL MEDICINE

## 2019-08-02 ENCOUNTER — HOSPITAL ENCOUNTER (OUTPATIENT)
Dept: ULTRASOUND IMAGING | Facility: HOSPITAL | Age: 69
Discharge: HOME | End: 2019-08-02
Attending: INTERNAL MEDICINE
Payer: MEDICARE

## 2019-08-02 ENCOUNTER — OFFICE VISIT (OUTPATIENT)
Dept: INTERNAL MEDICINE CLINIC | Facility: CLINIC | Age: 69
End: 2019-08-02
Payer: COMMERCIAL

## 2019-08-02 VITALS
SYSTOLIC BLOOD PRESSURE: 126 MMHG | BODY MASS INDEX: 21.47 KG/M2 | HEART RATE: 84 BPM | TEMPERATURE: 98 F | DIASTOLIC BLOOD PRESSURE: 60 MMHG | RESPIRATION RATE: 16 BRPM | HEIGHT: 70 IN | WEIGHT: 150 LBS

## 2019-08-02 DIAGNOSIS — I70.0 AORTIC ATHEROSCLEROSIS (HCC): ICD-10-CM

## 2019-08-02 DIAGNOSIS — M79.89 LEFT LEG SWELLING: Primary | ICD-10-CM

## 2019-08-02 DIAGNOSIS — D69.6 THROMBOCYTOPENIA (HCC): ICD-10-CM

## 2019-08-02 DIAGNOSIS — M79.89 LEFT LEG SWELLING: ICD-10-CM

## 2019-08-02 PROCEDURE — 93971 EXTREMITY STUDY: CPT | Performed by: INTERNAL MEDICINE

## 2019-08-02 PROCEDURE — 99213 OFFICE O/P EST LOW 20 MIN: CPT | Performed by: INTERNAL MEDICINE

## 2019-08-02 RX ORDER — ASPIRIN 81 MG/1
81 TABLET ORAL DAILY
COMMUNITY

## 2019-08-02 RX ORDER — CEPHALEXIN 500 MG/1
500 CAPSULE ORAL 2 TIMES DAILY
Qty: 14 CAPSULE | Refills: 0 | Status: SHIPPED | OUTPATIENT
Start: 2019-08-02 | End: 2019-08-13 | Stop reason: DRUGHIGH

## 2019-08-02 NOTE — PROGRESS NOTES
Patient presents with:  Redness: Pt has redness and swelling on L lower leg. LB-rm 9      HPI:  Here for red swolern left leg just below wound from cabg vein harvesting, midly painful, denies calf swelling, notes s swollen vessel. No f/c.  He sas Dr Otilia Ledesma pheochromocytoma     Coronary artery disease involving native coronary artery of native heart     Pulmonary nodules     MATT (acute kidney injury) (HCC)     Hyperkalemia     Pneumoperitoneum     Bradycardia     Hx of CABG     Enteritis        Current Outpat erythema, left leg just below the knee ant/med calf with a fimr vessel, likely a vein with mild warmth and erythema. Non tender, no calf swelling or thigh swelling.      A/P:    Left leg swelling  (primary encounter diagnosis)  Aortic atherosclerosis (hcc)

## 2019-08-05 ENCOUNTER — TELEPHONE (OUTPATIENT)
Dept: CARDIOLOGY | Age: 69
End: 2019-08-05

## 2019-08-06 ENCOUNTER — TELEPHONE (OUTPATIENT)
Dept: CARDIOLOGY | Age: 69
End: 2019-08-06

## 2019-08-07 RX ORDER — AMIODARONE HYDROCHLORIDE 200 MG/1
200 TABLET ORAL DAILY
Qty: 30 TABLET | OUTPATIENT
Start: 2019-08-07

## 2019-08-07 NOTE — TELEPHONE ENCOUNTER
Spoke with Vivi Phan, notified that refill should come from cardiologist per AS. Pt expressed understanding. Rx denied.

## 2019-08-07 NOTE — TELEPHONE ENCOUNTER
Last Ov: 8/2/19, AS, acute  Upcoming appt: 8/12/19  Last labs: multiple labs by multiple providers  Last Rx: amiodarone 200mg, #30, 0R 7/8/29    Per Protocol, not on protocol. Rx pending, please sign if appropriate.

## 2019-08-08 ENCOUNTER — TELEPHONE (OUTPATIENT)
Dept: CARDIOLOGY | Age: 69
End: 2019-08-08

## 2019-08-08 ENCOUNTER — CARDPULM VISIT (OUTPATIENT)
Dept: CARDIAC REHAB | Facility: HOSPITAL | Age: 69
End: 2019-08-08
Attending: INTERNAL MEDICINE
Payer: MEDICARE

## 2019-08-08 VITALS
SYSTOLIC BLOOD PRESSURE: 142 MMHG | OXYGEN SATURATION: 97 % | BODY MASS INDEX: 21.22 KG/M2 | DIASTOLIC BLOOD PRESSURE: 90 MMHG | HEIGHT: 70 IN | WEIGHT: 148.19 LBS | HEART RATE: 76 BPM

## 2019-08-08 RX ORDER — AMIODARONE HYDROCHLORIDE 200 MG/1
200 TABLET ORAL DAILY
Qty: 30 TABLET | Refills: 2 | Status: SHIPPED | OUTPATIENT
Start: 2019-08-08 | End: 2019-08-16 | Stop reason: ALTCHOICE

## 2019-08-09 ENCOUNTER — HOSPITAL ENCOUNTER (OUTPATIENT)
Dept: CV DIAGNOSTICS | Facility: HOSPITAL | Age: 69
Discharge: HOME | End: 2019-08-09
Attending: INTERNAL MEDICINE
Payer: MEDICARE

## 2019-08-09 DIAGNOSIS — I25.5 CARDIOMYOPATHY, ISCHEMIC: ICD-10-CM

## 2019-08-09 PROCEDURE — 93306 TTE W/DOPPLER COMPLETE: CPT | Performed by: INTERNAL MEDICINE

## 2019-08-12 ENCOUNTER — HOSPITAL ENCOUNTER (OUTPATIENT)
Dept: CV DIAGNOSTICS | Facility: HOSPITAL | Age: 69
Discharge: HOME | End: 2019-08-12
Attending: INTERNAL MEDICINE
Payer: MEDICARE

## 2019-08-12 DIAGNOSIS — I25.5 CARDIOMYOPATHY, ISCHEMIC: ICD-10-CM

## 2019-08-12 DIAGNOSIS — Z95.1 HX OF CABG: ICD-10-CM

## 2019-08-12 DIAGNOSIS — I25.10 CORONARY ATHEROSCLEROSIS OF NATIVE CORONARY ARTERY: ICD-10-CM

## 2019-08-12 PROCEDURE — 93018 CV STRESS TEST I&R ONLY: CPT | Performed by: INTERNAL MEDICINE

## 2019-08-12 PROCEDURE — 93017 CV STRESS TEST TRACING ONLY: CPT | Performed by: INTERNAL MEDICINE

## 2019-08-13 ENCOUNTER — HOSPITAL ENCOUNTER (OUTPATIENT)
Dept: CV DIAGNOSTICS | Facility: HOSPITAL | Age: 69
Discharge: HOME | End: 2019-08-13
Attending: INTERNAL MEDICINE
Payer: MEDICARE

## 2019-08-13 ENCOUNTER — OFFICE VISIT (OUTPATIENT)
Dept: INTERNAL MEDICINE CLINIC | Facility: CLINIC | Age: 69
End: 2019-08-13
Payer: COMMERCIAL

## 2019-08-13 ENCOUNTER — TELEPHONE (OUTPATIENT)
Dept: INTERNAL MEDICINE CLINIC | Facility: CLINIC | Age: 69
End: 2019-08-13

## 2019-08-13 VITALS
SYSTOLIC BLOOD PRESSURE: 138 MMHG | BODY MASS INDEX: 21.47 KG/M2 | TEMPERATURE: 98 F | RESPIRATION RATE: 14 BRPM | HEART RATE: 68 BPM | WEIGHT: 150 LBS | DIASTOLIC BLOOD PRESSURE: 84 MMHG | HEIGHT: 70 IN

## 2019-08-13 DIAGNOSIS — I80.02 THROMBOPHLEBITIS OF SUPERFICIAL VEINS OF LEFT LOWER EXTREMITY: Primary | ICD-10-CM

## 2019-08-13 DIAGNOSIS — I48.0 PAROXYSMAL A-FIB (HCC): ICD-10-CM

## 2019-08-13 PROCEDURE — 93227 XTRNL ECG REC<48 HR R&I: CPT | Performed by: INTERNAL MEDICINE

## 2019-08-13 PROCEDURE — 93225 XTRNL ECG REC<48 HRS REC: CPT | Performed by: INTERNAL MEDICINE

## 2019-08-13 PROCEDURE — 93226 XTRNL ECG REC<48 HR SCAN A/R: CPT | Performed by: INTERNAL MEDICINE

## 2019-08-13 PROCEDURE — 99213 OFFICE O/P EST LOW 20 MIN: CPT | Performed by: INTERNAL MEDICINE

## 2019-08-13 NOTE — TELEPHONE ENCOUNTER
Patient states he went to Dr Luzmaria Davila office yesterday, Bisi on the team, she wanted to restart Keflex 500mg QID, hasn't picked up the script yet but will this am to restart medication.   Pt states he feels the area is a bit better, hardly noticeable in the am

## 2019-08-13 NOTE — PROGRESS NOTES
Patient presents with: Follow - Up: from L leg swelling. Pt states he continues to have redness and soreness. LB-rm 9      HPI:  Here for f/u of leg redness with mild swollen vein.  Pt has had for about 10 days now, improving, jeg us for dvt, completed 7 d Generalized abdominal pain     Unspecified abdominal pain     Hypokalemia     Hyperglycemia     Intractable abdominal pain     Elevated troponin     Acute respiratory failure, unspecified whether with hypoxia or hypercapnia (HCC)     NSTEMI (non-ST elevate Ht 70\"   Wt 150 lb   BMI 21.52 kg/m²   Constitutional: Oriented to person, place, and time. No distress. Cardiovascular: Normal rate, regular rhythm and intact distal pulses. No murmur, rubs or gallops.    Pulmonary/Chest: Effort normal and breath sound

## 2019-08-14 ENCOUNTER — CARDPULM VISIT (OUTPATIENT)
Dept: CARDIAC REHAB | Facility: HOSPITAL | Age: 69
End: 2019-08-14
Attending: INTERNAL MEDICINE
Payer: MEDICARE

## 2019-08-14 PROCEDURE — 93798 PHYS/QHP OP CAR RHAB W/ECG: CPT

## 2019-08-16 ENCOUNTER — CARDPULM VISIT (OUTPATIENT)
Dept: CARDIAC REHAB | Facility: HOSPITAL | Age: 69
End: 2019-08-16
Attending: INTERNAL MEDICINE
Payer: MEDICARE

## 2019-08-16 ENCOUNTER — OFFICE VISIT (OUTPATIENT)
Dept: CARDIOLOGY | Age: 69
End: 2019-08-16

## 2019-08-16 ENCOUNTER — MED REC SCAN ONLY (OUTPATIENT)
Dept: INTERNAL MEDICINE CLINIC | Facility: CLINIC | Age: 69
End: 2019-08-16

## 2019-08-16 ENCOUNTER — TELEPHONE (OUTPATIENT)
Dept: CARDIOLOGY | Age: 69
End: 2019-08-16

## 2019-08-16 VITALS
DIASTOLIC BLOOD PRESSURE: 80 MMHG | SYSTOLIC BLOOD PRESSURE: 140 MMHG | BODY MASS INDEX: 21.47 KG/M2 | WEIGHT: 150 LBS | HEIGHT: 70 IN | HEART RATE: 74 BPM

## 2019-08-16 DIAGNOSIS — I25.10 CORONARY ARTERY DISEASE INVOLVING NATIVE CORONARY ARTERY OF NATIVE HEART WITHOUT ANGINA PECTORIS: ICD-10-CM

## 2019-08-16 DIAGNOSIS — I25.5 ISCHEMIC CARDIOMYOPATHY: ICD-10-CM

## 2019-08-16 DIAGNOSIS — R00.1 BRADYCARDIA: Primary | ICD-10-CM

## 2019-08-16 PROCEDURE — 93798 PHYS/QHP OP CAR RHAB W/ECG: CPT

## 2019-08-16 PROCEDURE — 3077F SYST BP >= 140 MM HG: CPT | Performed by: INTERNAL MEDICINE

## 2019-08-16 PROCEDURE — 99215 OFFICE O/P EST HI 40 MIN: CPT | Performed by: INTERNAL MEDICINE

## 2019-08-16 PROCEDURE — 3079F DIAST BP 80-89 MM HG: CPT | Performed by: INTERNAL MEDICINE

## 2019-08-16 SDOH — HEALTH STABILITY: MENTAL HEALTH: HOW OFTEN DO YOU HAVE A DRINK CONTAINING ALCOHOL?: NEVER

## 2019-08-16 ASSESSMENT — ENCOUNTER SYMPTOMS
ALLERGIC/IMMUNOLOGIC COMMENTS: NO NEW FOOD ALLERGIES
COUGH: 0
WEIGHT GAIN: 0
WEIGHT LOSS: 0
HEMATOCHEZIA: 0
BRUISES/BLEEDS EASILY: 0
CHILLS: 0
SUSPICIOUS LESIONS: 0
FEVER: 0
HEMOPTYSIS: 0

## 2019-08-19 ENCOUNTER — CARDPULM VISIT (OUTPATIENT)
Dept: CARDIAC REHAB | Facility: HOSPITAL | Age: 69
End: 2019-08-19
Attending: INTERNAL MEDICINE
Payer: MEDICARE

## 2019-08-19 PROCEDURE — 93798 PHYS/QHP OP CAR RHAB W/ECG: CPT

## 2019-08-21 ENCOUNTER — CARDPULM VISIT (OUTPATIENT)
Dept: CARDIAC REHAB | Facility: HOSPITAL | Age: 69
End: 2019-08-21
Attending: INTERNAL MEDICINE
Payer: MEDICARE

## 2019-08-21 PROCEDURE — 93798 PHYS/QHP OP CAR RHAB W/ECG: CPT

## 2019-08-23 ENCOUNTER — CARDPULM VISIT (OUTPATIENT)
Dept: CARDIAC REHAB | Facility: HOSPITAL | Age: 69
End: 2019-08-23
Attending: INTERNAL MEDICINE
Payer: MEDICARE

## 2019-08-23 PROCEDURE — 93798 PHYS/QHP OP CAR RHAB W/ECG: CPT

## 2019-08-26 ENCOUNTER — TELEPHONE (OUTPATIENT)
Dept: INTERNAL MEDICINE CLINIC | Facility: CLINIC | Age: 69
End: 2019-08-26

## 2019-08-26 ENCOUNTER — CARDPULM VISIT (OUTPATIENT)
Dept: CARDIAC REHAB | Facility: HOSPITAL | Age: 69
End: 2019-08-26
Attending: INTERNAL MEDICINE
Payer: MEDICARE

## 2019-08-26 DIAGNOSIS — M79.89 LEFT LEG SWELLING: Primary | ICD-10-CM

## 2019-08-26 PROCEDURE — 93798 PHYS/QHP OP CAR RHAB W/ECG: CPT

## 2019-08-29 ENCOUNTER — TELEPHONE (OUTPATIENT)
Dept: CARDIOLOGY | Age: 69
End: 2019-08-29

## 2019-08-30 ENCOUNTER — HOSPITAL ENCOUNTER (OUTPATIENT)
Dept: ULTRASOUND IMAGING | Facility: HOSPITAL | Age: 69
Discharge: HOME | End: 2019-08-30
Attending: INTERNAL MEDICINE
Payer: MEDICARE

## 2019-08-30 ENCOUNTER — TELEPHONE (OUTPATIENT)
Dept: CARDIOLOGY | Age: 69
End: 2019-08-30

## 2019-08-30 ENCOUNTER — CARDPULM VISIT (OUTPATIENT)
Dept: CARDIAC REHAB | Facility: HOSPITAL | Age: 69
End: 2019-08-30
Attending: INTERNAL MEDICINE
Payer: MEDICARE

## 2019-08-30 DIAGNOSIS — M79.89 LEFT LEG SWELLING: ICD-10-CM

## 2019-08-30 PROCEDURE — 93971 EXTREMITY STUDY: CPT | Performed by: INTERNAL MEDICINE

## 2019-08-30 PROCEDURE — 93798 PHYS/QHP OP CAR RHAB W/ECG: CPT

## 2019-09-04 ENCOUNTER — CARDPULM VISIT (OUTPATIENT)
Dept: CARDIAC REHAB | Facility: HOSPITAL | Age: 69
End: 2019-09-04
Attending: INTERNAL MEDICINE
Payer: MEDICARE

## 2019-09-04 PROCEDURE — 93798 PHYS/QHP OP CAR RHAB W/ECG: CPT

## 2019-09-05 ENCOUNTER — MA CHART PREP (OUTPATIENT)
Dept: FAMILY MEDICINE CLINIC | Facility: CLINIC | Age: 69
End: 2019-09-05

## 2019-09-05 PROBLEM — M47.816 ARTHRITIS OF FACET JOINT OF LUMBAR SPINE: Status: ACTIVE | Noted: 2019-09-05

## 2019-09-11 ENCOUNTER — APPOINTMENT (OUTPATIENT)
Dept: CARDIAC REHAB | Facility: HOSPITAL | Age: 69
End: 2019-09-11
Attending: INTERNAL MEDICINE
Payer: MEDICARE

## 2019-09-13 ENCOUNTER — APPOINTMENT (OUTPATIENT)
Dept: CARDIAC REHAB | Facility: HOSPITAL | Age: 69
End: 2019-09-13
Attending: INTERNAL MEDICINE
Payer: MEDICARE

## 2019-09-18 ENCOUNTER — HOSPITAL ENCOUNTER (OUTPATIENT)
Dept: CT IMAGING | Facility: HOSPITAL | Age: 69
Discharge: HOME | End: 2019-09-18
Attending: SPECIALIST
Payer: MEDICARE

## 2019-09-18 ENCOUNTER — APPOINTMENT (OUTPATIENT)
Dept: CARDIAC REHAB | Facility: HOSPITAL | Age: 69
End: 2019-09-18
Attending: INTERNAL MEDICINE
Payer: MEDICARE

## 2019-09-18 DIAGNOSIS — C22.1 CHOLANGIOCARCINOMA OF BILIARY TRACT (HCC): ICD-10-CM

## 2019-09-18 DIAGNOSIS — C77.2 SECONDARY MALIGNANT NEOPLASM OF INTRA-ABDOMINAL LYMPH NODES (HCC): ICD-10-CM

## 2019-09-18 LAB — CREAT BLD-MCNC: 1.2 MG/DL (ref 0.7–1.3)

## 2019-09-18 PROCEDURE — 82565 ASSAY OF CREATININE: CPT

## 2019-09-18 PROCEDURE — 71260 CT THORAX DX C+: CPT | Performed by: SPECIALIST

## 2019-09-18 PROCEDURE — 74177 CT ABD & PELVIS W/CONTRAST: CPT | Performed by: SPECIALIST

## 2019-09-19 ENCOUNTER — OFFICE VISIT (OUTPATIENT)
Dept: CARDIOLOGY | Age: 69
End: 2019-09-19

## 2019-09-19 VITALS
HEART RATE: 58 BPM | DIASTOLIC BLOOD PRESSURE: 70 MMHG | SYSTOLIC BLOOD PRESSURE: 130 MMHG | WEIGHT: 150 LBS | HEIGHT: 70 IN | BODY MASS INDEX: 21.47 KG/M2

## 2019-09-19 DIAGNOSIS — I25.5 CARDIOMYOPATHY, ISCHEMIC: ICD-10-CM

## 2019-09-19 DIAGNOSIS — I48.0 PAROXYSMAL ATRIAL FIBRILLATION (CMD): Primary | ICD-10-CM

## 2019-09-19 DIAGNOSIS — I10 BENIGN ESSENTIAL HTN: ICD-10-CM

## 2019-09-19 DIAGNOSIS — I49.3 PVCS (PREMATURE VENTRICULAR CONTRACTIONS): ICD-10-CM

## 2019-09-19 PROCEDURE — 3075F SYST BP GE 130 - 139MM HG: CPT | Performed by: NURSE PRACTITIONER

## 2019-09-19 PROCEDURE — 3078F DIAST BP <80 MM HG: CPT | Performed by: NURSE PRACTITIONER

## 2019-09-19 PROCEDURE — 99214 OFFICE O/P EST MOD 30 MIN: CPT | Performed by: NURSE PRACTITIONER

## 2019-09-19 RX ORDER — METOPROLOL SUCCINATE 25 MG/1
0.5 TABLET, EXTENDED RELEASE ORAL DAILY
COMMUNITY
End: 2020-04-30 | Stop reason: DRUGHIGH

## 2019-09-19 ASSESSMENT — ENCOUNTER SYMPTOMS
CHILLS: 0
SUSPICIOUS LESIONS: 0
WEIGHT GAIN: 0
HEMOPTYSIS: 0
HEMATOCHEZIA: 0
FEVER: 0
COUGH: 0
BRUISES/BLEEDS EASILY: 0
ALLERGIC/IMMUNOLOGIC COMMENTS: NO NEW FOOD ALLERGIES
WEIGHT LOSS: 0

## 2019-09-20 ENCOUNTER — OFFICE VISIT (OUTPATIENT)
Dept: HEMATOLOGY/ONCOLOGY | Facility: HOSPITAL | Age: 69
End: 2019-09-20
Attending: SPECIALIST
Payer: MEDICARE

## 2019-09-20 ENCOUNTER — APPOINTMENT (OUTPATIENT)
Dept: CARDIAC REHAB | Facility: HOSPITAL | Age: 69
End: 2019-09-20
Attending: INTERNAL MEDICINE
Payer: MEDICARE

## 2019-09-20 VITALS
RESPIRATION RATE: 12 BRPM | DIASTOLIC BLOOD PRESSURE: 87 MMHG | OXYGEN SATURATION: 99 % | HEART RATE: 72 BPM | WEIGHT: 148.19 LBS | HEIGHT: 70 IN | TEMPERATURE: 97 F | SYSTOLIC BLOOD PRESSURE: 148 MMHG | BODY MASS INDEX: 21.22 KG/M2

## 2019-09-20 DIAGNOSIS — C22.1 CHOLANGIOCARCINOMA OF BILIARY TRACT (HCC): Primary | ICD-10-CM

## 2019-09-20 PROCEDURE — 99214 OFFICE O/P EST MOD 30 MIN: CPT | Performed by: SPECIALIST

## 2019-09-20 NOTE — PROGRESS NOTES
[facility] Progress Note      Patient Name: Winifred Villalta   YOB: 1950  Medical Record Number: [id number]  Attending Physician: Pat Whiteside. Bird Bettencourt M.D.      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 On 10/13/2016 patient began adjuvant chemotherapy with gemcitabine and capecitabine. Cycle 1 was uncomplicated.  Cyc total) by mouth nightly. Disp: 30 tablet Rfl: 3   lisinopril 5 MG Oral Tab Take 1 tablet (5 mg total) by mouth daily. Disp: 30 tablet Rfl: 3   Denosumab 60 MG/ML Subcutaneous Solution Prefilled Syringe Inject 60 mg into the skin every 6 (six) months.  Disp: appropriate.     Laboratory  Recent Results (from the past 24 hour(s))   CARBOHYDRATE ANTIGEN 19-9    Collection Time: 09/20/19  9:56 AM   Result Value Ref Range    Carbohydrate Ag 19-9 14.3 <=37.0 U/mL     Radiology  09/18/2019:  CT chest, abdomen, pelvis endplate of Y18 and superior endplates of L1 and L2. No osteolytic or osteoblastic lesion. CONCLUSION:  1. Resolved right pleural effusion and decreased left pleural effusion. 2.  Mild interstitial opacity in lateral basilar segment of right lower lobe assoc

## 2019-09-20 NOTE — PROGRESS NOTES
Patient is here today for follow up with Charissa Aguero for Cholangiocarcinoma. Patient denies pain. Stated he is feeling good. Appetite is good. Has hard time gaining weight. Medication list and medical history were reviewed and updated.      Education Record

## 2019-09-23 ENCOUNTER — CARDPULM VISIT (OUTPATIENT)
Dept: CARDIAC REHAB | Facility: HOSPITAL | Age: 69
End: 2019-09-23
Attending: INTERNAL MEDICINE
Payer: MEDICARE

## 2019-09-23 PROCEDURE — 93798 PHYS/QHP OP CAR RHAB W/ECG: CPT

## 2019-09-25 ENCOUNTER — CARDPULM VISIT (OUTPATIENT)
Dept: CARDIAC REHAB | Facility: HOSPITAL | Age: 69
End: 2019-09-25
Attending: INTERNAL MEDICINE
Payer: MEDICARE

## 2019-09-25 PROCEDURE — 93798 PHYS/QHP OP CAR RHAB W/ECG: CPT

## 2019-09-27 ENCOUNTER — CARDPULM VISIT (OUTPATIENT)
Dept: CARDIAC REHAB | Facility: HOSPITAL | Age: 69
End: 2019-09-27
Attending: INTERNAL MEDICINE
Payer: MEDICARE

## 2019-09-27 PROCEDURE — 93798 PHYS/QHP OP CAR RHAB W/ECG: CPT

## 2019-09-30 ENCOUNTER — CARDPULM VISIT (OUTPATIENT)
Dept: CARDIAC REHAB | Facility: HOSPITAL | Age: 69
End: 2019-09-30
Attending: INTERNAL MEDICINE
Payer: MEDICARE

## 2019-09-30 PROCEDURE — 93798 PHYS/QHP OP CAR RHAB W/ECG: CPT

## 2019-10-02 ENCOUNTER — CARDPULM VISIT (OUTPATIENT)
Dept: CARDIAC REHAB | Facility: HOSPITAL | Age: 69
End: 2019-10-02
Attending: INTERNAL MEDICINE
Payer: MEDICARE

## 2019-10-02 PROCEDURE — 93798 PHYS/QHP OP CAR RHAB W/ECG: CPT

## 2019-10-03 ENCOUNTER — CARDPULM VISIT (OUTPATIENT)
Dept: CARDIAC REHAB | Facility: HOSPITAL | Age: 69
End: 2019-10-03
Attending: INTERNAL MEDICINE
Payer: MEDICARE

## 2019-10-03 PROCEDURE — 93798 PHYS/QHP OP CAR RHAB W/ECG: CPT

## 2019-10-04 ENCOUNTER — CARDPULM VISIT (OUTPATIENT)
Dept: CARDIAC REHAB | Facility: HOSPITAL | Age: 69
End: 2019-10-04
Attending: INTERNAL MEDICINE
Payer: MEDICARE

## 2019-10-04 ENCOUNTER — APPOINTMENT (OUTPATIENT)
Dept: CARDIOLOGY | Age: 69
End: 2019-10-04

## 2019-10-04 PROCEDURE — 93798 PHYS/QHP OP CAR RHAB W/ECG: CPT

## 2019-10-07 ENCOUNTER — CARDPULM VISIT (OUTPATIENT)
Dept: CARDIAC REHAB | Facility: HOSPITAL | Age: 69
End: 2019-10-07
Attending: INTERNAL MEDICINE
Payer: MEDICARE

## 2019-10-07 PROCEDURE — 93798 PHYS/QHP OP CAR RHAB W/ECG: CPT

## 2019-10-09 ENCOUNTER — CARDPULM VISIT (OUTPATIENT)
Dept: CARDIAC REHAB | Facility: HOSPITAL | Age: 69
End: 2019-10-09
Attending: INTERNAL MEDICINE
Payer: MEDICARE

## 2019-10-09 PROCEDURE — 93798 PHYS/QHP OP CAR RHAB W/ECG: CPT

## 2019-10-10 ENCOUNTER — APPOINTMENT (OUTPATIENT)
Dept: LAB | Age: 69
End: 2019-10-10
Attending: INTERNAL MEDICINE
Payer: MEDICARE

## 2019-10-10 ENCOUNTER — TELEPHONE (OUTPATIENT)
Dept: INTERNAL MEDICINE CLINIC | Facility: CLINIC | Age: 69
End: 2019-10-10

## 2019-10-10 ENCOUNTER — OFFICE VISIT (OUTPATIENT)
Dept: INTERNAL MEDICINE CLINIC | Facility: CLINIC | Age: 69
End: 2019-10-10
Payer: COMMERCIAL

## 2019-10-10 ENCOUNTER — APPOINTMENT (OUTPATIENT)
Dept: CARDIOLOGY | Age: 69
End: 2019-10-10

## 2019-10-10 VITALS
RESPIRATION RATE: 14 BRPM | BODY MASS INDEX: 21.33 KG/M2 | HEIGHT: 70 IN | OXYGEN SATURATION: 92 % | TEMPERATURE: 98 F | DIASTOLIC BLOOD PRESSURE: 80 MMHG | HEART RATE: 88 BPM | SYSTOLIC BLOOD PRESSURE: 122 MMHG | WEIGHT: 149 LBS

## 2019-10-10 DIAGNOSIS — R73.9 HYPERGLYCEMIA: ICD-10-CM

## 2019-10-10 DIAGNOSIS — M81.8 OTHER OSTEOPOROSIS WITHOUT CURRENT PATHOLOGICAL FRACTURE: ICD-10-CM

## 2019-10-10 DIAGNOSIS — Z86.018 HISTORY OF PHEOCHROMOCYTOMA: ICD-10-CM

## 2019-10-10 DIAGNOSIS — I25.10 CORONARY ARTERY DISEASE INVOLVING NATIVE CORONARY ARTERY OF NATIVE HEART, ANGINA PRESENCE UNSPECIFIED: ICD-10-CM

## 2019-10-10 DIAGNOSIS — D63.8 ANEMIA OF CHRONIC DISEASE: ICD-10-CM

## 2019-10-10 DIAGNOSIS — C22.1 CHOLANGIOCARCINOMA (HCC): ICD-10-CM

## 2019-10-10 DIAGNOSIS — Z85.09 HISTORY OF CHOLANGIOCARCINOMA: ICD-10-CM

## 2019-10-10 DIAGNOSIS — D64.9 NORMOCYTIC NORMOCHROMIC ANEMIA: ICD-10-CM

## 2019-10-10 DIAGNOSIS — S22.000A CLOSED COMPRESSION FRACTURE OF THORACIC VERTEBRA, INITIAL ENCOUNTER (HCC): ICD-10-CM

## 2019-10-10 DIAGNOSIS — I70.0 AORTIC ATHEROSCLEROSIS (HCC): ICD-10-CM

## 2019-10-10 DIAGNOSIS — Z95.1 HX OF CABG: ICD-10-CM

## 2019-10-10 DIAGNOSIS — C22.1 CHOLANGIOCARCINOMA METASTATIC TO LYMPH NODE (HCC): ICD-10-CM

## 2019-10-10 DIAGNOSIS — M47.816 ARTHRITIS OF FACET JOINT OF LUMBAR SPINE: ICD-10-CM

## 2019-10-10 DIAGNOSIS — D69.6 THROMBOCYTOPENIA (HCC): ICD-10-CM

## 2019-10-10 DIAGNOSIS — H61.23 BILATERAL IMPACTED CERUMEN: ICD-10-CM

## 2019-10-10 DIAGNOSIS — Z00.00 ENCOUNTER FOR ANNUAL HEALTH EXAMINATION: Primary | ICD-10-CM

## 2019-10-10 DIAGNOSIS — S32.010A CLOSED COMPRESSION FRACTURE OF FIRST LUMBAR VERTEBRA, INITIAL ENCOUNTER: ICD-10-CM

## 2019-10-10 DIAGNOSIS — D17.79 MYELOLIPOMA OF LEFT ADRENAL GLAND: ICD-10-CM

## 2019-10-10 DIAGNOSIS — D36.15: ICD-10-CM

## 2019-10-10 DIAGNOSIS — K86.81 EXOCRINE PANCREATIC INSUFFICIENCY: ICD-10-CM

## 2019-10-10 DIAGNOSIS — I10 BENIGN ESSENTIAL HTN: ICD-10-CM

## 2019-10-10 DIAGNOSIS — R91.8 PULMONARY NODULES: ICD-10-CM

## 2019-10-10 DIAGNOSIS — R16.0 LIVER MASS, LEFT LOBE: ICD-10-CM

## 2019-10-10 DIAGNOSIS — C77.9 CHOLANGIOCARCINOMA METASTATIC TO LYMPH NODE (HCC): ICD-10-CM

## 2019-10-10 PROBLEM — E87.6 HYPOKALEMIA: Status: RESOLVED | Noted: 2019-04-26 | Resolved: 2019-10-10

## 2019-10-10 PROBLEM — K66.8 PNEUMOPERITONEUM: Status: RESOLVED | Noted: 2019-07-07 | Resolved: 2019-10-10

## 2019-10-10 PROBLEM — R00.1 BRADYCARDIA: Status: RESOLVED | Noted: 2019-07-07 | Resolved: 2019-10-10

## 2019-10-10 PROBLEM — J96.00 ACUTE RESPIRATORY FAILURE, UNSPECIFIED WHETHER WITH HYPOXIA OR HYPERCAPNIA (HCC): Status: RESOLVED | Noted: 2019-06-24 | Resolved: 2019-10-10

## 2019-10-10 PROCEDURE — 99397 PER PM REEVAL EST PAT 65+ YR: CPT | Performed by: INTERNAL MEDICINE

## 2019-10-10 PROCEDURE — 96160 PT-FOCUSED HLTH RISK ASSMT: CPT | Performed by: INTERNAL MEDICINE

## 2019-10-10 PROCEDURE — G0008 ADMIN INFLUENZA VIRUS VAC: HCPCS | Performed by: INTERNAL MEDICINE

## 2019-10-10 PROCEDURE — G0439 PPPS, SUBSEQ VISIT: HCPCS | Performed by: INTERNAL MEDICINE

## 2019-10-10 PROCEDURE — 90471 IMMUNIZATION ADMIN: CPT | Performed by: INTERNAL MEDICINE

## 2019-10-10 PROCEDURE — 90715 TDAP VACCINE 7 YRS/> IM: CPT | Performed by: INTERNAL MEDICINE

## 2019-10-10 PROCEDURE — 90662 IIV NO PRSV INCREASED AG IM: CPT | Performed by: INTERNAL MEDICINE

## 2019-10-10 PROCEDURE — 69210 REMOVE IMPACTED EAR WAX UNI: CPT | Performed by: INTERNAL MEDICINE

## 2019-10-10 NOTE — PROGRESS NOTES
HPI:   Gilbert Simpson is a 76year old male who presents for a MA (Medicare Advantage) [address] [place] [address] (Once per calendar year). Here for supervisit.    His last annual assessment has been over 1 year: Annual Physical due on 04/30/2019         Fall/Risk Ass (GASTROENTEROLOGY)  Jasmin Pulliam MD (ENDOCRINOLOGY)  Jean Couch MD (Surgical Oncology)  Glenys Corey MD (Radiation Oncology)  [facility])  Ema Copeland MD (GASTROENTEROLOGY)    Patient Active Problem List:     Anemia Take 81 mg by mouth daily. atorvastatin 10 MG Oral Tab Take 1 tablet (10 mg total) by mouth nightly. lisinopril 5 MG Oral Tab Take 1 tablet (5 mg total) by mouth daily.    Denosumab 60 MG/ML Subcutaneous Solution Prefilled Syringe Inject 60 mg into the in his father and another family member. SOCIAL HISTORY:   He  reports that he quit smoking about 3 months ago. His smoking use included cigarettes. He has a 20.00 pack-year smoking history.  He has never used smokeless tobacco. He reports that he does no and symmetric   Skin: Skin color, texture, turgor normal, no rashes or lesions   Lymph nodes: Cervical, supraclavicular, and axillary nodes normal   Neurologic: Normal   Cerumen Removal Procedure  Patient gave verbal consent.   Risks and Benefits of removal osteoporosis without current pathological fracture  -     XR DEXA BONE DENSITOMETRY (CPT=77080);  Future  -     VITAMIN D, 25-HYDROXY  Stable rpt dexa, prolia in 12/19  Closed compression fracture of first lumbar vertebra, initial encounter (HCC)  -     XR (mg/dL)   Date Value   07/09/2019 95       Cardiovascular Disease Screening     LDL Annually LDL Cholesterol (mg/dL)   Date Value   06/25/2019 61        EKG - w/ Initial Preventative Physical Exam only, or if medically necessary Electrocardiogram date07/07 or Procedure      Annual Monitoring of Persistent     Medications (ACE/ARB, digoxin diuretics, anticonvulsants.)    Potassium  Annually Potassium (mmol/L)   Date Value   07/09/2019 4.2    No flowsheet data found.     Creatinine  Annually Creatinine (mg/dL)

## 2019-10-10 NOTE — PATIENT INSTRUCTIONS
Navid Bryan's SCREENING SCHEDULE   Tests on this list are recommended by your physician but may not be covered, or covered at this frequency, by your insurer. Please check with your insurance carrier before scheduling to verify coverage.     PREVENTATI more often if abnormal Colonoscopy due on 12/13/2016 Update [facility] if applicable    Flex Sigmoidoscopy Screen  Covered every 5 years No results found for this or any previous visit. No flowsheet data found.      Fecal Occult Blood   Covered Tammie with your prescription benefits, but Medicare does not cover unless Medically needed    Zoster (Not covered by Medicare Part B) No orders found for this or any previous visit.  This may be covered with your pharmacy  prescription benefits     Recommended We

## 2019-10-10 NOTE — TELEPHONE ENCOUNTER
Noman Pollard MD  [facility] Clinical Staff             Pt did cologuard in march through a dr in [place]. Can we gil this down? ? Also did stool cards for [facility] about amonth ago. Please call pt and help find this result.

## 2019-10-11 ENCOUNTER — CARDPULM VISIT (OUTPATIENT)
Dept: CARDIAC REHAB | Facility: HOSPITAL | Age: 69
End: 2019-10-11
Attending: INTERNAL MEDICINE
Payer: MEDICARE

## 2019-10-11 DIAGNOSIS — R74.8 ELEVATED ALKALINE PHOSPHATASE LEVEL: Primary | ICD-10-CM

## 2019-10-11 DIAGNOSIS — E55.9 VITAMIN D DEFICIENCY: Primary | ICD-10-CM

## 2019-10-11 DIAGNOSIS — E87.1 HYPONATREMIA: ICD-10-CM

## 2019-10-11 PROCEDURE — 93798 PHYS/QHP OP CAR RHAB W/ECG: CPT

## 2019-10-11 RX ORDER — ERGOCALCIFEROL 1.25 MG/1
50000 CAPSULE ORAL WEEKLY
Qty: 12 CAPSULE | Refills: 0 | Status: SHIPPED | OUTPATIENT
Start: 2019-10-11 | End: 2020-04-29

## 2019-10-14 ENCOUNTER — TELEPHONE (OUTPATIENT)
Dept: CARDIOLOGY | Age: 69
End: 2019-10-14

## 2019-10-14 ENCOUNTER — CARDPULM VISIT (OUTPATIENT)
Dept: CARDIAC REHAB | Facility: HOSPITAL | Age: 69
End: 2019-10-14
Attending: INTERNAL MEDICINE
Payer: MEDICARE

## 2019-10-14 ENCOUNTER — LAB ENCOUNTER (OUTPATIENT)
Dept: LAB | Age: 69
End: 2019-10-14
Attending: INTERNAL MEDICINE
Payer: MEDICARE

## 2019-10-14 DIAGNOSIS — R74.8 ELEVATED ALKALINE PHOSPHATASE LEVEL: ICD-10-CM

## 2019-10-14 DIAGNOSIS — E87.1 HYPONATREMIA: ICD-10-CM

## 2019-10-14 PROCEDURE — 85025 COMPLETE CBC W/AUTO DIFF WBC: CPT

## 2019-10-14 PROCEDURE — 93798 PHYS/QHP OP CAR RHAB W/ECG: CPT

## 2019-10-14 PROCEDURE — 83036 HEMOGLOBIN GLYCOSYLATED A1C: CPT

## 2019-10-15 ENCOUNTER — TELEPHONE (OUTPATIENT)
Dept: INTERNAL MEDICINE CLINIC | Facility: CLINIC | Age: 69
End: 2019-10-15

## 2019-10-15 NOTE — TELEPHONE ENCOUNTER
Patient notified a1c is increasing likely from pancreatic insufficiency, notified will need an appt with the [facility] for evaluation due to complicated mgmt with surgical hx.  Pt verbalizes understanding.

## 2019-10-15 NOTE — TELEPHONE ENCOUNTER
Aguila Ramirez, See result notes. AS would like pt to be scheduled with [facility]. Pt is aware. Please call pt to schedule with DM APN's. Thank you.

## 2019-10-16 ENCOUNTER — CARDPULM VISIT (OUTPATIENT)
Dept: CARDIAC REHAB | Facility: HOSPITAL | Age: 69
End: 2019-10-16
Attending: INTERNAL MEDICINE
Payer: MEDICARE

## 2019-10-16 PROCEDURE — 93798 PHYS/QHP OP CAR RHAB W/ECG: CPT

## 2019-10-18 ENCOUNTER — TELEPHONE (OUTPATIENT)
Dept: CARDIOLOGY | Age: 69
End: 2019-10-18

## 2019-10-18 ENCOUNTER — CARDPULM VISIT (OUTPATIENT)
Dept: CARDIAC REHAB | Facility: HOSPITAL | Age: 69
End: 2019-10-18
Attending: INTERNAL MEDICINE
Payer: MEDICARE

## 2019-10-18 ENCOUNTER — HOSPITAL ENCOUNTER (EMERGENCY)
Facility: HOSPITAL | Age: 69
Discharge: HOME | End: 2019-10-18
Attending: EMERGENCY MEDICINE
Payer: OTHER GOVERNMENT

## 2019-10-18 VITALS
OXYGEN SATURATION: 95 % | BODY MASS INDEX: 20.76 KG/M2 | HEART RATE: 92 BPM | DIASTOLIC BLOOD PRESSURE: 79 MMHG | TEMPERATURE: 98 F | WEIGHT: 145 LBS | RESPIRATION RATE: 18 BRPM | SYSTOLIC BLOOD PRESSURE: 131 MMHG | HEIGHT: 70 IN

## 2019-10-18 DIAGNOSIS — E87.1 HYPONATREMIA: ICD-10-CM

## 2019-10-18 DIAGNOSIS — R55 SYNCOPE, VASOVAGAL: Primary | ICD-10-CM

## 2019-10-18 PROCEDURE — 85025 COMPLETE CBC W/AUTO DIFF WBC: CPT | Performed by: EMERGENCY MEDICINE

## 2019-10-18 PROCEDURE — 93005 ELECTROCARDIOGRAM TRACING: CPT

## 2019-10-18 PROCEDURE — 82962 GLUCOSE BLOOD TEST: CPT

## 2019-10-18 PROCEDURE — 93010 ELECTROCARDIOGRAM REPORT: CPT

## 2019-10-18 PROCEDURE — 80053 COMPREHEN METABOLIC PANEL: CPT | Performed by: EMERGENCY MEDICINE

## 2019-10-18 PROCEDURE — 93798 PHYS/QHP OP CAR RHAB W/ECG: CPT

## 2019-10-18 PROCEDURE — 36415 COLL VENOUS BLD VENIPUNCTURE: CPT

## 2019-10-18 PROCEDURE — 99284 EMERGENCY DEPT VISIT MOD MDM: CPT

## 2019-10-18 NOTE — ED PROVIDER NOTES
Patient Seen in: [facility] Emergency Department      History   Patient presents with:  Syncope (cardiovascular, neurologic)    Stated Complaint:     HPI    43-year-old male sent from cardiac rehab after a syncopal episode.   The patient reports a pr left     Dx in 6/2016: 1.7 cm mass near inferior adrenal gland   • Pneumoperitoneum 7/7/2019   • Tobacco abuse               Past Surgical History:   Procedure Laterality Date   • ANGIOGRAM  06/24/2019   • BYPASS SURGERY  06/27/2019    CABG x 3   • CABG Drug use: No             Review of Systems    Positive for stated complaint:   Other systems are as noted in HPI. Constitutional and vital signs reviewed. All other systems reviewed and negative except as noted above.     Physical Exam     ED Triage V Status                     ---------                               -----------         ------                     CBC W/ DIFFERENTIAL[[id number]]          Abnormal            Final result                 Please view results for these tests on the olivia

## 2019-10-18 NOTE — CARDIAC REHAB
The patient suddenly became weak, pale, lightheaded, nauseated & SOB with complaints of severe abdominal cramping pain during cool down exercise of Phase2 Outpatient cardiac rehab class, BP 70/40, place supine in recliner.  EKG Sinus Rhythm HR 90's  BP 82/4

## 2019-10-18 NOTE — ED INITIAL ASSESSMENT (HPI)
Pt her after syncopal event in cardiac rehab. Pt was hypotensive and recovered with o2 and juice. Pt admits to not eating much today.

## 2019-10-21 ENCOUNTER — APPOINTMENT (OUTPATIENT)
Dept: LAB | Facility: HOSPITAL | Age: 69
End: 2019-10-21
Attending: INTERNAL MEDICINE
Payer: MEDICARE

## 2019-10-21 ENCOUNTER — HOSPITAL ENCOUNTER (OUTPATIENT)
Dept: GENERAL RADIOLOGY | Facility: HOSPITAL | Age: 69
Discharge: HOME | End: 2019-10-21
Attending: INTERNAL MEDICINE
Payer: MEDICARE

## 2019-10-21 ENCOUNTER — APPOINTMENT (OUTPATIENT)
Dept: CARDIOLOGY | Age: 69
End: 2019-10-21

## 2019-10-21 ENCOUNTER — APPOINTMENT (OUTPATIENT)
Dept: CARDIAC REHAB | Facility: HOSPITAL | Age: 69
End: 2019-10-21
Payer: MEDICARE

## 2019-10-21 ENCOUNTER — TELEPHONE (OUTPATIENT)
Dept: HEMATOLOGY/ONCOLOGY | Facility: HOSPITAL | Age: 69
End: 2019-10-21

## 2019-10-21 ENCOUNTER — OFFICE VISIT (OUTPATIENT)
Dept: INTERNAL MEDICINE CLINIC | Facility: CLINIC | Age: 69
End: 2019-10-21
Payer: COMMERCIAL

## 2019-10-21 ENCOUNTER — TELEPHONE (OUTPATIENT)
Dept: INTERNAL MEDICINE CLINIC | Facility: CLINIC | Age: 69
End: 2019-10-21

## 2019-10-21 VITALS
TEMPERATURE: 98 F | BODY MASS INDEX: 20.9 KG/M2 | SYSTOLIC BLOOD PRESSURE: 128 MMHG | RESPIRATION RATE: 16 BRPM | HEART RATE: 92 BPM | DIASTOLIC BLOOD PRESSURE: 82 MMHG | WEIGHT: 146 LBS | OXYGEN SATURATION: 96 % | HEIGHT: 70 IN

## 2019-10-21 DIAGNOSIS — I25.10 CORONARY ARTERY DISEASE INVOLVING NATIVE CORONARY ARTERY OF NATIVE HEART, ANGINA PRESENCE UNSPECIFIED: ICD-10-CM

## 2019-10-21 DIAGNOSIS — Z85.09 HISTORY OF CHOLANGIOCARCINOMA: Primary | ICD-10-CM

## 2019-10-21 DIAGNOSIS — R10.84 GENERALIZED ABDOMINAL PAIN: ICD-10-CM

## 2019-10-21 DIAGNOSIS — Z95.1 HX OF CABG: ICD-10-CM

## 2019-10-21 DIAGNOSIS — E87.1 HYPONATREMIA: ICD-10-CM

## 2019-10-21 PROCEDURE — 99214 OFFICE O/P EST MOD 30 MIN: CPT | Performed by: INTERNAL MEDICINE

## 2019-10-21 PROCEDURE — 83690 ASSAY OF LIPASE: CPT | Performed by: INTERNAL MEDICINE

## 2019-10-21 PROCEDURE — 80053 COMPREHEN METABOLIC PANEL: CPT | Performed by: INTERNAL MEDICINE

## 2019-10-21 PROCEDURE — 36415 COLL VENOUS BLD VENIPUNCTURE: CPT | Performed by: INTERNAL MEDICINE

## 2019-10-21 PROCEDURE — 82150 ASSAY OF AMYLASE: CPT | Performed by: INTERNAL MEDICINE

## 2019-10-21 PROCEDURE — 74018 RADEX ABDOMEN 1 VIEW: CPT | Performed by: INTERNAL MEDICINE

## 2019-10-21 NOTE — TELEPHONE ENCOUNTER
Return call - spoke to patient spouse - patient is currently at Dr Latisha Marie office. Instructed to call our office or 's office as needed. Noted having Abdominal XRay today.

## 2019-10-21 NOTE — PATIENT INSTRUCTIONS
Xray and labs today  I sent a note to dr Tomi Leonardo and Lilian Aguirre to update them, they may call you to be seen  Please call dr Tomi Leonardo for a follow up appointment to look at your abdomen and your hernia.   Check sugars twice daily for the next week   Follow up with

## 2019-10-21 NOTE — PROGRESS NOTES
Patient presents with:  ER F/U: RG rm 9 f/u from ER, stomach issues      HPI:  Here for ER f/u from vasovagal syncope due to abd pain, hyponatermia. Had labs, no maging done there. Pt continues to have inermittent abd pain, severe at times. No n/v/d.  Belch left lobe     Osteoporosis     Closed compression fracture of L1 vertebra (HCC)     Closed compression fracture of thoracic vertebra (HCC)     Aortic atherosclerosis (HCC)     Exocrine pancreatic insufficiency     Hyperglycemia     Benign essential HTN Normal carotid pulses. No masses. Cardiovascular: Normal rate, regular rhythm and intact distal pulses. No murmur, rubs or gallops. Pulmonary/Chest: Effort normal and breath sounds normal. No respiratory distress. Abdominal: Soft.  Bowel sounds are nor

## 2019-10-21 NOTE — TELEPHONE ENCOUNTER
Vishnu Wetzel MD  [facility] Clinical Staff          Previous Messages      ----- Message -----   From: Concepcion Kaba MD   Sent: 10/21/2019   3:38 PM CDT   To: Ventura Marie MD     Let me know.  Happy to see but may be GI??   ----- Message -----   From:

## 2019-10-22 ENCOUNTER — HOSPITAL ENCOUNTER (OUTPATIENT)
Dept: BONE DENSITY | Age: 69
Discharge: HOME | End: 2019-10-22
Attending: INTERNAL MEDICINE
Payer: MEDICARE

## 2019-10-22 ENCOUNTER — TELEPHONE (OUTPATIENT)
Dept: INTERNAL MEDICINE CLINIC | Facility: CLINIC | Age: 69
End: 2019-10-22

## 2019-10-22 DIAGNOSIS — M81.8 OTHER OSTEOPOROSIS WITHOUT CURRENT PATHOLOGICAL FRACTURE: ICD-10-CM

## 2019-10-22 DIAGNOSIS — S22.000A CLOSED COMPRESSION FRACTURE OF THORACIC VERTEBRA, INITIAL ENCOUNTER (HCC): ICD-10-CM

## 2019-10-22 DIAGNOSIS — S32.010A CLOSED COMPRESSION FRACTURE OF FIRST LUMBAR VERTEBRA, INITIAL ENCOUNTER: ICD-10-CM

## 2019-10-22 PROCEDURE — 77080 DXA BONE DENSITY AXIAL: CPT | Performed by: INTERNAL MEDICINE

## 2019-10-22 NOTE — TELEPHONE ENCOUNTER
Message   Received: Yesterday   Message Contents   Cesia Flores MD  [facility] Clinical Staff          Previous Messages      ----- Message -----   From: Melissa Bond MD   Sent: 10/21/2019   4:45 PM CDT   To: Felipe Howard MD     KURONEN shows a

## 2019-10-23 ENCOUNTER — APPOINTMENT (OUTPATIENT)
Dept: CARDIAC REHAB | Facility: HOSPITAL | Age: 69
End: 2019-10-23
Attending: INTERNAL MEDICINE
Payer: MEDICARE

## 2019-10-24 ENCOUNTER — TELEPHONE (OUTPATIENT)
Dept: INTERNAL MEDICINE CLINIC | Facility: CLINIC | Age: 69
End: 2019-10-24

## 2019-10-24 ENCOUNTER — OFFICE VISIT (OUTPATIENT)
Dept: NEPHROLOGY | Facility: CLINIC | Age: 69
End: 2019-10-24
Payer: COMMERCIAL

## 2019-10-24 VITALS — DIASTOLIC BLOOD PRESSURE: 74 MMHG | BODY MASS INDEX: 21 KG/M2 | SYSTOLIC BLOOD PRESSURE: 122 MMHG | WEIGHT: 149 LBS

## 2019-10-24 DIAGNOSIS — E87.1 HYPONATREMIA: Primary | ICD-10-CM

## 2019-10-24 PROCEDURE — 99214 OFFICE O/P EST MOD 30 MIN: CPT | Performed by: INTERNAL MEDICINE

## 2019-10-24 RX ORDER — FUROSEMIDE 20 MG/1
10 TABLET ORAL DAILY
Qty: 20 TABLET | Refills: 5 | Status: SHIPPED | OUTPATIENT
Start: 2019-10-24 | End: 2019-11-18

## 2019-10-24 NOTE — PROGRESS NOTES
Nephrology Progress Note      Jak Larson is a 76year old male. HPI:   Patient presents with:  Hyponatremia      Mr. Madison Freedman was seen in the nephrology clinic today in follow-up for management of hyponatremia.   Our service has previously seen him in t Laterality Date   • ANGIOGRAM  06/24/2019   • BYPASS SURGERY  06/27/2019    CABG x 3   • CABG     • COLONOSCOPY  12/13/11    Dr. Kerno Urena repeat 5 yrs   • ENDOSCOPIC RETROGRADE CHOLANGIOPANCREATOGRAPHY (ERCP) N/A 8/8/2016    Performed by Buzz Lama Quit date: 6/15/2019        Years since quittin.3      Smokeless tobacco: Never Used    Alcohol use: No      Alcohol/week: 0.0 standard drinks      Frequency: Never    Drug use: No       Medications (Active prior to today's visit):  metoprolol succinat MMM  Neck: Supple, no CARLYLE or thyromegaly  Cardiac: Regular rate and rhythm, S1, S2 normal, no murmur or rub  Lungs: Clear without wheezes, rales, rhonchi. Extremities: Without clubbing, cyanosis or edema.   Neurologic: Alert and oriented, normal affect, 07/07/2019    PROUR 100  (A) 07/07/2019    UROBILINOGEN 2.0 07/07/2019    NITRITE Negative 07/07/2019    LEUUR Negative 07/07/2019    NMIC Microscopic not indicated 06/25/2019    WBCUR 1-4 07/07/2019    RBCUR 0-2 07/07/2019    EPIUR None Seen 07/07/2019

## 2019-10-24 NOTE — TELEPHONE ENCOUNTER
Patient notified results KUB and notified labs are stable, both Bonetereza Dow and Tamela, suggested he see Nupur Hess, can we please help him get in to discuss his abd pain and bloating.   Pt states he will call Deshawn first and if he cannot get in then he will

## 2019-10-24 NOTE — TELEPHONE ENCOUNTER
Patient states he cannot go back to Cardiac Rehab; which is Monday,Wednesday and Friday(tomorrow) until AS releases him.   Please advise

## 2019-10-24 NOTE — TELEPHONE ENCOUNTER
Patient states he needs a release from AS to go back to Cardiac Rehab, he had gone to the ER from Cardiac Rehab because he didn't eat enough before the class, vaso vagal and EMT's took him to the ER.  AS, do you want to see him after he sees Dr Medina Kim an

## 2019-10-24 NOTE — TELEPHONE ENCOUNTER
Patient notified results and notified labs are stable, both Raul Amato and Tamela, suggested he see Sofya Knight, can we please help him get in to discuss his abd pain and bloating.   Pt states he will call Deshawn first and if he cannot get in then he will kiet

## 2019-10-24 NOTE — TELEPHONE ENCOUNTER
Patient notified results and notified labs are stable, both Gray Akersite and Tamela, suggested he see Rodo Mauricio, can we please help him get in to discuss his abd pain and bloating.   Pt states he will call Deshawn first and if he cannot get in then he will kiet

## 2019-10-25 ENCOUNTER — APPOINTMENT (OUTPATIENT)
Dept: CARDIAC REHAB | Facility: HOSPITAL | Age: 69
End: 2019-10-25
Attending: INTERNAL MEDICINE
Payer: MEDICARE

## 2019-10-25 NOTE — TELEPHONE ENCOUNTER
Patient notified ok to return to Cardiac Rehab. Pt wanted AS to know that he saw Dr Lennie Wang today regarding his low sodium levels, he will try a medication to help keep his levels higher and has an appt with Dr Evelio GREEN on 10/28/19. FYI to AS.     Casilda Koyanagi

## 2019-10-28 ENCOUNTER — CARDPULM VISIT (OUTPATIENT)
Dept: CARDIAC REHAB | Facility: HOSPITAL | Age: 69
End: 2019-10-28
Attending: INTERNAL MEDICINE
Payer: MEDICARE

## 2019-10-28 PROCEDURE — 93798 PHYS/QHP OP CAR RHAB W/ECG: CPT

## 2019-10-29 ENCOUNTER — MED REC SCAN ONLY (OUTPATIENT)
Dept: INTERNAL MEDICINE CLINIC | Facility: CLINIC | Age: 69
End: 2019-10-29

## 2019-10-29 ENCOUNTER — TELEPHONE (OUTPATIENT)
Dept: NEPHROLOGY | Facility: CLINIC | Age: 69
End: 2019-10-29

## 2019-10-29 ENCOUNTER — TELEPHONE (OUTPATIENT)
Dept: INTERNAL MEDICINE CLINIC | Facility: CLINIC | Age: 69
End: 2019-10-29

## 2019-10-29 NOTE — TELEPHONE ENCOUNTER
I spoke with pt today, he c/o dizziness since starting low-dose furosemide. Will stop and I encouraged him to incr salt intake and limit free water to 50-60 oz daily.

## 2019-10-29 NOTE — TELEPHONE ENCOUNTER
Pt stated he's shaky, dizzy, weak and has stomach cramps. He also noted his diabetic meter read said 80 (checked with Cat Long and she said stated that was a good read).   Please advise

## 2019-10-30 ENCOUNTER — APPOINTMENT (OUTPATIENT)
Dept: CARDIAC REHAB | Facility: HOSPITAL | Age: 69
End: 2019-10-30
Attending: INTERNAL MEDICINE
Payer: MEDICARE

## 2019-11-01 ENCOUNTER — DOCUMENTATION (OUTPATIENT)
Dept: CARDIOLOGY | Age: 69
End: 2019-11-01

## 2019-11-01 ENCOUNTER — OFFICE VISIT (OUTPATIENT)
Dept: CARDIOLOGY | Age: 69
End: 2019-11-01

## 2019-11-01 ENCOUNTER — TELEPHONE (OUTPATIENT)
Dept: NEPHROLOGY | Facility: CLINIC | Age: 69
End: 2019-11-01

## 2019-11-01 ENCOUNTER — APPOINTMENT (OUTPATIENT)
Dept: CARDIAC REHAB | Facility: HOSPITAL | Age: 69
End: 2019-11-01
Attending: INTERNAL MEDICINE
Payer: MEDICARE

## 2019-11-01 ENCOUNTER — LAB ENCOUNTER (OUTPATIENT)
Dept: LAB | Facility: HOSPITAL | Age: 69
End: 2019-11-01
Attending: PHYSICIAN ASSISTANT
Payer: MEDICARE

## 2019-11-01 VITALS
DIASTOLIC BLOOD PRESSURE: 62 MMHG | HEART RATE: 76 BPM | SYSTOLIC BLOOD PRESSURE: 120 MMHG | HEIGHT: 70 IN | WEIGHT: 152 LBS | BODY MASS INDEX: 21.76 KG/M2

## 2019-11-01 DIAGNOSIS — I25.5 ISCHEMIC CARDIOMYOPATHY: ICD-10-CM

## 2019-11-01 DIAGNOSIS — E87.1 HYPONATREMIA: Primary | ICD-10-CM

## 2019-11-01 DIAGNOSIS — I48.0 AF (PAROXYSMAL ATRIAL FIBRILLATION) (CMD): ICD-10-CM

## 2019-11-01 DIAGNOSIS — R00.1 BRADYCARDIA: Primary | ICD-10-CM

## 2019-11-01 DIAGNOSIS — I48.0 PAROXYSMAL ATRIAL FIBRILLATION (HCC): ICD-10-CM

## 2019-11-01 DIAGNOSIS — R69 DIAGNOSIS UNKNOWN: Primary | ICD-10-CM

## 2019-11-01 PROCEDURE — 3074F SYST BP LT 130 MM HG: CPT | Performed by: INTERNAL MEDICINE

## 2019-11-01 PROCEDURE — 36415 COLL VENOUS BLD VENIPUNCTURE: CPT

## 2019-11-01 PROCEDURE — 80053 COMPREHEN METABOLIC PANEL: CPT | Performed by: INTERNAL MEDICINE

## 2019-11-01 PROCEDURE — 85025 COMPLETE CBC W/AUTO DIFF WBC: CPT

## 2019-11-01 PROCEDURE — 99215 OFFICE O/P EST HI 40 MIN: CPT | Performed by: INTERNAL MEDICINE

## 2019-11-01 PROCEDURE — 3078F DIAST BP <80 MM HG: CPT | Performed by: INTERNAL MEDICINE

## 2019-11-01 RX ORDER — LISINOPRIL 5 MG/1
5 TABLET ORAL DAILY
Qty: 90 TABLET | Refills: 1 | Status: SHIPPED | OUTPATIENT
Start: 2019-11-01 | End: 2019-12-27 | Stop reason: DRUGHIGH

## 2019-11-01 SDOH — HEALTH STABILITY: MENTAL HEALTH: HOW OFTEN DO YOU HAVE A DRINK CONTAINING ALCOHOL?: NEVER

## 2019-11-01 ASSESSMENT — ENCOUNTER SYMPTOMS
CHILLS: 0
BRUISES/BLEEDS EASILY: 0
HEMOPTYSIS: 0
COUGH: 0
WEIGHT LOSS: 0
WEIGHT GAIN: 0
HEMATOCHEZIA: 0
FEVER: 0
ALLERGIC/IMMUNOLOGIC COMMENTS: NO NEW FOOD ALLERGIES
SUSPICIOUS LESIONS: 0

## 2019-11-01 NOTE — TELEPHONE ENCOUNTER
Patient states that he is having the same issues that began Tuesday (See TE). Muscle weakness, dizziness, disorientation, visual disturbances (\"eyes shaking\"), and bodily shaking.  Patient did follow MD advice and stopped the furosemide and increased his

## 2019-11-01 NOTE — TELEPHONE ENCOUNTER
I spoke with pt today, he c/o ongoing dizziness in AM.  Stopped furosemide on Tuesday.   Check bmp today

## 2019-11-04 ENCOUNTER — APPOINTMENT (OUTPATIENT)
Dept: CARDIAC REHAB | Facility: HOSPITAL | Age: 69
End: 2019-11-04
Payer: MEDICARE

## 2019-11-04 ENCOUNTER — TELEPHONE (OUTPATIENT)
Dept: NEPHROLOGY | Facility: CLINIC | Age: 69
End: 2019-11-04

## 2019-11-04 DIAGNOSIS — E87.1 HYPONATREMIA: Primary | ICD-10-CM

## 2019-11-04 NOTE — TELEPHONE ENCOUNTER
Called pt today, labs from Friday stable. with Na still at 127 meq/l. However he c/o issues with dizziness/lightheadedness with furosemide. Advised to continue modest fluid restriction and increased oral salt intake. We will repeat labs in 4 weeks or so.

## 2019-11-06 ENCOUNTER — APPOINTMENT (OUTPATIENT)
Dept: CARDIAC REHAB | Facility: HOSPITAL | Age: 69
End: 2019-11-06
Attending: INTERNAL MEDICINE
Payer: MEDICARE

## 2019-11-08 ENCOUNTER — APPOINTMENT (OUTPATIENT)
Dept: CARDIAC REHAB | Facility: HOSPITAL | Age: 69
End: 2019-11-08
Attending: INTERNAL MEDICINE
Payer: MEDICARE

## 2019-11-11 ENCOUNTER — APPOINTMENT (OUTPATIENT)
Dept: CARDIAC REHAB | Facility: HOSPITAL | Age: 69
End: 2019-11-11
Payer: MEDICARE

## 2019-11-12 ENCOUNTER — HOSPITAL ENCOUNTER (EMERGENCY)
Facility: HOSPITAL | Age: 69
Discharge: HOME | End: 2019-11-12
Attending: EMERGENCY MEDICINE
Payer: MEDICARE

## 2019-11-12 ENCOUNTER — TELEPHONE (OUTPATIENT)
Dept: HEMATOLOGY/ONCOLOGY | Facility: HOSPITAL | Age: 69
End: 2019-11-12

## 2019-11-12 ENCOUNTER — OFFICE VISIT (OUTPATIENT)
Dept: ENDOCRINOLOGY CLINIC | Facility: CLINIC | Age: 69
End: 2019-11-12
Payer: COMMERCIAL

## 2019-11-12 VITALS
HEIGHT: 70 IN | HEART RATE: 72 BPM | WEIGHT: 160 LBS | RESPIRATION RATE: 20 BRPM | SYSTOLIC BLOOD PRESSURE: 120 MMHG | DIASTOLIC BLOOD PRESSURE: 68 MMHG | BODY MASS INDEX: 22.9 KG/M2

## 2019-11-12 VITALS
BODY MASS INDEX: 22.91 KG/M2 | RESPIRATION RATE: 18 BRPM | OXYGEN SATURATION: 100 % | HEIGHT: 70 IN | TEMPERATURE: 98 F | WEIGHT: 160.06 LBS | HEART RATE: 67 BPM | DIASTOLIC BLOOD PRESSURE: 87 MMHG | SYSTOLIC BLOOD PRESSURE: 139 MMHG

## 2019-11-12 DIAGNOSIS — E87.1 HYPONATREMIA: Primary | ICD-10-CM

## 2019-11-12 DIAGNOSIS — E16.2 HYPOGLYCEMIA: ICD-10-CM

## 2019-11-12 DIAGNOSIS — R73.9 ELEVATED BLOOD SUGAR: Primary | ICD-10-CM

## 2019-11-12 PROCEDURE — 83036 HEMOGLOBIN GLYCOSYLATED A1C: CPT | Performed by: NURSE PRACTITIONER

## 2019-11-12 PROCEDURE — 85025 COMPLETE CBC W/AUTO DIFF WBC: CPT | Performed by: EMERGENCY MEDICINE

## 2019-11-12 PROCEDURE — 36415 COLL VENOUS BLD VENIPUNCTURE: CPT

## 2019-11-12 PROCEDURE — 99285 EMERGENCY DEPT VISIT HI MDM: CPT

## 2019-11-12 PROCEDURE — 80053 COMPREHEN METABOLIC PANEL: CPT | Performed by: EMERGENCY MEDICINE

## 2019-11-12 PROCEDURE — 99284 EMERGENCY DEPT VISIT MOD MDM: CPT

## 2019-11-12 PROCEDURE — 82962 GLUCOSE BLOOD TEST: CPT

## 2019-11-12 RX ORDER — LISINOPRIL 5 MG/1
5 TABLET ORAL DAILY
Qty: 30 TABLET | Refills: 3 | Status: CANCELLED | OUTPATIENT
Start: 2019-11-12

## 2019-11-12 RX ORDER — ATORVASTATIN CALCIUM 10 MG/1
10 TABLET, FILM COATED ORAL NIGHTLY
Qty: 30 TABLET | Refills: 3 | Status: CANCELLED | OUTPATIENT
Start: 2019-11-12

## 2019-11-12 RX ORDER — ATORVASTATIN CALCIUM 10 MG/1
10 TABLET, FILM COATED ORAL DAILY
Qty: 6 TABLET | Refills: 0 | Status: SHIPPED | OUTPATIENT
Start: 2019-11-12 | End: 2019-11-22 | Stop reason: SDUPTHER

## 2019-11-12 NOTE — ED PROVIDER NOTES
Patient Seen in: [facility] Emergency Department      History   Patient presents with:  Hypoglycemia (metabolic)    Stated Complaint: hypoglycemia    HPI    27-year-old male comes to the hospital complaint of having an episode of hypoglycemia.   He h infarction) [facility])    • Pancreatic cancer [facility])     chloangiocarcinoma   • Personal history of antineoplastic chemotherapy     Completed chemo 2/17/17   • Pheochromocytoma, left     Dx in 6/2016: 1.7 cm mass near inferior adrenal gland   • Pneumoperitoneum Quit date: 6/15/2019        Years since quittin.4      Smokeless tobacco: Never Used    Alcohol use: No      Alcohol/week: 0.0 standard drinks      Frequency: Never    Drug use:  No             Review of Systems    Positive for stated complaint: hypogly Narrative: The following orders were created for panel order CBC WITH DIFFERENTIAL WITH PLATELET.   Procedure                               Abnormality         Status                     ---------                               -----------         ------

## 2019-11-12 NOTE — ED INITIAL ASSESSMENT (HPI)
Hypoglycemia while at primary MD today. bs 59 in office. Pt became dizzy and diaphoretic. Had crackers in office, bs 79. Had oral glucose in ambulance, bs 79.

## 2019-11-13 ENCOUNTER — DIABETIC EDUCATION (OUTPATIENT)
Dept: ENDOCRINOLOGY CLINIC | Facility: CLINIC | Age: 69
End: 2019-11-13
Payer: COMMERCIAL

## 2019-11-13 ENCOUNTER — APPOINTMENT (OUTPATIENT)
Dept: CARDIAC REHAB | Facility: HOSPITAL | Age: 69
End: 2019-11-13
Attending: INTERNAL MEDICINE
Payer: MEDICARE

## 2019-11-13 ENCOUNTER — TELEPHONE (OUTPATIENT)
Dept: INTERNAL MEDICINE CLINIC | Facility: CLINIC | Age: 69
End: 2019-11-13

## 2019-11-13 VITALS — BODY MASS INDEX: 22.9 KG/M2 | WEIGHT: 160 LBS | HEIGHT: 70 IN

## 2019-11-13 DIAGNOSIS — R73.9 ELEVATED BLOOD SUGAR: Primary | ICD-10-CM

## 2019-11-13 DIAGNOSIS — E16.2 HYPOGLYCEMIA: Primary | ICD-10-CM

## 2019-11-13 NOTE — TELEPHONE ENCOUNTER
Pt called to report again neuroglycopenic symptoms of hypoglycemia including dizziness, weakness,  and confusion      Woke up at 92 mg/dl - felt like \"he was going low\"   Drank 3- 4 sips of pedialyte, ate  2 pieces of lorenza toast 1 hard boiled egg   Checked BG 15 min after ingesting food: BG was 218mg/dl     30 min later: BG was 165 mg/dl and now at time of call, BG was 118 mg /dl (2hr after eating above) - but starting to \"feel low again\"  Need to investigate reason for hypoglycemia   Would recommend:  Endocrinology consult but soonest appt is 1- 2 2020. Office will send message to physicians and see if sooner appt available   Did advise pt we can draw labs when + hypoglycemia    When BG < 60mg/dl-  draw blood for glucose, insulin, pro insulin, betahydroxybutrate, EMERY screen and C-peptide. After blood test, ok to treat the low blood sugar  Make sure pt has ride to lab. Discussed w PCP - needs to see GI , oncology   Will also provide pt w personal elva sensor to keep close watch on BG trends  May benefit from small frequent low carb meals.    Will meet w RD today at 2pm for sensor start and follow up in 2 weeks for interpretation     Discussed reactive hypoglycemia - will mail diet

## 2019-11-13 NOTE — PROGRESS NOTES
Mg Serrano  [id number]/[id number] was seen for Personal Continuous Glucose Monitoring Training/Start:    Date: 11/13/2019  Referring Provider: JACQUELINE Jacobson  Start time: 2:00 End time: 2:30    Sensor Type:  Freestyle Bakari CGM    Patient verbalized understandin

## 2019-11-14 ENCOUNTER — TELEPHONE (OUTPATIENT)
Dept: ENDOCRINOLOGY CLINIC | Facility: CLINIC | Age: 69
End: 2019-11-14

## 2019-11-14 NOTE — PROGRESS NOTES
[facility] Progress Note      Patient Name: Eder Zamora   YOB: 1950  Medical Record Number: [id number]  Attending Physician: Savannah Odell M.D.      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 On 10/13/2016 patient began adjuvant chemotherapy with gemcitabine and capecitabine. Cycle 1 was uncomplicated.  Cyc Karnofsky 90% - Normal, only minor signs/symptoms.     Past Medical History (historical data, reviewed by physician)  Cholangiocarcinoma (as above); diverticulosis; coronary artery disease; MI.    Past Surgical History (historical data, reviewed by physic Left arm, Patient Position: Sitting, Cuff Size: adult)   Pulse 73   Temp 96.5 °F (35.8 °C) (Tympanic)   Resp 16   Ht 1.778 m (5' 10\")   Wt 70.3 kg (155 lb)   SpO2 100%   BMI 22.24 kg/m²     Physical Examination   Constitutional      Thin but in no apparen 321 pg/mL   FOLIC ACID SERUM(FOLATE)    Collection Time: 11/15/19 11:24 AM   Result Value Ref Range    Folate (Folic Acid) 33.1 >=0.6 ng/mL   CARBOHYDRATE ANTIGEN 19-9    Collection Time: 11/15/19 11:24 AM   Result Value Ref Range    Carbohydrate Ag 19-9 2

## 2019-11-14 NOTE — TELEPHONE ENCOUNTER
Patient agreed to see Endo in [address] [place] [address] Street him number but may want to have them call to be sure he gets in sooner. He leaves for [place] in December.

## 2019-11-14 NOTE — TELEPHONE ENCOUNTER
Due to  Hypoglycemia (NO DM history), recommend endocrinology evaluation   I left message with [facility] endocrinology practice yesterday but they could not get pt appt unitl 1-2020 or 2-2020  Dr Shemar Pérez or Dr Fabrice Johnson offered to see pt at [facility]

## 2019-11-15 ENCOUNTER — APPOINTMENT (OUTPATIENT)
Dept: CARDIAC REHAB | Facility: HOSPITAL | Age: 69
End: 2019-11-15
Attending: INTERNAL MEDICINE
Payer: MEDICARE

## 2019-11-15 ENCOUNTER — OFFICE VISIT (OUTPATIENT)
Dept: ENDOCRINOLOGY CLINIC | Facility: CLINIC | Age: 69
End: 2019-11-15
Payer: COMMERCIAL

## 2019-11-15 ENCOUNTER — OFFICE VISIT (OUTPATIENT)
Dept: HEMATOLOGY/ONCOLOGY | Facility: HOSPITAL | Age: 69
End: 2019-11-15
Attending: SPECIALIST
Payer: MEDICARE

## 2019-11-15 VITALS
DIASTOLIC BLOOD PRESSURE: 76 MMHG | BODY MASS INDEX: 22.19 KG/M2 | HEIGHT: 70 IN | WEIGHT: 155 LBS | OXYGEN SATURATION: 100 % | SYSTOLIC BLOOD PRESSURE: 157 MMHG | TEMPERATURE: 97 F | RESPIRATION RATE: 16 BRPM | HEART RATE: 73 BPM

## 2019-11-15 VITALS
WEIGHT: 157.38 LBS | DIASTOLIC BLOOD PRESSURE: 86 MMHG | SYSTOLIC BLOOD PRESSURE: 152 MMHG | HEART RATE: 71 BPM | BODY MASS INDEX: 23 KG/M2

## 2019-11-15 VITALS — SYSTOLIC BLOOD PRESSURE: 146 MMHG | HEART RATE: 80 BPM | DIASTOLIC BLOOD PRESSURE: 87 MMHG

## 2019-11-15 DIAGNOSIS — E87.5 HYPERKALEMIA: ICD-10-CM

## 2019-11-15 DIAGNOSIS — K86.89 PANCREATIC INSUFFICIENCY: ICD-10-CM

## 2019-11-15 DIAGNOSIS — R42 EPISODIC LIGHTHEADEDNESS: ICD-10-CM

## 2019-11-15 DIAGNOSIS — C22.1 CHOLANGIOCARCINOMA OF BILIARY TRACT (HCC): Primary | ICD-10-CM

## 2019-11-15 DIAGNOSIS — K86.89 SECONDARY PANCREATIC INSUFFICIENCY: ICD-10-CM

## 2019-11-15 DIAGNOSIS — E16.2 HYPOGLYCEMIA: Primary | ICD-10-CM

## 2019-11-15 DIAGNOSIS — C77.2 SECONDARY MALIGNANT NEOPLASM OF INTRA-ABDOMINAL LYMPH NODES (HCC): ICD-10-CM

## 2019-11-15 DIAGNOSIS — D64.9 NORMOCHROMIC NORMOCYTIC ANEMIA: ICD-10-CM

## 2019-11-15 DIAGNOSIS — C22.1 CHOLANGIOCARCINOMA (HCC): Primary | ICD-10-CM

## 2019-11-15 DIAGNOSIS — C22.1 CHOLANGIOCARCINOMA (HCC): ICD-10-CM

## 2019-11-15 DIAGNOSIS — E87.1 HYPONATREMIA: ICD-10-CM

## 2019-11-15 PROCEDURE — 96360 HYDRATION IV INFUSION INIT: CPT

## 2019-11-15 PROCEDURE — 99215 OFFICE O/P EST HI 40 MIN: CPT | Performed by: SPECIALIST

## 2019-11-15 PROCEDURE — 99203 OFFICE O/P NEW LOW 30 MIN: CPT | Performed by: INTERNAL MEDICINE

## 2019-11-15 PROCEDURE — 36416 COLLJ CAPILLARY BLOOD SPEC: CPT | Performed by: INTERNAL MEDICINE

## 2019-11-15 PROCEDURE — 82962 GLUCOSE BLOOD TEST: CPT | Performed by: INTERNAL MEDICINE

## 2019-11-15 RX ORDER — ATORVASTATIN CALCIUM 10 MG/1
10 TABLET, FILM COATED ORAL DAILY
Qty: 90 TABLET | Refills: 0 | OUTPATIENT
Start: 2019-11-15

## 2019-11-15 NOTE — PATIENT INSTRUCTIONS
Encouraged to consume more complex carbs:    nuts  • cheese  • meat  • lentils  • peanut butter  • eggs  • yogurt  whole wheat crackers  • whole wheat bread  • whole wheat carol  • whole grain rice

## 2019-11-15 NOTE — H&P
PROGRESS NOTE    CHIEF COMPLAINT:  Patient presents with:  Consult: Hypoglycemia, Bakari download       HISTORY OF PRESENT ILLNESS:   Love Horowitz is a 71year old male who presents for evaluation of episodes of light headeness an Past Surgical History:   Procedure Laterality Date   • ANGIOGRAM  06/24/2019   • BYPASS SURGERY  06/27/2019    CABG x 3   • CABG     • COLONOSCOPY  12/13/11    Dr. Andra Reinoso repeat 5 yrs   • ENDOSCOPIC RETROGRADE CHOLANGIOPANCREATOGRAPHY (ERCP) N/A 8/8/2016 Solution Prefilled Syringe Inject 60 mg into the skin every 6 (six) months. • Ascorbic Acid (VITAMIN C) 100 MG Oral Tab Take 100 mg by mouth daily. • Cyanocobalamin (VITAMIN B 12 OR) Take 50 mcg by mouth daily.        • Metoprolol Succinate ER 50 MG episodes as above  Constitutional: Negative for: weight change, fever, fatigue, cold/heat intolerance  Eyes: Negative for:  Visual changes, proptosis, blurring  ENT: Negative for:  dysphagia, neck swelling, dysphonia  Respiratory: Negative for:  dyspnea, c cause of his symptoms. Noted that he has had removal of one adrenal gland. He is also somewhat orthostatic and has been hyperkalemic in the past.   Hence will test 7-8 am cortisol, renin and aldosterone levels.    Further management will be based on resul

## 2019-11-18 ENCOUNTER — APPOINTMENT (OUTPATIENT)
Dept: CARDIOLOGY | Age: 69
End: 2019-11-18

## 2019-11-18 ENCOUNTER — HOSPITAL ENCOUNTER (OUTPATIENT)
Facility: HOSPITAL | Age: 69
Setting detail: OBSERVATION
Discharge: HOME | End: 2019-11-20
Attending: EMERGENCY MEDICINE | Admitting: HOSPITALIST
Payer: MEDICARE

## 2019-11-18 ENCOUNTER — LAB ENCOUNTER (OUTPATIENT)
Dept: LAB | Facility: HOSPITAL | Age: 69
End: 2019-11-18
Attending: INTERNAL MEDICINE
Payer: MEDICARE

## 2019-11-18 ENCOUNTER — APPOINTMENT (OUTPATIENT)
Dept: CARDIAC REHAB | Facility: HOSPITAL | Age: 69
End: 2019-11-18
Payer: MEDICARE

## 2019-11-18 ENCOUNTER — TELEPHONE (OUTPATIENT)
Dept: ENDOCRINOLOGY CLINIC | Facility: CLINIC | Age: 69
End: 2019-11-18

## 2019-11-18 ENCOUNTER — TELEPHONE (OUTPATIENT)
Dept: INTERNAL MEDICINE CLINIC | Facility: CLINIC | Age: 69
End: 2019-11-18

## 2019-11-18 ENCOUNTER — TELEPHONE (OUTPATIENT)
Dept: CARDIOLOGY | Age: 69
End: 2019-11-18

## 2019-11-18 ENCOUNTER — TELEPHONE (OUTPATIENT)
Dept: HEMATOLOGY/ONCOLOGY | Facility: HOSPITAL | Age: 69
End: 2019-11-18

## 2019-11-18 DIAGNOSIS — E87.1 HYPONATREMIA: ICD-10-CM

## 2019-11-18 DIAGNOSIS — M81.8 OTHER OSTEOPOROSIS WITHOUT CURRENT PATHOLOGICAL FRACTURE: ICD-10-CM

## 2019-11-18 DIAGNOSIS — E87.5 HYPERKALEMIA: ICD-10-CM

## 2019-11-18 DIAGNOSIS — R55 SYNCOPE, NEAR: Primary | ICD-10-CM

## 2019-11-18 DIAGNOSIS — E87.1 HYPOSMOLALITY SYNDROME: Primary | ICD-10-CM

## 2019-11-18 DIAGNOSIS — E16.2 HYPOGLYCEMIA: ICD-10-CM

## 2019-11-18 PROBLEM — D64.9 ANEMIA: Status: ACTIVE | Noted: 2019-11-18

## 2019-11-18 LAB — INSULIN SERPL-ACNC: 3.2 MU/L (ref 3–25)

## 2019-11-18 PROCEDURE — 80377 DRUG/SUBSTANCE NOS 7/MORE: CPT

## 2019-11-18 PROCEDURE — 84681 ASSAY OF C-PEPTIDE: CPT | Performed by: NURSE PRACTITIONER

## 2019-11-18 PROCEDURE — 84075 ASSAY ALKALINE PHOSPHATASE: CPT

## 2019-11-18 PROCEDURE — 84244 ASSAY OF RENIN: CPT

## 2019-11-18 PROCEDURE — 83525 ASSAY OF INSULIN: CPT | Performed by: NURSE PRACTITIONER

## 2019-11-18 PROCEDURE — 99220 INITIAL OBSERVATION CARE,LEVL III: CPT | Performed by: HOSPITALIST

## 2019-11-18 PROCEDURE — 82010 KETONE BODYS QUAN: CPT

## 2019-11-18 PROCEDURE — 84206 ASSAY OF PROINSULIN: CPT

## 2019-11-18 PROCEDURE — 80053 COMPREHEN METABOLIC PANEL: CPT

## 2019-11-18 PROCEDURE — 36415 COLL VENOUS BLD VENIPUNCTURE: CPT | Performed by: NURSE PRACTITIONER

## 2019-11-18 PROCEDURE — 86337 INSULIN ANTIBODIES: CPT

## 2019-11-18 PROCEDURE — 82088 ASSAY OF ALDOSTERONE: CPT

## 2019-11-18 PROCEDURE — 82533 TOTAL CORTISOL: CPT

## 2019-11-18 PROCEDURE — 99215 OFFICE O/P EST HI 40 MIN: CPT | Performed by: INTERNAL MEDICINE

## 2019-11-18 PROCEDURE — 82024 ASSAY OF ACTH: CPT

## 2019-11-18 RX ORDER — TRAZODONE HYDROCHLORIDE 50 MG/1
50 TABLET ORAL NIGHTLY PRN
Status: DISCONTINUED | OUTPATIENT
Start: 2019-11-18 | End: 2019-11-20

## 2019-11-18 RX ORDER — ATORVASTATIN CALCIUM 10 MG/1
10 TABLET, FILM COATED ORAL NIGHTLY
Status: DISCONTINUED | OUTPATIENT
Start: 2019-11-18 | End: 2019-11-20

## 2019-11-18 RX ORDER — ENOXAPARIN SODIUM 100 MG/ML
40 INJECTION SUBCUTANEOUS NIGHTLY
Status: DISCONTINUED | OUTPATIENT
Start: 2019-11-18 | End: 2019-11-20

## 2019-11-18 RX ORDER — LISINOPRIL 5 MG/1
5 TABLET ORAL DAILY
Status: DISCONTINUED | OUTPATIENT
Start: 2019-11-19 | End: 2019-11-20

## 2019-11-18 RX ORDER — MELATONIN
325
COMMUNITY

## 2019-11-18 RX ORDER — ONDANSETRON 2 MG/ML
4 INJECTION INTRAMUSCULAR; INTRAVENOUS EVERY 6 HOURS PRN
Status: DISCONTINUED | OUTPATIENT
Start: 2019-11-18 | End: 2019-11-20

## 2019-11-18 RX ORDER — ACETAMINOPHEN 325 MG/1
650 TABLET ORAL EVERY 6 HOURS PRN
Status: DISCONTINUED | OUTPATIENT
Start: 2019-11-18 | End: 2019-11-20

## 2019-11-18 RX ORDER — ASPIRIN 81 MG/1
81 TABLET ORAL DAILY
Status: DISCONTINUED | OUTPATIENT
Start: 2019-11-18 | End: 2019-11-20

## 2019-11-18 NOTE — ED PROVIDER NOTES
Patient Seen in: [facility] 7ne-a      History   Patient presents with:  Dizziness (neurologic)    Stated Complaint: feeling lightheaded, HR in the 40's at home    HPI    Patient is a 12-year-old male comes to ED for evaluation of near syncope.   Joni Fall Pneumoperitoneum 7/7/2019   • Tobacco abuse               Past Surgical History:   Procedure Laterality Date   • ANGIOGRAM  06/24/2019   • BYPASS SURGERY  06/27/2019    CABG x 3   • CABG     • COLONOSCOPY  12/13/11    Dr. Antolin Ibanez repeat 5 yrs   • ENDOSCOPIC Frequency: Never    Drug use: No             Review of Systems    Positive for stated complaint: feeling lightheaded, HR in the 40's at home  Other systems are as noted in HPI. Constitutional and vital signs reviewed.       All other systems reviewed and - Abnormal; Notable for the following components:    POC Glucose 111 (*)     All other components within normal limits   CBC W/ DIFFERENTIAL - Abnormal; Notable for the following components:    HGB 11.7 (*)     HCT 36.2 (*)     RDW 18.3 (*)     RDW-SD 58. 1 ICD-10-CM Noted POA    * (Principal) Syncope, near R55 11/18/2019 Unknown    Anemia D64.9 11/18/2019 Yes    Hyponatremia E87.1 11/18/2019 Yes

## 2019-11-18 NOTE — TELEPHONE ENCOUNTER
C/o abdominal pain, muscle cramping, shaking, breathing \"harder\", feels like he is going to \"pass out\". Denies chest pain. Denies n/v. C/o Diarrhea this morning - rare for him. Finger prick is 108 currently.  BG 79 earlier this morning - up to 90 now

## 2019-11-18 NOTE — PROGRESS NOTES
11/18/19 1742   Clinical Encounter Type   Visited With Patient   Routine Visit Introduction   Continue Visiting No   Patient's Supportive Strategies/Resources  provided emotional support   Yarsani Encounters   Yarsani Needs Prayer   Patient

## 2019-11-18 NOTE — ED INITIAL ASSESSMENT (HPI)
A/o x3, pt was sitting at kitchen table and felt dizzy almost like, \"passing out\" today. Lasted around 5 minutes. Hx of chronic low sodium, being checked for low sugar levels with PCP.

## 2019-11-18 NOTE — CONSULTS
Cardiology Consult Note     PRIMARY CARDIOLOGIST: ELIOT/ANAND/TRAE      CONSULT FOR: NEAR SYNCOPE WITH ESTHELA        HISTORY: 72 Y/O MALE WITH KNOWN ICM WITH LAST EF OF 40%, CABG IN 6/19  AND ANTERIOR APICL MI. NOW PRESENTS WITH RECURRENT NEAR SYNCOPE WITH

## 2019-11-18 NOTE — HISTORICAL OFFICE NOTE
Progress Notes  - documented in this encounter  Hermes George MD - 11/01/2019 1:00 PM CDT  Formatting of this note might be different from the original.  11/1/2019    [address] Date   • Cabg, arterial, three 06/27/2019   Dr. Quinton Huston   • Cardiac catherization 06/26/2019   Dr. Keke Fuller   • Laproscopic whipple procedure   pancreatic cancer     Family Hx:    Family History   Problem Relation Age of Onset   • Congestive Heart Failure Fathe No new food allergies       Physical Exam:  Vitals:   Visit Vitals  /62   Pulse 76   Ht 5' 10\" (1.778 m)   Wt 68.9 kg (152 lb)   BMI 21.81 kg/m²     General: No acute distress.   HEENT: Mucosa moist, no scleral icterus  Lungs: Cear to auscultation

## 2019-11-18 NOTE — TELEPHONE ENCOUNTER
RN spoke with Lizz who confirmed pt is currently at [facility] ED for evaluation.     Forwarded to AM as [facility]

## 2019-11-18 NOTE — PLAN OF CARE
Admitted from ER   Navigator completed   Orders received and carried out   Fall precautions maintained   Plan of care discussed, will continue to monitor       Problem: CARDIOVASCULAR - ADULT  Goal: Maintains optimal cardiac output and hemodynamic stabilit

## 2019-11-19 ENCOUNTER — APPOINTMENT (OUTPATIENT)
Dept: INTERVENTIONAL RADIOLOGY/VASCULAR | Facility: HOSPITAL | Age: 69
End: 2019-11-19
Attending: INTERNAL MEDICINE
Payer: MEDICARE

## 2019-11-19 ENCOUNTER — APPOINTMENT (OUTPATIENT)
Dept: CV DIAGNOSTICS | Facility: HOSPITAL | Age: 69
End: 2019-11-19
Attending: HOSPITALIST
Payer: MEDICARE

## 2019-11-19 LAB — C-PEPTIDE, SERUM OR PLASMA: 0.9 NG/ML

## 2019-11-19 PROCEDURE — 33249 INSJ/RPLCMT DEFIB W/LEAD(S): CPT | Performed by: INTERNAL MEDICINE

## 2019-11-19 PROCEDURE — 99214 OFFICE O/P EST MOD 30 MIN: CPT | Performed by: INTERNAL MEDICINE

## 2019-11-19 PROCEDURE — 99225 SUBSEQUENT OBSERVATION CARE: CPT | Performed by: HOSPITALIST

## 2019-11-19 PROCEDURE — 93306 TTE W/DOPPLER COMPLETE: CPT | Performed by: HOSPITALIST

## 2019-11-19 RX ORDER — CEFAZOLIN SODIUM/WATER 2 G/20 ML
SYRINGE (ML) INTRAVENOUS
Status: COMPLETED
Start: 2019-11-19 | End: 2019-11-19

## 2019-11-19 RX ORDER — BACITRACIN 50000 [USP'U]/1
INJECTION, POWDER, LYOPHILIZED, FOR SOLUTION INTRAMUSCULAR
Status: COMPLETED
Start: 2019-11-19 | End: 2019-11-19

## 2019-11-19 RX ORDER — CHLORHEXIDINE GLUCONATE 4 G/100ML
30 SOLUTION TOPICAL
Status: DISCONTINUED | OUTPATIENT
Start: 2019-11-20 | End: 2019-11-19 | Stop reason: HOSPADM

## 2019-11-19 RX ORDER — CEFAZOLIN SODIUM/WATER 2 G/20 ML
2 SYRINGE (ML) INTRAVENOUS EVERY 8 HOURS
Status: COMPLETED | OUTPATIENT
Start: 2019-11-19 | End: 2019-11-20

## 2019-11-19 RX ORDER — METOPROLOL SUCCINATE 50 MG/1
50 TABLET, EXTENDED RELEASE ORAL
Status: DISCONTINUED | OUTPATIENT
Start: 2019-11-20 | End: 2019-11-20

## 2019-11-19 RX ORDER — CEFAZOLIN SODIUM/WATER 2 G/20 ML
2 SYRINGE (ML) INTRAVENOUS
Status: DISCONTINUED | OUTPATIENT
Start: 2019-11-19 | End: 2019-11-19 | Stop reason: HOSPADM

## 2019-11-19 RX ORDER — ONDANSETRON 2 MG/ML
4 INJECTION INTRAMUSCULAR; INTRAVENOUS EVERY 6 HOURS PRN
Status: DISCONTINUED | OUTPATIENT
Start: 2019-11-19 | End: 2019-11-20

## 2019-11-19 RX ORDER — LIDOCAINE HYDROCHLORIDE 10 MG/ML
INJECTION, SOLUTION EPIDURAL; INFILTRATION; INTRACAUDAL; PERINEURAL
Status: COMPLETED
Start: 2019-11-19 | End: 2019-11-19

## 2019-11-19 RX ORDER — MAGNESIUM SULFATE HEPTAHYDRATE 40 MG/ML
2 INJECTION, SOLUTION INTRAVENOUS ONCE
Status: COMPLETED | OUTPATIENT
Start: 2019-11-19 | End: 2019-11-19

## 2019-11-19 RX ORDER — DIPHENHYDRAMINE HYDROCHLORIDE 50 MG/ML
INJECTION INTRAMUSCULAR; INTRAVENOUS
Status: COMPLETED
Start: 2019-11-19 | End: 2019-11-19

## 2019-11-19 RX ORDER — MIDAZOLAM HYDROCHLORIDE 1 MG/ML
INJECTION INTRAMUSCULAR; INTRAVENOUS
Status: COMPLETED
Start: 2019-11-19 | End: 2019-11-19

## 2019-11-19 RX ORDER — SODIUM CHLORIDE 9 MG/ML
INJECTION, SOLUTION INTRAVENOUS
Status: DISCONTINUED | OUTPATIENT
Start: 2019-11-20 | End: 2019-11-19 | Stop reason: HOSPADM

## 2019-11-19 NOTE — CONSULTS
[facility]    PATIENT'S NAME: Yahir Long   ATTENDING PHYSICIAN: ANISH Doherty Dorian: Dipesh Goff M.D.    PATIENT ACCOUNT#:   [de-identified]    LOCATION:  [address] [place] [address]  MEDICAL RECORD #:   [id number]       DATE OF BIRTH: procedures include colonoscopies, bypass surgery as noted above, Whipple procedure with gallbladder removal, placement of common bile duct stent historically.     MEDICATIONS:  Medical therapy includes rifaximin 550 mg 3 times a day, Zenpep, furosemide at 1 thus far. No tachyarrhythmias. Currently during exam, he as a bigeminal rhythm. IMPRESSION:  A 78-year-old gentleman, known ischemic cardiomyopathy. Last EF about in the 40% range.   Previous bypass surgery of 2 vessels, previous anterior apical infar

## 2019-11-19 NOTE — CONSULTS
[facility]  Report of Consultation    Micky Ortiz Patient Status:  Observation    1950 MRN [id number]   [facility] 7NE-A Attending Adriane Marin MD   Hosp Day # 0 PCP MD Nikki Weinstein He subsequently developed junctional rhythm (HRs 40s) but after adjusting meds things improved. Initial EF was 30-35% but improved to 35-40% by echo in August.  Over the past week he has had intermittent episodes of dizziness and presyncope.   Was checking CHOLANGIOPANCREATOGRAPHY (ERCP) N/A 8/8/2016    Performed by Elina Vyas MD at [address] [place] [address] (ERCP) N/A 7/25/2016    Performed by Elina Vyas MD at [address] Daily  •  atorvastatin (LIPITOR) tab 10 mg, 10 mg, Oral, Nightly  •  lisinopril tab 5 mg, 5 mg, Oral, Daily  •  pancrelipase (Lip-Prot-Amyl) (ZENPEP) DR particles cap 20,000 Units, 20,000 Units, Oral, TID CC  •  traZODone HCl (DESYREL) tab 50 mg, 50 mg, Or (mg/dL)   Date Value   11/19/2019 1.09   11/18/2019 1.14   11/18/2019 1.10     Lab Results   Component Value Date    INR 1.50 (H) 06/27/2019    INR 1.65 (H) 06/27/2019    INR 0.96 04/26/2019         Wale Nj  11/19/2019  11:01 AM

## 2019-11-19 NOTE — PROGRESS NOTES
[facility]  Progress note     Jessi Colin Patient Status:  Observation    1950 MRN [id number]   [facility] 7NE-A Attending Patrecia Cooks, MD   Hosp Day # 0 PCP Rocío Monte MD     Chief Complaint: dizziness    Subject Imaging: Imaging data reviewed in Epic. ASSESSMENT / PLAN:     1. Syncope; SSS vs vasovagal;  re-assess EF; echo results pending; might need pacer and/or AICD, pending EF results on echo. Monitor on tele  2. parox afib  3.  HTN-resume acei and bb

## 2019-11-19 NOTE — PLAN OF CARE
A/OX4, RA, VSS  Denies pain at this time  7 beats of Vtach and few episodes of NSVT during the night, otherwise in SR w/ frequent PVCs, HR>60   Lytes replaced per protocol  EP to see, ECHO this am  Needs attended to, Will cont to monitor.

## 2019-11-20 ENCOUNTER — APPOINTMENT (OUTPATIENT)
Dept: GENERAL RADIOLOGY | Facility: HOSPITAL | Age: 69
End: 2019-11-20
Attending: INTERNAL MEDICINE
Payer: MEDICARE

## 2019-11-20 ENCOUNTER — APPOINTMENT (OUTPATIENT)
Dept: CARDIAC REHAB | Facility: HOSPITAL | Age: 69
End: 2019-11-20
Attending: INTERNAL MEDICINE
Payer: MEDICARE

## 2019-11-20 VITALS
BODY MASS INDEX: 23 KG/M2 | WEIGHT: 157.44 LBS | HEART RATE: 91 BPM | SYSTOLIC BLOOD PRESSURE: 140 MMHG | OXYGEN SATURATION: 97 % | TEMPERATURE: 98 F | DIASTOLIC BLOOD PRESSURE: 82 MMHG | RESPIRATION RATE: 20 BRPM

## 2019-11-20 PROCEDURE — 99024 POSTOP FOLLOW-UP VISIT: CPT | Performed by: INTERNAL MEDICINE

## 2019-11-20 PROCEDURE — 99217 OBSERVATION CARE DISCHARGE: CPT | Performed by: HOSPITALIST

## 2019-11-20 PROCEDURE — 71046 X-RAY EXAM CHEST 2 VIEWS: CPT | Performed by: INTERNAL MEDICINE

## 2019-11-20 RX ORDER — METOPROLOL SUCCINATE 50 MG/1
50 TABLET, EXTENDED RELEASE ORAL
Qty: 30 TABLET | Refills: 1 | Status: SHIPPED | OUTPATIENT
Start: 2019-11-21 | End: 2020-05-06

## 2019-11-20 NOTE — CM/SW NOTE
No identified needs at this time, MSW will remain available should needs change. MSW spoke to bedside RN.

## 2019-11-20 NOTE — PROGRESS NOTES
[facility]  Cardiology Progress Note    Bessie Taylor Patient Status:  Observation    1950 MRN [id number]   [facility] 7NE-A Attending José Manuel Arias MD   Hosp Day # 0 PCP Luis Thomas MD     Subjective:  Relieved that he go non-tender. Extremities: No edema, Peripheral pulses are 2+. L chest site with mild swelling noted posterior to device, soft, no crepitus, no drainage- pressure dressing applied   Skin: Warm and dry.      Medications:  • metoprolol succinate  50 mg Oral D patient and his wife  - follow up device clinic one week      Pete Perdomo MD  [facility]

## 2019-11-20 NOTE — PROGRESS NOTES
Patient seen and examined. Pacer site looks prominent; unsure if this is just a large device or if perhaps there is hematoma; however, I see no obvious bruising to suggest hematoma. Await card's evaluation of pacer site.   If site is OK, anticipate d/c to

## 2019-11-20 NOTE — PLAN OF CARE
Assumed pt care at [address] [place] [address] for the night shift. Pt resting in bed s/p pacemaker insertion. Lt upper chest w/ mepilex drsg c/d/i. Sling in place. Pt denies any pain or discomfort. VSS. Afebrile. Post op Ancef given as ordered. NSR/ST on tele w/ frequent pvc's.   Rem strengthening/mobility  - Encourage toileting schedule  Outcome: Progressing

## 2019-11-20 NOTE — PLAN OF CARE
NURSING DISCHARGE NOTE    Discharged Home via Wheelchair. Accompanied by Spouse  Belongings Taken by patient/family.     Received patient A/Ox4, RA, NSR with pacemaker on tele at 0700  Chest xray completed to verify pacemaker wire placement  Yoel appiah Discharge  Goal: Electrolytes maintained within normal limits  Description  INTERVENTIONS:  - Monitor labs and rhythm and assess patient for signs and symptoms of electrolyte imbalances  - Administer electrolyte replacement as ordered  - Monitor response t

## 2019-11-21 ENCOUNTER — PATIENT OUTREACH (OUTPATIENT)
Dept: CASE MANAGEMENT | Age: 69
End: 2019-11-21

## 2019-11-21 DIAGNOSIS — C22.1 CHOLANGIOCARCINOMA (HCC): ICD-10-CM

## 2019-11-21 DIAGNOSIS — Z02.9 ENCOUNTERS FOR UNSPECIFIED ADMINISTRATIVE PURPOSE: ICD-10-CM

## 2019-11-21 DIAGNOSIS — R55 SYNCOPE, NEAR: ICD-10-CM

## 2019-11-21 DIAGNOSIS — Z95.1 HX OF CABG: ICD-10-CM

## 2019-11-21 DIAGNOSIS — I10 BENIGN ESSENTIAL HTN: Primary | ICD-10-CM

## 2019-11-21 DIAGNOSIS — I25.10 CORONARY ARTERY DISEASE INVOLVING NATIVE CORONARY ARTERY OF NATIVE HEART, ANGINA PRESENCE UNSPECIFIED: ICD-10-CM

## 2019-11-21 PROCEDURE — 1111F DSCHRG MED/CURRENT MED MERGE: CPT

## 2019-11-21 NOTE — PROGRESS NOTES
A Go[employer] message was sent to the patient for outreach to complete TCM. It was requested the patient call [facility] back at, [phone].

## 2019-11-21 NOTE — DISCHARGE SUMMARY
[facility]  DISCHARGE SUMMARY     Walter Rich Patient Status:  Observation    1950 MRN [id number]   [facility] 7NE-A Attending No att. providers found   Hosp Day # 0 PCP Ines Sofia MD     Date of Admission: 2019 Prescription details   aspirin 81 MG Tbec      Take 81 mg by mouth daily. Refills:  0     atorvastatin 10 MG Tabs  Commonly known as:  LIPITOR      Take 1 tablet (10 mg total) by mouth nightly.    Quantity:  30 tablet  Refills:  3     Denosumab 60 MG/ML S VISIT [2828] 15 min. [facility] Endocrinology Mel Rosa MD    Patient Instructions:                     12/6/2019  2:00 PM  INJECTION [25] 30 min.  [facility] FRACTURE SERVICE, [place], [place]    Patient Instructions:

## 2019-11-22 ENCOUNTER — APPOINTMENT (OUTPATIENT)
Dept: CARDIAC REHAB | Facility: HOSPITAL | Age: 69
End: 2019-11-22
Attending: INTERNAL MEDICINE
Payer: MEDICARE

## 2019-11-22 ENCOUNTER — TELEPHONE (OUTPATIENT)
Dept: CARDIOLOGY | Age: 69
End: 2019-11-22

## 2019-11-22 DIAGNOSIS — Z95.0 CARDIAC PACEMAKER IN SITU: ICD-10-CM

## 2019-11-22 RX ORDER — ATORVASTATIN CALCIUM 10 MG/1
10 TABLET, FILM COATED ORAL DAILY
Qty: 30 TABLET | Refills: 1 | Status: SHIPPED | OUTPATIENT
Start: 2019-11-22 | End: 2019-11-22 | Stop reason: SDUPTHER

## 2019-11-22 NOTE — PROGRESS NOTES
Initial Post Discharge Follow Up   Discharge Date: 11/20/19  Contact Date: 11/21/2019    Consent Verification:  Assessment Completed With: Patient  HIPAA Verified?   Yes    Discharge Dx:    Near Syncope due to symptomatic bradycardia, SSS s/p biV ICD/pac Outpatient Medications   Medication Sig Dispense Refill   • Metoprolol Succinate ER 50 MG Oral Tablet 24 Hr Take 1 tablet (50 mg total) by mouth Daily Beta Blocker.  30 tablet 1   • ferrous sulfate 325 (65 FE) MG Oral Tab EC Take 325 mg by mouth daily with properly. CV Surgery:   Have there been any changes in your wound or incision? no  Are you walking more and more each day?    yes; did report difficulty while gas pains are present. Waiting to hear from GI.    Are you continuing to use your incentive yes; an appointment with Dr. Shruthi Brown was scheduled for 11/26/2019 at 12:45    NCM Reviewed/scheduled/rescheduled PCP TCM/HFU appointment with pt:  Yes      Have you made all of your follow up appointments?  yes    Is there any reason as to why you cannot

## 2019-11-25 ENCOUNTER — APPOINTMENT (OUTPATIENT)
Dept: CARDIAC REHAB | Facility: HOSPITAL | Age: 69
End: 2019-11-25
Payer: MEDICARE

## 2019-11-25 ENCOUNTER — PATIENT OUTREACH (OUTPATIENT)
Dept: CASE MANAGEMENT | Age: 69
End: 2019-11-25

## 2019-11-25 ENCOUNTER — TELEPHONE (OUTPATIENT)
Dept: CARDIOLOGY | Age: 69
End: 2019-11-25

## 2019-11-25 PROBLEM — I50.22 CHRONIC SYSTOLIC CONGESTIVE HEART FAILURE (HCC): Status: ACTIVE | Noted: 2019-07-12

## 2019-11-25 PROBLEM — I10 BENIGN ESSENTIAL HTN: Status: ACTIVE | Noted: 2019-08-01

## 2019-11-25 PROBLEM — I25.10 CORONARY ARTERY DISEASE INVOLVING NATIVE CORONARY ARTERY OF NATIVE HEART: Status: ACTIVE | Noted: 2019-08-01

## 2019-11-25 PROBLEM — Z95.1 S/P CABG (CORONARY ARTERY BYPASS GRAFT): Status: ACTIVE | Noted: 2019-07-07

## 2019-11-25 PROBLEM — I48.0 PAROXYSMAL ATRIAL FIBRILLATION (HCC): Status: ACTIVE | Noted: 2019-07-12

## 2019-11-25 PROBLEM — Z85.09 HISTORY OF CHOLANGIOCARCINOMA: Status: ACTIVE | Noted: 2019-08-01

## 2019-11-25 NOTE — PROGRESS NOTES
Spoke with patient to introduce CCM program. Pt will discuss with PCP tomorrow and I will mail more information. I will follow up with patient in the next couple of weeks.

## 2019-11-26 ENCOUNTER — ANCILLARY PROCEDURE (OUTPATIENT)
Dept: CARDIOLOGY | Age: 69
End: 2019-11-26
Attending: INTERNAL MEDICINE

## 2019-11-26 ENCOUNTER — OFFICE VISIT (OUTPATIENT)
Dept: CARDIOLOGY | Age: 69
End: 2019-11-26

## 2019-11-26 ENCOUNTER — HOSPITAL ENCOUNTER (OUTPATIENT)
Dept: GENERAL RADIOLOGY | Facility: HOSPITAL | Age: 69
Discharge: HOME | End: 2019-11-26
Attending: NURSE PRACTITIONER
Payer: MEDICARE

## 2019-11-26 ENCOUNTER — OFFICE VISIT (OUTPATIENT)
Dept: INTERNAL MEDICINE CLINIC | Facility: CLINIC | Age: 69
End: 2019-11-26
Payer: COMMERCIAL

## 2019-11-26 ENCOUNTER — APPOINTMENT (OUTPATIENT)
Dept: CARDIOLOGY | Age: 69
End: 2019-11-26

## 2019-11-26 VITALS — SYSTOLIC BLOOD PRESSURE: 160 MMHG | HEART RATE: 70 BPM | DIASTOLIC BLOOD PRESSURE: 80 MMHG

## 2019-11-26 VITALS
HEART RATE: 60 BPM | DIASTOLIC BLOOD PRESSURE: 60 MMHG | WEIGHT: 154 LBS | HEIGHT: 70 IN | SYSTOLIC BLOOD PRESSURE: 126 MMHG | BODY MASS INDEX: 22.05 KG/M2

## 2019-11-26 VITALS
TEMPERATURE: 99 F | WEIGHT: 155 LBS | BODY MASS INDEX: 22.19 KG/M2 | OXYGEN SATURATION: 100 % | SYSTOLIC BLOOD PRESSURE: 130 MMHG | RESPIRATION RATE: 16 BRPM | HEIGHT: 70 IN | DIASTOLIC BLOOD PRESSURE: 78 MMHG | HEART RATE: 64 BPM

## 2019-11-26 DIAGNOSIS — Z95.0 PACEMAKER: ICD-10-CM

## 2019-11-26 DIAGNOSIS — R42 DIZZINESS: Primary | ICD-10-CM

## 2019-11-26 DIAGNOSIS — Z95.810 ICD (IMPLANTABLE CARDIOVERTER-DEFIBRILLATOR) IN PLACE: Primary | ICD-10-CM

## 2019-11-26 DIAGNOSIS — I49.5 SSS (SICK SINUS SYNDROME) (HCC): ICD-10-CM

## 2019-11-26 DIAGNOSIS — I50.22 CHRONIC SYSTOLIC CONGESTIVE HEART FAILURE (HCC): ICD-10-CM

## 2019-11-26 DIAGNOSIS — I25.5 CARDIOMYOPATHY, ISCHEMIC: ICD-10-CM

## 2019-11-26 DIAGNOSIS — Z85.09 HISTORY OF CHOLANGIOCARCINOMA: ICD-10-CM

## 2019-11-26 DIAGNOSIS — K92.89 GAS BLOAT SYNDROME: ICD-10-CM

## 2019-11-26 DIAGNOSIS — R55 SYNCOPE, NEAR: ICD-10-CM

## 2019-11-26 DIAGNOSIS — I10 BENIGN ESSENTIAL HTN: ICD-10-CM

## 2019-11-26 DIAGNOSIS — R10.84 GENERALIZED ABDOMINAL PAIN: ICD-10-CM

## 2019-11-26 DIAGNOSIS — I66.01 STENOSIS OF RIGHT MIDDLE CEREBRAL ARTERY: ICD-10-CM

## 2019-11-26 DIAGNOSIS — Z95.1 S/P CABG (CORONARY ARTERY BYPASS GRAFT): ICD-10-CM

## 2019-11-26 DIAGNOSIS — Z09 HOSPITAL DISCHARGE FOLLOW-UP: ICD-10-CM

## 2019-11-26 PROCEDURE — 93283 PRGRMG EVAL IMPLANTABLE DFB: CPT | Performed by: INTERNAL MEDICINE

## 2019-11-26 PROCEDURE — 74018 RADEX ABDOMEN 1 VIEW: CPT | Performed by: NURSE PRACTITIONER

## 2019-11-26 PROCEDURE — 3074F SYST BP LT 130 MM HG: CPT | Performed by: INTERNAL MEDICINE

## 2019-11-26 PROCEDURE — 99496 TRANSJ CARE MGMT HIGH F2F 7D: CPT | Performed by: INTERNAL MEDICINE

## 2019-11-26 PROCEDURE — 3078F DIAST BP <80 MM HG: CPT | Performed by: INTERNAL MEDICINE

## 2019-11-26 PROCEDURE — 99214 OFFICE O/P EST MOD 30 MIN: CPT | Performed by: INTERNAL MEDICINE

## 2019-11-26 RX ORDER — ATORVASTATIN CALCIUM 10 MG/1
TABLET, FILM COATED ORAL
Qty: 90 TABLET | Refills: 1 | Status: SHIPPED | OUTPATIENT
Start: 2019-11-26 | End: 2020-01-24 | Stop reason: SDUPTHER

## 2019-11-26 SDOH — HEALTH STABILITY: MENTAL HEALTH: HOW OFTEN DO YOU HAVE A DRINK CONTAINING ALCOHOL?: NEVER

## 2019-11-26 ASSESSMENT — PATIENT HEALTH QUESTIONNAIRE - PHQ9
SUM OF ALL RESPONSES TO PHQ9 QUESTIONS 1 AND 2: 0
2. FEELING DOWN, DEPRESSED OR HOPELESS: NOT AT ALL
SUM OF ALL RESPONSES TO PHQ9 QUESTIONS 1 AND 2: 0
1. LITTLE INTEREST OR PLEASURE IN DOING THINGS: NOT AT ALL

## 2019-11-26 NOTE — PROGRESS NOTES
HPI:    Genoveva Shukla is a 71year old male here today for hospital follow up.    He was discharged from Inpatient hospital, [facility] to Home   Admission Date: 11/18/19   Discharge Date: 11/20/19  Hospital Discharge Diagnoses (since 10/27/2019) by mouth daily with breakfast.  pancrelipase, Lip-Prot-Amyl, 92819-76705 units Oral Cap DR Particles, Take 20,000 Units by mouth 3 (three) times daily with meals.   ergocalciferol 94856 units Oral Cap, Take 1 capsule (50,000 Units total) by mouth once a wee includes Cancer in his mother; Diabetes in his father, mother, and sister; Hypertension in his mother; Other in his father and another family member. He  reports that he quit smoking about 5 months ago. His smoking use included cigarettes.  He has a 20.00 contineu meds  History of cholangiocarcinoma  Resolved but with occ abd pain, see gi for f/u  Hospital discharge follow-up        No orders of the defined types were placed in this encounter.       Meds & Refills for this Visit:  Requested Prescriptions

## 2019-11-27 ENCOUNTER — APPOINTMENT (OUTPATIENT)
Dept: CARDIAC REHAB | Facility: HOSPITAL | Age: 69
End: 2019-11-27
Attending: INTERNAL MEDICINE
Payer: MEDICARE

## 2019-11-29 ENCOUNTER — APPOINTMENT (OUTPATIENT)
Dept: CARDIAC REHAB | Facility: HOSPITAL | Age: 69
End: 2019-11-29
Attending: INTERNAL MEDICINE
Payer: MEDICARE

## 2019-12-03 ENCOUNTER — TELEPHONE (OUTPATIENT)
Dept: NEPHROLOGY | Facility: CLINIC | Age: 69
End: 2019-12-03

## 2019-12-03 ENCOUNTER — LAB ENCOUNTER (OUTPATIENT)
Dept: LAB | Facility: HOSPITAL | Age: 69
End: 2019-12-03
Attending: INTERNAL MEDICINE
Payer: MEDICARE

## 2019-12-03 ENCOUNTER — OFFICE VISIT (OUTPATIENT)
Dept: ENDOCRINOLOGY CLINIC | Facility: CLINIC | Age: 69
End: 2019-12-03
Payer: COMMERCIAL

## 2019-12-03 ENCOUNTER — TELEPHONE (OUTPATIENT)
Dept: ENDOCRINOLOGY CLINIC | Facility: CLINIC | Age: 69
End: 2019-12-03

## 2019-12-03 VITALS
WEIGHT: 154 LBS | BODY MASS INDEX: 22.05 KG/M2 | HEART RATE: 64 BPM | HEIGHT: 70 IN | SYSTOLIC BLOOD PRESSURE: 160 MMHG | DIASTOLIC BLOOD PRESSURE: 85 MMHG

## 2019-12-03 DIAGNOSIS — E27.40 LOW SERUM ALDOSTERONE (HCC): ICD-10-CM

## 2019-12-03 DIAGNOSIS — E87.1 HYPONATREMIA: ICD-10-CM

## 2019-12-03 DIAGNOSIS — R42 INTERMITTENT LIGHTHEADEDNESS: Primary | ICD-10-CM

## 2019-12-03 DIAGNOSIS — E27.40 LOW SERUM ALDOSTERONE (HCC): Primary | ICD-10-CM

## 2019-12-03 PROCEDURE — 99214 OFFICE O/P EST MOD 30 MIN: CPT | Performed by: INTERNAL MEDICINE

## 2019-12-03 PROCEDURE — 82962 GLUCOSE BLOOD TEST: CPT | Performed by: INTERNAL MEDICINE

## 2019-12-03 PROCEDURE — 80053 COMPREHEN METABOLIC PANEL: CPT

## 2019-12-03 PROCEDURE — 36415 COLL VENOUS BLD VENIPUNCTURE: CPT

## 2019-12-03 PROCEDURE — 36416 COLLJ CAPILLARY BLOOD SPEC: CPT | Performed by: INTERNAL MEDICINE

## 2019-12-03 PROCEDURE — 83835 ASSAY OF METANEPHRINES: CPT

## 2019-12-03 NOTE — PROGRESS NOTES
Return Office Visit     CHIEF COMPLAINT:  Patient presents with:   Follow - Up: Bakari       HISTORY OF PRESENT ILLNESS:  Jak Larson is a 71year old male who presents for follow up for for evaluation of episodes of light headeness and abdominal discomfo Allergies    PAST MEDICAL, SOCIAL AND FAMILY HISTORY:  See past medical history marked as reviewed. See past surgical history marked as reviewed. See past family history marked as reviewed. See past social history marked as reviewed.     ASSESSMENTS: mostly overnight and could be related to sensor movement during sleep. Also, even though he reports some night sweats, he gets most of the episodes I the day , sugars are not low at that time. Also, they do not respond to carb administration.     Also, insu and did not have any further questions. This is a 35 minute visit and greater than 50% of the time was spent counseling the patient and/or coordinating care.         Orders Placed This Encounter      POC Finger stick glucose Denise Marcelo

## 2019-12-03 NOTE — TELEPHONE ENCOUNTER
Left a message for cardiology Dr. Lai Tucker  To call me to discuss patient  His office number is [phone]  Thanks

## 2019-12-03 NOTE — TELEPHONE ENCOUNTER
Please schedule patient for appointment on 12/10 at 4:00 pm as as new consult     Please call patient to inform about the appointment

## 2019-12-04 ENCOUNTER — APPOINTMENT (OUTPATIENT)
Dept: CARDIAC REHAB | Facility: HOSPITAL | Age: 69
End: 2019-12-04
Attending: INTERNAL MEDICINE
Payer: MEDICARE

## 2019-12-04 ENCOUNTER — HOSPITAL ENCOUNTER (OUTPATIENT)
Dept: CT IMAGING | Facility: HOSPITAL | Age: 69
Discharge: HOME | End: 2019-12-04
Attending: INTERNAL MEDICINE
Payer: MEDICARE

## 2019-12-04 DIAGNOSIS — I66.01 STENOSIS OF RIGHT MIDDLE CEREBRAL ARTERY: ICD-10-CM

## 2019-12-04 PROCEDURE — 70496 CT ANGIOGRAPHY HEAD: CPT | Performed by: INTERNAL MEDICINE

## 2019-12-04 NOTE — TELEPHONE ENCOUNTER
Patient will be doing labs in [facility] so will need paper orders regardless. Renin ordered. Mailed all three orders to [place] address.

## 2019-12-04 NOTE — TELEPHONE ENCOUNTER
Please let the patient know that I spoke with his cardiologist and he is okay with the patient stopping lisinopril for 2 weeks for testing  He should stop lisinopril x two weeks and repeat aldosterone level after that  He will be going to [place] for 5-6 m

## 2019-12-04 NOTE — TELEPHONE ENCOUNTER
Patient returned call. Offered appointment for date and time stated below but patient is leaving for [place] and requesting another earlier date. There is 1 time available on Monday 12/9/19 11:50 am but is MD approval. Can you see patient on this date?  I t

## 2019-12-05 NOTE — TELEPHONE ENCOUNTER
Patient contacted. He is willing to come Friday 11/6/19 at 11:40 am as Dr. Dinorah Aragon requested. Appointment booked.

## 2019-12-06 ENCOUNTER — TELEPHONE (OUTPATIENT)
Dept: INTERNAL MEDICINE CLINIC | Facility: CLINIC | Age: 69
End: 2019-12-06

## 2019-12-06 ENCOUNTER — APPOINTMENT (OUTPATIENT)
Dept: CARDIAC REHAB | Facility: HOSPITAL | Age: 69
End: 2019-12-06
Attending: INTERNAL MEDICINE
Payer: MEDICARE

## 2019-12-06 ENCOUNTER — OFFICE VISIT (OUTPATIENT)
Dept: NEPHROLOGY | Facility: CLINIC | Age: 69
End: 2019-12-06
Payer: COMMERCIAL

## 2019-12-06 VITALS
DIASTOLIC BLOOD PRESSURE: 87 MMHG | BODY MASS INDEX: 22.71 KG/M2 | HEART RATE: 65 BPM | HEIGHT: 70 IN | WEIGHT: 158.63 LBS | SYSTOLIC BLOOD PRESSURE: 158 MMHG

## 2019-12-06 DIAGNOSIS — E87.1 HYPONATREMIA: Primary | ICD-10-CM

## 2019-12-06 DIAGNOSIS — Z12.11 SCREEN FOR COLON CANCER: Primary | ICD-10-CM

## 2019-12-06 PROCEDURE — G0463 HOSPITAL OUTPT CLINIC VISIT: HCPCS | Performed by: INTERNAL MEDICINE

## 2019-12-06 PROCEDURE — 99205 OFFICE O/P NEW HI 60 MIN: CPT | Performed by: INTERNAL MEDICINE

## 2019-12-06 NOTE — TELEPHONE ENCOUNTER
Patient notified an appt was scheduled with Dr Monica Guillory for Monday 12/9/19 at 10:40am, notified to be there 15 minutes earlier for paperwork. Pt verbalizes understanding.

## 2019-12-06 NOTE — PROGRESS NOTES
Consult Requested By: Dr. Pushpa Costello      Reason for Consult: hyponatremia     HPI:     Patient is a 71 yrs old male with pmh of CAD s/p CABG in June 2019, ischemic CMP with EF 35%, s/p whipple surgery for  Cholangiocarcinoma in 2016 and s/p left adrena cancer [facility])     chloangiocarcinoma   • Personal history of antineoplastic chemotherapy     Completed chemo 2/17/17   • Pheochromocytoma, left     Dx in 6/2016: 1.7 cm mass near inferior adrenal gland   • Pneumoperitoneum 7/7/2019   • Tobacco abuse       P Other         No adrenal issues   • Diabetes Sister    • Thyroid disease Neg    • Thyroid Disorder Neg       Social History: Social History    Tobacco Use      Smoking status: Former Smoker        Packs/day: 0.50        Years: 40.00        Pack years: 21 constipation  Genitourinary:  Negative for dysuria and hematuria  Endocrine:  Negative for abnormal sleep patterns, increased activity  Hema/Lymph:  Negative for easy bleeding and easy bruising  Integumentary:  Negative for pruritus and rash  Musculoskelet Orders This Visit:  Orders Placed This Encounter      Sodium, Urine, Random [E]      Osmolality, Urine [E]      Meds This Visit:  Requested Prescriptions      No prescriptions requested or ordered in this encounter       Imaging & Referrals:  None     12

## 2019-12-06 NOTE — PATIENT INSTRUCTIONS
Urine test as ordered  Blood test per Dr. Pushpa Costello   Follow up in 6 months   CT scan as ordered

## 2019-12-06 NOTE — TELEPHONE ENCOUNTER
Received a [address] could not be processed. Send an [address] test in the mail to pt who indicates he will not be able to complete until he gets to [place]. Pt verbalizes understanding.

## 2019-12-09 ENCOUNTER — PATIENT MESSAGE (OUTPATIENT)
Dept: ENDOCRINOLOGY CLINIC | Facility: CLINIC | Age: 69
End: 2019-12-09

## 2019-12-09 ENCOUNTER — OFFICE VISIT (OUTPATIENT)
Dept: NEUROLOGY | Facility: CLINIC | Age: 69
End: 2019-12-09
Payer: COMMERCIAL

## 2019-12-09 ENCOUNTER — PATIENT OUTREACH (OUTPATIENT)
Dept: CASE MANAGEMENT | Age: 69
End: 2019-12-09

## 2019-12-09 VITALS
HEART RATE: 70 BPM | RESPIRATION RATE: 16 BRPM | DIASTOLIC BLOOD PRESSURE: 68 MMHG | WEIGHT: 156 LBS | BODY MASS INDEX: 22 KG/M2 | SYSTOLIC BLOOD PRESSURE: 126 MMHG

## 2019-12-09 DIAGNOSIS — I67.1 INTRACRANIAL ANEURYSM: ICD-10-CM

## 2019-12-09 DIAGNOSIS — R93.0 ABNORMAL CT SCAN OF HEAD: ICD-10-CM

## 2019-12-09 DIAGNOSIS — I67.2 INTRACRANIAL ATHEROSCLEROSIS: ICD-10-CM

## 2019-12-09 DIAGNOSIS — R42 EPISODIC LIGHTHEADEDNESS: Primary | ICD-10-CM

## 2019-12-09 PROCEDURE — 99204 OFFICE O/P NEW MOD 45 MIN: CPT | Performed by: OTHER

## 2019-12-09 NOTE — PROGRESS NOTES
[address]   Neurology- INITIAL CLINIC VISIT  2019, 10:54 AM     Hiro More Patient Status:  No patient class for patient encounter    1950 MRN [id number]   Location [facility] Jeaneth Odell - 149 mg/dL 76   LDL Cholesterol Calc      <100 mg/dL 43   VLDL      0 - 30 mg/dL 15   NON HDL CHOL      <130 mg/dL 58           Past Medical History:  Past Medical History:   Diagnosis Date   • Acute respiratory failure, unspecified whether with hypoxia o N/A 7/25/2016    Performed by Alejandrina Walker MD at [address] [place] [address] (EUS) N/A 6/23/2016    Performed by Alejandrina Walker MD at [facility] ENDOSCOPY   • ESOPHAGOGASTRODUODENOSCOPY (EGD) N/A 6/23/2016    Performed by Alejandrina Walker MD mg by mouth daily.   , Disp: , Rfl:   •  atorvastatin 10 MG Oral Tab, Take 1 tablet (10 mg total) by mouth nightly., Disp: 30 tablet, Rfl: 3  •  Denosumab 60 MG/ML Subcutaneous Solution Prefilled Syringe, Inject 60 mg into the skin every 6 (six) months., [facility] [place] the toes. Pinprick and temperature were normal. Romberg sign was absent. Complex motor skills revealed normal coordination. Finger-nose-finger intact. Gait was narrow and stable, was able to walk on heels, toes and tandem without any difficulty.      ASS

## 2019-12-09 NOTE — PROGRESS NOTES
The patient states dizziness on and off for about a month. Denies any headaches, nausea and vomiting. Denies any balance issues or recent falls.

## 2019-12-10 ENCOUNTER — LAB ENCOUNTER (OUTPATIENT)
Dept: LAB | Facility: HOSPITAL | Age: 69
End: 2019-12-10
Attending: INTERNAL MEDICINE
Payer: MEDICARE

## 2019-12-10 ENCOUNTER — HOSPITAL ENCOUNTER (OUTPATIENT)
Dept: CT IMAGING | Facility: HOSPITAL | Age: 69
Discharge: HOME | End: 2019-12-10
Attending: NURSE PRACTITIONER
Payer: MEDICARE

## 2019-12-10 DIAGNOSIS — E27.40 LOW SERUM ALDOSTERONE (HCC): ICD-10-CM

## 2019-12-10 DIAGNOSIS — E87.1 HYPONATREMIA: ICD-10-CM

## 2019-12-10 DIAGNOSIS — R10.84 GENERALIZED ABDOMINAL PAIN: ICD-10-CM

## 2019-12-10 DIAGNOSIS — R79.89 HYPOURICEMIA: Primary | ICD-10-CM

## 2019-12-10 PROCEDURE — 84300 ASSAY OF URINE SODIUM: CPT

## 2019-12-10 PROCEDURE — 82088 ASSAY OF ALDOSTERONE: CPT

## 2019-12-10 PROCEDURE — 74177 CT ABD & PELVIS W/CONTRAST: CPT | Performed by: NURSE PRACTITIONER

## 2019-12-10 PROCEDURE — 80048 BASIC METABOLIC PNL TOTAL CA: CPT | Performed by: INTERNAL MEDICINE

## 2019-12-10 PROCEDURE — 83935 ASSAY OF URINE OSMOLALITY: CPT

## 2019-12-10 PROCEDURE — 84244 ASSAY OF RENIN: CPT

## 2019-12-10 PROCEDURE — 36415 COLL VENOUS BLD VENIPUNCTURE: CPT | Performed by: INTERNAL MEDICINE

## 2019-12-13 ENCOUNTER — TELEPHONE (OUTPATIENT)
Dept: ENDOCRINOLOGY CLINIC | Facility: CLINIC | Age: 69
End: 2019-12-13

## 2019-12-13 DIAGNOSIS — R42 DIZZINESS: ICD-10-CM

## 2019-12-13 DIAGNOSIS — E27.40 LOW SERUM ALDOSTERONE (HCC): Primary | ICD-10-CM

## 2019-12-13 NOTE — TELEPHONE ENCOUNTER
Torey Zhang did you have a chance to see his aldosterone and renin. Aldosterone is undetectable.   Thanks

## 2019-12-16 NOTE — TELEPHONE ENCOUNTER
Yes. Saw the results. He was only off of lisinopril for 3-4 days and he did the labs in evening     He wasn't orthostatic. I think aldosterone is low from lisinopril.

## 2019-12-18 RX ORDER — METOPROLOL SUCCINATE 25 MG/1
0.5 TABLET, EXTENDED RELEASE ORAL DAILY
Status: CANCELLED | OUTPATIENT
Start: 2019-12-18

## 2019-12-23 ENCOUNTER — TELEPHONE (OUTPATIENT)
Dept: CARDIOLOGY | Age: 69
End: 2019-12-23

## 2019-12-27 RX ORDER — LISINOPRIL 2.5 MG/1
2.5 TABLET ORAL NIGHTLY
COMMUNITY
End: 2019-12-27 | Stop reason: SDUPTHER

## 2019-12-27 RX ORDER — LISINOPRIL 2.5 MG/1
2.5 TABLET ORAL NIGHTLY
Qty: 30 TABLET | Refills: 6 | Status: SHIPPED | OUTPATIENT
Start: 2019-12-27 | End: 2020-01-02 | Stop reason: ALTCHOICE

## 2020-01-02 ENCOUNTER — TELEPHONE (OUTPATIENT)
Dept: CARDIOLOGY | Age: 70
End: 2020-01-02

## 2020-01-02 RX ORDER — LOSARTAN POTASSIUM 25 MG/1
25 TABLET ORAL AT BEDTIME
Qty: 30 TABLET | Refills: 3 | Status: SHIPPED | OUTPATIENT
Start: 2020-01-02 | End: 2020-06-01 | Stop reason: ALTCHOICE

## 2020-01-08 NOTE — PROCEDURES
OPERATION(S) PERFORMED:   1. Dual chamber ICD implant. 2. Chest fluoroscopy.    3. Left sided venogram    : MD Carissa  INDICATION:  CAD, CM, EF <86%, NSVT    COMPLICATIONS: None     ESTIMATED BLOOD LOSS: Minimal.     METHODS: The patient was broug lead:3mV,  0.5 v at 0.4 ms, 494 ohms.     INDUCTION TESTING: No DFT testing was performed    CONCLUSIONS:   1. Status post successful implant of a Medtronic chamber ICD with a active fixation right ventricular (RV) lead, active fixation RA lead with adequat

## 2020-01-12 ENCOUNTER — APPOINTMENT (OUTPATIENT)
Dept: LAB | Facility: HOSPITAL | Age: 70
End: 2020-01-12
Attending: INTERNAL MEDICINE
Payer: MEDICARE

## 2020-01-12 PROCEDURE — 82274 ASSAY TEST FOR BLOOD FECAL: CPT | Performed by: INTERNAL MEDICINE

## 2020-01-14 ENCOUNTER — TELEPHONE (OUTPATIENT)
Dept: ENDOCRINOLOGY CLINIC | Facility: CLINIC | Age: 70
End: 2020-01-14

## 2020-01-14 NOTE — TELEPHONE ENCOUNTER
Message relayed to pt. He has not established care with MD in [facility]. Stressed importance of this to pt as his condition is difficult to manage over the phone.   He states he will \"work on this\"  He would like us to provide orders for labs so he may have th

## 2020-01-14 NOTE — TELEPHONE ENCOUNTER
Reviewed labs [facility].   Aldosterone low normal  I discussed his case with Dr. Trisha More and given his normal BP and the fact that he was off lisinopril only for a few days at the time of testing, it is hard to say if the slightly low value is pathologic

## 2020-01-21 ENCOUNTER — TELEPHONE (OUTPATIENT)
Dept: SURGERY | Facility: CLINIC | Age: 70
End: 2020-01-21

## 2020-01-21 NOTE — TELEPHONE ENCOUNTER
Called pt to schedule appt with [facility]U as refd by Dr. Candee Eisenmenger for 3 mm aneurysm. Pt is in [place] until April. Reminder placed.

## 2020-01-22 ENCOUNTER — TELEPHONE (OUTPATIENT)
Dept: INTERNAL MEDICINE CLINIC | Facility: CLINIC | Age: 70
End: 2020-01-22

## 2020-01-24 ENCOUNTER — TELEPHONE (OUTPATIENT)
Dept: CARDIOLOGY | Age: 70
End: 2020-01-24

## 2020-01-24 RX ORDER — ATORVASTATIN CALCIUM 10 MG/1
TABLET, FILM COATED ORAL
Qty: 90 TABLET | Refills: 0 | Status: SHIPPED | OUTPATIENT
Start: 2020-01-24 | End: 2020-04-24

## 2020-02-04 NOTE — TELEPHONE ENCOUNTER
Called lab at [facility] (ph: [phone]). They are currently closed. Will try again tomorrow.

## 2020-02-04 NOTE — TELEPHONE ENCOUNTER
Patient has had BMP from 1/17/2020  I do not have renin and yovany results  Could you pleas call the lab for them  Thanks

## 2020-02-24 PROCEDURE — 93296 REM INTERROG EVL PM/IDS: CPT | Performed by: INTERNAL MEDICINE

## 2020-03-04 NOTE — TELEPHONE ENCOUNTER
Reviewed labs. Jamey remains low  Did he see an endocrinologist at [place], this can not be managed over the phone.    Hence, needs to see endo  Also, I suggest that he call and dominik martinez apt to be seen at [facility] for further evaluation since his ald

## 2020-03-04 NOTE — TELEPHONE ENCOUNTER
Contacted patient and relayed below message from Dr. Wilmar Sheridan. He has not seen an endocrinologist in [place].   He states he will see his PCP down there in [place] tomorrow and will get a referral for endocrinologist.     RN also advised to make an appoin

## 2020-03-04 NOTE — TELEPHONE ENCOUNTER
Unlikely  We had also checked a cortisol level and it was normal    Given multiple symptoms I strongly recommend he call [facility] for an apt  Can provide numbers for [facility] or [facility]    Thanks

## 2020-03-05 NOTE — TELEPHONE ENCOUNTER
Responded to patient via [employer] indicating the phone numbers to Alda and Liam Also relayed Dr. Klaus Shen response to patient's question.

## 2020-04-06 ENCOUNTER — ANCILLARY PROCEDURE (OUTPATIENT)
Dept: CARDIOLOGY | Age: 70
End: 2020-04-06
Attending: INTERNAL MEDICINE

## 2020-04-06 DIAGNOSIS — Z95.810 ICD (IMPLANTABLE CARDIOVERTER-DEFIBRILLATOR) IN PLACE: ICD-10-CM

## 2020-04-16 ENCOUNTER — TELEPHONE (OUTPATIENT)
Dept: CARDIOLOGY | Age: 70
End: 2020-04-16

## 2020-04-22 DIAGNOSIS — E87.1 HYPONATREMIA: Primary | ICD-10-CM

## 2020-04-24 RX ORDER — ATORVASTATIN CALCIUM 10 MG/1
TABLET, FILM COATED ORAL
Qty: 90 TABLET | Refills: 0 | Status: SHIPPED | OUTPATIENT
Start: 2020-04-24 | End: 2020-04-30 | Stop reason: DRUGHIGH

## 2020-04-28 ENCOUNTER — LAB ENCOUNTER (OUTPATIENT)
Dept: LAB | Facility: HOSPITAL | Age: 70
End: 2020-04-28
Attending: INTERNAL MEDICINE
Payer: MEDICARE

## 2020-04-28 DIAGNOSIS — M81.8 OTHER OSTEOPOROSIS WITHOUT CURRENT PATHOLOGICAL FRACTURE: ICD-10-CM

## 2020-04-28 DIAGNOSIS — R79.89 HYPOURICEMIA: Primary | ICD-10-CM

## 2020-04-28 DIAGNOSIS — R42 DIZZINESS AND GIDDINESS: ICD-10-CM

## 2020-04-28 DIAGNOSIS — R42 DIZZINESS: ICD-10-CM

## 2020-04-28 DIAGNOSIS — E27.40 LOW SERUM ALDOSTERONE (HCC): ICD-10-CM

## 2020-04-28 DIAGNOSIS — E87.1 HYPONATREMIA: ICD-10-CM

## 2020-04-28 PROCEDURE — 36415 COLL VENOUS BLD VENIPUNCTURE: CPT | Performed by: INTERNAL MEDICINE

## 2020-04-28 PROCEDURE — 84244 ASSAY OF RENIN: CPT

## 2020-04-28 PROCEDURE — 80048 BASIC METABOLIC PNL TOTAL CA: CPT | Performed by: INTERNAL MEDICINE

## 2020-04-28 PROCEDURE — 82088 ASSAY OF ALDOSTERONE: CPT

## 2020-04-28 PROCEDURE — 82306 VITAMIN D 25 HYDROXY: CPT | Performed by: INTERNAL MEDICINE

## 2020-04-30 ENCOUNTER — V-VISIT (OUTPATIENT)
Dept: CARDIOLOGY | Age: 70
End: 2020-04-30

## 2020-04-30 ENCOUNTER — TELEPHONE (OUTPATIENT)
Dept: ENDOCRINOLOGY CLINIC | Facility: CLINIC | Age: 70
End: 2020-04-30

## 2020-04-30 ENCOUNTER — APPOINTMENT (OUTPATIENT)
Dept: CARDIOLOGY | Age: 70
End: 2020-04-30
Attending: INTERNAL MEDICINE

## 2020-04-30 ENCOUNTER — VIRTUAL PHONE E/M (OUTPATIENT)
Dept: SURGERY | Facility: CLINIC | Age: 70
End: 2020-04-30
Payer: COMMERCIAL

## 2020-04-30 VITALS
SYSTOLIC BLOOD PRESSURE: 138 MMHG | WEIGHT: 163 LBS | HEIGHT: 70 IN | HEART RATE: 63 BPM | DIASTOLIC BLOOD PRESSURE: 90 MMHG | BODY MASS INDEX: 23.34 KG/M2

## 2020-04-30 DIAGNOSIS — Z95.810 ICD (IMPLANTABLE CARDIOVERTER-DEFIBRILLATOR) IN PLACE: Primary | ICD-10-CM

## 2020-04-30 DIAGNOSIS — I25.5 ISCHEMIC CARDIOMYOPATHY: ICD-10-CM

## 2020-04-30 DIAGNOSIS — I67.1 INTRACRANIAL ANEURYSM: ICD-10-CM

## 2020-04-30 DIAGNOSIS — I67.9 INTRACRANIAL VASCULAR STENOSIS: Primary | ICD-10-CM

## 2020-04-30 DIAGNOSIS — R00.1 BRADYCARDIA, UNSPECIFIED: ICD-10-CM

## 2020-04-30 PROCEDURE — 99442 TELEPHONE E&M BY PHYSICIAN EST PT NOT ORIG PREV 7 DAYS 11-20 MIN: CPT | Performed by: INTERNAL MEDICINE

## 2020-04-30 PROCEDURE — 99443 PHONE E/M BY PHYS 21-30 MIN: CPT | Performed by: RADIOLOGY

## 2020-04-30 RX ORDER — ATORVASTATIN CALCIUM 20 MG/1
20 TABLET, FILM COATED ORAL DAILY
Qty: 30 TABLET | Refills: 5 | Status: SHIPPED | OUTPATIENT
Start: 2020-04-30 | End: 2020-11-03 | Stop reason: SDUPTHER

## 2020-04-30 RX ORDER — METOPROLOL SUCCINATE 100 MG/1
100 TABLET, EXTENDED RELEASE ORAL DAILY
Qty: 30 TABLET | Refills: 5 | Status: SHIPPED | OUTPATIENT
Start: 2020-04-30 | End: 2021-03-30 | Stop reason: SDUPTHER

## 2020-04-30 SDOH — HEALTH STABILITY: MENTAL HEALTH: HOW OFTEN DO YOU HAVE A DRINK CONTAINING ALCOHOL?: NEVER

## 2020-04-30 NOTE — PROGRESS NOTES
Virtual Telephone Check-In    Nicole Rodriguez verbally consents to a Virtual/Telephone Check-In visit on 04/30/20. Patient understands and accepts financial responsibility for any deductible, co-insurance and/or co-pays associated with this service.     D

## 2020-04-30 NOTE — TELEPHONE ENCOUNTER
Received labs  Aldosterone is very low  Like he has been recommended multiple times before, I again recommend FU at a tertiary care center for further evaluation  Please let him know  He should call us once he has the apt and we can forward labs results

## 2020-05-01 NOTE — TELEPHONE ENCOUNTER
Called the patient. Verified name and . He agrees with being seen at a tertiary care center.    He wants to know if you have any recommendations for a referral?

## 2020-05-01 NOTE — TELEPHONE ENCOUNTER
No one specific, but he can call [facility] or [facility].      Thanks

## 2020-05-01 NOTE — TELEPHONE ENCOUNTER
Hi, looked at the labs and aldosterone is low as it has been.  This could be bcuz of lisinopril and also has one adrenal gland removed    You can call me to further discuss     Thanks

## 2020-05-01 NOTE — TELEPHONE ENCOUNTER
I spoke with Dr. Virginia Holm and we agree that he should be evaluated at a higher care center    Thanks

## 2020-05-01 NOTE — TELEPHONE ENCOUNTER
Spoke with pt and notified of MD recommendations. Pt will call [address] [place] [address] or [facility].

## 2020-05-04 ENCOUNTER — TELEPHONE (OUTPATIENT)
Dept: INTERNAL MEDICINE CLINIC | Facility: CLINIC | Age: 70
End: 2020-05-04

## 2020-05-04 RX ORDER — LOSARTAN POTASSIUM 25 MG/1
25 TABLET ORAL NIGHTLY
COMMUNITY
Start: 2020-01-02 | End: 2020-05-19

## 2020-05-04 RX ORDER — LISINOPRIL 2.5 MG/1
2.5 TABLET ORAL NIGHTLY
COMMUNITY
Start: 2019-12-27 | End: 2020-05-06

## 2020-05-04 RX ORDER — LOSARTAN POTASSIUM 25 MG/1
25 TABLET ORAL NIGHTLY
COMMUNITY
Start: 2020-03-31 | End: 2020-05-19

## 2020-05-04 RX ORDER — ATORVASTATIN CALCIUM 10 MG/1
TABLET, FILM COATED ORAL
COMMUNITY
Start: 2020-04-24 | End: 2020-05-06

## 2020-05-04 RX ORDER — ATORVASTATIN CALCIUM 20 MG/1
20 TABLET, FILM COATED ORAL DAILY
COMMUNITY
Start: 2020-04-30 | End: 2020-05-22

## 2020-05-04 RX ORDER — METOPROLOL SUCCINATE 100 MG/1
100 TABLET, EXTENDED RELEASE ORAL DAILY
COMMUNITY
Start: 2020-04-30 | End: 2020-05-22

## 2020-05-04 NOTE — TELEPHONE ENCOUNTER
Patient is calling and took his BP and it is 144/110 pulse is 70 he is feeling dizzy and shaky  I am sending to triage based on the symptoms

## 2020-05-04 NOTE — TELEPHONE ENCOUNTER
Patient states one hour ago he became very dizzy and shaky, checked his JK=622 then 115, check his CU=187/110 70. Pt rechecked BP while on the phone, states it was 109/69 61, states he is not as dizzy or shaky now, rechecked /88 68.   Pt states he wan

## 2020-05-04 NOTE — TELEPHONE ENCOUNTER
Patient notified to continue to monitor BP and HR, see Dr Silvano Sparks nephrology for aldosterone issues/BP issues. Pt states he has an appt tomorrow 5/5/20 with Dr Silvano Sparks.   Pt notified if BP spikes high again, dizzy and shaky then he will need to go to t

## 2020-05-05 ENCOUNTER — VIRTUAL PHONE E/M (OUTPATIENT)
Dept: NEPHROLOGY | Facility: CLINIC | Age: 70
End: 2020-05-05
Payer: COMMERCIAL

## 2020-05-05 ENCOUNTER — TELEPHONE (OUTPATIENT)
Dept: NEPHROLOGY | Facility: CLINIC | Age: 70
End: 2020-05-05

## 2020-05-05 DIAGNOSIS — E87.1 HYPONATREMIA: Primary | ICD-10-CM

## 2020-05-05 NOTE — PROGRESS NOTES
Pt called today and I returned call and performed virtual telephone visit. Time spent 10 minutes. We discussed that Na is stable at 132 meq/l. He c/o issues with BP yesterday as SBP was into 160s but better now today at 130/70.   For some reason did not t

## 2020-05-06 ENCOUNTER — VIRTUAL PHONE E/M (OUTPATIENT)
Dept: NEPHROLOGY | Facility: CLINIC | Age: 70
End: 2020-05-06

## 2020-05-06 ENCOUNTER — TELEPHONE (OUTPATIENT)
Dept: INTERNAL MEDICINE CLINIC | Facility: CLINIC | Age: 70
End: 2020-05-06

## 2020-05-06 DIAGNOSIS — E87.1 HYPONATREMIA: Primary | ICD-10-CM

## 2020-05-06 PROCEDURE — 99441 PHONE E/M BY PHYS 5-10 MIN: CPT | Performed by: INTERNAL MEDICINE

## 2020-05-06 NOTE — TELEPHONE ENCOUNTER
bp is too high, continue his meds as prescribed, check bp twice daily for the next 5 days, set up vurtual visit with me next week.

## 2020-05-06 NOTE — TELEPHONE ENCOUNTER
Patient's wife called stating that husbands BS's and BP's are jumping everywhere;  Today;    BP's   10:45   168/125             10:55  178/85             11:00   173/85              11:15   163/95    BS's   9:15   193           10:45   75            11:45

## 2020-05-06 NOTE — TELEPHONE ENCOUNTER
Patient states he did not take his Losartan for two nights because he thought that was making his BP go up and down,  He did take his Losartan 25mg this am today, also thought the Metoprolol was the problem so took 50mg at 9am and the other 50mg at 10:30am

## 2020-05-06 NOTE — TELEPHONE ENCOUNTER
Patient notified BP is too high, continue meds as prescribed, check BP twice daily, keep a log for the next 5 days with dates, time, s/s. VTE scheduled for 5/11/20 at 1pm with AS. Pt verbalizes understanding.

## 2020-05-07 ENCOUNTER — TELEPHONE (OUTPATIENT)
Dept: CARDIOLOGY | Age: 70
End: 2020-05-07

## 2020-05-07 RX ORDER — LOSARTAN POTASSIUM 50 MG/1
50 TABLET ORAL EVERY EVENING
Qty: 30 TABLET | Refills: 6 | Status: SHIPPED | OUTPATIENT
Start: 2020-05-07 | End: 2020-06-01 | Stop reason: ALTCHOICE

## 2020-05-11 ENCOUNTER — VIRTUAL PHONE E/M (OUTPATIENT)
Dept: INTERNAL MEDICINE CLINIC | Facility: CLINIC | Age: 70
End: 2020-05-11
Payer: COMMERCIAL

## 2020-05-11 DIAGNOSIS — E87.1 HYPONATREMIA: ICD-10-CM

## 2020-05-11 DIAGNOSIS — E16.2 HYPOGLYCEMIA: Primary | ICD-10-CM

## 2020-05-11 DIAGNOSIS — I10 BENIGN ESSENTIAL HTN: ICD-10-CM

## 2020-05-11 DIAGNOSIS — C77.9 CHOLANGIOCARCINOMA METASTATIC TO LYMPH NODE (HCC): ICD-10-CM

## 2020-05-11 DIAGNOSIS — C22.1 CHOLANGIOCARCINOMA METASTATIC TO LYMPH NODE (HCC): ICD-10-CM

## 2020-05-11 PROBLEM — J44.9 ASTHMA WITH COPD (CHRONIC OBSTRUCTIVE PULMONARY DISEASE): Chronic | Status: ACTIVE | Noted: 2020-05-11

## 2020-05-11 PROBLEM — J44.9 ASTHMA WITH COPD (CHRONIC OBSTRUCTIVE PULMONARY DISEASE) (HCC): Chronic | Status: ACTIVE | Noted: 2020-05-11

## 2020-05-11 PROBLEM — J44.89 ASTHMA WITH COPD (CHRONIC OBSTRUCTIVE PULMONARY DISEASE) (HCC): Chronic | Status: ACTIVE | Noted: 2020-05-11

## 2020-05-11 PROBLEM — J44.89 ASTHMA WITH COPD (CHRONIC OBSTRUCTIVE PULMONARY DISEASE): Chronic | Status: ACTIVE | Noted: 2020-05-11

## 2020-05-11 PROCEDURE — 99443 PHONE E/M BY PHYS 21-30 MIN: CPT | Performed by: INTERNAL MEDICINE

## 2020-05-11 NOTE — PROGRESS NOTES
Due to COVID-19 ACTION PLAN, the patient's office visit was converted to a phone or video visit.   Time Spent:30 min    Subjective     HPI:   Sandra Munoz is a 71year old male who presents for f/u of variable sugars, in the 70's and up to 200, with no me and because of restrictions of visitation. There are limitations of this visit as no physical exam could be performed. Every conscious effort was taken to allow for sufficient and adequate time.   This billing visit was spent on reviewing labs, medication

## 2020-05-14 ENCOUNTER — LAB ENCOUNTER (OUTPATIENT)
Dept: LAB | Facility: HOSPITAL | Age: 70
End: 2020-05-14
Attending: INTERNAL MEDICINE
Payer: MEDICARE

## 2020-05-14 DIAGNOSIS — I10 BENIGN ESSENTIAL HTN: ICD-10-CM

## 2020-05-14 DIAGNOSIS — E16.2 HYPOGLYCEMIA: ICD-10-CM

## 2020-05-14 LAB
ALBUMIN SERPL-MCNC: 3.5 G/DL (ref 3.4–5)
ALBUMIN/GLOB SERPL: 1 {RATIO} (ref 1–2)
ALP LIVER SERPL-CCNC: 96 U/L (ref 45–117)
ALT SERPL-CCNC: 36 U/L (ref 16–61)
ANION GAP SERPL CALC-SCNC: 5 MMOL/L (ref 0–18)
AST SERPL-CCNC: 23 U/L (ref 15–37)
BILIRUB SERPL-MCNC: 0.8 MG/DL (ref 0.1–2)
BUN BLD-MCNC: 15 MG/DL (ref 7–18)
BUN/CREAT SERPL: 12.4 (ref 10–20)
CALCIUM BLD-MCNC: 8.5 MG/DL (ref 8.5–10.1)
CHLORIDE SERPL-SCNC: 103 MMOL/L (ref 98–112)
CHOLEST SMN-MCNC: 88 MG/DL (ref ?–200)
CO2 SERPL-SCNC: 26 MMOL/L (ref 21–32)
CREAT BLD-MCNC: 1.21 MG/DL (ref 0.7–1.3)
CREAT UR-SCNC: 58 MG/DL
EST. AVERAGE GLUCOSE BLD GHB EST-MCNC: 117 MG/DL (ref 68–126)
GLOBULIN PLAS-MCNC: 3.6 G/DL (ref 2.8–4.4)
GLUCOSE BLD-MCNC: 92 MG/DL (ref 70–99)
HBA1C MFR BLD HPLC: 5.7 % (ref ?–5.7)
HDLC SERPL-MCNC: 53 MG/DL (ref 40–59)
LDLC SERPL CALC-MCNC: 25 MG/DL (ref ?–100)
M PROTEIN MFR SERPL ELPH: 7.1 G/DL (ref 6.4–8.2)
MICROALBUMIN UR-MCNC: 0.63 MG/DL
MICROALBUMIN/CREAT 24H UR-RTO: 10.9 UG/MG (ref ?–30)
NONHDLC SERPL-MCNC: 35 MG/DL (ref ?–130)
OSMOLALITY SERPL CALC.SUM OF ELEC: 278 MOSM/KG (ref 275–295)
PATIENT FASTING Y/N/NP: YES
PATIENT FASTING Y/N/NP: YES
POTASSIUM SERPL-SCNC: 4.8 MMOL/L (ref 3.5–5.1)
SODIUM SERPL-SCNC: 134 MMOL/L (ref 136–145)
T4 FREE SERPL-MCNC: 1.6 NG/DL (ref 0.8–1.7)
TRIGL SERPL-MCNC: 49 MG/DL (ref 30–149)
TSI SER-ACNC: 0.01 MIU/ML (ref 0.36–3.74)
VLDLC SERPL CALC-MCNC: 10 MG/DL (ref 0–30)

## 2020-05-14 PROCEDURE — 84481 FREE ASSAY (FT-3): CPT | Performed by: INTERNAL MEDICINE

## 2020-05-14 PROCEDURE — 83036 HEMOGLOBIN GLYCOSYLATED A1C: CPT

## 2020-05-14 PROCEDURE — 80061 LIPID PANEL: CPT

## 2020-05-14 PROCEDURE — 80053 COMPREHEN METABOLIC PANEL: CPT

## 2020-05-14 PROCEDURE — 84439 ASSAY OF FREE THYROXINE: CPT

## 2020-05-14 PROCEDURE — 82043 UR ALBUMIN QUANTITATIVE: CPT

## 2020-05-14 PROCEDURE — 36415 COLL VENOUS BLD VENIPUNCTURE: CPT

## 2020-05-14 PROCEDURE — 84443 ASSAY THYROID STIM HORMONE: CPT

## 2020-05-14 PROCEDURE — 82570 ASSAY OF URINE CREATININE: CPT

## 2020-05-15 ENCOUNTER — TELEPHONE (OUTPATIENT)
Dept: ENDOCRINOLOGY CLINIC | Facility: CLINIC | Age: 70
End: 2020-05-15

## 2020-05-15 DIAGNOSIS — R79.89 ABNORMAL THYROID BLOOD TEST: Primary | ICD-10-CM

## 2020-05-15 LAB — T3FREE SERPL-MCNC: 3.72 PG/ML (ref 2.4–4.2)

## 2020-05-15 NOTE — TELEPHONE ENCOUNTER
Reference lab called stating they need the order put in epic as add on.  RN added the Free T3 as per written order. Please advise on other RN's questions from below. Thank you.

## 2020-05-15 NOTE — TELEPHONE ENCOUNTER
Spoke with patient states he saw his primary yesterday through a virtual visit, had labs done, he wanted Dr Raul Hogue to review them since he is hyperthyroid and was instructed to follow up with endocrinologist.

## 2020-05-15 NOTE — TELEPHONE ENCOUNTER
I have recommended since last year to please see someone [facility]  To evaluate his ald[facility]  This is out of my scope   I can only address his thyroid  Thanks

## 2020-05-15 NOTE — TELEPHONE ENCOUNTER
Pt contacted and informed he needs to f/u with tertiary care center [facility], Yolanda Napier, per Dr Douglas Carcamo, pt verbalized understanding.  Pt booked for f/u appt for 6/8/20 @ 3:45pm.

## 2020-05-15 NOTE — TELEPHONE ENCOUNTER
Called reference lab and was able to add T3 to labs.  Pt states he did not follow up with Hien in regards to aldosterone was told that he did not need to?? Okay to add patient to schedule for June 8th at 3:45pm?

## 2020-05-15 NOTE — TELEPHONE ENCOUNTER
Please add a free T3 to them  I will need to address this over an apt since this is a new problem  Please bok in the next 2-3 weeks  Can book end of the on Monday ( can ask Soco Bustos to add a 3:45 slot if needed)  Please ask him if he has a chance to schedule with [facility] or some other [facility] center for his aldosterone  Thanks

## 2020-05-18 ENCOUNTER — VIRTUAL PHONE E/M (OUTPATIENT)
Dept: ENDOCRINOLOGY CLINIC | Facility: CLINIC | Age: 70
End: 2020-05-18
Payer: COMMERCIAL

## 2020-05-18 ENCOUNTER — HOSPITAL ENCOUNTER (OUTPATIENT)
Dept: GENERAL RADIOLOGY | Facility: HOSPITAL | Age: 70
Discharge: HOME | End: 2020-05-18
Attending: THORACIC SURGERY (CARDIOTHORACIC VASCULAR SURGERY)
Payer: MEDICARE

## 2020-05-18 DIAGNOSIS — E05.90 SUBCLINICAL HYPERTHYROIDISM: Primary | ICD-10-CM

## 2020-05-18 DIAGNOSIS — Z98.890 HISTORY OF OPEN HEART SURGERY: ICD-10-CM

## 2020-05-18 PROBLEM — I67.9 INTRACRANIAL VASCULAR STENOSIS: Status: ACTIVE | Noted: 2020-05-18

## 2020-05-18 PROCEDURE — 71048 X-RAY EXAM CHEST 4+ VIEWS: CPT | Performed by: THORACIC SURGERY (CARDIOTHORACIC VASCULAR SURGERY)

## 2020-05-18 PROCEDURE — 99442 PHONE E/M BY PHYS 11-20 MIN: CPT | Performed by: INTERNAL MEDICINE

## 2020-05-18 NOTE — PROGRESS NOTES
Virtual Telephone Check-In    Dennis Mendez verbally consents to a Virtual/Telephone Check-In visit on 05/18/20. Patient understands and accepts financial responsibility for any deductible, co-insurance and/or co-pays associated with this service.     D every 4 (four) hours as needed for Cramping., Disp: 30 tablet, Rfl: 0  atorvastatin 20 MG Oral Tab, Take 20 mg by mouth daily. , Disp: , Rfl:   Losartan Potassium 25 MG Oral Tab, Take 25 mg by mouth nightly., Disp: , Rfl:   Losartan Potassium 25 MG Oral Tab the diagnosis and that there is increased risk of progression to overt hyperthyroidism when TSH is < 0.1, symptoms are present and when underlying pathology is toxic adenoma.   Will get a NM scan and uptake to look for cause (graves vs toxic adenoma vs MNG)

## 2020-05-19 ENCOUNTER — VIRTUAL PHONE E/M (OUTPATIENT)
Dept: INTERNAL MEDICINE CLINIC | Facility: CLINIC | Age: 70
End: 2020-05-19
Payer: COMMERCIAL

## 2020-05-19 DIAGNOSIS — E05.90 HYPERTHYROIDISM: ICD-10-CM

## 2020-05-19 DIAGNOSIS — I10 BENIGN ESSENTIAL HTN: Primary | ICD-10-CM

## 2020-05-19 DIAGNOSIS — R73.9 HYPERGLYCEMIA: ICD-10-CM

## 2020-05-19 DIAGNOSIS — I25.5 CARDIOMYOPATHY, ISCHEMIC: ICD-10-CM

## 2020-05-19 DIAGNOSIS — C22.1 CHOLANGIOCARCINOMA (HCC): ICD-10-CM

## 2020-05-19 DIAGNOSIS — E87.1 HYPONATREMIA: ICD-10-CM

## 2020-05-19 PROCEDURE — 99443 PHONE E/M BY PHYS 21-30 MIN: CPT | Performed by: INTERNAL MEDICINE

## 2020-05-19 RX ORDER — LOSARTAN POTASSIUM 25 MG/1
50 TABLET ORAL NIGHTLY
Refills: 0 | COMMUNITY
Start: 2020-05-19 | End: 2020-05-22

## 2020-05-19 NOTE — PROGRESS NOTES
Due to COVID-19 ACTION PLAN, the patient's office visit was converted to a phone or video visit. Time Spent:30 min    Subjective     HPI:   Valdemar Layton is a 71year old male who presents for continued dizziness intermittently. Has cta brain scheduled. provider-patient relationship, due to the on-going public health crisis/national emergency and because of restrictions of visitation. There are limitations of this visit as no physical exam could be performed.   Every conscious effort was taken to allow fo

## 2020-05-20 ENCOUNTER — TELEPHONE (OUTPATIENT)
Dept: INTERNAL MEDICINE CLINIC | Facility: CLINIC | Age: 70
End: 2020-05-20

## 2020-05-20 NOTE — TELEPHONE ENCOUNTER
Dr Jamar Quinones would like to have a virtual visit with patient sometime first week of June.   Called phone and no place to leave message    Continue to call

## 2020-05-21 ENCOUNTER — APPOINTMENT (OUTPATIENT)
Dept: CARDIOLOGY | Age: 70
End: 2020-05-21

## 2020-05-22 RX ORDER — LOSARTAN POTASSIUM 25 MG/1
50 TABLET ORAL NIGHTLY
Qty: 90 TABLET | Refills: 0 | Status: SHIPPED | OUTPATIENT
Start: 2020-05-22 | End: 2020-05-22

## 2020-05-22 RX ORDER — METOPROLOL SUCCINATE 100 MG/1
100 TABLET, EXTENDED RELEASE ORAL DAILY
Qty: 90 TABLET | Refills: 0 | Status: SHIPPED | OUTPATIENT
Start: 2020-05-22 | End: 2020-05-26

## 2020-05-22 RX ORDER — LOSARTAN POTASSIUM 25 MG/1
TABLET ORAL
Qty: 180 TABLET | Refills: 0 | Status: SHIPPED | OUTPATIENT
Start: 2020-05-22 | End: 2020-05-26

## 2020-05-22 RX ORDER — ATORVASTATIN CALCIUM 20 MG/1
20 TABLET, FILM COATED ORAL DAILY
Qty: 90 TABLET | Refills: 0 | Status: SHIPPED | OUTPATIENT
Start: 2020-05-22 | End: 2020-05-26

## 2020-05-22 NOTE — TELEPHONE ENCOUNTER
Last OV: 11/26/19 TCM f/u    Future Appointments   Date Time Provider Tanika Monica   5/27/2020  8:00 AM [facility] NUC DOSE RM Sylvester Loya [address]   5/27/2020  2:00 PM [facility] NUC RM1 Sylvester Loya [address]   6/3/2020  3:00 PM Rodrick Bernabe MD [facility]

## 2020-05-26 ENCOUNTER — TELEPHONE (OUTPATIENT)
Dept: INTERNAL MEDICINE CLINIC | Facility: CLINIC | Age: 70
End: 2020-05-26

## 2020-05-26 RX ORDER — METOPROLOL SUCCINATE 100 MG/1
100 TABLET, EXTENDED RELEASE ORAL DAILY
Qty: 90 TABLET | Refills: 0 | Status: SHIPPED | OUTPATIENT
Start: 2020-05-26 | End: 2020-11-06 | Stop reason: CLARIF

## 2020-05-26 RX ORDER — ATORVASTATIN CALCIUM 20 MG/1
20 TABLET, FILM COATED ORAL DAILY
Qty: 90 TABLET | Refills: 0 | Status: SHIPPED | OUTPATIENT
Start: 2020-05-26

## 2020-05-26 RX ORDER — LOSARTAN POTASSIUM 25 MG/1
50 TABLET ORAL NIGHTLY
Qty: 180 TABLET | Refills: 0 | Status: SHIPPED | OUTPATIENT
Start: 2020-05-26 | End: 2020-10-27 | Stop reason: DRUGHIGH

## 2020-05-26 NOTE — TELEPHONE ENCOUNTER
Rx's filled on 5/22/20, but sent to [place]. Pt is requesting Rx's to be printed and faxed to [address] [place] [address] at number provided. Rx pending for printing.

## 2020-05-26 NOTE — TELEPHONE ENCOUNTER
Prescription Refill Request - Patient advised can take 48-72 hours. Name of Medication (strength, dose, qty requested:     atorvastatin 20 MG Oral Tab 90 tablet 0 5/22/2020    Sig:   Take 1 tablet (20 mg total) by mouth daily.        Metoprolol Succinate

## 2020-05-27 ENCOUNTER — HOSPITAL ENCOUNTER (OUTPATIENT)
Dept: NUCLEAR MEDICINE | Facility: HOSPITAL | Age: 70
Discharge: HOME | End: 2020-05-27
Attending: INTERNAL MEDICINE
Payer: MEDICARE

## 2020-05-27 ENCOUNTER — TELEPHONE (OUTPATIENT)
Dept: ENDOCRINOLOGY CLINIC | Facility: CLINIC | Age: 70
End: 2020-05-27

## 2020-05-27 DIAGNOSIS — E05.90 SUBCLINICAL HYPERTHYROIDISM: ICD-10-CM

## 2020-05-27 DIAGNOSIS — E05.90 SUBCLINICAL HYPERTHYROIDISM: Primary | ICD-10-CM

## 2020-05-27 PROCEDURE — 78014 THYROID IMAGING W/BLOOD FLOW: CPT | Performed by: INTERNAL MEDICINE

## 2020-05-27 NOTE — TELEPHONE ENCOUNTER
Thyroid scan indicates thyroiditis  This is inflammation of the thyroid gland  Typically patients become hyperthyroid, then euthyroid and then hypothyroid  We should monitor his labs since most times this is a self resolving condition  Please repeat TSH, F

## 2020-05-28 NOTE — TELEPHONE ENCOUNTER
Pt calling back to ask that the scripts be printed and faxed to [place], Pt's second request    [phone] fax     Attention Dr. Zimmerman Six

## 2020-06-01 ENCOUNTER — OFFICE VISIT (OUTPATIENT)
Dept: CARDIOLOGY | Age: 70
End: 2020-06-01

## 2020-06-01 VITALS — DIASTOLIC BLOOD PRESSURE: 60 MMHG | HEART RATE: 60 BPM | SYSTOLIC BLOOD PRESSURE: 118 MMHG

## 2020-06-01 DIAGNOSIS — I25.5 CARDIOMYOPATHY, ISCHEMIC: ICD-10-CM

## 2020-06-01 DIAGNOSIS — E78.00 PURE HYPERCHOLESTEROLEMIA: ICD-10-CM

## 2020-06-01 DIAGNOSIS — I65.23 CAROTID STENOSIS, ASYMPTOMATIC, BILATERAL: ICD-10-CM

## 2020-06-01 DIAGNOSIS — I10 BENIGN ESSENTIAL HTN: ICD-10-CM

## 2020-06-01 DIAGNOSIS — G47.33 OSA (OBSTRUCTIVE SLEEP APNEA): ICD-10-CM

## 2020-06-01 DIAGNOSIS — I25.10 CORONARY ARTERY DISEASE INVOLVING NATIVE CORONARY ARTERY OF NATIVE HEART WITHOUT ANGINA PECTORIS: Primary | ICD-10-CM

## 2020-06-01 DIAGNOSIS — Z95.1 S/P CABG (CORONARY ARTERY BYPASS GRAFT): ICD-10-CM

## 2020-06-01 PROCEDURE — 99442 TELEPHONE E&M BY PHYSICIAN EST PT NOT ORIG PREV 7 DAYS 11-20 MIN: CPT | Performed by: INTERNAL MEDICINE

## 2020-06-01 PROCEDURE — 93296 REM INTERROG EVL PM/IDS: CPT | Performed by: INTERNAL MEDICINE

## 2020-06-01 RX ORDER — LOSARTAN POTASSIUM 25 MG/1
25 TABLET ORAL 2 TIMES DAILY
COMMUNITY
End: 2020-10-05 | Stop reason: DRUGHIGH

## 2020-06-03 ENCOUNTER — VIRTUAL PHONE E/M (OUTPATIENT)
Dept: INTERNAL MEDICINE CLINIC | Facility: CLINIC | Age: 70
End: 2020-06-03
Payer: COMMERCIAL

## 2020-06-03 ENCOUNTER — TELEPHONE (OUTPATIENT)
Dept: INTERNAL MEDICINE CLINIC | Facility: CLINIC | Age: 70
End: 2020-06-03

## 2020-06-03 DIAGNOSIS — I10 BENIGN ESSENTIAL HTN: ICD-10-CM

## 2020-06-03 DIAGNOSIS — R42 DIZZINESS: Primary | ICD-10-CM

## 2020-06-03 DIAGNOSIS — Z95.0 PACEMAKER: ICD-10-CM

## 2020-06-03 DIAGNOSIS — E03.9 HYPOTHYROIDISM, UNSPECIFIED TYPE: ICD-10-CM

## 2020-06-03 PROCEDURE — 99443 PHONE E/M BY PHYS 21-30 MIN: CPT | Performed by: INTERNAL MEDICINE

## 2020-06-03 NOTE — PROGRESS NOTES
Due to COVID-19 ACTION PLAN, the patient's office visit was converted to a phone visit.   Time Spent:30 min    Subjective     HPI:   Micky Ortiz is a 71year old male who presents for f/u of dizziness, seems a bit better since splitting the losartan dose on-going public health crisis/national emergency and because of restrictions of visitation. There are limitations of this visit as no physical exam could be performed. Every conscious effort was taken to allow for sufficient and adequate time.   This bill

## 2020-06-03 NOTE — TELEPHONE ENCOUNTER
Message   Received:  Today   Message Contents   Karrie Blas MD  P [facility] Clinical Staff             Please f/u with me in 6 weeks in person    Virtual Phone E/M for Follow - Up   6/3/2020   Karrie Blas MD - [address] Carolyne Meredith Rolanda Reinforced healthy diet, lifestyle, and exercise.     Return in about 6 weeks (around 7/15/2020).    MD Desiree Meredith understands phone evaluation is not a substitute for face-to-face examination or emergency care.  Patient advised to

## 2020-06-11 ENCOUNTER — LAB ENCOUNTER (OUTPATIENT)
Dept: LAB | Facility: HOSPITAL | Age: 70
End: 2020-06-11
Attending: PHYSICIAN ASSISTANT
Payer: MEDICARE

## 2020-06-11 ENCOUNTER — PATIENT MESSAGE (OUTPATIENT)
Dept: ENDOCRINOLOGY CLINIC | Facility: CLINIC | Age: 70
End: 2020-06-11

## 2020-06-11 DIAGNOSIS — E06.9 THYROIDITIS: Primary | ICD-10-CM

## 2020-06-11 DIAGNOSIS — E05.90 SUBCLINICAL HYPERTHYROIDISM: ICD-10-CM

## 2020-06-11 LAB
T3FREE SERPL-MCNC: 2.73 PG/ML (ref 2.4–4.2)
T4 FREE SERPL-MCNC: 1.1 NG/DL (ref 0.8–1.7)
TSI SER-ACNC: 0.04 MIU/ML (ref 0.36–3.74)

## 2020-06-11 PROCEDURE — 84443 ASSAY THYROID STIM HORMONE: CPT

## 2020-06-11 PROCEDURE — 36415 COLL VENOUS BLD VENIPUNCTURE: CPT

## 2020-06-11 PROCEDURE — 84439 ASSAY OF FREE THYROXINE: CPT

## 2020-06-11 PROCEDURE — 84481 FREE ASSAY (FT-3): CPT

## 2020-06-18 ENCOUNTER — HOSPITAL ENCOUNTER (OUTPATIENT)
Dept: CT IMAGING | Facility: HOSPITAL | Age: 70
Discharge: HOME | End: 2020-06-18
Attending: RADIOLOGY
Payer: MEDICARE

## 2020-06-18 DIAGNOSIS — I67.9 INTRACRANIAL VASCULAR STENOSIS: ICD-10-CM

## 2020-06-18 PROCEDURE — 70496 CT ANGIOGRAPHY HEAD: CPT | Performed by: RADIOLOGY

## 2020-06-18 PROCEDURE — 82565 ASSAY OF CREATININE: CPT

## 2020-06-18 PROCEDURE — 70498 CT ANGIOGRAPHY NECK: CPT | Performed by: RADIOLOGY

## 2020-06-23 ENCOUNTER — TELEPHONE (OUTPATIENT)
Dept: INTERNAL MEDICINE CLINIC | Facility: CLINIC | Age: 70
End: 2020-06-23

## 2020-06-23 NOTE — TELEPHONE ENCOUNTER
Supervisit   Future Appointments   Date Time Provider Tanika Anderson   7/15/2020 10:00 AM Gilson Felix MD [facility]        Orders to  THE [facility]   aware must fast no call back required

## 2020-06-24 ENCOUNTER — OFFICE VISIT (OUTPATIENT)
Dept: SLEEP CENTER | Age: 70
End: 2020-06-24
Attending: INTERNAL MEDICINE
Payer: MEDICARE

## 2020-06-24 DIAGNOSIS — G47.30 SLEEP APNEA, UNSPECIFIED: ICD-10-CM

## 2020-06-24 PROCEDURE — 95806 SLEEP STUDY UNATT&RESP EFFT: CPT

## 2020-06-26 NOTE — PROCEDURES
[address]       Accredited by the [facility] of Sleep Medicine (AASM)    PATIENT'S NAME:        Sofiya Constantino  ATTENDING PHYSICIAN:   Izaiah Pat M.D. REFERRING PHYSICIAN:   Luciano Malcolm M.D.   PAT was 90%. The patient did not have oxygen saturation levels of 88% or below. Snoring was noted. Heart rate averaged 60. IMPRESSION:  This home sleep test documents mild obstructive sleep apnea which is more pronounced during supine sleep.   Overall AHI

## 2020-06-30 ENCOUNTER — TELEPHONE (OUTPATIENT)
Dept: CARDIOLOGY | Age: 70
End: 2020-06-30

## 2020-07-01 ENCOUNTER — HOSPITAL ENCOUNTER (OUTPATIENT)
Dept: CV DIAGNOSTICS | Facility: HOSPITAL | Age: 70
Discharge: HOME | End: 2020-07-01
Attending: INTERNAL MEDICINE
Payer: MEDICARE

## 2020-07-01 DIAGNOSIS — I25.5 CARDIOMYOPATHY, ISCHEMIC: ICD-10-CM

## 2020-07-01 DIAGNOSIS — G47.33 OSA (OBSTRUCTIVE SLEEP APNEA): ICD-10-CM

## 2020-07-01 PROCEDURE — 93306 TTE W/DOPPLER COMPLETE: CPT | Performed by: INTERNAL MEDICINE

## 2020-07-02 DIAGNOSIS — I25.5 CARDIOMYOPATHY, ISCHEMIC: ICD-10-CM

## 2020-07-06 NOTE — TELEPHONE ENCOUNTER
Pt had TSH+Free T4, Microalb/creat, Lipid, CMP 5/14/20    Please advise if pt needs labs prior to upcoming appt.

## 2020-07-15 ENCOUNTER — E-ADVICE (OUTPATIENT)
Dept: CARDIOLOGY | Age: 70
End: 2020-07-15

## 2020-07-15 ENCOUNTER — ANCILLARY PROCEDURE (OUTPATIENT)
Dept: CARDIOLOGY | Age: 70
End: 2020-07-15
Attending: INTERNAL MEDICINE

## 2020-07-15 ENCOUNTER — OFFICE VISIT (OUTPATIENT)
Dept: INTERNAL MEDICINE CLINIC | Facility: CLINIC | Age: 70
End: 2020-07-15
Payer: COMMERCIAL

## 2020-07-15 ENCOUNTER — TELEPHONE (OUTPATIENT)
Dept: CARDIOLOGY | Age: 70
End: 2020-07-15

## 2020-07-15 VITALS
WEIGHT: 169 LBS | SYSTOLIC BLOOD PRESSURE: 146 MMHG | BODY MASS INDEX: 24.25 KG/M2 | DIASTOLIC BLOOD PRESSURE: 62 MMHG | HEART RATE: 64 BPM

## 2020-07-15 VITALS
WEIGHT: 168.63 LBS | HEART RATE: 60 BPM | TEMPERATURE: 99 F | BODY MASS INDEX: 24.14 KG/M2 | HEIGHT: 70 IN | DIASTOLIC BLOOD PRESSURE: 70 MMHG | SYSTOLIC BLOOD PRESSURE: 134 MMHG

## 2020-07-15 DIAGNOSIS — Z95.0 PACEMAKER: ICD-10-CM

## 2020-07-15 DIAGNOSIS — I49.5 SSS (SICK SINUS SYNDROME) (HCC): ICD-10-CM

## 2020-07-15 DIAGNOSIS — I70.0 AORTIC ATHEROSCLEROSIS (HCC): ICD-10-CM

## 2020-07-15 DIAGNOSIS — I50.22 CHRONIC SYSTOLIC CONGESTIVE HEART FAILURE (HCC): ICD-10-CM

## 2020-07-15 DIAGNOSIS — Z95.1 S/P CABG (CORONARY ARTERY BYPASS GRAFT): ICD-10-CM

## 2020-07-15 DIAGNOSIS — C22.1 CHOLANGIOCARCINOMA (HCC): ICD-10-CM

## 2020-07-15 DIAGNOSIS — I67.9 INTRACRANIAL VASCULAR STENOSIS: ICD-10-CM

## 2020-07-15 DIAGNOSIS — E87.1 HYPONATREMIA: ICD-10-CM

## 2020-07-15 DIAGNOSIS — Z00.00 ENCOUNTER FOR ANNUAL HEALTH EXAMINATION: ICD-10-CM

## 2020-07-15 DIAGNOSIS — J44.9 ASTHMA WITH COPD (CHRONIC OBSTRUCTIVE PULMONARY DISEASE) (HCC): ICD-10-CM

## 2020-07-15 DIAGNOSIS — R91.8 PULMONARY NODULES: ICD-10-CM

## 2020-07-15 DIAGNOSIS — I49.3 PVCS (PREMATURE VENTRICULAR CONTRACTIONS): ICD-10-CM

## 2020-07-15 DIAGNOSIS — E05.90 HYPERTHYROIDISM: ICD-10-CM

## 2020-07-15 DIAGNOSIS — D63.8 ANEMIA OF CHRONIC DISEASE: ICD-10-CM

## 2020-07-15 DIAGNOSIS — K86.81 EXOCRINE PANCREATIC INSUFFICIENCY: ICD-10-CM

## 2020-07-15 DIAGNOSIS — M81.8 OTHER OSTEOPOROSIS WITHOUT CURRENT PATHOLOGICAL FRACTURE: ICD-10-CM

## 2020-07-15 DIAGNOSIS — I25.5 CARDIOMYOPATHY, ISCHEMIC: ICD-10-CM

## 2020-07-15 DIAGNOSIS — D69.6 THROMBOCYTOPENIA (HCC): ICD-10-CM

## 2020-07-15 DIAGNOSIS — D17.79 MYELOLIPOMA OF LEFT ADRENAL GLAND: ICD-10-CM

## 2020-07-15 DIAGNOSIS — D36.15: ICD-10-CM

## 2020-07-15 DIAGNOSIS — M47.816 ARTHRITIS OF FACET JOINT OF LUMBAR SPINE: ICD-10-CM

## 2020-07-15 DIAGNOSIS — Z85.09 HISTORY OF CHOLANGIOCARCINOMA: ICD-10-CM

## 2020-07-15 DIAGNOSIS — C77.2 SECONDARY MALIGNANT NEOPLASM OF INTRA-ABDOMINAL LYMPH NODES (HCC): ICD-10-CM

## 2020-07-15 DIAGNOSIS — R16.0 LIVER MASS, LEFT LOBE: ICD-10-CM

## 2020-07-15 DIAGNOSIS — I48.0 PAROXYSMAL ATRIAL FIBRILLATION (HCC): ICD-10-CM

## 2020-07-15 DIAGNOSIS — I25.10 CORONARY ARTERY DISEASE INVOLVING NATIVE CORONARY ARTERY OF NATIVE HEART, ANGINA PRESENCE UNSPECIFIED: ICD-10-CM

## 2020-07-15 DIAGNOSIS — Z86.018 HISTORY OF PHEOCHROMOCYTOMA: ICD-10-CM

## 2020-07-15 DIAGNOSIS — Z95.810 ICD (IMPLANTABLE CARDIOVERTER-DEFIBRILLATOR) IN PLACE: ICD-10-CM

## 2020-07-15 DIAGNOSIS — I10 BENIGN ESSENTIAL HTN: ICD-10-CM

## 2020-07-15 DIAGNOSIS — R73.9 HYPERGLYCEMIA: Primary | ICD-10-CM

## 2020-07-15 PROBLEM — R55 SYNCOPE, NEAR: Status: RESOLVED | Noted: 2019-11-18 | Resolved: 2020-07-15

## 2020-07-15 PROBLEM — S32.010A CLOSED COMPRESSION FRACTURE OF L1 VERTEBRA (HCC): Status: RESOLVED | Noted: 2017-10-13 | Resolved: 2020-07-15

## 2020-07-15 PROCEDURE — 96160 PT-FOCUSED HLTH RISK ASSMT: CPT | Performed by: INTERNAL MEDICINE

## 2020-07-15 PROCEDURE — G0439 PPPS, SUBSEQ VISIT: HCPCS | Performed by: INTERNAL MEDICINE

## 2020-07-15 PROCEDURE — 3008F BODY MASS INDEX DOCD: CPT | Performed by: INTERNAL MEDICINE

## 2020-07-15 PROCEDURE — 99397 PER PM REEVAL EST PAT 65+ YR: CPT | Performed by: INTERNAL MEDICINE

## 2020-07-15 PROCEDURE — 93290 INTERROG DEV EVAL ICPMS IP: CPT | Performed by: INTERNAL MEDICINE

## 2020-07-15 PROCEDURE — 3078F DIAST BP <80 MM HG: CPT | Performed by: INTERNAL MEDICINE

## 2020-07-15 PROCEDURE — 3075F SYST BP GE 130 - 139MM HG: CPT | Performed by: INTERNAL MEDICINE

## 2020-07-15 PROCEDURE — 93283 PRGRMG EVAL IMPLANTABLE DFB: CPT | Performed by: INTERNAL MEDICINE

## 2020-07-15 RX ORDER — PANCRELIPASE LIPASE, PANCRELIPASE PROTEASE, PANCRELIPASE AMYLASE 20000; 63000; 84000 [USP'U]/1; [USP'U]/1; [USP'U]/1
1 CAPSULE, DELAYED RELEASE ORAL 4 TIMES DAILY
COMMUNITY
Start: 2020-07-14

## 2020-07-15 RX ORDER — ERGOCALCIFEROL 1.25 MG/1
50000 CAPSULE ORAL
COMMUNITY
Start: 2020-04-29 | End: 2021-04-22

## 2020-07-16 ENCOUNTER — TELEPHONE (OUTPATIENT)
Dept: CARDIOLOGY | Age: 70
End: 2020-07-16

## 2020-07-16 NOTE — PATIENT INSTRUCTIONS
Mariam Bryan's SCREENING SCHEDULE   Tests on this list are recommended by your physician but may not be covered, or covered at this frequency, by your insurer. Please check with your insurance carrier before scheduling to verify coverage.     PREVENTATI Age 76     Colonoscopy Screen   Covered every 10 years- more often if abnormal Colonoscopy due on 03/11/2022 Update Health Maintenance if applicable    Flex Sigmoidoscopy Screen  Covered every 5 years No results found for this or any previous visit.  No ashley Homosexual men   Illicit injectable drug abusers     Tetanus Toxoid- Only covered with a cut with metal- TD and TDaP Not covered by Medicare Part B) Orders placed or performed in visit on 10/10/19   • TETANUS, [facility]

## 2020-07-16 NOTE — PROGRESS NOTES
HPI:   Howard Espinal is a 71year old male who presents for a MA (Medicare Advantage) [facility] (Once per calendar year). Here for supervisit.    Pt struggling wh low tsh and fluctuant sugars, a1c not diabetic, has ranging sugars from 200 to 70 in th Shorty Oneill MD ([facility])  Ino Dumont MD (GASTROENTEROLOGY)  Acosta Dutton MD (ENDOCRINOLOGY)  Low Echeverria MD (Surgical Oncology)  Rodrigo Moran MD (Radiation Oncology)  [facility])  Shawn Eugene MD ([facility] Lab Results   Component Value Date    WBC 7.4 11/19/2019    HGB 11.8 (L) 11/19/2019    .0 11/19/2019        ALLERGIES:   He has No Known Allergies. CURRENT MEDICATIONS:   atorvastatin 20 MG Oral Tab, Take 1 tablet (20 mg total) by mouth daily. Frequent urination (drink 5 bottles of water daily), Hearing impairment, Hearing loss (10% ), [facility] (06/24/2019), Hemorrhoids (teenager), High blood pressure, Hyperkalemia, Hypokalemia (4/26/2019), Hyponatremia (11/7/2016), Ileus ([facility] Assessment  (Required for AWV/SWV)    wnl             Visual Acuity                           General Appearance:  Alert, cooperative, no distress, appears stated age   Head:  Normocephalic, without obvious abnormality, atraumatic   Eyes:  PERRL, conjuncti this visit:    Hyperglycemia  -     COMP METABOLIC PANEL (14);  Future  -     HEMOGLOBIN A1C; Future  Watch closely for developmental   Secondary malignant neoplasm of intra-abdominal lymph nodes ([facility]  Stable cotinue f/u wih dr Litzy Calderon of facet asya and agrees to the plan. Reinforced healthy diet, lifestyle, and exercise. Return in 3 months (on 10/15/2020).      Amanda Pop MD, 7/15/2020     General Health     In the past six months, have you lost more than 10 pounds without trying?: 2 - No  Danya Vasquez applicable     Immunizations (Update Immunization Activity if applicable)     Influenza  Covered Annually 10/10/2019   Please get every year    Pneumococcal 13 (Prevnar)  Covered Once after 65 10/13/2017 Please get once after your 65th birthday    Pneumoco

## 2020-07-17 ENCOUNTER — TELEPHONE (OUTPATIENT)
Dept: INTERNAL MEDICINE CLINIC | Facility: CLINIC | Age: 70
End: 2020-07-17

## 2020-07-17 ENCOUNTER — TELEPHONE (OUTPATIENT)
Dept: CARDIOLOGY | Age: 70
End: 2020-07-17

## 2020-07-17 NOTE — TELEPHONE ENCOUNTER
Pt's wife calling back to report BP to AS     127/59  7/16/2020 before bed  177/88  7/17/2020 this morning       BS last night 108 before bed  BS this morning 187 this morning

## 2020-08-10 ENCOUNTER — TELEPHONE (OUTPATIENT)
Dept: SURGERY | Facility: CLINIC | Age: 70
End: 2020-08-10

## 2020-08-10 NOTE — TELEPHONE ENCOUNTER
Patient states he has made an appointment with Dr. Radhika Nelson to discuss pain around his incision, hernia pain and bloating. Follow up appointment confirmed for 8/12/20.

## 2020-08-12 ENCOUNTER — APPOINTMENT (OUTPATIENT)
Dept: HEMATOLOGY/ONCOLOGY | Facility: HOSPITAL | Age: 70
End: 2020-08-12
Attending: SURGERY
Payer: MEDICARE

## 2020-08-12 ENCOUNTER — OFFICE VISIT (OUTPATIENT)
Dept: SURGERY | Facility: CLINIC | Age: 70
End: 2020-08-12
Payer: COMMERCIAL

## 2020-08-12 VITALS
BODY MASS INDEX: 25 KG/M2 | OXYGEN SATURATION: 98 % | WEIGHT: 173.38 LBS | TEMPERATURE: 97 F | DIASTOLIC BLOOD PRESSURE: 89 MMHG | SYSTOLIC BLOOD PRESSURE: 169 MMHG | HEART RATE: 71 BPM

## 2020-08-12 DIAGNOSIS — R10.12 LEFT UPPER QUADRANT ABDOMINAL PAIN: ICD-10-CM

## 2020-08-12 DIAGNOSIS — C22.1 CHOLANGIOCARCINOMA (HCC): Primary | ICD-10-CM

## 2020-08-12 LAB
ALBUMIN SERPL-MCNC: 3.8 G/DL (ref 3.4–5)
ALBUMIN/GLOB SERPL: 1 {RATIO} (ref 1–2)
ALP LIVER SERPL-CCNC: 94 U/L (ref 45–117)
ALT SERPL-CCNC: 39 U/L (ref 16–61)
ANION GAP SERPL CALC-SCNC: 0 MMOL/L (ref 0–18)
AST SERPL-CCNC: 29 U/L (ref 15–37)
BASOPHILS # BLD AUTO: 0.06 X10(3) UL (ref 0–0.2)
BASOPHILS NFR BLD AUTO: 0.8 %
BILIRUB SERPL-MCNC: 0.8 MG/DL (ref 0.1–2)
BUN BLD-MCNC: 12 MG/DL (ref 7–18)
BUN/CREAT SERPL: 9.5 (ref 10–20)
CALCIUM BLD-MCNC: 8.6 MG/DL (ref 8.5–10.1)
CANCER AG19-9 SERPL-ACNC: 7.6 U/ML (ref ?–37)
CHLORIDE SERPL-SCNC: 105 MMOL/L (ref 98–112)
CO2 SERPL-SCNC: 31 MMOL/L (ref 21–32)
CREAT BLD-MCNC: 1.26 MG/DL (ref 0.7–1.3)
DEPRECATED RDW RBC AUTO: 50.4 FL (ref 35.1–46.3)
EOSINOPHIL # BLD AUTO: 0.23 X10(3) UL (ref 0–0.7)
EOSINOPHIL NFR BLD AUTO: 3.1 %
ERYTHROCYTE [DISTWIDTH] IN BLOOD BY AUTOMATED COUNT: 14.7 % (ref 11–15)
GLOBULIN PLAS-MCNC: 3.8 G/DL (ref 2.8–4.4)
GLUCOSE BLD-MCNC: 106 MG/DL (ref 70–99)
HCT VFR BLD AUTO: 40.9 % (ref 39–53)
HGB BLD-MCNC: 13.3 G/DL (ref 13–17.5)
IMM GRANULOCYTES # BLD AUTO: 0.02 X10(3) UL (ref 0–1)
IMM GRANULOCYTES NFR BLD: 0.3 %
LYMPHOCYTES # BLD AUTO: 2.06 X10(3) UL (ref 1–4)
LYMPHOCYTES NFR BLD AUTO: 27.4 %
M PROTEIN MFR SERPL ELPH: 7.6 G/DL (ref 6.4–8.2)
MCH RBC QN AUTO: 30 PG (ref 26–34)
MCHC RBC AUTO-ENTMCNC: 32.5 G/DL (ref 31–37)
MCV RBC AUTO: 92.1 FL (ref 80–100)
MONOCYTES # BLD AUTO: 1.17 X10(3) UL (ref 0.1–1)
MONOCYTES NFR BLD AUTO: 15.5 %
NEUTROPHILS # BLD AUTO: 3.99 X10 (3) UL (ref 1.5–7.7)
NEUTROPHILS # BLD AUTO: 3.99 X10(3) UL (ref 1.5–7.7)
NEUTROPHILS NFR BLD AUTO: 52.9 %
OSMOLALITY SERPL CALC.SUM OF ELEC: 282 MOSM/KG (ref 275–295)
PATIENT FASTING Y/N/NP: NO
PLATELET # BLD AUTO: 188 10(3)UL (ref 150–450)
POTASSIUM SERPL-SCNC: 4.3 MMOL/L (ref 3.5–5.1)
RBC # BLD AUTO: 4.44 X10(6)UL (ref 3.8–5.8)
SODIUM SERPL-SCNC: 136 MMOL/L (ref 136–145)
WBC # BLD AUTO: 7.5 X10(3) UL (ref 4–11)

## 2020-08-12 PROCEDURE — 99213 OFFICE O/P EST LOW 20 MIN: CPT | Performed by: SURGERY

## 2020-08-12 PROCEDURE — 3079F DIAST BP 80-89 MM HG: CPT | Performed by: SURGERY

## 2020-08-12 PROCEDURE — 80053 COMPREHEN METABOLIC PANEL: CPT | Performed by: PHYSICIAN ASSISTANT

## 2020-08-12 PROCEDURE — 86301 IMMUNOASSAY TUMOR CA 19-9: CPT | Performed by: PHYSICIAN ASSISTANT

## 2020-08-12 PROCEDURE — 3077F SYST BP >= 140 MM HG: CPT | Performed by: SURGERY

## 2020-08-12 PROCEDURE — 85025 COMPLETE CBC W/AUTO DIFF WBC: CPT | Performed by: PHYSICIAN ASSISTANT

## 2020-08-12 RX ORDER — PANCRELIPASE LIPASE, PANCRELIPASE PROTEASE, PANCRELIPASE AMYLASE 20000; 63000; 84000 [USP'U]/1; [USP'U]/1; [USP'U]/1
CAPSULE, DELAYED RELEASE ORAL
COMMUNITY
Start: 2020-07-14 | End: 2020-11-06 | Stop reason: CLARIF

## 2020-08-12 NOTE — PROGRESS NOTES
[facility] Surgical Oncology    Patient Name:  Hillary Seo   YOB: 1950   Gender:  Male   Appt Date:  8/12/2020   Provider:  Patria Page MD   Insurance:  [place] Pena MD   Address: esophagitis, erosive enteritis, and an anastomotic ulcer. He was treated with omeprazole. -CA 19-9 on 11/15/2019 was 20.2.  He was supposed to follow-up with medical oncology in 6 months with repeat imaging.   -He has continued to follow with GI for abdom •  ZENPEP [address]35-86229 units Oral Cap DR Particles, TK 1 C PO 6 TIMES D WC, Disp: , Rfl:      Allergies Reviewed:  No Known Allergies     History:  Reviewed:  Past Medical History:   Diagnosis Date   • Abdominal distention    • Abdominal hernia    • Abdom Shortness of breath upon onset of spasms   • Syncope, near 11/18/2019   • Tobacco abuse    • Uncomfortable fullness after meals always   • Weight gain       Reviewed:  Past Surgical History:   Procedure Laterality Date   • Angiogram  06/24/2019   • Bypass chest pain, SOB, edema,orthopnea, no palpitations   · GI: + abdominal bloating. + abdominal tenderness (described as nerve like) along incision.  denies nausea, vomiting, constipation, diarrhea; no rectal bleeding  · GENITAL/: no blood in urine  · MUSCULO

## 2020-08-16 LAB — AMB EXT OCCULT BLOOD RESULT: NEGATIVE

## 2020-08-18 ENCOUNTER — HOSPITAL ENCOUNTER (OUTPATIENT)
Dept: CT IMAGING | Facility: HOSPITAL | Age: 70
Discharge: HOME | End: 2020-08-18
Attending: SPECIALIST
Payer: MEDICARE

## 2020-08-18 DIAGNOSIS — C22.1 CHOLANGIOCARCINOMA OF BILIARY TRACT (HCC): ICD-10-CM

## 2020-08-18 PROCEDURE — 71260 CT THORAX DX C+: CPT | Performed by: SPECIALIST

## 2020-08-18 PROCEDURE — 74177 CT ABD & PELVIS W/CONTRAST: CPT | Performed by: SPECIALIST

## 2020-08-25 ENCOUNTER — TELEPHONE (OUTPATIENT)
Dept: SURGERY | Facility: CLINIC | Age: 70
End: 2020-08-25

## 2020-08-26 ENCOUNTER — TELEPHONE (OUTPATIENT)
Dept: INTERNAL MEDICINE CLINIC | Facility: CLINIC | Age: 70
End: 2020-08-26

## 2020-08-26 NOTE — TELEPHONE ENCOUNTER
Received FIT test from [facility]. Abstracted result and placed in AS bin for review.

## 2020-09-08 PROCEDURE — 93295 DEV INTERROG REMOTE 1/2/MLT: CPT | Performed by: INTERNAL MEDICINE

## 2020-09-08 PROCEDURE — 93296 REM INTERROG EVL PM/IDS: CPT | Performed by: INTERNAL MEDICINE

## 2020-09-11 ENCOUNTER — ANCILLARY ORDERS (OUTPATIENT)
Dept: CARDIOLOGY | Age: 70
End: 2020-09-11

## 2020-09-11 ENCOUNTER — ANCILLARY PROCEDURE (OUTPATIENT)
Dept: CARDIOLOGY | Age: 70
End: 2020-09-11
Attending: INTERNAL MEDICINE

## 2020-09-11 DIAGNOSIS — Z95.810 ICD (IMPLANTABLE CARDIOVERTER-DEFIBRILLATOR) IN PLACE: ICD-10-CM

## 2020-09-11 PROCEDURE — X1114 CARDIAC DEVICE HOME CHECK - REMOTE UNSCHEDULED: HCPCS | Performed by: INTERNAL MEDICINE

## 2020-09-14 ENCOUNTER — TELEPHONE (OUTPATIENT)
Dept: ENDOCRINOLOGY CLINIC | Facility: CLINIC | Age: 70
End: 2020-09-14

## 2020-09-14 NOTE — TELEPHONE ENCOUNTER
Patient was contacted and relayed below message. RN reviewed chart and last sensor placement was over 6 months ago (December 2019). Patient was agreeable with plan. Scheduled an RN visit tomorrow. [facility] location reviewed with patient.     Future Appointmen

## 2020-09-14 NOTE — TELEPHONE ENCOUNTER
Dr. Ana Trujillo --    Per patient for the past 6-9 months his sugars have been fluctuating and having hypoglycemia around noon time.       9/11 1030 180   1130 60    9/12 0800 157 (took some glucose tabs after checking -- asymptomatic)    9/13 1150 89 took gl

## 2020-09-14 NOTE — TELEPHONE ENCOUNTER
He is not a diabetic  Can you check and see when he last had a Mayra John  We can put on a elva if we put it over 6 months ago.  Then schedule FU with me in one week after Mayra Lopez is put  Three regular meals, three small snacks    Also, due for thyroid labs , ord

## 2020-09-15 ENCOUNTER — LAB ENCOUNTER (OUTPATIENT)
Dept: LAB | Facility: HOSPITAL | Age: 70
End: 2020-09-15
Attending: INTERNAL MEDICINE
Payer: MEDICARE

## 2020-09-15 ENCOUNTER — NURSE ONLY (OUTPATIENT)
Dept: ENDOCRINOLOGY CLINIC | Facility: CLINIC | Age: 70
End: 2020-09-15
Payer: COMMERCIAL

## 2020-09-15 DIAGNOSIS — E06.9 THYROIDITIS: ICD-10-CM

## 2020-09-15 DIAGNOSIS — E16.2 HYPOGLYCEMIA: Primary | ICD-10-CM

## 2020-09-15 LAB
T3FREE SERPL-MCNC: 2.4 PG/ML (ref 2.4–4.2)
T4 FREE SERPL-MCNC: 1 NG/DL (ref 0.8–1.7)
TSI SER-ACNC: 0.84 MIU/ML (ref 0.36–3.74)

## 2020-09-15 PROCEDURE — 95250 CONT GLUC MNTR PHYS/QHP EQP: CPT | Performed by: INTERNAL MEDICINE

## 2020-09-15 PROCEDURE — 84481 FREE ASSAY (FT-3): CPT

## 2020-09-15 PROCEDURE — 84439 ASSAY OF FREE THYROXINE: CPT

## 2020-09-15 PROCEDURE — 36415 COLL VENOUS BLD VENIPUNCTURE: CPT

## 2020-09-15 PROCEDURE — 84443 ASSAY THYROID STIM HORMONE: CPT

## 2020-09-15 NOTE — PROGRESS NOTES
Patient presents today accompanied by wife for Elena pro placement to evaluate hypoglycemia for the past 6-9 months. Explained the administration process as well as taking care of sensor.   He was made aware that sensor sits under the interstitial fluid so

## 2020-09-22 ENCOUNTER — LAB ENCOUNTER (OUTPATIENT)
Dept: LAB | Facility: HOSPITAL | Age: 70
End: 2020-09-22
Attending: INTERNAL MEDICINE
Payer: MEDICARE

## 2020-09-22 DIAGNOSIS — Z13.1 DIABETES MELLITUS SCREENING: Primary | ICD-10-CM

## 2020-09-22 DIAGNOSIS — Z13.1 DIABETES MELLITUS SCREENING: ICD-10-CM

## 2020-09-22 LAB
ANION GAP SERPL CALC-SCNC: 5 MMOL/L (ref 0–18)
BUN BLD-MCNC: 12 MG/DL (ref 7–18)
BUN/CREAT SERPL: 9.4 (ref 10–20)
CALCIUM BLD-MCNC: 9.5 MG/DL (ref 8.5–10.1)
CHLORIDE SERPL-SCNC: 104 MMOL/L (ref 98–112)
CO2 SERPL-SCNC: 28 MMOL/L (ref 21–32)
CREAT BLD-MCNC: 1.28 MG/DL
GLUCOSE BLD-MCNC: 108 MG/DL (ref 70–99)
INSULIN SERPL-ACNC: 31.4 MU/L (ref 3–25)
OSMOLALITY SERPL CALC.SUM OF ELEC: 284 MOSM/KG (ref 275–295)
PATIENT FASTING Y/N/NP: NO
POTASSIUM SERPL-SCNC: 4.4 MMOL/L (ref 3.5–5.1)
SODIUM SERPL-SCNC: 137 MMOL/L (ref 136–145)

## 2020-09-22 PROCEDURE — 36415 COLL VENOUS BLD VENIPUNCTURE: CPT

## 2020-09-22 PROCEDURE — 84681 ASSAY OF C-PEPTIDE: CPT

## 2020-09-22 PROCEDURE — 80048 BASIC METABOLIC PNL TOTAL CA: CPT

## 2020-09-22 PROCEDURE — 83525 ASSAY OF INSULIN: CPT

## 2020-09-24 LAB — C-PEPTIDE, SERUM OR PLASMA: 3.1 NG/ML

## 2020-10-02 ENCOUNTER — OFFICE VISIT (OUTPATIENT)
Dept: ENDOCRINOLOGY CLINIC | Facility: CLINIC | Age: 70
End: 2020-10-02
Payer: COMMERCIAL

## 2020-10-02 VITALS
SYSTOLIC BLOOD PRESSURE: 127 MMHG | WEIGHT: 170 LBS | DIASTOLIC BLOOD PRESSURE: 83 MMHG | BODY MASS INDEX: 24 KG/M2 | HEART RATE: 76 BPM

## 2020-10-02 DIAGNOSIS — E16.1 INAPPROPRIATELY HIGH SERUM INSULIN: Primary | ICD-10-CM

## 2020-10-02 DIAGNOSIS — E16.2 HYPOGLYCEMIA: ICD-10-CM

## 2020-10-02 PROCEDURE — 3079F DIAST BP 80-89 MM HG: CPT | Performed by: INTERNAL MEDICINE

## 2020-10-02 PROCEDURE — 95251 CONT GLUC MNTR ANALYSIS I&R: CPT | Performed by: INTERNAL MEDICINE

## 2020-10-02 PROCEDURE — 99213 OFFICE O/P EST LOW 20 MIN: CPT | Performed by: INTERNAL MEDICINE

## 2020-10-02 PROCEDURE — 3074F SYST BP LT 130 MM HG: CPT | Performed by: INTERNAL MEDICINE

## 2020-10-02 RX ORDER — FLASH GLUCOSE SENSOR
1 KIT MISCELLANEOUS
Qty: 2 EACH | Refills: 2 | Status: SHIPPED | OUTPATIENT
Start: 2020-10-02 | End: 2020-11-06 | Stop reason: CLARIF

## 2020-10-02 NOTE — PROGRESS NOTES
Return Office Visit     CHIEF COMPLAINT:  Patient presents with: Follow - Up: fluctuating blood glucose. HISTORY OF PRESENT ILLNESS:  Jaime Edge is a 71year old male who presents for follow up for evaluation of low blood sugars.    He had alcala every 6 (six) months. • Ascorbic Acid (VITAMIN C) 100 MG Oral Tab Take 100 mg by mouth daily. • Cyanocobalamin (VITAMIN B 12 OR) Take 50 mcg by mouth daily.              ALLERGY:  No Known Allergies    PAST MEDICAL, SOCIAL AND FAMILY HISTORY:  See p pulses, no edema      DATA:     Pertinent data reviewed    ASSESSMENT AND PLAN:    Patient is a 71year old male with hypoglycemia  Has low BG , gets symptomatic we he low low BG    a1c is 5.6 %  Insulin is 31.4  C peptide is high normal  BG on BMP ( 9/22)

## 2020-10-03 ENCOUNTER — APPOINTMENT (OUTPATIENT)
Dept: CT IMAGING | Facility: HOSPITAL | Age: 70
End: 2020-10-03
Attending: EMERGENCY MEDICINE
Payer: MEDICARE

## 2020-10-03 ENCOUNTER — TELEPHONE (OUTPATIENT)
Dept: ENDOCRINOLOGY CLINIC | Facility: CLINIC | Age: 70
End: 2020-10-03

## 2020-10-03 ENCOUNTER — HOSPITAL ENCOUNTER (EMERGENCY)
Facility: HOSPITAL | Age: 70
Discharge: HOME | End: 2020-10-03
Attending: EMERGENCY MEDICINE
Payer: MEDICARE

## 2020-10-03 VITALS
RESPIRATION RATE: 18 BRPM | TEMPERATURE: 98 F | OXYGEN SATURATION: 99 % | WEIGHT: 169 LBS | SYSTOLIC BLOOD PRESSURE: 163 MMHG | HEART RATE: 63 BPM | BODY MASS INDEX: 24.2 KG/M2 | DIASTOLIC BLOOD PRESSURE: 87 MMHG | HEIGHT: 70 IN

## 2020-10-03 DIAGNOSIS — R42 DIZZINESS: Primary | ICD-10-CM

## 2020-10-03 PROCEDURE — 80053 COMPREHEN METABOLIC PANEL: CPT | Performed by: EMERGENCY MEDICINE

## 2020-10-03 PROCEDURE — 99285 EMERGENCY DEPT VISIT HI MDM: CPT

## 2020-10-03 PROCEDURE — 93005 ELECTROCARDIOGRAM TRACING: CPT

## 2020-10-03 PROCEDURE — 36415 COLL VENOUS BLD VENIPUNCTURE: CPT

## 2020-10-03 PROCEDURE — 70450 CT HEAD/BRAIN W/O DYE: CPT | Performed by: EMERGENCY MEDICINE

## 2020-10-03 PROCEDURE — 99284 EMERGENCY DEPT VISIT MOD MDM: CPT

## 2020-10-03 PROCEDURE — 85610 PROTHROMBIN TIME: CPT | Performed by: EMERGENCY MEDICINE

## 2020-10-03 PROCEDURE — 93010 ELECTROCARDIOGRAM REPORT: CPT

## 2020-10-03 PROCEDURE — 85730 THROMBOPLASTIN TIME PARTIAL: CPT | Performed by: EMERGENCY MEDICINE

## 2020-10-03 PROCEDURE — 84484 ASSAY OF TROPONIN QUANT: CPT | Performed by: EMERGENCY MEDICINE

## 2020-10-03 PROCEDURE — 85025 COMPLETE CBC W/AUTO DIFF WBC: CPT | Performed by: EMERGENCY MEDICINE

## 2020-10-03 NOTE — ED INITIAL ASSESSMENT (HPI)
PT TO ED FROM HOME WITH C/O LIGHTHEADEDNESS, DIZZINESS, SUDDEN ONSET OF SOB THAT STARTED APPROX. 30 MIN AGO. + HEADACHES.

## 2020-10-03 NOTE — ED PROVIDER NOTES
Patient Seen in: [facility] Emergency Department      History   Patient presents with:  Dizziness    Stated Complaint: dizzy, near syncope    HPI    Silvestre Guillaume is a pleasant 79-year-old male presenting to the emergency department for dizziness.   Patient st hearing loss in right   • Hearing loss 10%    • Heart attack ([facility] 06/24/2019    NONSTEMI   • Hemorrhoids teenager   • High blood pressure    • Hyperkalemia    • Hypokalemia 4/26/2019   • Hyponatremia 11/7/2016   • Ileus ([facility])    • Loss of appetite 8.16.16   • OTHER      right index finger surgery   • OTHER SURGICAL HISTORY  6/23/16.     EUS/EGD/FNA   • PLACEMENT, BILE DUCT STENT     • REMOVAL GALLBLADDER     • WHIPPLE  8/16/2016    Pancreaticoduodenectomy with regional lymphadenectomy, Left adrenalec retractions or rhonchi noted  Cardiovascular exam shows a regular rate and rhythm  Abdomen soft nontender with no rebound tenderness noted  Extremity exam shows no clubbing cyanosis or edema  Skin exam shows no rashes or lacerations  Neuro exam shows no fo Impression:  Dizziness  (primary encounter diagnosis)    Disposition:  Discharge  10/3/2020  4:57 pm    Follow-up:  Jessi Weinstein MD  [address] [place] [address]  [phone]    In 1 week            Medications Prescribed:  Dorita Fernandes

## 2020-10-05 ENCOUNTER — TELEPHONE (OUTPATIENT)
Dept: CARDIOLOGY | Age: 70
End: 2020-10-05

## 2020-10-05 ENCOUNTER — TELEPHONE (OUTPATIENT)
Dept: INTERNAL MEDICINE CLINIC | Facility: CLINIC | Age: 70
End: 2020-10-05

## 2020-10-05 DIAGNOSIS — I10 BENIGN ESSENTIAL HTN: Primary | ICD-10-CM

## 2020-10-05 RX ORDER — LOSARTAN POTASSIUM 50 MG/1
50 TABLET ORAL 2 TIMES DAILY
Qty: 180 TABLET | Refills: 3 | Status: SHIPPED | OUTPATIENT
Start: 2020-10-05 | End: 2021-01-13 | Stop reason: DRUGHIGH

## 2020-10-05 RX ORDER — LOSARTAN POTASSIUM 50 MG/1
50 TABLET ORAL 2 TIMES DAILY
COMMUNITY
End: 2020-10-05 | Stop reason: SDUPTHER

## 2020-10-05 NOTE — TELEPHONE ENCOUNTER
Patient states having elevated BP and was seen in the ER 10/3/2020. Patient states has headaches, blurred vision, dizziness when his BP is elevated.  Patient states he was instructed by cards to increase losartan to 50 mg in the AM and 50 mg in the PM. [facility]

## 2020-10-05 NOTE — TELEPHONE ENCOUNTER
Pt stated he wants to speak to AS. Pt stated his bp is 180/95 and has headache, dizziness, blurred vision. Pt stated he was in the ER on Friday and they told him he needs to up his medication and to call his pcp. High TE. Please advise.

## 2020-10-08 ENCOUNTER — TELEPHONE (OUTPATIENT)
Dept: ENDOCRINOLOGY CLINIC | Facility: CLINIC | Age: 70
End: 2020-10-08

## 2020-10-08 NOTE — TELEPHONE ENCOUNTER
We will be admitting patient for 72 hour fast for hyoglycemia.    It will be a direct admission  I will like to arrange for that  Please ask for what date in October 2020 will he like to come in for the same , I will make arrangements with the hospitalist a

## 2020-10-09 ENCOUNTER — TELEPHONE (OUTPATIENT)
Dept: INTERNAL MEDICINE CLINIC | Facility: CLINIC | Age: 70
End: 2020-10-09

## 2020-10-09 ENCOUNTER — HOSPITAL ENCOUNTER (EMERGENCY)
Facility: HOSPITAL | Age: 70
Discharge: HOME | End: 2020-10-09
Attending: EMERGENCY MEDICINE
Payer: MEDICARE

## 2020-10-09 ENCOUNTER — TELEPHONE (OUTPATIENT)
Dept: CARDIOLOGY | Age: 70
End: 2020-10-09

## 2020-10-09 ENCOUNTER — APPOINTMENT (OUTPATIENT)
Dept: GENERAL RADIOLOGY | Facility: HOSPITAL | Age: 70
End: 2020-10-09
Payer: MEDICARE

## 2020-10-09 VITALS
WEIGHT: 171 LBS | TEMPERATURE: 98 F | SYSTOLIC BLOOD PRESSURE: 157 MMHG | HEIGHT: 70 IN | BODY MASS INDEX: 24.48 KG/M2 | OXYGEN SATURATION: 96 % | RESPIRATION RATE: 22 BRPM | HEART RATE: 61 BPM | DIASTOLIC BLOOD PRESSURE: 91 MMHG

## 2020-10-09 DIAGNOSIS — R42 DIZZINESS: Primary | ICD-10-CM

## 2020-10-09 DIAGNOSIS — R03.0 ELEVATED BLOOD PRESSURE READING: ICD-10-CM

## 2020-10-09 DIAGNOSIS — R20.0 NUMBNESS AND TINGLING: ICD-10-CM

## 2020-10-09 DIAGNOSIS — R20.2 NUMBNESS AND TINGLING: ICD-10-CM

## 2020-10-09 PROCEDURE — 85025 COMPLETE CBC W/AUTO DIFF WBC: CPT

## 2020-10-09 PROCEDURE — 93010 ELECTROCARDIOGRAM REPORT: CPT

## 2020-10-09 PROCEDURE — 99285 EMERGENCY DEPT VISIT HI MDM: CPT

## 2020-10-09 PROCEDURE — 80053 COMPREHEN METABOLIC PANEL: CPT

## 2020-10-09 PROCEDURE — 85025 COMPLETE CBC W/AUTO DIFF WBC: CPT | Performed by: EMERGENCY MEDICINE

## 2020-10-09 PROCEDURE — 99284 EMERGENCY DEPT VISIT MOD MDM: CPT

## 2020-10-09 PROCEDURE — 93005 ELECTROCARDIOGRAM TRACING: CPT

## 2020-10-09 PROCEDURE — 71045 X-RAY EXAM CHEST 1 VIEW: CPT | Performed by: EMERGENCY MEDICINE

## 2020-10-09 PROCEDURE — 84484 ASSAY OF TROPONIN QUANT: CPT

## 2020-10-09 PROCEDURE — 84484 ASSAY OF TROPONIN QUANT: CPT | Performed by: EMERGENCY MEDICINE

## 2020-10-09 PROCEDURE — 36415 COLL VENOUS BLD VENIPUNCTURE: CPT

## 2020-10-09 PROCEDURE — 80053 COMPREHEN METABOLIC PANEL: CPT | Performed by: EMERGENCY MEDICINE

## 2020-10-09 PROCEDURE — 81001 URINALYSIS AUTO W/SCOPE: CPT | Performed by: EMERGENCY MEDICINE

## 2020-10-09 NOTE — TELEPHONE ENCOUNTER
Patient's wife indicates pt is dizzy, h/a, numbness and tingling in his toes, blurred vision, labored breathing. Pt notified to go to the ER for evaluation. Pt's wife verbalizes understanding. BABATUNDE to AS.

## 2020-10-09 NOTE — TELEPHONE ENCOUNTER
Patient's wife(Lizz) calling stating that this morning at 8:30 am patient's BP was 143/70 and at 9:12 am BP was 162/70. Patient's toes are tingling and patient is having some blurred vision.   Please advise

## 2020-10-09 NOTE — ED NOTES
Called MRI regarding test, states that patient is unable to get MRI test done d/t pacemaker. A rep from the company must be present to turn the device off temporarily during MRI scan and a nurse needs to be present.  If MRI is needed, patient would need to

## 2020-10-09 NOTE — ED PROVIDER NOTES
Patient Seen in: [facility] Emergency Department      History   Patient presents with:  Dizziness  Difficulty Breathing    Stated Complaint: dizziness, shortness of breath, abd pain    HPI    79-year-old male sent from clinic for evaluation of dizzi lately. ...meds?    • Enteritis    • Exposure to radiation     completed 2/17/17   • Fatigue Sometimes   • Flash pulmonary edema (HCC)    • Flatulence/gas pain/belching usually   • Frequent urination drink 5 bottles of water daily   • Hearing impairment ESOPHAGOGASTRODUODENOSCOPY (EGD) N/A 6/23/2016    Performed by Rachel Millan MD at [facility] N/A 6/27/2019    Performed by Félix Mon MD at . Mitimmy Souza      over 35 yrs ago   • NEEDLE BIOPSY clear   Heart: regular rate rhythm and no murmur   Abdomen: Soft and nontender. No abdominal masses. No peritoneal signs   Extremities: no edema, normal peripheral pulses   Neuro: Alert oriented. Cranial nerves II through XII intact.   Sensation shows di INDICATIONS:  dizzy, near syncope  TECHNIQUE:  Noncontrast CT scanning is performed through the brain. Dose reduction techniques were used.  Dose information is transmitted to the [facility] ([address] [place] [address] of Radiology) Elisa Loya 35 ([address]) w Mari Soto MD on 10/09/2020 at 3:16 PM             MDM      Discussed with Dr. Lico Chi from neurology who recommends MRI brain and if unremarkable patient to be discharged home for outpatient follow-up.   Given CT scan a week ago and now persistent d

## 2020-10-09 NOTE — ED INITIAL ASSESSMENT (HPI)
Pt states seen here last week for elevated BP, taking losartan BID, continues to note elevated BP readings.   Noted cold sensation to the top of his left foot, seen at IC for the foot today, states his EKG was different from his previous so he was sent to t

## 2020-10-09 NOTE — TELEPHONE ENCOUNTER
Medication PA Requested:  Freestyle Bakari sensor                                                        CoverMyMeds Used: No  Key:   Sig: Change sensor every 14 days   DX Code:   E16.2                                  CPT code (if applicable):   Case Number/Pending Ref#:    Per LOV 10/02/20 patient already has a reader and if it's not covered he's willing to cash pay. Other option is to send to [employer] to see if his medical policy will cover if pharmacy benefit denies it.

## 2020-10-12 ENCOUNTER — TELEPHONE (OUTPATIENT)
Dept: ENDOCRINOLOGY CLINIC | Facility: CLINIC | Age: 70
End: 2020-10-12

## 2020-10-12 NOTE — TELEPHONE ENCOUNTER
rn called patient with message below to arrive at 10 am and call us before so md can let ER know, verbalized understanding of instructions

## 2020-10-12 NOTE — TELEPHONE ENCOUNTER
Please ask the patient to arrive at around 10 am at the emergency triage area and tell them that he is coming for direct admission. Please call us when he is no the way. I will inform the desk of his anticipated arrival.   Thanks. n/a

## 2020-10-12 NOTE — TELEPHONE ENCOUNTER
Medication PA Requested:    [employer] Used: Yes  Key: [id number]  Sig: change sensor every 14 days  DX Code: E16.2                                       PA submitted with LOV note. Will await response.

## 2020-10-12 NOTE — TELEPHONE ENCOUNTER
Spoke with patient and wife on instructions from ER discharge paper work on following up with neuro for MRI as discussed in the ER. Imaging was not completed in the ER. Wife and  agreeable to call neuro and continue with ER follow up with AS 10/28.

## 2020-10-12 NOTE — TELEPHONE ENCOUNTER
Pt went to the ER and was advised to get a MRI done. No order  Was placed calling to see if AS will place the order?  Please advise     He is scheduled for a ER follow up on   Future Appointments   Date Time Provider Tanika Anderson   10/28/2020  3:00 PM

## 2020-10-12 NOTE — TELEPHONE ENCOUNTER
Patient will be admitted tomorrow for 72 hour fast  Dr. Agustina Lopez will be admitting    Patient will be npo   During the fast, the patient is allowed to take in only calorie-free and caffeine-free fluids.  Nonessential medication should be withheld, and the lay

## 2020-10-13 ENCOUNTER — TELEPHONE (OUTPATIENT)
Dept: INTERNAL MEDICINE CLINIC | Facility: CLINIC | Age: 70
End: 2020-10-13

## 2020-10-13 NOTE — TELEPHONE ENCOUNTER
Patient given Dr Frederick Powell information. Pt states he wouldn't see Dr Santos Morales as she is from the same office as Brennen Casarez. FYI to AS.

## 2020-10-13 NOTE — TELEPHONE ENCOUNTER
Pt called and stated that he has fired his Endocrinologist Dr Neri Meza and would like a recommendation for new one    Please advise

## 2020-10-15 ENCOUNTER — TELEPHONE (OUTPATIENT)
Dept: INTERNAL MEDICINE CLINIC | Facility: CLINIC | Age: 70
End: 2020-10-15

## 2020-10-15 NOTE — TELEPHONE ENCOUNTER
Patient states when he went to bed last night instead of have his usual honey crisp cereal with milk, he had 4 Twizzlers, checked his BG before bed and was 186, was having problems sleeping last night(not sure why), woke up at 5:30am wearing Bakari, BG was

## 2020-10-15 NOTE — TELEPHONE ENCOUNTER
Tried to call Dr Consuelo Martinez office several times this afternoon at [phone], no one answers, rings until the call disconnects. [place] [facility] [place] at [phone], Amaris Kaur transfer me to Charley at the Endo office.   Amaris Kaur took all information and will call

## 2020-10-15 NOTE — TELEPHONE ENCOUNTER
Pts wife called and stated that the pt woke up this morning and his BG was 40. He felt off and thought he might go tot he ER.      Pts wife stated that the pt ate some food and laid back down and now feels better     Please advise

## 2020-10-20 ENCOUNTER — LAB ENCOUNTER (OUTPATIENT)
Dept: LAB | Facility: HOSPITAL | Age: 70
End: 2020-10-20
Attending: INTERNAL MEDICINE
Payer: MEDICARE

## 2020-10-20 DIAGNOSIS — R73.9 HYPERGLYCEMIA: ICD-10-CM

## 2020-10-20 DIAGNOSIS — E16.2 HYPOGLYCEMIA: ICD-10-CM

## 2020-10-21 ENCOUNTER — LAB ENCOUNTER (OUTPATIENT)
Dept: LAB | Facility: HOSPITAL | Age: 70
End: 2020-10-21
Attending: INTERNAL MEDICINE
Payer: MEDICARE

## 2020-10-21 DIAGNOSIS — M81.8 OTHER OSTEOPOROSIS WITHOUT CURRENT PATHOLOGICAL FRACTURE: ICD-10-CM

## 2020-10-21 DIAGNOSIS — R73.9 HYPERGLYCEMIA: ICD-10-CM

## 2020-10-21 PROCEDURE — 80053 COMPREHEN METABOLIC PANEL: CPT

## 2020-10-21 PROCEDURE — 82306 VITAMIN D 25 HYDROXY: CPT

## 2020-10-21 PROCEDURE — 36415 COLL VENOUS BLD VENIPUNCTURE: CPT

## 2020-10-21 PROCEDURE — 83036 HEMOGLOBIN GLYCOSYLATED A1C: CPT

## 2020-10-26 ENCOUNTER — TELEPHONE (OUTPATIENT)
Dept: CARDIOLOGY | Age: 70
End: 2020-10-26

## 2020-10-27 ENCOUNTER — OFFICE VISIT (OUTPATIENT)
Dept: NEUROLOGY | Facility: CLINIC | Age: 70
End: 2020-10-27
Payer: COMMERCIAL

## 2020-10-27 VITALS
BODY MASS INDEX: 25 KG/M2 | HEART RATE: 56 BPM | DIASTOLIC BLOOD PRESSURE: 76 MMHG | SYSTOLIC BLOOD PRESSURE: 140 MMHG | WEIGHT: 172 LBS | RESPIRATION RATE: 16 BRPM

## 2020-10-27 DIAGNOSIS — R42 DIZZINESS: ICD-10-CM

## 2020-10-27 DIAGNOSIS — H53.8 BLURRED VISION: ICD-10-CM

## 2020-10-27 DIAGNOSIS — R20.2 TINGLING IN EXTREMITIES: Primary | ICD-10-CM

## 2020-10-27 PROCEDURE — 99215 OFFICE O/P EST HI 40 MIN: CPT | Performed by: OTHER

## 2020-10-27 PROCEDURE — 3078F DIAST BP <80 MM HG: CPT | Performed by: OTHER

## 2020-10-27 PROCEDURE — 3077F SYST BP >= 140 MM HG: CPT | Performed by: OTHER

## 2020-10-27 RX ORDER — LOSARTAN POTASSIUM 50 MG/1
50 TABLET ORAL 2 TIMES DAILY
COMMUNITY
End: 2020-11-18

## 2020-10-27 RX ORDER — LORAZEPAM 1 MG/1
TABLET ORAL
Qty: 2 TABLET | Refills: 0 | Status: SHIPPED | OUTPATIENT
Start: 2020-10-27 | End: 2020-11-06 | Stop reason: CLARIF

## 2020-10-27 NOTE — PROGRESS NOTES
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 90 tablet 0   • Metoprolol Succinate  MG Oral Tablet 24 Hr Take 1 tablet (100 mg total) by mouth daily.  90 tablet 0   • Hyoscyamine Sulfate 0.125 MG Sublingual SL Tab Pl male in no acute distress. appearance: Normal developed and well nourished , in no acute stress;   HEENT:  Normal conjunctiva, no abnormal secretion, normal structure of skull, no tenderness  Neck supple,  No carotid bruit,  thyroid normal  Lungs are clear List Items Addressed This Visit     Blurred vision    Relevant Orders    MRI BRAIN, MRA BRAIN&NECK (CPT=70551/22288/23088)    Dizziness    Tingling in extremities - Primary    Relevant Orders    EMG    FCO, DIRECT, REFLEX TO 9 ENAS    C-REACTIVE PROTEIN

## 2020-10-27 NOTE — PROGRESS NOTES
Patient states dizziness on and off. Patient denies recent falls or head trauma. Denies headaches. Denies numbness in the left foot. Patient states tingling on the bottom of both feet. Patient states this started about a week ago.

## 2020-10-28 ENCOUNTER — OFFICE VISIT (OUTPATIENT)
Dept: INTERNAL MEDICINE CLINIC | Facility: CLINIC | Age: 70
End: 2020-10-28
Payer: COMMERCIAL

## 2020-10-28 VITALS
SYSTOLIC BLOOD PRESSURE: 134 MMHG | RESPIRATION RATE: 18 BRPM | DIASTOLIC BLOOD PRESSURE: 88 MMHG | HEIGHT: 70 IN | HEART RATE: 76 BPM | TEMPERATURE: 98 F | BODY MASS INDEX: 25.77 KG/M2 | WEIGHT: 180 LBS

## 2020-10-28 DIAGNOSIS — E16.2 HYPOGLYCEMIA: ICD-10-CM

## 2020-10-28 DIAGNOSIS — I25.5 CARDIOMYOPATHY, ISCHEMIC: ICD-10-CM

## 2020-10-28 DIAGNOSIS — K86.81 EXOCRINE PANCREATIC INSUFFICIENCY: ICD-10-CM

## 2020-10-28 DIAGNOSIS — I10 BENIGN ESSENTIAL HTN: Primary | ICD-10-CM

## 2020-10-28 PROCEDURE — 90662 IIV NO PRSV INCREASED AG IM: CPT | Performed by: INTERNAL MEDICINE

## 2020-10-28 PROCEDURE — 3075F SYST BP GE 130 - 139MM HG: CPT | Performed by: INTERNAL MEDICINE

## 2020-10-28 PROCEDURE — 3079F DIAST BP 80-89 MM HG: CPT | Performed by: INTERNAL MEDICINE

## 2020-10-28 PROCEDURE — G0008 ADMIN INFLUENZA VIRUS VAC: HCPCS | Performed by: INTERNAL MEDICINE

## 2020-10-28 PROCEDURE — 99214 OFFICE O/P EST MOD 30 MIN: CPT | Performed by: INTERNAL MEDICINE

## 2020-10-28 PROCEDURE — 3008F BODY MASS INDEX DOCD: CPT | Performed by: INTERNAL MEDICINE

## 2020-10-28 NOTE — PROGRESS NOTES
Patient presents with:  Medication Follow-Up: MR rm 9 discuss medication changes      HPI:  Here for f/u of htn, chronic crampy intermittent abd pain which has been worse lately he equates to hernia getting larger. No n/v/d.  He has had fluctuant sugars see Personal history of antineoplastic chemotherapy     Completed chemo 2/17/17   • Pheochromocytoma, left     Dx in 6/2016: 1.7 cm mass near inferior adrenal gland   • Pneumoperitoneum 7/7/2019   • Rash after prolia shot   • Shortness of breath upon onset of Sublingual SL Tab Place 1 tablet (125 mcg total) under the tongue every 4 (four) hours as needed for Cramping. 30 tablet 0   • ergocalciferol 1.25 MG (98030 UT) Oral Cap Take 1 capsule (50,000 Units total) by mouth every 7 days for 52 doses.  26 capsule 1 [09878]      Meds & Refills for this Visit:  Requested Prescriptions      No prescriptions requested or ordered in this encounter       Imaging & Consults:  FLU VACC HIGH DOSE PRSV FREE    No follow-ups on file.   There are no Patient Instructions on file f

## 2020-10-29 ENCOUNTER — TELEPHONE (OUTPATIENT)
Dept: SURGERY | Facility: CLINIC | Age: 70
End: 2020-10-29

## 2020-10-29 NOTE — TELEPHONE ENCOUNTER
Called and spoke with patient regarding his persistent abdominal pain. We recommended follow-up with Dr. Octavia Laboy for further evaluation. Patient has contact information and will reach out to schedule appointment.

## 2020-11-02 ENCOUNTER — TELEPHONE (OUTPATIENT)
Dept: CARDIOLOGY | Age: 70
End: 2020-11-02

## 2020-11-02 ENCOUNTER — TELEPHONE (OUTPATIENT)
Dept: INTERNAL MEDICINE CLINIC | Facility: CLINIC | Age: 70
End: 2020-11-02

## 2020-11-02 NOTE — TELEPHONE ENCOUNTER
Pt was advised to call AS when his BG was over 200.  He took his BG 5min ago and it was 218    Please advise

## 2020-11-03 RX ORDER — ATORVASTATIN CALCIUM 20 MG/1
20 TABLET, FILM COATED ORAL DAILY
Qty: 90 TABLET | Refills: 1 | Status: SHIPPED | OUTPATIENT
Start: 2020-11-03 | End: 2021-05-17 | Stop reason: SDUPTHER

## 2020-11-04 ENCOUNTER — TELEPHONE (OUTPATIENT)
Dept: CARDIOLOGY | Age: 70
End: 2020-11-04

## 2020-11-04 PROBLEM — R93.3 ABNORMAL FINDING ON GI TRACT IMAGING: Status: ACTIVE | Noted: 2020-11-04

## 2020-11-05 ENCOUNTER — OFFICE VISIT (OUTPATIENT)
Dept: CARDIOLOGY | Age: 70
End: 2020-11-05

## 2020-11-05 VITALS
BODY MASS INDEX: 24.11 KG/M2 | HEART RATE: 80 BPM | WEIGHT: 168 LBS | SYSTOLIC BLOOD PRESSURE: 130 MMHG | DIASTOLIC BLOOD PRESSURE: 72 MMHG

## 2020-11-05 DIAGNOSIS — Z95.810 ICD (IMPLANTABLE CARDIOVERTER-DEFIBRILLATOR) IN PLACE: ICD-10-CM

## 2020-11-05 DIAGNOSIS — I50.22 CHRONIC SYSTOLIC CONGESTIVE HEART FAILURE (CMD): Primary | ICD-10-CM

## 2020-11-05 DIAGNOSIS — I25.5 CARDIOMYOPATHY, ISCHEMIC: ICD-10-CM

## 2020-11-05 PROCEDURE — 3078F DIAST BP <80 MM HG: CPT | Performed by: INTERNAL MEDICINE

## 2020-11-05 PROCEDURE — 3075F SYST BP GE 130 - 139MM HG: CPT | Performed by: INTERNAL MEDICINE

## 2020-11-05 PROCEDURE — 99215 OFFICE O/P EST HI 40 MIN: CPT | Performed by: INTERNAL MEDICINE

## 2020-11-05 SDOH — HEALTH STABILITY: MENTAL HEALTH: HOW OFTEN DO YOU HAVE A DRINK CONTAINING ALCOHOL?: NEVER

## 2020-11-05 ASSESSMENT — ENCOUNTER SYMPTOMS
COUGH: 0
BRUISES/BLEEDS EASILY: 0
WEIGHT LOSS: 0
CHILLS: 0
WEIGHT GAIN: 0
FEVER: 0
SUSPICIOUS LESIONS: 0
ALLERGIC/IMMUNOLOGIC COMMENTS: NO NEW FOOD ALLERGIES
HEMATOCHEZIA: 0
HEMOPTYSIS: 0

## 2020-11-05 ASSESSMENT — PATIENT HEALTH QUESTIONNAIRE - PHQ9
CLINICAL INTERPRETATION OF PHQ2 SCORE: NO FURTHER SCREENING NEEDED
2. FEELING DOWN, DEPRESSED OR HOPELESS: NOT AT ALL
SUM OF ALL RESPONSES TO PHQ9 QUESTIONS 1 AND 2: 0
CLINICAL INTERPRETATION OF PHQ9 SCORE: NO FURTHER SCREENING NEEDED
SUM OF ALL RESPONSES TO PHQ9 QUESTIONS 1 AND 2: 0
1. LITTLE INTEREST OR PLEASURE IN DOING THINGS: NOT AT ALL

## 2020-11-06 ENCOUNTER — LAB ENCOUNTER (OUTPATIENT)
Dept: LAB | Age: 70
DRG: 068 | End: 2020-11-06
Attending: Other
Payer: MEDICARE

## 2020-11-06 ENCOUNTER — TELEPHONE (OUTPATIENT)
Dept: CARDIOLOGY | Age: 70
End: 2020-11-06

## 2020-11-06 ENCOUNTER — HOSPITAL ENCOUNTER (INPATIENT)
Facility: HOSPITAL | Age: 70
LOS: 2 days | Discharge: HOME | DRG: 068 | End: 2020-11-09
Attending: EMERGENCY MEDICINE | Admitting: HOSPITALIST
Payer: MEDICARE

## 2020-11-06 ENCOUNTER — TELEPHONE (OUTPATIENT)
Dept: INTERNAL MEDICINE CLINIC | Facility: CLINIC | Age: 70
End: 2020-11-06

## 2020-11-06 ENCOUNTER — APPOINTMENT (OUTPATIENT)
Dept: CT IMAGING | Facility: HOSPITAL | Age: 70
DRG: 068 | End: 2020-11-06
Attending: EMERGENCY MEDICINE
Payer: MEDICARE

## 2020-11-06 DIAGNOSIS — R20.2 TINGLING IN EXTREMITIES: ICD-10-CM

## 2020-11-06 DIAGNOSIS — I25.83 CORONARY ARTERY DISEASE DUE TO LIPID RICH PLAQUE: ICD-10-CM

## 2020-11-06 DIAGNOSIS — E87.1 HYPONATREMIA: ICD-10-CM

## 2020-11-06 DIAGNOSIS — E16.2 HYPOGLYCEMIA: ICD-10-CM

## 2020-11-06 DIAGNOSIS — I25.10 CORONARY ARTERY DISEASE DUE TO LIPID RICH PLAQUE: ICD-10-CM

## 2020-11-06 DIAGNOSIS — G45.9 TIA (TRANSIENT ISCHEMIC ATTACK): Primary | ICD-10-CM

## 2020-11-06 PROCEDURE — 84207 ASSAY OF VITAMIN B-6: CPT

## 2020-11-06 PROCEDURE — 86038 ANTINUCLEAR ANTIBODIES: CPT

## 2020-11-06 PROCEDURE — 84446 ASSAY OF VITAMIN E: CPT

## 2020-11-06 PROCEDURE — 82607 VITAMIN B-12: CPT

## 2020-11-06 PROCEDURE — 86140 C-REACTIVE PROTEIN: CPT

## 2020-11-06 PROCEDURE — 70498 CT ANGIOGRAPHY NECK: CPT | Performed by: EMERGENCY MEDICINE

## 2020-11-06 PROCEDURE — 86334 IMMUNOFIX E-PHORESIS SERUM: CPT

## 2020-11-06 PROCEDURE — 82010 KETONE BODYS QUAN: CPT

## 2020-11-06 PROCEDURE — 99222 1ST HOSP IP/OBS MODERATE 55: CPT | Performed by: INTERNAL MEDICINE

## 2020-11-06 PROCEDURE — 86431 RHEUMATOID FACTOR QUANT: CPT

## 2020-11-06 PROCEDURE — 80053 COMPREHEN METABOLIC PANEL: CPT

## 2020-11-06 PROCEDURE — 83525 ASSAY OF INSULIN: CPT

## 2020-11-06 PROCEDURE — 70496 CT ANGIOGRAPHY HEAD: CPT | Performed by: EMERGENCY MEDICINE

## 2020-11-06 PROCEDURE — 82941 ASSAY OF GASTRIN: CPT

## 2020-11-06 PROCEDURE — 99222 1ST HOSP IP/OBS MODERATE 55: CPT | Performed by: HOSPITALIST

## 2020-11-06 PROCEDURE — 85652 RBC SED RATE AUTOMATED: CPT

## 2020-11-06 PROCEDURE — 84206 ASSAY OF PROINSULIN: CPT

## 2020-11-06 PROCEDURE — 82746 ASSAY OF FOLIC ACID SERUM: CPT

## 2020-11-06 PROCEDURE — 36415 COLL VENOUS BLD VENIPUNCTURE: CPT

## 2020-11-06 PROCEDURE — 84681 ASSAY OF C-PEPTIDE: CPT

## 2020-11-06 RX ORDER — METOPROLOL SUCCINATE 100 MG/1
100 TABLET, EXTENDED RELEASE ORAL DAILY
COMMUNITY

## 2020-11-06 RX ORDER — LORAZEPAM 1 MG/1
1 TABLET ORAL ONCE
Status: ON HOLD | COMMUNITY
Start: 2020-10-27 | End: 2020-11-09

## 2020-11-06 RX ORDER — HYOSCYAMINE SULFATE EXTENDED-RELEASE 0.38 MG/1
0.38 TABLET ORAL EVERY 12 HOURS
COMMUNITY
End: 2020-11-17

## 2020-11-06 RX ORDER — ERGOCALCIFEROL 1.25 MG/1
50000 CAPSULE ORAL
COMMUNITY
End: 2021-05-18

## 2020-11-06 NOTE — TELEPHONE ENCOUNTER
Pt called stating he for the past 20 min has had shaky, headache, dizziness, elevated BP and blood sugar is 96-sent call to triage

## 2020-11-06 NOTE — TELEPHONE ENCOUNTER
LOV 10/28/20    FYI-     C/o sudden onset light-headedness, feeling shaky, mild headache to right side x 20-30 minutes, not resolving. Denies CP or SOB. Took BP 3x, 167/78, 127/80, 152/73 with pulse around 73. Advised to go to ED.  Verbs understandin

## 2020-11-07 ENCOUNTER — APPOINTMENT (OUTPATIENT)
Dept: MRI IMAGING | Facility: HOSPITAL | Age: 70
DRG: 068 | End: 2020-11-07
Attending: Other
Payer: MEDICARE

## 2020-11-07 PROBLEM — I25.83 CORONARY ARTERY DISEASE DUE TO LIPID RICH PLAQUE: Status: ACTIVE | Noted: 2020-11-07

## 2020-11-07 PROBLEM — I25.10 CORONARY ARTERY DISEASE DUE TO LIPID RICH PLAQUE: Status: ACTIVE | Noted: 2020-11-07

## 2020-11-07 PROCEDURE — 70553 MRI BRAIN STEM W/O & W/DYE: CPT | Performed by: OTHER

## 2020-11-07 PROCEDURE — 99223 1ST HOSP IP/OBS HIGH 75: CPT | Performed by: OTHER

## 2020-11-07 PROCEDURE — 5A09357 ASSISTANCE WITH RESPIRATORY VENTILATION, LESS THAN 24 CONSECUTIVE HOURS, CONTINUOUS POSITIVE AIRWAY PRESSURE: ICD-10-PCS | Performed by: HOSPITALIST

## 2020-11-07 PROCEDURE — 99232 SBSQ HOSP IP/OBS MODERATE 35: CPT | Performed by: NURSE PRACTITIONER

## 2020-11-07 PROCEDURE — 99232 SBSQ HOSP IP/OBS MODERATE 35: CPT | Performed by: HOSPITALIST

## 2020-11-07 RX ORDER — LORAZEPAM 1 MG/1
1 TABLET ORAL ONCE
Status: COMPLETED | OUTPATIENT
Start: 2020-11-07 | End: 2020-11-07

## 2020-11-07 RX ORDER — METOPROLOL SUCCINATE 100 MG/1
100 TABLET, EXTENDED RELEASE ORAL
Status: DISCONTINUED | OUTPATIENT
Start: 2020-11-07 | End: 2020-11-09

## 2020-11-07 RX ORDER — LORAZEPAM 2 MG/ML
1 INJECTION INTRAMUSCULAR ONCE
Status: DISCONTINUED | OUTPATIENT
Start: 2020-11-07 | End: 2020-11-07

## 2020-11-07 RX ORDER — LORAZEPAM 2 MG/ML
1 INJECTION INTRAMUSCULAR ONCE
Status: DISCONTINUED | OUTPATIENT
Start: 2020-11-07 | End: 2020-11-08

## 2020-11-07 RX ORDER — SODIUM CHLORIDE 9 MG/ML
INJECTION, SOLUTION INTRAVENOUS CONTINUOUS
Status: DISCONTINUED | OUTPATIENT
Start: 2020-11-07 | End: 2020-11-08

## 2020-11-07 RX ORDER — DEXTROSE MONOHYDRATE 25 G/50ML
50 INJECTION, SOLUTION INTRAVENOUS
Status: DISCONTINUED | OUTPATIENT
Start: 2020-11-07 | End: 2020-11-09

## 2020-11-07 RX ORDER — ASPIRIN 81 MG/1
81 TABLET ORAL DAILY
Status: DISCONTINUED | OUTPATIENT
Start: 2020-11-07 | End: 2020-11-09

## 2020-11-07 RX ORDER — LOSARTAN POTASSIUM 50 MG/1
50 TABLET ORAL 2 TIMES DAILY
Status: DISCONTINUED | OUTPATIENT
Start: 2020-11-07 | End: 2020-11-09

## 2020-11-07 RX ORDER — LORAZEPAM 1 MG/1
1 TABLET ORAL
Status: DISCONTINUED | OUTPATIENT
Start: 2020-11-07 | End: 2020-11-08

## 2020-11-07 RX ORDER — SODIUM CHLORIDE 9 MG/ML
INJECTION, SOLUTION INTRAVENOUS CONTINUOUS
Status: ACTIVE | OUTPATIENT
Start: 2020-11-07 | End: 2020-11-07

## 2020-11-07 RX ORDER — ATORVASTATIN CALCIUM 20 MG/1
20 TABLET, FILM COATED ORAL DAILY
Status: DISCONTINUED | OUTPATIENT
Start: 2020-11-07 | End: 2020-11-09

## 2020-11-07 RX ORDER — LORAZEPAM 1 MG/1
1 TABLET ORAL
Status: DISCONTINUED | OUTPATIENT
Start: 2020-11-08 | End: 2020-11-07

## 2020-11-07 NOTE — PLAN OF CARE
NURSING ADMISSION NOTE      Patient admitted via cart. Oriented to room. Safety precautions initiated. Bed in low position. Call light in reach. Received pt at approx 0100. Denies pain/discomfort at this time. Neuro checks done q2h.  See charti

## 2020-11-07 NOTE — CONSULTS
[address] [facility] Initial Consultation    Hyun Pinedo Patient Status:  Inpatient    1950 MRN [id number]   [facility] 7NE-A Attending Elham Valle MD   Hosp Day # 0 PCP Winifred Allen MD     REASON FOR EVALUATION:  D episode. At present he feels back to baseline. In the past he has had symptoms of dizziness and headache when his blood pressure is elevated or his blood sugar is low.  He was last seen by Dr. Larisa Jacobson on 10/27/2020 for dizziness and tingling in his cid Bile Duct Cancer   • Cardiac defibrillator in place    • Cholangiocarcinoma [facility]) 10/1/2016   • Closed compression fracture of L1 vertebra ([facility] [address] 10/13/2017   • Coronary atherosclerosis    • Decorative tattoo long time ago   • Diverticulosis of large intest Performed by Juma Riley MD at [address] [place] [address] (EUS) N/A 7/25/2016    Performed by Juma Riley MD at [address] [place] [address] (EUS) N/A 6/23/2016    Performed by Juma Riley MD at [facility] ENDOSCOPY   • E cap 20,000 Units, 20,000 Units, Oral, 6x Daily    •  glucose (DEX4) oral liquid 15 g, 15 g, Oral, Q15 Min PRN    Or    •  Glucose-Vitamin C (DEX-4) chewable tab 4 tablet, 4 tablet, Oral, Q15 Min PRN    Or    •  dextrose 50 % injection 50 mL, 50 mL, Intrave ALB 3.5 11/06/2020    ALKPHO 92 11/06/2020    BILT 0.8 11/06/2020    TP 7.1 11/06/2020    AST 30 11/06/2020    ALT 41 11/06/2020    TROP <0.045 11/06/2020    PGLU 100 11/07/2020       IMAGING:  CTA Head and Neck 11/6/2020  No evidence of acute intracranial

## 2020-11-07 NOTE — ED PROVIDER NOTES
Patient Seen in: [facility] Emergency Department      History   Patient presents with:  Dizziness  Headache    Stated Complaint: dizziness, unilateral headache    HPI    22-year-old  and very pleasant gentleman presents to the emergency depar He checked his heart rate which was also normal and his blood pressure which was normal for him and then became very concerned that he did not know exactly what this episode what it is. He states that it felt like the room was moving around him.   He had h Frequent urination drink 5 bottles of water daily   • Hearing impairment     hearing loss in right   • Hearing loss 10%    • Heart attack ([facility] 06/24/2019    NONSTEMI   • Hemorrhoids teenager   • High blood pressure    • Hyperkalemia    • Hypokalem over 35 yrs ago   • NEEDLE BIOPSY LIVER  8.16.16   • OTHER      right index finger surgery   • OTHER SURGICAL HISTORY  6/23/16.     EUS/EGD/FNA   • PLACEMENT, BILE DUCT STENT     • REMOVAL GALLBLADDER     • WHIPPLE  8/16/2016    Pancreaticoduodenectomy w Right eye: No discharge. Left eye: No discharge. Extraocular Movements:      Right eye: Normal extraocular motion and no nystagmus. Left eye: Normal extraocular motion and no nystagmus.       Conjunctiva/sclera: Conjunctivae normal. wooden\" but he does walk with a steady gait   Psychiatric:         Mood and Affect: Mood normal. Mood is not anxious or depressed. Speech: Speech normal.         Behavior: Behavior normal. Behavior is not agitated.          Cognition and Memory: Co (Reviewed)  ------------------------------------------------------------  Time: 11/06 1437  Value: Hemoglobin(!): 12.1  Comment: (Reviewed)  ------------------------------------------------------------  Time: 11/06 1437  Value: POC GLUCOSE(!): 102  Comment been avoiding salt because they said that it can make him swell so I encouraged him to go ahead and start eating then he was given some fluids here in the emergency department it looks like his last sodium was 134 just a few weeks ago.   Other possibilities

## 2020-11-07 NOTE — CONSULTS
[address] [facility] Preliminary Note    Mery Cardoso Patient Status:  Inpatient    1950 MRN [id number]   [facility] 7NE-A Attending Emeli Yeager MD   Hosp Day # 0 PCP Fernando Vogt MD     REASON FOR EVALUATION:  Severiano Pick yesterday)  Currently the patient denies associated, neck pain, nausea, or vomiting.   The patient denies cranial nerve symptoms such as blurred vision, diplopia, photophobia, ptosis, facial weakness/paresthesias, hearing loss, vertigo, tinnitus, hoarseness Hyperkalemia    • Hypokalemia 4/26/2019   • Hyponatremia 11/7/2016   • Ileus (HCC)    • Nausea Sometimes   • NSTEMI (non-ST elevated myocardial infarction) ([facility] [address] [address]    • Osteoporosis    • Pacemaker With defib   • Pancreatic cancer [facility])     chloangiocarcinoma WHIPPLE  8/16/2016    Pancreaticoduodenectomy with regional lymphadenectomy, Left adrenalectomy with resection of retroperitoneal periadrenal tumor, Omental pedicle flap, Splenectomy, and Wedge resection segment 4b liver mass.    • WHIPPLE PROCEDURE N/A 8/1 GENERAL:  Patient is a 71year old male in no acute distress. HEENT:  Normocephalic, atraumatic  LUNGS: Clear to auscultation bilaterally. HEART:  S1, S2, Regular rate and rhythm.   ABD: Soft, non tender  SKIN: Warm, dry, no rashes    NEUROLOGICAL:  Th atherosclerosis disease.     PLAN:  Coordinating with [email or web address] to get MRI brain w+wo contrast completed  Recommend avoiding hypotensive episodes (sbp > 110 mmHg)    Dr. Alvarez Favor to follow for further recommendations    [facility]

## 2020-11-07 NOTE — PROGRESS NOTES
[facility]  Cardiology Progress Note    Bessie Taylor Patient Status:  Inpatient    1950 MRN [id number]   [facility] 7NE-A Attending Poly Garcia MD   Hosp Day # 0 PCP Luis Thomas MD     Subjective:  Had an episode of dizz Daily Beta Blocker   • pancrelipase (Lip-Prot-Amyl)  20,000 Units Oral 6x Daily     • sodium chloride 75 mL/hr at 11/07/20 0142       Assessment:  · Syncope, dizziness - MRI brain ordered per neurology  · Ischemic CM, LVEF 35%  · CAD, h/o CABG  · CHF  · Me

## 2020-11-07 NOTE — CONSULTS
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          Scheduled Medication:    Continuous Infusing Medication:    PRN Medication:       OBJECTIVE:   11/06/20  1730 11/06/20  1827 11/06/20 2012 11/06/20 2112   BP:  143/76 135/68 119/60   Pulse: 65 78 68 resection      Plan:  Continue home cardiac meds    I will contact [employer] and make them aware of plans for MRI and need for ICD check (to rule out arrhythmia as cause of his presyncope)    FLORA Beard MD  10:19pmn

## 2020-11-07 NOTE — PROGRESS NOTES
[facility]  Progress Note     Charles Mason Patient Status:  Inpatient    1950 MRN [id number]   [facility] 7NE-A Attending Sanket Agustin MD   Hosp Day # 0 PCP Anel Koo MD     Chief Complaint: TIA, leg numbness    S: P 20,000 Units Oral 6x Daily       ASSESSMENT / PLAN:     1. Near syncope, TIA r/o vs 2/2 hyponatremia  1. Etiology unclear  2. Rule out orthostasis  3. Neuro  4. MRI, echo pending  5. asa, statin, permissive HTN  2. Acute hyponatremia  1.  Pt drank 16 bottl

## 2020-11-08 ENCOUNTER — APPOINTMENT (OUTPATIENT)
Dept: CV DIAGNOSTICS | Facility: HOSPITAL | Age: 70
DRG: 068 | End: 2020-11-08
Attending: INTERNAL MEDICINE
Payer: MEDICARE

## 2020-11-08 PROBLEM — Z99.89 OSA ON CPAP: Status: ACTIVE | Noted: 2020-11-08

## 2020-11-08 PROBLEM — R55 NEAR SYNCOPE: Status: ACTIVE | Noted: 2019-11-18

## 2020-11-08 PROBLEM — G47.33 OSA ON CPAP: Status: ACTIVE | Noted: 2020-11-08

## 2020-11-08 PROCEDURE — 4B02XTZ MEASUREMENT OF CARDIAC DEFIBRILLATOR, EXTERNAL APPROACH: ICD-10-PCS | Performed by: INTERNAL MEDICINE

## 2020-11-08 PROCEDURE — 99233 SBSQ HOSP IP/OBS HIGH 50: CPT | Performed by: INTERNAL MEDICINE

## 2020-11-08 PROCEDURE — 93306 TTE W/DOPPLER COMPLETE: CPT | Performed by: INTERNAL MEDICINE

## 2020-11-08 PROCEDURE — 99233 SBSQ HOSP IP/OBS HIGH 50: CPT | Performed by: OTHER

## 2020-11-08 PROCEDURE — 99231 SBSQ HOSP IP/OBS SF/LOW 25: CPT | Performed by: HOSPITALIST

## 2020-11-08 NOTE — ICD/PM
Remote Cardiac Device Interrogation        Device : YouTube    Device type:EVERA MRI XT  leadless pacemaker        Current rhythm: A paced      Stored events: none      Curtis Valdes  11/8/2020

## 2020-11-08 NOTE — PROGRESS NOTES
[facility]  Progress Note    Carlee Haro Patient Status:  Inpatient    1950 MRN [id number]   [facility] 7NE-A Attending Luciana Epley, MD   Hosp Day # 1 PCP Lobo Oliveira MD       Assessment and Plan:  Patient Active Proble await report from representative. MRI- no acute infarct  Neurology workup ongoing  Recheck TSH  Hydrate  Will monitor on telemetry and continue supportive care. Echo in am.   EP to evaluate on Monday.      Subjective:  No chest pain or shortness of ebenezer mg, Oral, Daily Beta Blocker    •  pancrelipase (Lip-Prot-Amyl) (ZENPEP) DR particles cap 20,000 Units, 20,000 Units, Oral, 6x Daily    •  glucose (DEX4) oral liquid 15 g, 15 g, Oral, Q15 Min PRN    Or    •  Glucose-Vitamin C (DEX-4) chewable tab 4 tablet,

## 2020-11-08 NOTE — PROGRESS NOTES
[facility]  Progress Note     Vikram Mylesmayelin Patient Status:  Inpatient    1950 MRN [id number]   [facility] 7NE-A Attending Efra Chapin MD   Hosp Day # 1 PCP Shahbaz Cardona MD     Chief Complaint: TIA, leg numbness    S: P ASSESSMENT / PLAN:     1. Near syncope with known cerebrovascular stenosis, TIA vs d/t hyponatremia, MRI neg > orthostatics negative  2. CAD sp CABG   3. Ischemic cardiomyopathy sp ICD  4. Choalngiocarcinoma sp Whipple  5.  Pheochromocytoma sp resecti

## 2020-11-08 NOTE — PLAN OF CARE
Assumed care at 0730. A&OX4. Only mild dizziness at this time. N&T BLE improved. Tele - SR. At 1030 pt C/O of episode like what brought him into hospital.  \"Dizziness & HA like a halo on top of head. \"  Improved within 15 minutes.   No further episodes precautions    Outcome: Progressing     Problem: Diabetes/Glucose Control  Goal: Glucose maintained within prescribed range  Description: INTERVENTIONS:  - Monitor Blood Glucose as ordered  - Assess for signs and symptoms of hyperglycemia and hypoglycemia

## 2020-11-08 NOTE — RESPIRATORY THERAPY NOTE
LONG : EQUIPMENT USE: DAILY SUMMARY                                            SET MODE:                                          USAGE IN HOURS:                                          90% EXP. PRESSURE(EPAP):

## 2020-11-08 NOTE — PLAN OF CARE
Assumed care at [address] [place] [address]. Denies pain/discomfort at this time. Neuro checks q4h. See charting for full assessment. States dizziness is resolved.       Problem: Patient/Family Goals  Goal: Patient/Family Long Term Goal  Description: Patient's Long Term Goal: self management of diabetes  Outcome: Progressing

## 2020-11-08 NOTE — PLAN OF CARE
Assumed care at 0730. A&OX4. Tele - A-Paced. Neuro symptoms resolved completely. Matthew pain or discomfort. 2D Echo today. Nuclear stress test tomorrow. Up in room and halls. Wife at bedside. Resting comfortably. Will continue to monitor.     Problem: P maintain glucose within target range  - Assess barriers to adequate nutritional intake and initiate nutrition consult as needed  - Instruct patient on self management of diabetes  Outcome: Progressing

## 2020-11-09 ENCOUNTER — NURSE ONLY (OUTPATIENT)
Dept: ELECTROPHYSIOLOGY | Facility: HOSPITAL | Age: 70
DRG: 068 | End: 2020-11-09
Attending: Other
Payer: MEDICARE

## 2020-11-09 ENCOUNTER — APPOINTMENT (OUTPATIENT)
Dept: CV DIAGNOSTICS | Facility: HOSPITAL | Age: 70
DRG: 068 | End: 2020-11-09
Attending: INTERNAL MEDICINE
Payer: MEDICARE

## 2020-11-09 ENCOUNTER — TELEPHONE (OUTPATIENT)
Dept: NEUROLOGY | Facility: CLINIC | Age: 70
End: 2020-11-09

## 2020-11-09 VITALS
DIASTOLIC BLOOD PRESSURE: 84 MMHG | TEMPERATURE: 99 F | BODY MASS INDEX: 24.77 KG/M2 | WEIGHT: 173 LBS | OXYGEN SATURATION: 97 % | HEART RATE: 65 BPM | SYSTOLIC BLOOD PRESSURE: 158 MMHG | HEIGHT: 70 IN | RESPIRATION RATE: 16 BRPM

## 2020-11-09 PROCEDURE — 99222 1ST HOSP IP/OBS MODERATE 55: CPT | Performed by: INTERNAL MEDICINE

## 2020-11-09 PROCEDURE — 99233 SBSQ HOSP IP/OBS HIGH 50: CPT | Performed by: OTHER

## 2020-11-09 PROCEDURE — 78452 HT MUSCLE IMAGE SPECT MULT: CPT | Performed by: INTERNAL MEDICINE

## 2020-11-09 PROCEDURE — 95816 EEG AWAKE AND DROWSY: CPT | Performed by: OTHER

## 2020-11-09 PROCEDURE — 93017 CV STRESS TEST TRACING ONLY: CPT | Performed by: INTERNAL MEDICINE

## 2020-11-09 PROCEDURE — 93018 CV STRESS TEST I&R ONLY: CPT | Performed by: INTERNAL MEDICINE

## 2020-11-09 NOTE — PROGRESS NOTES
[facility]  Cardiology Progress Note    Darlina Minus Patient Status:  Inpatient    1950 MRN [id number]   [facility] 7NE-A Attending Soham Hagen MD   Hosp Day # 2 PCP Rex Florez MD     Subjective:  Denies CP, SOB or diz · Ischemic CM, LVEF 35%  · CAD, h/o CABG  · CHF, euvolemic   · [employer] ICD, interrogated 11/8  · Hx of intracranial severe arterial stenosis  · Hx of cholangiocarcinoma resection via whipple  · Hx of pheochromocytoma    Plan:   · ECHO pending this AM

## 2020-11-09 NOTE — TELEPHONE ENCOUNTER
TIA CLINIC SCREENING    1. Date of ED visit/TIA diagnosis:  11/6/2020   Time of discharge from ED:  n/a    2. Is patient currently admitted? Yes   If YES - TIA Clinic Appointment not required.       3. Does patient already see an ERIN neurologist?  Yes  Nam

## 2020-11-09 NOTE — PROGRESS NOTES
[address] [facility] Progress Note    Micky Ortiz Patient Status:  Inpatient    1950 MRN [id number]   [facility] 7NE-A Attending Ilan Cuellar MD   Hosp Day # 1 PCP Amanda Pop MD     Subjective:  Micky Ortiz is intact to light touch throughout   Coord: FNF intact with no tremor or dysmetria  Romberg:deferred  Gait: deferred    Labs:  Reviewed in EPIC;        BMP:   No results found for: GLUCOSE  Lab Results   Component Value Date    K 4.2 11/07/2020    K 3.9 11/0 vessel ischemic change within the deep white   matter. Diffusion weighted imaging was performed and is without evidence for acute infarction. No evidence of hemorrhage or mass lesion. Large intracranial flow voids are present.   VENTRICLES/SULCI:   Ventr TIA order set in place   - ASA 81 mg daily   - Lipitor 20 mg daily home statin   - hemoglobin A1C 5.8, lipids ordered;   - PT/OT/speech therapy   - smoking cessation education   - SCDs for DVT ppx    EEG in AM     Will follow    Edmar Wilks MD, Tamika Burger

## 2020-11-09 NOTE — PLAN OF CARE
Assumed care at [address]   A&Ox4  Tele- A-paced   RA, LONG with CPAP at night   Denies any pain or discomfort   Nuclear stress tested and EEG scheduled for tomorrow  Will continue to monitor

## 2020-11-09 NOTE — PROGRESS NOTES
CARDIACDIAGNOSTICS NOTE      Inpatient nuclear Lexiscan stress test ordered by [facility]. Stationary Lexiscan protocol implemented, with 0.4mg Lexiscan infused while pt seated in recliner. The pt denied any cardiac symptoms with infusion.  Nuclear i

## 2020-11-09 NOTE — PROCEDURES
[facility] in [place]  in affiliation with [facility]  [address]  [address]  [phone]  Fax [phone]      Name: Carlee Haro  05[birth year]91[birth year]275  Date of Garcia awake and drowsy state without sedation. Digital recording was utilized    Background Activity and Sleep:   The background activity demonstrated frequency of 9-10hz well organized medium to large voltage alpha waves with reactivity to eye opening and clos

## 2020-11-09 NOTE — PAYOR COMM NOTE
--------------  ADMISSION REVIEW     Payor: [facility] Leo Mendoza Pinch #:  [id number]  Authorization Number: [id number]       ED Provider Notes      ED Provider Notes signed by Harris Valdes MD at 11/7/2020  3:17 AM     Author: Harris Valdes [place] Cali blood sugar as he is had pretty symptomatic blood sugar drops so he took his blood sugar and it was it within normal limits for him initially 70 and then 102 after having some Gatorade and crackers.   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 Liban Hardy ENDOSCOPY   • ENDOSCOPIC ULTRASOUND (EUS) N/A 7/25/2016    Performed by Ree Garduno MD at [address] [place] [address] (EUS) N/A 6/23/2016    Performed by Ree Garduno MD at [facility] ENDOSCOPY   • ESOPHAGOGASTRODUODENOSCOPY (EGD) N/A 6/23/ field deficit or scleral icterus. Right eye: No discharge. Left eye: No discharge. Extraocular Movements:      Right eye: Normal extraocular motion and no nystagmus. Left eye: Normal extraocular motion and no nystagmus.       Conjun ambulating that his legs feel \"a little wooden\" but he does walk with a steady gait   Psychiatric:         Mood and Affect: Mood normal. Mood is not anxious or depressed.          Speech: Speech normal.         Behavior: Behavior normal. Behavior is not a CALCULATED OSMOLALITY(!): 268  Comment: (Reviewed)  ------------------------------------------------------------  Time: 11/06 1437  Value: Hemoglobin(!): 12.1  Comment: (Reviewed)  ------------------------------------------------------------  Time: 11/06 1 with the patient he states he is really been avoiding salt because they said that it can make him swell so I encouraged him to go ahead and start eating then he was given some fluids here in the emergency department it looks like his last sodium was 134 ju sweating and shaking earlier today almost losing his consciousness but without falling. Review of Systems:   A comprehensive 14 point review of systems was completed. Pertinent positives and negatives noted in the HPI.     Physical Exam:    /6 presyncope, rule out CVA     Presyncope / dizziness / weakness  ICM EF 35%  Hx of CABG  CHF  Medtronic ICD 11/2019  Hx of intracranial severe arterial stenosis (Left P2 and Right M1)  Hx of cholangiocarcinoma resection via whipple  Hx of pheochromocytoma r cessation education   - SCDs for DVT ppx        11/7 CARDS       Assessment:  · Syncope, dizziness - MRI brain ordered per neurology  · Ischemic CM, LVEF 35%  · CAD, h/o CABG  · CHF  · Medtronic ICD  · Hx of intracranial severe arterial stenosis  · Hx of c progress per neurology service. 6. Mild carotid disease  7. Possible bicuspid aortic valve. 8. Dyslipidemia   9. HTN  10. History of peripheral neuropathy        Check orthostatics     Interrogate ICD- await report from representative.    MRI- no acute resolved  Hyponatremia - resolved   Hx intracranial atherosclerosis  Cholangiocarcinoma s/p whipple procedure and pheochromocytoma removal  NSTEMI s/p CABG        Plan:  cont asa daily   lipitor 20 mg   PT/OT  DVT prophylaxis   Cardiology following - ICD c

## 2020-11-09 NOTE — PROGRESS NOTES
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)     Thrombocytopenia (HCC)     Liver mass, left lobe     Osteoporosis     Aortic atherosclerosis (HCC)     Exocrine pancreatic insufficiency     Hyperglycemia     Benign essential HTN     Hi Notes:    I have seen and evaluated the patient with my housestaff or independently on the day of the APN/PA note and agree with the overall History/Assessement and Plan:   Specific comments/additions/deletions are as outlined in my notes (If none listed-I 3.6 3.4 - 5.0 g/dL    Globulin  4.0 2.8 - 4.4 g/dL    A/G Ratio 0.9 (L) 1.0 - 2.0   MAGNESIUM    Collection Time: 11/09/20  7:06 AM   Result Value Ref Range    Magnesium 2.0 1.6 - 2.6 mg/dL   CBC W/ DIFFERENTIAL    Collection Time: 11/09/20  7:06 AM   Resu reviewed/ordered - 1  (   ) new diagnosis: - 2  ( x ) Images independently reviewed -2  ( x ) Case/studies discussed with other caregivers - 2  (   ) Fam meetings - patient not present --non F2F  Non Face to Face CPT code 60911/61658 applies as documented

## 2020-11-09 NOTE — PLAN OF CARE
A/OX4, anxious and upset he cant go home, threatening to leave AMA  EEG complete, Stress test complete. Denies any dizziness/ lightheadedness  VSS, A Paced per Tele  Needs attended to, Will cont to monitor.       Problem: SAFETY ADULT - FALL  Goal: Free fr

## 2020-11-09 NOTE — OCCUPATIONAL THERAPY NOTE
Received order for OT evaluation. Attempted to see the patient for OT evaluation. Provided max encouragement to be out of bed. Independent ambulating to bathroom, turning, closing his gown, and returning back to bed.   Pt told OT and PT that he was feelin

## 2020-11-09 NOTE — RESPIRATORY THERAPY NOTE
LONG - Equipment Use Daily Summary:                  . Set Mode: AUTO CPAP WITH C-FLEX                . Usage in hours: 3:48                . 90% Pressure (EPAP) level: 5.0                . 90% Insp. Pressure (IPAP): [place] Mel[place]  AHI: 5.3

## 2020-11-09 NOTE — PHYSICAL THERAPY NOTE
PT orders received and chart reviewed. Pt with no concerns about d/c home and demonstrated safe functional mobility. Pt independently completed supine to sit, sit to stand, and ambulation to and from the bathroom.  Pt with no skilled inpatient PT needs at t

## 2020-11-09 NOTE — PLAN OF CARE
NURSING DISCHARGE NOTE    Discharged Home via Wheelchair. Accompanied by RN  Belongings Taken by patient/family. AVS reviewed w/ patient, all questions answered. Belongings taken home. No needs for home at d/c.

## 2020-11-09 NOTE — PLAN OF CARE
EF 20%, no ischemia. Noted to have an old infarct extending from the inferior wall to the apex. Ok to discharge from a cardiac perspective, follow up with EP as an outpatient.     Nikki Lo

## 2020-11-11 ENCOUNTER — PATIENT OUTREACH (OUTPATIENT)
Dept: CASE MANAGEMENT | Age: 70
End: 2020-11-11

## 2020-11-11 DIAGNOSIS — Z02.9 ENCOUNTERS FOR ADMINISTRATIVE PURPOSE: ICD-10-CM

## 2020-11-11 PROCEDURE — 1111F DSCHRG MED/CURRENT MED MERGE: CPT

## 2020-11-11 NOTE — DISCHARGE SUMMARY
[facility]IATR[facility] [place] HOSPITALIST  DISCHARGE SUMMARY     Micky Ortiz Patient Status:  Inpatient    1950 MRN [id number]   [facility] 7NE-A Attending No att. providers found   Hosp Day # 2 PCP Amanda Pop MD     Date of Admission: 2020  Da LIPITOR      Take 1 tablet (20 mg total) by mouth daily. Quantity: 90 tablet  Refills: 0     Denosumab 60 MG/ML Sosy  Commonly known as: PROLIA      Inject 60 mg into the skin every 6 (six) months.    Refills: 0     ergocalciferol 1.25 MG (62285 UT) Caps AM  [facility] MRI BRAIN COMBINATION [3110] 90 min [facility] MRI [facility] MR RM2 (1.5T WIDE)    Patient Instructions:     Please arrive 30 minutes prior to your scheduled appointment time.   You will need time to change your clothes, fill out screening forms, use your insurance card and photo ID. You will also need to bring your doctor's order unless your physician's office submitted the order electronically or faxed the order. Without the order, your test may be delayed or postponed. &nbsp; &nbsp;   Children: Child

## 2020-11-11 NOTE — PROGRESS NOTES
Initial Post Discharge Follow Up   Discharge Date: 11/9/20  Contact Date: 11/11/2020    Consent Verification:  Assessment Completed With: Patient  HIPAA Verified?   Yes    Discharge Dx:  TIA    Was TCC ordered: no      General:   • How have you been sinc • aspirin 81 MG Oral Tab EC Take 81 mg by mouth daily. • Denosumab 60 MG/ML Subcutaneous Solution Prefilled Syringe Inject 60 mg into the skin every 6 (six) months. • Ascorbic Acid (VITAMIN C) 100 MG Oral Tab Take 100 mg by mouth daily.      • C appointment. You can eat, drink, and take your medication(s). IF YOU REQUIRE ORAL SEDATION FOR YOUR MRI: Your physician is responsible for giving you a prescription for oral medication which you would fill at your local pharmacy.  If you will be taking ID, and your  doctor's order, if you received one from the doctor's office. Without the order your test may be delayed or postponed. If your  doctor's office submitted the order electronically, it will be  available when you check in.  If you or your docto morning he had a good bm and \"everything came out\" and he feels relieved. He denies any further pain. He denies having any fevers, n/v/c/d, pain, sob, lightheadedness, shakiness, numbness or any new or worsening symptoms.  His blood pressure today was 125

## 2020-11-12 ENCOUNTER — TELEPHONE (OUTPATIENT)
Dept: NEUROLOGY | Facility: CLINIC | Age: 70
End: 2020-11-12

## 2020-11-13 NOTE — TELEPHONE ENCOUNTER
Evangelista Villa MD   11/10/2020  1:06 PM      Lab normal     Relayed to patient, verbalized understanding. Pt inquiring if MTI is needed on 11/17 if he had one done on 11/6 while in the hospital.  Will route to provider to advise.

## 2020-11-17 ENCOUNTER — OFFICE VISIT (OUTPATIENT)
Dept: NEUROLOGY | Facility: CLINIC | Age: 70
End: 2020-11-17
Payer: COMMERCIAL

## 2020-11-17 VITALS
WEIGHT: 180 LBS | SYSTOLIC BLOOD PRESSURE: 136 MMHG | BODY MASS INDEX: 26 KG/M2 | DIASTOLIC BLOOD PRESSURE: 80 MMHG | HEART RATE: 62 BPM | RESPIRATION RATE: 12 BRPM

## 2020-11-17 DIAGNOSIS — R55 NEAR SYNCOPE: ICD-10-CM

## 2020-11-17 DIAGNOSIS — G45.9 TIA (TRANSIENT ISCHEMIC ATTACK): Primary | ICD-10-CM

## 2020-11-17 DIAGNOSIS — H53.8 BLURRED VISION: ICD-10-CM

## 2020-11-17 PROCEDURE — 3079F DIAST BP 80-89 MM HG: CPT | Performed by: OTHER

## 2020-11-17 PROCEDURE — 99214 OFFICE O/P EST MOD 30 MIN: CPT | Performed by: OTHER

## 2020-11-17 PROCEDURE — 3075F SYST BP GE 130 - 139MM HG: CPT | Performed by: OTHER

## 2020-11-17 RX ORDER — ACETAMINOPHEN 500 MG
1000 TABLET ORAL ONCE
Status: CANCELLED | OUTPATIENT
Start: 2020-11-17 | End: 2020-11-17

## 2020-11-17 RX ORDER — SODIUM CHLORIDE, SODIUM LACTATE, POTASSIUM CHLORIDE, CALCIUM CHLORIDE 600; 310; 30; 20 MG/100ML; MG/100ML; MG/100ML; MG/100ML
INJECTION, SOLUTION INTRAVENOUS CONTINUOUS
Status: CANCELLED | OUTPATIENT
Start: 2020-11-17

## 2020-11-17 NOTE — PROGRESS NOTES
Vi 1827   Neurology; follow up  CLINIC VISIT  2020    Dennis Mendez Patient Status:  No patient class for patient encounter    1950 MRN [id number]   Location [address] [place] [address], [address] [place] [address] problem     herniated discs   • Belching alot   • Bloating always   • Blurred vision meds   • Bradycardia 7/7/2019   • [facility] 08[facility]2016    Bile Duct Cancer   • Cardiac defibrillator in place    • Cholangiocarcinoma ([facility] 10/1/2016   • Closed compressio RETROGRADE CHOLANGIOPANCREATOGRAPHY (ERCP) N/A 7/25/2016    Performed by Juma Riley MD at [address] [facility] (ERCP) N/A 6/23/2016    Performed by Juma Riley MD at [address] [place] [address],[address] 50,000 Units by mouth every 7 days. • Metoprolol Succinate  MG Oral Tablet 24 Hr Take 100 mg by mouth daily. • losartan Potassium 50 MG Oral Tab Take 50 mg by mouth 2 (two) times daily.      • atorvastatin 20 MG Oral Tab Take 1 tablet (20 mg t abnormal secretion, normal structure of skull, no tenderness  Neck supple,  No carotid bruit,  thyroid normal  Lungs are clear to auscultation  Heart: normal SR, no murmur  Extremities:  No edema or cyanosis, radial and pedal pulse is normal.  Skin:  No un PM       Finalized by (CST): Manda Zhu MD on 11/07/2020 at 6:19 PM       EEG is normal,   Labs normal,   EMG not done,     Impressions:  This study is compared with previous dated 07/01/2020:   Compared to the prior study, there has been no significant in were addressed to satisfactory status. The patient was instructed to go to local ER if current symptoms worse or significant new symptoms arise. They understood my instruction.      Molly Seymour MD  General Neurology   Neuromuscular/ Electrodiagnostic Special

## 2020-11-18 ENCOUNTER — LAB ENCOUNTER (OUTPATIENT)
Dept: LAB | Age: 70
End: 2020-11-18
Attending: NURSE PRACTITIONER
Payer: MEDICARE

## 2020-11-18 ENCOUNTER — OFFICE VISIT (OUTPATIENT)
Dept: INTERNAL MEDICINE CLINIC | Facility: CLINIC | Age: 70
End: 2020-11-18
Payer: COMMERCIAL

## 2020-11-18 VITALS
DIASTOLIC BLOOD PRESSURE: 78 MMHG | HEIGHT: 70 IN | SYSTOLIC BLOOD PRESSURE: 112 MMHG | BODY MASS INDEX: 25.77 KG/M2 | WEIGHT: 180 LBS | TEMPERATURE: 99 F | HEART RATE: 68 BPM

## 2020-11-18 DIAGNOSIS — E87.1 HYPONATREMIA: Primary | ICD-10-CM

## 2020-11-18 DIAGNOSIS — I48.0 PAROXYSMAL ATRIAL FIBRILLATION (HCC): ICD-10-CM

## 2020-11-18 DIAGNOSIS — I10 BENIGN ESSENTIAL HTN: ICD-10-CM

## 2020-11-18 DIAGNOSIS — I25.10 CORONARY ARTERY DISEASE INVOLVING NATIVE CORONARY ARTERY OF NATIVE HEART, ANGINA PRESENCE UNSPECIFIED: ICD-10-CM

## 2020-11-18 DIAGNOSIS — E87.1 HYPONATREMIA: ICD-10-CM

## 2020-11-18 DIAGNOSIS — R55 NEAR SYNCOPE: ICD-10-CM

## 2020-11-18 DIAGNOSIS — I25.5 CARDIOMYOPATHY, ISCHEMIC: ICD-10-CM

## 2020-11-18 PROCEDURE — 99496 TRANSJ CARE MGMT HIGH F2F 7D: CPT | Performed by: NURSE PRACTITIONER

## 2020-11-18 PROCEDURE — 3078F DIAST BP <80 MM HG: CPT | Performed by: NURSE PRACTITIONER

## 2020-11-18 PROCEDURE — 3008F BODY MASS INDEX DOCD: CPT | Performed by: NURSE PRACTITIONER

## 2020-11-18 PROCEDURE — 36415 COLL VENOUS BLD VENIPUNCTURE: CPT

## 2020-11-18 PROCEDURE — 80053 COMPREHEN METABOLIC PANEL: CPT

## 2020-11-18 PROCEDURE — 3074F SYST BP LT 130 MM HG: CPT | Performed by: NURSE PRACTITIONER

## 2020-11-18 RX ORDER — LOSARTAN POTASSIUM 50 MG/1
50 TABLET ORAL DAILY
Refills: 0 | COMMUNITY
Start: 2020-11-18

## 2020-11-18 NOTE — PROGRESS NOTES
HPI:    Howard Espinal is a 79year old male here today for hospital follow up.    He was discharged from Inpatient hospital, [facility] to Home   Admission Date: 11/6/20   Discharge Date: 11/9/20  Hospital Discharge Diagnoses (since 10/19/2020)   No Allergies. Current Meds:    •  losartan Potassium 50 MG Oral Tab, Take 1 tablet (50 mg total) by mouth daily. •  pancrelipase, Lip-Prot-Amyl, 75396-68911 units Oral Cap DR Particles, Take 20,000 Units by mouth 6 (six) times daily.  With meals    •  er [facility] (06/24/2019), Hemorrhoids (teenager), High blood pressure, Hyperkalemia, Hypokalemia (4/26/2019), Hyponatremia (11/7/2016), Ileus ([facility] Nausea (Sometimes), NSTEMI (non-ST elevated myocardial infarction) ([facility] Osteoporosis, Pacemaker (W 25.83 kg/m² as calculated from the following:    Height as of this encounter: 70\". Weight as of this encounter: 180 lb (81.6 kg).    /78   Pulse 68   Temp 98.5 °F (36.9 °C) (Temporal)   Ht 70\"   Wt 180 lb (81.6 kg)   BMI 25.83 kg/m²    GENERAL: w moderate    Patient seen within 7 days  from date of discharge.      Dwight Kohler, APRN, 11/18/2020

## 2020-11-18 NOTE — PROGRESS NOTES
Please inform of normal results --fasting labs look great!  Keep up the good work [facility] Radiation Treatment Management Note 21-25    Patient:  Eulalia Garduno  Age:  77year old  Visit Diagnosis:    1.  Cholangiocarcinoma of biliary tract ([address]      Primary Rad/Onc:  Dr. Granados Deep    Site Delivered Dose (Gy) P

## 2020-11-19 ENCOUNTER — TELEPHONE (OUTPATIENT)
Dept: INTERNAL MEDICINE CLINIC | Facility: CLINIC | Age: 70
End: 2020-11-19

## 2020-11-19 DIAGNOSIS — E87.1 HYPONATREMIA: Primary | ICD-10-CM

## 2020-11-19 NOTE — TELEPHONE ENCOUNTER
Patient called to let SD know that he did get an appointment with Dr. Jah Umanzor was Dec. 7th. Patient also indicated that he was drinking V8 juice to try and get his sodium up.

## 2020-11-21 ENCOUNTER — APPOINTMENT (OUTPATIENT)
Dept: LAB | Facility: HOSPITAL | Age: 70
End: 2020-11-21
Attending: INTERNAL MEDICINE
Payer: MEDICARE

## 2020-11-21 DIAGNOSIS — D36.15: ICD-10-CM

## 2020-11-23 ENCOUNTER — ANESTHESIA EVENT (OUTPATIENT)
Dept: ENDOSCOPY | Facility: HOSPITAL | Age: 70
End: 2020-11-23
Payer: MEDICARE

## 2020-11-24 ENCOUNTER — HOSPITAL ENCOUNTER (OUTPATIENT)
Facility: HOSPITAL | Age: 70
Setting detail: HOSPITAL OUTPATIENT SURGERY
Discharge: HOME | End: 2020-11-24
Attending: INTERNAL MEDICINE | Admitting: INTERNAL MEDICINE
Payer: MEDICARE

## 2020-11-24 ENCOUNTER — ANESTHESIA (OUTPATIENT)
Dept: ENDOSCOPY | Facility: HOSPITAL | Age: 70
End: 2020-11-24
Payer: MEDICARE

## 2020-11-24 ENCOUNTER — APPOINTMENT (OUTPATIENT)
Dept: GENERAL RADIOLOGY | Facility: HOSPITAL | Age: 70
End: 2020-11-24
Attending: INTERNAL MEDICINE
Payer: MEDICARE

## 2020-11-24 VITALS
RESPIRATION RATE: 18 BRPM | DIASTOLIC BLOOD PRESSURE: 83 MMHG | WEIGHT: 169 LBS | BODY MASS INDEX: 24.2 KG/M2 | HEIGHT: 70 IN | HEART RATE: 64 BPM | SYSTOLIC BLOOD PRESSURE: 135 MMHG | TEMPERATURE: 97 F | OXYGEN SATURATION: 100 %

## 2020-11-24 DIAGNOSIS — D36.15: Primary | ICD-10-CM

## 2020-11-24 DIAGNOSIS — R93.3 ABNORMAL FINDING ON GI TRACT IMAGING: ICD-10-CM

## 2020-11-24 PROCEDURE — 88305 TISSUE EXAM BY PATHOLOGIST: CPT | Performed by: INTERNAL MEDICINE

## 2020-11-24 PROCEDURE — 0DB68ZX EXCISION OF STOMACH, VIA NATURAL OR ARTIFICIAL OPENING ENDOSCOPIC, DIAGNOSTIC: ICD-10-PCS | Performed by: INTERNAL MEDICINE

## 2020-11-24 PROCEDURE — 74328 X-RAY BILE DUCT ENDOSCOPY: CPT | Performed by: INTERNAL MEDICINE

## 2020-11-24 RX ORDER — ONDANSETRON 2 MG/ML
4 INJECTION INTRAMUSCULAR; INTRAVENOUS AS NEEDED
Status: DISCONTINUED | OUTPATIENT
Start: 2020-11-24 | End: 2020-11-24

## 2020-11-24 RX ORDER — LIDOCAINE HYDROCHLORIDE 10 MG/ML
INJECTION, SOLUTION EPIDURAL; INFILTRATION; INTRACAUDAL; PERINEURAL AS NEEDED
Status: DISCONTINUED | OUTPATIENT
Start: 2020-11-24 | End: 2020-11-24 | Stop reason: SURG

## 2020-11-24 RX ORDER — SODIUM CHLORIDE, SODIUM LACTATE, POTASSIUM CHLORIDE, CALCIUM CHLORIDE 600; 310; 30; 20 MG/100ML; MG/100ML; MG/100ML; MG/100ML
INJECTION, SOLUTION INTRAVENOUS CONTINUOUS
Status: DISCONTINUED | OUTPATIENT
Start: 2020-11-24 | End: 2020-11-24

## 2020-11-24 RX ORDER — NALOXONE HYDROCHLORIDE 0.4 MG/ML
80 INJECTION, SOLUTION INTRAMUSCULAR; INTRAVENOUS; SUBCUTANEOUS AS NEEDED
Status: DISCONTINUED | OUTPATIENT
Start: 2020-11-24 | End: 2020-11-24

## 2020-11-24 RX ORDER — OMEPRAZOLE 40 MG/1
40 CAPSULE, DELAYED RELEASE ORAL 2 TIMES DAILY
Qty: 60 CAPSULE | Refills: 11 | Status: SHIPPED | OUTPATIENT
Start: 2020-11-24 | End: 2021-10-29

## 2020-11-24 RX ADMIN — SODIUM CHLORIDE, SODIUM LACTATE, POTASSIUM CHLORIDE, CALCIUM CHLORIDE: 600; 310; 30; 20 INJECTION, SOLUTION INTRAVENOUS at 08:44:00

## 2020-11-24 RX ADMIN — LIDOCAINE HYDROCHLORIDE 40 MG: 10 INJECTION, SOLUTION EPIDURAL; INFILTRATION; INTRACAUDAL; PERINEURAL at 08:44:00

## 2020-11-24 NOTE — OPERATIVE REPORT
Genoveva Shukla Patient Status:  Hospital Outpatient Surgery    1950 MRN [id number]   [facility] ENDOSCOPY Attending Sukhdeep Lynn MD   Date 2020 PCP Lauren Parry MD     PREOPERATIVE DIAGNOSIS/INDICATION: Recurrent yazmin orifice   THERAPEUTICS: Cold forceps biopsies were performed from anastomotic ulcer  RECOMMENDATIONS:   - Post EGD precautions, watch for bleeding, infection, perforation, adverse drug reactions   - Follow biopsies.  - Omeprazole 40 mg PO BID  [facility]

## 2020-11-24 NOTE — ANESTHESIA PREPROCEDURE EVALUATION
PRE-OP EVALUATION    Patient Name: Kvng Mar    Pre-op Diagnosis: Abnormal finding on GI tract imaging [R93.3]    Procedure(s):  ENDOSCOPIC RETROGRADE CHOLANGIOPANCREATOGRAPHY    Surgeon(s) and Role:     Jim Kidd MD - Primary    Pre-op vit annulus. There was mild regurgitation. 5. Left atrium: The left atrium was mildly dilated. 6. Right ventricle: Pacer C/W ICD noted in the right ventricle. 7. Right atrium: Pacer C/W ICD noted in right atrium.    8. Pulmonary arteries: Systolic pressur • ENDOSCOPIC RETROGRADE CHOLANGIOPANCREATOGRAPHY (ERCP) N/A 8/8/2016    Performed by Ines Jimenez MD at [address] [place] [address] (ERCP) N/A 7/25/2016    Performed by Ines Jimenez MD at [facility] ENDOSCOPY   • E 11/09/2020    .0 11/09/2020     Lab Results   Component Value Date     (L) 11/18/2020    K 4.6 11/18/2020    CL 97 (L) 11/18/2020    CO2 28.0 11/18/2020    BUN 18 11/18/2020    CREATSERUM 1.30 11/18/2020    GLU 79 11/18/2020    CA 9.6 11/18/20

## 2020-11-24 NOTE — ANESTHESIA POSTPROCEDURE EVALUATION
[facility] Doria King Patient Status:  Hospital Outpatient Surgery   Age/Gender 79year old male MRN [id number]   Location [address] [facility]. Attending John Cordova MD   Hosp Day # 0 PCP Marylu Calderon MD       Anesthesia Post-op N

## 2020-11-24 NOTE — H&P
Henry Curiel Patient Status:  Hospital Outpatient Surgery    1950 MRN [id number]   [facility] ENDOSCOPY Attending Shawn Eugene MD   Date 2020 PCP Esther Hernandez MD     CC: Recurrent se • Personal history of antineoplastic chemotherapy     Completed chemo 2/17/17   • Pheochromocytoma, left     Dx in 6/2016: 1.7 cm mass near inferior adrenal gland   • Pneumoperitoneum 7/7/2019   • Rash after prolia shot   • Shortness of breath upon onset N/A 8/16/2016    Performed by Jean Couch MD at [facility] MAIN OR     Family History   Problem Relation Age of Onset   • Diabetes Father         80 yrs old   • Other (Other) Father         Heart Failure   • Cancer Mother         Rectal Cancer   • Diabetes Moth heart     Pulmonary nodules     S/P CABG (coronary artery bypass graft)     Arthritis of facet joint of lumbar spine ([facility])     Hyponatremia     Near syncope     SSS (sick sinus syndrome) ([facility])     PVCs (premature ventricular contractions)     Paroxysmal at

## 2020-12-01 RX ORDER — OMEPRAZOLE 40 MG/1
40 CAPSULE, DELAYED RELEASE ORAL DAILY
COMMUNITY
Start: 2020-11-24

## 2020-12-02 ENCOUNTER — OFFICE VISIT (OUTPATIENT)
Dept: CARDIOLOGY | Age: 70
End: 2020-12-02

## 2020-12-02 VITALS
HEIGHT: 70 IN | HEART RATE: 62 BPM | DIASTOLIC BLOOD PRESSURE: 72 MMHG | BODY MASS INDEX: 25.2 KG/M2 | SYSTOLIC BLOOD PRESSURE: 140 MMHG | WEIGHT: 176 LBS

## 2020-12-02 DIAGNOSIS — I10 BENIGN ESSENTIAL HTN: ICD-10-CM

## 2020-12-02 DIAGNOSIS — I48.0 PAROXYSMAL ATRIAL FIBRILLATION (CMD): Primary | ICD-10-CM

## 2020-12-02 DIAGNOSIS — I25.5 CARDIOMYOPATHY, ISCHEMIC: ICD-10-CM

## 2020-12-02 DIAGNOSIS — I25.10 CORONARY ARTERY DISEASE INVOLVING NATIVE CORONARY ARTERY OF NATIVE HEART WITHOUT ANGINA PECTORIS: ICD-10-CM

## 2020-12-02 DIAGNOSIS — E78.00 PURE HYPERCHOLESTEROLEMIA: ICD-10-CM

## 2020-12-02 DIAGNOSIS — I50.22 CHRONIC SYSTOLIC CONGESTIVE HEART FAILURE (CMD): ICD-10-CM

## 2020-12-02 DIAGNOSIS — Z85.09 HISTORY OF CHOLANGIOCARCINOMA: ICD-10-CM

## 2020-12-02 DIAGNOSIS — Z95.1 S/P CABG (CORONARY ARTERY BYPASS GRAFT): ICD-10-CM

## 2020-12-02 DIAGNOSIS — I65.23 CAROTID STENOSIS, ASYMPTOMATIC, BILATERAL: ICD-10-CM

## 2020-12-02 DIAGNOSIS — I49.3 PVCS (PREMATURE VENTRICULAR CONTRACTIONS): ICD-10-CM

## 2020-12-02 PROCEDURE — 99214 OFFICE O/P EST MOD 30 MIN: CPT | Performed by: NURSE PRACTITIONER

## 2020-12-02 PROCEDURE — 3078F DIAST BP <80 MM HG: CPT | Performed by: NURSE PRACTITIONER

## 2020-12-02 PROCEDURE — 3077F SYST BP >= 140 MM HG: CPT | Performed by: NURSE PRACTITIONER

## 2020-12-02 ASSESSMENT — ENCOUNTER SYMPTOMS
NEAR-SYNCOPE: 0
FEVER: 0
COUGH: 0
ORTHOPNEA: 0
NIGHT SWEATS: 0
ABDOMINAL PAIN: 0
SHORTNESS OF BREATH: 0
BLOATING: 0
SYNCOPE: 0

## 2020-12-02 ASSESSMENT — PATIENT HEALTH QUESTIONNAIRE - PHQ9
CLINICAL INTERPRETATION OF PHQ2 SCORE: NO FURTHER SCREENING NEEDED
2. FEELING DOWN, DEPRESSED OR HOPELESS: NOT AT ALL
1. LITTLE INTEREST OR PLEASURE IN DOING THINGS: NOT AT ALL
CLINICAL INTERPRETATION OF PHQ9 SCORE: NO FURTHER SCREENING NEEDED
SUM OF ALL RESPONSES TO PHQ9 QUESTIONS 1 AND 2: 0
SUM OF ALL RESPONSES TO PHQ9 QUESTIONS 1 AND 2: 0

## 2020-12-02 NOTE — PROGRESS NOTES
Date: 2020    To: Cammiegarrison Lo  : 1950    I hope this letter finds you doing well.   I am writing to inform you of the following:        Biopsies taken during your recent upper endoscopy show infection with a bacteria called Helicobacter pyl

## 2020-12-07 ENCOUNTER — OFFICE VISIT (OUTPATIENT)
Dept: NEPHROLOGY | Facility: CLINIC | Age: 70
End: 2020-12-07
Payer: COMMERCIAL

## 2020-12-07 ENCOUNTER — LAB ENCOUNTER (OUTPATIENT)
Dept: LAB | Facility: HOSPITAL | Age: 70
End: 2020-12-07
Attending: INTERNAL MEDICINE
Payer: MEDICARE

## 2020-12-07 ENCOUNTER — TELEPHONE (OUTPATIENT)
Dept: INTERNAL MEDICINE CLINIC | Facility: CLINIC | Age: 70
End: 2020-12-07

## 2020-12-07 VITALS — SYSTOLIC BLOOD PRESSURE: 178 MMHG | BODY MASS INDEX: 25 KG/M2 | DIASTOLIC BLOOD PRESSURE: 92 MMHG | WEIGHT: 175 LBS

## 2020-12-07 DIAGNOSIS — E87.1 HYPONATREMIA: ICD-10-CM

## 2020-12-07 DIAGNOSIS — I10 HYPERTENSION, UNSPECIFIED TYPE: ICD-10-CM

## 2020-12-07 DIAGNOSIS — E87.1 HYPONATREMIA: Primary | ICD-10-CM

## 2020-12-07 PROCEDURE — 99214 OFFICE O/P EST MOD 30 MIN: CPT | Performed by: INTERNAL MEDICINE

## 2020-12-07 PROCEDURE — 3080F DIAST BP >= 90 MM HG: CPT | Performed by: INTERNAL MEDICINE

## 2020-12-07 PROCEDURE — 36415 COLL VENOUS BLD VENIPUNCTURE: CPT

## 2020-12-07 PROCEDURE — 84244 ASSAY OF RENIN: CPT

## 2020-12-07 PROCEDURE — 80048 BASIC METABOLIC PNL TOTAL CA: CPT

## 2020-12-07 PROCEDURE — 3077F SYST BP >= 140 MM HG: CPT | Performed by: INTERNAL MEDICINE

## 2020-12-07 PROCEDURE — 82088 ASSAY OF ALDOSTERONE: CPT

## 2020-12-07 PROCEDURE — 83835 ASSAY OF METANEPHRINES: CPT

## 2020-12-07 RX ORDER — AMLODIPINE BESYLATE 10 MG/1
10 TABLET ORAL DAILY
Qty: 90 TABLET | Refills: 3 | Status: SHIPPED | OUTPATIENT
Start: 2020-12-07 | End: 2021-04-29

## 2020-12-07 NOTE — PROGRESS NOTES
Nephrology Progress Note      Mg Serrano is a 79year old male.     HPI:   Patient presents with:  Hyponatremia      Mr. 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 06/24/2019    NONSTEMI   • Hemorrhoids teenager   • High blood pressure    • Hyperkalemia Performed by Nellie Ferrari MD at [facility] GRAFT N/A 6/27/2019    Performed by Oleg Chakraborty MD at [address]      over 35 yrs ago   • NEEDLE BIOPSY LIVER  8.16.16   • OTHER      right index finger s mg total) by mouth 2 (two) times daily for 14 days. 56 tablet 0   • omeprazole 20 MG Oral Capsule Delayed Release Take 1 capsule (20 mg total) by mouth 2 (two) times daily before meals for 14 days.  28 capsule 0   • Omeprazole 40 MG Oral Capsule Delayed Rel clubbing, cyanosis or edema.   Neurologic: Alert and oriented, normal affect, cranial nerves grossly intact, moving all extremities  Skin: Warm and dry, no rashes      LABS:     Lab Results   Component Value Date    BUN 18 11/18/2020    BUNCREA 13.8 11/18/2 RBCUR 0-2 07/07/2019    EPIUR None Seen 07/07/2019    BACUR None Seen 07/07/2019    ANNAR Present (A) 07/07/2019     ASSESSMENT/PLAN:     #1.  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

## 2020-12-07 NOTE — TELEPHONE ENCOUNTER
----- Message -----   From: Cesia Flores MD   Sent: 12/7/2020   9:50 AM CST   To: [facility] Clinical Staff   Subject: FW: Patient's BP at specialty visit over 140*       LMTCB to sched BP fup

## 2020-12-08 NOTE — TELEPHONE ENCOUNTER
Future Appointments   Date Time Provider Tanika Anderson   12/10/2020 11:20 AM Thom Zamora MD [facility]

## 2020-12-09 ENCOUNTER — TELEPHONE (OUTPATIENT)
Dept: CARDIOLOGY | Age: 70
End: 2020-12-09

## 2020-12-10 ENCOUNTER — OFFICE VISIT (OUTPATIENT)
Dept: INTERNAL MEDICINE CLINIC | Facility: CLINIC | Age: 70
End: 2020-12-10
Payer: COMMERCIAL

## 2020-12-10 VITALS
TEMPERATURE: 98 F | DIASTOLIC BLOOD PRESSURE: 88 MMHG | SYSTOLIC BLOOD PRESSURE: 132 MMHG | WEIGHT: 179 LBS | BODY MASS INDEX: 25.62 KG/M2 | HEIGHT: 70 IN | RESPIRATION RATE: 18 BRPM | HEART RATE: 72 BPM

## 2020-12-10 DIAGNOSIS — I10 BENIGN ESSENTIAL HTN: Primary | ICD-10-CM

## 2020-12-10 DIAGNOSIS — I25.10 CORONARY ARTERY DISEASE INVOLVING NATIVE CORONARY ARTERY OF NATIVE HEART, ANGINA PRESENCE UNSPECIFIED: ICD-10-CM

## 2020-12-10 DIAGNOSIS — I50.22 CHRONIC SYSTOLIC CONGESTIVE HEART FAILURE (HCC): ICD-10-CM

## 2020-12-10 PROCEDURE — 3079F DIAST BP 80-89 MM HG: CPT | Performed by: FAMILY MEDICINE

## 2020-12-10 PROCEDURE — 99214 OFFICE O/P EST MOD 30 MIN: CPT | Performed by: FAMILY MEDICINE

## 2020-12-10 PROCEDURE — 3008F BODY MASS INDEX DOCD: CPT | Performed by: FAMILY MEDICINE

## 2020-12-10 PROCEDURE — 3075F SYST BP GE 130 - 139MM HG: CPT | Performed by: FAMILY MEDICINE

## 2020-12-10 NOTE — PROGRESS NOTES
[facility]  11/14/1950    Patient presents with:  Blood Pressure: MR rm 9 b/p check       HPI:   Shruthi Kinney is a 79year old male who presents for blood pressure follow-up.  He has history significant cardiac disease including CAD s/p CABG and cardi • amLODIPine Besylate 10 MG Oral Tab Take 1 tablet (10 mg total) by mouth daily. (Patient not taking: Reported on 12/10/2020 ) 90 tablet 3   • amoxicillin 500 MG Oral Tab Take 2 tablets (1,000 mg total) by mouth 2 (two) times daily for 14 days.  (Patient Personal history of antineoplastic chemotherapy     Completed chemo 2/17/17   • Pheochromocytoma, left     Dx in 6/2016: 1.7 cm mass near inferior adrenal gland   • Pneumoperitoneum 7/7/2019   • Rash after prolia shot   • Shortness of breath upon onset of COLONOSCOPY  overdue   • ENDOSCOPIC RETROGRADE CHOLANGIOPANCREATOGRAPHY (ERCP) N/A 11/24/2020    Performed by Juma Riley MD at [address] [facility] (ERCP) N/A 8/8/2016    Performed by Juma Riley MD Packs/day: 0.00        Years: 40.00        Pack years: 0        Types: Cigarettes        Quit date: 6/15/2019        Years since quittin.4      Smokeless tobacco: Never Used      Tobacco comment: Stopped    Alcohol use: No      Alcohol/week: 0.0 sta HTN  2. Chronic systolic congestive heart failure ([facility]  3. Coronary artery disease involving native coronary artery of native heart, angina presence unspecified    All questions were answered and the patient agrees with the plan.      Thank you,  Amber Hagen

## 2020-12-14 PROCEDURE — 93295 DEV INTERROG REMOTE 1/2/MLT: CPT | Performed by: INTERNAL MEDICINE

## 2020-12-14 PROCEDURE — 93296 REM INTERROG EVL PM/IDS: CPT | Performed by: INTERNAL MEDICINE

## 2020-12-16 ENCOUNTER — ANCILLARY ORDERS (OUTPATIENT)
Dept: CARDIOLOGY | Age: 70
End: 2020-12-16

## 2020-12-16 ENCOUNTER — ANCILLARY PROCEDURE (OUTPATIENT)
Dept: CARDIOLOGY | Age: 70
End: 2020-12-16
Attending: INTERNAL MEDICINE

## 2020-12-16 DIAGNOSIS — Z95.810 ICD (IMPLANTABLE CARDIOVERTER-DEFIBRILLATOR) IN PLACE: ICD-10-CM

## 2020-12-16 PROCEDURE — X1114 CARDIAC DEVICE HOME CHECK - REMOTE UNSCHEDULED: HCPCS | Performed by: INTERNAL MEDICINE

## 2020-12-23 ENCOUNTER — TELEPHONE (OUTPATIENT)
Dept: NEUROLOGY | Facility: CLINIC | Age: 70
End: 2020-12-23

## 2021-01-13 ENCOUNTER — TELEPHONE (OUTPATIENT)
Dept: CARDIOLOGY | Age: 71
End: 2021-01-13

## 2021-01-13 RX ORDER — LOSARTAN POTASSIUM 50 MG/1
50 TABLET ORAL DAILY
Qty: 30 TABLET | Refills: 11 | Status: SHIPPED
Start: 2021-01-13 | End: 2022-01-18

## 2021-01-21 ENCOUNTER — PATIENT MESSAGE (OUTPATIENT)
Dept: INTERNAL MEDICINE CLINIC | Facility: CLINIC | Age: 71
End: 2021-01-21

## 2021-01-25 DIAGNOSIS — Z23 NEED FOR VACCINATION: ICD-10-CM

## 2021-02-17 ENCOUNTER — TELEPHONE (OUTPATIENT)
Dept: CARDIOLOGY | Age: 71
End: 2021-02-17

## 2021-03-22 ENCOUNTER — ANCILLARY ORDERS (OUTPATIENT)
Dept: CARDIOLOGY | Age: 71
End: 2021-03-22

## 2021-03-22 ENCOUNTER — ANCILLARY PROCEDURE (OUTPATIENT)
Dept: CARDIOLOGY | Age: 71
End: 2021-03-22
Attending: INTERNAL MEDICINE

## 2021-03-22 DIAGNOSIS — Z95.810 ICD (IMPLANTABLE CARDIOVERTER-DEFIBRILLATOR) IN PLACE: ICD-10-CM

## 2021-03-22 PROCEDURE — X1114 CARDIAC DEVICE HOME CHECK - REMOTE UNSCHEDULED: HCPCS | Performed by: INTERNAL MEDICINE

## 2021-03-22 PROCEDURE — 93295 DEV INTERROG REMOTE 1/2/MLT: CPT | Performed by: INTERNAL MEDICINE

## 2021-03-22 PROCEDURE — 93296 REM INTERROG EVL PM/IDS: CPT | Performed by: INTERNAL MEDICINE

## 2021-03-30 RX ORDER — METOPROLOL SUCCINATE 100 MG/1
100 TABLET, EXTENDED RELEASE ORAL DAILY
Qty: 90 TABLET | Refills: 3 | Status: SHIPPED | OUTPATIENT
Start: 2021-03-30 | End: 2022-03-22 | Stop reason: SDUPTHER

## 2021-04-19 ENCOUNTER — TELEPHONE (OUTPATIENT)
Dept: CARDIOLOGY | Age: 71
End: 2021-04-19

## 2021-04-20 ENCOUNTER — LAB ENCOUNTER (OUTPATIENT)
Dept: LAB | Facility: HOSPITAL | Age: 71
End: 2021-04-20
Attending: INTERNAL MEDICINE
Payer: MEDICARE

## 2021-04-20 DIAGNOSIS — A04.8 H. PYLORI INFECTION: ICD-10-CM

## 2021-04-20 PROCEDURE — 87338 HPYLORI STOOL AG IA: CPT

## 2021-04-29 ENCOUNTER — OFFICE VISIT (OUTPATIENT)
Dept: NEUROLOGY | Facility: CLINIC | Age: 71
End: 2021-04-29
Payer: COMMERCIAL

## 2021-04-29 VITALS
DIASTOLIC BLOOD PRESSURE: 78 MMHG | BODY MASS INDEX: 26 KG/M2 | SYSTOLIC BLOOD PRESSURE: 134 MMHG | RESPIRATION RATE: 16 BRPM | WEIGHT: 181 LBS | HEART RATE: 72 BPM

## 2021-04-29 DIAGNOSIS — R20.2 TINGLING IN EXTREMITIES: ICD-10-CM

## 2021-04-29 DIAGNOSIS — G45.9 TIA (TRANSIENT ISCHEMIC ATTACK): ICD-10-CM

## 2021-04-29 DIAGNOSIS — H53.8 BLURRED VISION: Primary | ICD-10-CM

## 2021-04-29 DIAGNOSIS — R42 DIZZINESS: ICD-10-CM

## 2021-04-29 PROCEDURE — 99214 OFFICE O/P EST MOD 30 MIN: CPT | Performed by: OTHER

## 2021-04-29 PROCEDURE — 3078F DIAST BP <80 MM HG: CPT | Performed by: OTHER

## 2021-04-29 PROCEDURE — 3075F SYST BP GE 130 - 139MM HG: CPT | Performed by: OTHER

## 2021-04-29 NOTE — PROGRESS NOTES
Vi 1827   Neurology; follow up CLINIC VISIT  2021    Valdemar Layton Patient Status:  No patient class for patient encounter    1950 MRN [id number]   Location ED [facility], [address], [address] [place] [address] PCP David 06/24/2019   • Back pain    • Back problem     herniated discs   • Belching alot   • Bloating always   • Blurred vision meds   • Bradycardia 7/7/2019   • Cancer ([facility] 08/2016    Bile Duct Cancer   • Cardiac defibrillator in place    • Cholangiocarcinoma (H surgery   • OTHER SURGICAL HISTORY  6/23/16.     EUS/EGD/FNA   • PLACEMENT, BILE DUCT STENT     • REMOVAL GALLBLADDER     • WHIPPLE  8/16/2016    Pancreaticoduodenectomy with regional lymphadenectomy, Left adrenalectomy with resection of retroperitoneal per REVIEW OF SYSTEMS:    GENERAL HEALTH:  feels well, calm, normal appetite,   EYES: no visual complaints or deficits  HEENT: denies nasal congestion, sinus pain or sore throat; no  hearing loss;  RESPIRATORY: denies shortness of breath, wheezing o Horrizontal and vertical movements normal.  Normal pursuit and saccades.                             No nystagmus, no ptosis       CN V: Masseters strong, Facial sensations normal,        CN  VII:  Symmetric facial movement       CN VIII:  Normal hearing extremities        Summery:  Clinically, This  patient presented with recent blurred vision, dizziness, poor balance, with HTN crisis recently , need to rule out stroke,    he had  MRI , MRA brain and neck, unchanged, right M1 and left P2 segmental stenosi

## 2021-05-06 ENCOUNTER — TELEPHONE (OUTPATIENT)
Dept: CARDIOLOGY | Age: 71
End: 2021-05-06

## 2021-05-17 ENCOUNTER — TELEPHONE (OUTPATIENT)
Dept: INTERNAL MEDICINE CLINIC | Facility: CLINIC | Age: 71
End: 2021-05-17

## 2021-05-17 DIAGNOSIS — Z00.00 ROUTINE GENERAL MEDICAL EXAMINATION AT A HEALTH CARE FACILITY: Primary | ICD-10-CM

## 2021-05-17 DIAGNOSIS — Z12.5 SCREENING FOR PROSTATE CANCER: ICD-10-CM

## 2021-05-17 DIAGNOSIS — E05.90 HYPERTHYROIDISM: ICD-10-CM

## 2021-05-17 DIAGNOSIS — I10 BENIGN ESSENTIAL HTN: ICD-10-CM

## 2021-05-17 DIAGNOSIS — R73.9 HYPERGLYCEMIA: ICD-10-CM

## 2021-05-17 NOTE — TELEPHONE ENCOUNTER
Supervisit   Future Appointments   Date Time Provider Tanika Anderson   6/14/2021  3:00 PM Sandra Lan, APRN [facility]      Orders to  [facility]   aware must fast no call back required

## 2021-05-18 RX ORDER — ATORVASTATIN CALCIUM 20 MG/1
20 TABLET, FILM COATED ORAL DAILY
Qty: 90 TABLET | Refills: 1 | Status: SHIPPED | OUTPATIENT
Start: 2021-05-18 | End: 2022-08-26 | Stop reason: SDUPTHER

## 2021-05-20 ENCOUNTER — OFFICE VISIT (OUTPATIENT)
Dept: CARDIOLOGY | Age: 71
End: 2021-05-20

## 2021-05-20 VITALS
BODY MASS INDEX: 25.8 KG/M2 | HEIGHT: 70 IN | DIASTOLIC BLOOD PRESSURE: 73 MMHG | WEIGHT: 180.19 LBS | HEART RATE: 75 BPM | SYSTOLIC BLOOD PRESSURE: 124 MMHG

## 2021-05-20 DIAGNOSIS — E78.00 PURE HYPERCHOLESTEROLEMIA: ICD-10-CM

## 2021-05-20 DIAGNOSIS — I10 BENIGN ESSENTIAL HTN: ICD-10-CM

## 2021-05-20 DIAGNOSIS — I49.3 PVCS (PREMATURE VENTRICULAR CONTRACTIONS): ICD-10-CM

## 2021-05-20 DIAGNOSIS — I48.0 PAROXYSMAL ATRIAL FIBRILLATION (CMD): Primary | ICD-10-CM

## 2021-05-20 DIAGNOSIS — I25.10 CORONARY ARTERY DISEASE INVOLVING NATIVE CORONARY ARTERY OF NATIVE HEART WITHOUT ANGINA PECTORIS: ICD-10-CM

## 2021-05-20 DIAGNOSIS — I25.5 CARDIOMYOPATHY, ISCHEMIC: ICD-10-CM

## 2021-05-20 DIAGNOSIS — I65.23 CAROTID STENOSIS, ASYMPTOMATIC, BILATERAL: ICD-10-CM

## 2021-05-20 DIAGNOSIS — I50.22 CHRONIC SYSTOLIC CONGESTIVE HEART FAILURE (CMD): ICD-10-CM

## 2021-05-20 PROCEDURE — 3078F DIAST BP <80 MM HG: CPT | Performed by: INTERNAL MEDICINE

## 2021-05-20 PROCEDURE — 99215 OFFICE O/P EST HI 40 MIN: CPT | Performed by: INTERNAL MEDICINE

## 2021-05-20 PROCEDURE — 3074F SYST BP LT 130 MM HG: CPT | Performed by: INTERNAL MEDICINE

## 2021-05-20 RX ORDER — ERGOCALCIFEROL 1.25 MG/1
CAPSULE ORAL
COMMUNITY
Start: 2021-05-18 | End: 2022-10-18 | Stop reason: ALTCHOICE

## 2021-05-20 ASSESSMENT — PATIENT HEALTH QUESTIONNAIRE - PHQ9
CLINICAL INTERPRETATION OF PHQ2 SCORE: NO FURTHER SCREENING NEEDED
CLINICAL INTERPRETATION OF PHQ9 SCORE: NO FURTHER SCREENING NEEDED
SUM OF ALL RESPONSES TO PHQ9 QUESTIONS 1 AND 2: 0
1. LITTLE INTEREST OR PLEASURE IN DOING THINGS: NOT AT ALL
SUM OF ALL RESPONSES TO PHQ9 QUESTIONS 1 AND 2: 0
2. FEELING DOWN, DEPRESSED OR HOPELESS: NOT AT ALL

## 2021-05-20 NOTE — TELEPHONE ENCOUNTER
Would you like to add PSA to labs. Please advise? Per protocol placed fasting lab orders to ward lab.

## 2021-06-11 ENCOUNTER — LAB ENCOUNTER (OUTPATIENT)
Dept: LAB | Age: 71
End: 2021-06-11
Attending: NURSE PRACTITIONER
Payer: MEDICARE

## 2021-06-11 DIAGNOSIS — Z00.00 ROUTINE GENERAL MEDICAL EXAMINATION AT A HEALTH CARE FACILITY: ICD-10-CM

## 2021-06-11 DIAGNOSIS — Z12.5 SCREENING FOR PROSTATE CANCER: ICD-10-CM

## 2021-06-11 DIAGNOSIS — E05.90 HYPERTHYROIDISM: ICD-10-CM

## 2021-06-11 DIAGNOSIS — M81.8 OTHER OSTEOPOROSIS WITHOUT CURRENT PATHOLOGICAL FRACTURE: ICD-10-CM

## 2021-06-11 DIAGNOSIS — D64.9 ANEMIA, UNSPECIFIED TYPE: ICD-10-CM

## 2021-06-11 DIAGNOSIS — I10 BENIGN ESSENTIAL HTN: ICD-10-CM

## 2021-06-11 DIAGNOSIS — R73.9 HYPERGLYCEMIA: ICD-10-CM

## 2021-06-11 PROCEDURE — 36415 COLL VENOUS BLD VENIPUNCTURE: CPT

## 2021-06-11 PROCEDURE — 83540 ASSAY OF IRON: CPT

## 2021-06-11 PROCEDURE — 83550 IRON BINDING TEST: CPT

## 2021-06-11 PROCEDURE — 85025 COMPLETE CBC W/AUTO DIFF WBC: CPT

## 2021-06-11 PROCEDURE — 82728 ASSAY OF FERRITIN: CPT

## 2021-06-11 PROCEDURE — 80053 COMPREHEN METABOLIC PANEL: CPT

## 2021-06-11 PROCEDURE — 84443 ASSAY THYROID STIM HORMONE: CPT

## 2021-06-11 PROCEDURE — 80061 LIPID PANEL: CPT

## 2021-06-13 DIAGNOSIS — D64.9 ANEMIA, UNSPECIFIED TYPE: Primary | ICD-10-CM

## 2021-06-14 ENCOUNTER — OFFICE VISIT (OUTPATIENT)
Dept: INTERNAL MEDICINE CLINIC | Facility: CLINIC | Age: 71
End: 2021-06-14
Payer: COMMERCIAL

## 2021-06-14 VITALS
WEIGHT: 179 LBS | TEMPERATURE: 98 F | HEIGHT: 70 IN | BODY MASS INDEX: 25.62 KG/M2 | SYSTOLIC BLOOD PRESSURE: 132 MMHG | HEART RATE: 60 BPM | DIASTOLIC BLOOD PRESSURE: 76 MMHG

## 2021-06-14 DIAGNOSIS — I50.22 CHRONIC SYSTOLIC CONGESTIVE HEART FAILURE (HCC): ICD-10-CM

## 2021-06-14 DIAGNOSIS — E11.22 CKD STAGE 3 DUE TO TYPE 2 DIABETES MELLITUS (HCC): Chronic | ICD-10-CM

## 2021-06-14 DIAGNOSIS — I25.5 CARDIOMYOPATHY, ISCHEMIC: ICD-10-CM

## 2021-06-14 DIAGNOSIS — J44.9 ASTHMA WITH COPD (CHRONIC OBSTRUCTIVE PULMONARY DISEASE) (HCC): Chronic | ICD-10-CM

## 2021-06-14 DIAGNOSIS — D17.79 MYELOLIPOMA OF LEFT ADRENAL GLAND: ICD-10-CM

## 2021-06-14 DIAGNOSIS — I49.3 PVCS (PREMATURE VENTRICULAR CONTRACTIONS): ICD-10-CM

## 2021-06-14 DIAGNOSIS — K86.81 EXOCRINE PANCREATIC INSUFFICIENCY: ICD-10-CM

## 2021-06-14 DIAGNOSIS — I70.0 AORTIC ATHEROSCLEROSIS (HCC): ICD-10-CM

## 2021-06-14 DIAGNOSIS — N18.30 CKD STAGE 3 DUE TO TYPE 2 DIABETES MELLITUS (HCC): Chronic | ICD-10-CM

## 2021-06-14 DIAGNOSIS — Z00.00 ENCOUNTER FOR ANNUAL HEALTH EXAMINATION: Primary | ICD-10-CM

## 2021-06-14 DIAGNOSIS — I49.5 SSS (SICK SINUS SYNDROME) (HCC): ICD-10-CM

## 2021-06-14 DIAGNOSIS — E87.1 HYPONATREMIA: ICD-10-CM

## 2021-06-14 DIAGNOSIS — Z99.89 OSA ON CPAP: ICD-10-CM

## 2021-06-14 DIAGNOSIS — E05.90 HYPERTHYROIDISM: ICD-10-CM

## 2021-06-14 DIAGNOSIS — I25.83 CORONARY ARTERY DISEASE DUE TO LIPID RICH PLAQUE: ICD-10-CM

## 2021-06-14 DIAGNOSIS — M47.816 ARTHRITIS OF FACET JOINT OF LUMBAR SPINE: ICD-10-CM

## 2021-06-14 DIAGNOSIS — E16.2 HYPOGLYCEMIA: ICD-10-CM

## 2021-06-14 DIAGNOSIS — R91.8 PULMONARY NODULES: ICD-10-CM

## 2021-06-14 DIAGNOSIS — Z86.73 HISTORY OF TRANSIENT ISCHEMIC ATTACK (TIA): ICD-10-CM

## 2021-06-14 DIAGNOSIS — I10 BENIGN ESSENTIAL HTN: ICD-10-CM

## 2021-06-14 DIAGNOSIS — Z95.0 PACEMAKER: ICD-10-CM

## 2021-06-14 DIAGNOSIS — I25.10 CORONARY ARTERY DISEASE DUE TO LIPID RICH PLAQUE: ICD-10-CM

## 2021-06-14 DIAGNOSIS — H53.8 BLURRED VISION: ICD-10-CM

## 2021-06-14 DIAGNOSIS — I25.10 CORONARY ARTERY DISEASE INVOLVING NATIVE CORONARY ARTERY OF NATIVE HEART, UNSPECIFIED WHETHER ANGINA PRESENT: ICD-10-CM

## 2021-06-14 DIAGNOSIS — I48.0 PAROXYSMAL ATRIAL FIBRILLATION (HCC): ICD-10-CM

## 2021-06-14 DIAGNOSIS — Z85.09 HISTORY OF CHOLANGIOCARCINOMA: ICD-10-CM

## 2021-06-14 DIAGNOSIS — N18.32 STAGE 3B CHRONIC KIDNEY DISEASE (HCC): Chronic | ICD-10-CM

## 2021-06-14 DIAGNOSIS — Z86.018 HISTORY OF PHEOCHROMOCYTOMA: ICD-10-CM

## 2021-06-14 DIAGNOSIS — I67.9 INTRACRANIAL VASCULAR STENOSIS: ICD-10-CM

## 2021-06-14 DIAGNOSIS — G47.33 OSA ON CPAP: ICD-10-CM

## 2021-06-14 DIAGNOSIS — D69.6 THROMBOCYTOPENIA (HCC): ICD-10-CM

## 2021-06-14 DIAGNOSIS — D63.8 ANEMIA OF CHRONIC DISEASE: ICD-10-CM

## 2021-06-14 DIAGNOSIS — Z95.1 S/P CABG (CORONARY ARTERY BYPASS GRAFT): ICD-10-CM

## 2021-06-14 DIAGNOSIS — R42 DIZZINESS: ICD-10-CM

## 2021-06-14 PROBLEM — R55 NEAR SYNCOPE: Status: RESOLVED | Noted: 2019-11-18 | Resolved: 2021-06-14

## 2021-06-14 PROBLEM — R20.2 TINGLING IN EXTREMITIES: Status: RESOLVED | Noted: 2020-10-27 | Resolved: 2021-06-14

## 2021-06-14 PROBLEM — R73.9 HYPERGLYCEMIA: Status: RESOLVED | Noted: 2019-04-26 | Resolved: 2021-06-14

## 2021-06-14 PROCEDURE — 99397 PER PM REEVAL EST PAT 65+ YR: CPT | Performed by: NURSE PRACTITIONER

## 2021-06-14 PROCEDURE — 3075F SYST BP GE 130 - 139MM HG: CPT | Performed by: NURSE PRACTITIONER

## 2021-06-14 PROCEDURE — G0439 PPPS, SUBSEQ VISIT: HCPCS | Performed by: NURSE PRACTITIONER

## 2021-06-14 PROCEDURE — 96160 PT-FOCUSED HLTH RISK ASSMT: CPT | Performed by: NURSE PRACTITIONER

## 2021-06-14 PROCEDURE — 3008F BODY MASS INDEX DOCD: CPT | Performed by: NURSE PRACTITIONER

## 2021-06-14 PROCEDURE — 3078F DIAST BP <80 MM HG: CPT | Performed by: NURSE PRACTITIONER

## 2021-06-14 NOTE — PROGRESS NOTES
HPI:   Shruthi Kinney is a 79year old male who presents for a MA (Medicare Advantage) [address] [facility] (Once per calendar year).   History of transient ischemic attack (TIA)  Follows with Dr Iván Wright  Statin ASA    SSS (sick sinus syndrome) ([facility] [address]  PPM  Per cardio decrease of hgb  Will follow     Thrombocytopenia (HCC)  Stable  Most recent labs  Cont to monitor. S/P CABG (coronary artery bypass graft) Per cardiology  Stable  Cont to monitor.      History of pheochromocytoma  Stable       History of cholangiocarci Oncology)  May Martinez MD (Radiation Oncology)  [facility])  Stanley Cam MD ([facility])  Jerrod Orlando MD ([address])  Max Wilkerson DO as Consulting Physician ([facility])  Seth Paez MD (Cardiac Elect Component Value Date    AST 25 06/11/2021    ALT 34 06/11/2021    CA 9.4 06/11/2021    ALB 3.7 06/11/2021    TSH 0.843 06/11/2021    CREATSERUM 1.36 (H) 06/11/2021    GLU 87 06/11/2021        CBC  (most recent labs)   Lab Results   Component Value Date Coronary atherosclerosis, Decorative tattoo (long time ago), Diverticulosis of large intestine, Dizziness (lately. ...meds?), Enteritis, Exposure to radiation, Fatigue (Sometimes), Flash pulmonary edema ([address] Flatulence/gas pain/belching (usually), Madeleine Brunner (Temporal)   Ht 5' 10\" (1.778 m)   Wt 179 lb (81.2 kg)   BMI 25.68 kg/m²   Estimated body mass index is 25.68 kg/m² as calculated from the following:    Height as of this encounter: 5' 10\" (1.778 m). Weight as of this encounter: 179 lb (81.2 kg).     Concetta Mendes 12/09/2019, 06/10/2020, 12/11/2020   • TDAP 10/10/2019, 10/10/2019        ASSESSMENT AND OTHER RELEVANT CHRONIC CONDITIONS:   Caleb Gracia is a 79year old male who presents for a Medicare Assessment.      PLAN SUMMARY:   Diagnoses and all orders for this recent labs  Continue ot monitor. Arthritis of facet joint of lumbar spine (HCC)  Stable  No recent flare  Cont to monitor.      Anemia of chronic disease  Stable  Discussed mild decrease of hgb  Will follow     Thrombocytopenia (HCC)  Stable  Most rece pre-diabetes Lab Results   Component Value Date    GLU 87 06/11/2021        Cardiovascular Disease Screening    Lipid Panel  Cholesterol  Lipoprotein (HDL)  Triglycerides Covered every 5 years for all Medicare beneficiaries without apparent signs or sympto Zoster Not covered by Medicare Part B; may be covered with your pharmacy  prescription benefits -  Zoster Vaccines(1 of 2) Never done        Annual Monitoring of Persistent Medications (ACE/ARB, digoxin diuretics, anticonvulsants)    Potassium Annually

## 2021-06-14 NOTE — PATIENT INSTRUCTIONS
David Bryan's SCREENING SCHEDULE   Tests on this list are recommended by your physician but may not be covered, or covered at this frequency, by your insurer. Please check with your insurance carrier before scheduling to verify coverage.    JERAD Pneumococcal Each vaccine (Bnaioxl64 & [id number]) covered once after 65 Prevnar 13: 10/13/2017    [id number]: 04/30/2018     No recommendations at this time    Hepatitis B One screening covered for patients with certain risk factors   -  No recommendati

## 2021-06-28 PROCEDURE — 93296 REM INTERROG EVL PM/IDS: CPT | Performed by: INTERNAL MEDICINE

## 2021-07-15 ENCOUNTER — APPOINTMENT (OUTPATIENT)
Dept: CARDIOLOGY | Age: 71
End: 2021-07-15
Attending: INTERNAL MEDICINE

## 2021-07-16 ENCOUNTER — ANCILLARY PROCEDURE (OUTPATIENT)
Dept: CARDIOLOGY | Age: 71
End: 2021-07-16
Attending: INTERNAL MEDICINE

## 2021-07-16 VITALS
HEART RATE: 88 BPM | DIASTOLIC BLOOD PRESSURE: 80 MMHG | WEIGHT: 182.3 LBS | SYSTOLIC BLOOD PRESSURE: 120 MMHG | BODY MASS INDEX: 26.1 KG/M2 | HEIGHT: 70 IN

## 2021-07-16 DIAGNOSIS — Z95.810 ICD (IMPLANTABLE CARDIOVERTER-DEFIBRILLATOR) IN PLACE: ICD-10-CM

## 2021-07-16 PROCEDURE — 93283 PRGRMG EVAL IMPLANTABLE DFB: CPT | Performed by: INTERNAL MEDICINE

## 2021-08-11 ENCOUNTER — TELEPHONE (OUTPATIENT)
Dept: HEMATOLOGY/ONCOLOGY | Facility: HOSPITAL | Age: 71
End: 2021-08-11

## 2021-08-11 NOTE — TELEPHONE ENCOUNTER
MD Radha Monteiro, RN  Caller: Unspecified (Today, 10:18 AM)      Yes. He should follow up. We'll deal with need for labs and other workup when he shows up. Patient notified.  - transferred to call center to schedule appointment

## 2021-08-11 NOTE — TELEPHONE ENCOUNTER
Patient last saw Dr Clark Concepcion on 4/19/2019, patient wants to know if he needs to schedule a follow up with doctor and if yes does he needs labs done.  Patient stated he feels good

## 2021-08-17 NOTE — H&P
HPI:     Walter Rich is a 79year old male with a PMH of HTN,  TIA, LONG (uses CPAP), CAD s/p CABG, afib (has PM), CHF, cardiomyopathy s/p AICD, h/o cholangiocarcinoma s/p Whipple, duodenal ulcer, SIADH with hyponatremia, CKD.  He presents as a consult Encouraged him to continue to drink plenty of water and try to empty bladder every 2-3 hours during the day. We discussed office cysto to evaluate the SP pain with bladder fullness but he declines at this time.       HISTORY:  Past Medical History:   Alix Nguyen spasms   • Sleep apnea     CPAP   • Syncope, near 11/18/2019   • Tobacco abuse    • Uncomfortable fullness after meals always   • Weight gain       Past Surgical History:   Procedure Laterality Date   • ANGIOGRAM  06/24/2019   • BYPASS SURGERY  06/27/2019 ERGOCALCIFEROL 1.25 MG (71121 UT) Oral Cap TAKE 1 CAPSULE BY MOUTH EVERY 7 DAYS 12 capsule 0   • Omeprazole 40 MG Oral Capsule Delayed Release Take 1 capsule (40 mg total) by mouth 2 (two) times daily.  60 capsule 11   • losartan Potassium 50 MG Oral Tab Ta try to empty bladder every 2-3 hours during the day. We discussed office cysto to evaluate the SP pain with bladder fullness but he declines at this time. Thank you very much for this consult.  I will do my best to keep you informed of all significant ur

## 2021-08-20 ENCOUNTER — OFFICE VISIT (OUTPATIENT)
Dept: SURGERY | Facility: CLINIC | Age: 71
End: 2021-08-20
Payer: COMMERCIAL

## 2021-08-20 DIAGNOSIS — R10.9 FLANK PAIN: ICD-10-CM

## 2021-08-20 DIAGNOSIS — R10.2 PELVIC PAIN: Primary | ICD-10-CM

## 2021-08-20 PROCEDURE — 99203 OFFICE O/P NEW LOW 30 MIN: CPT | Performed by: UROLOGY

## 2021-08-23 ENCOUNTER — LAB SERVICES (OUTPATIENT)
Dept: CARDIOLOGY | Age: 71
End: 2021-08-23

## 2021-08-23 ENCOUNTER — HOSPITAL ENCOUNTER (EMERGENCY)
Facility: HOSPITAL | Age: 71
Discharge: HOME | End: 2021-08-23
Attending: EMERGENCY MEDICINE
Payer: MEDICARE

## 2021-08-23 ENCOUNTER — APPOINTMENT (OUTPATIENT)
Dept: GENERAL RADIOLOGY | Facility: HOSPITAL | Age: 71
End: 2021-08-23
Attending: EMERGENCY MEDICINE
Payer: MEDICARE

## 2021-08-23 ENCOUNTER — OFF PREMISE (OUTPATIENT)
Dept: CARDIOLOGY | Age: 71
End: 2021-08-23

## 2021-08-23 VITALS
TEMPERATURE: 97 F | HEIGHT: 70 IN | DIASTOLIC BLOOD PRESSURE: 84 MMHG | BODY MASS INDEX: 25.05 KG/M2 | HEART RATE: 56 BPM | SYSTOLIC BLOOD PRESSURE: 142 MMHG | OXYGEN SATURATION: 98 % | RESPIRATION RATE: 16 BRPM | WEIGHT: 175 LBS

## 2021-08-23 DIAGNOSIS — R42 LIGHTHEADEDNESS: Primary | ICD-10-CM

## 2021-08-23 LAB
ALBUMIN SERPL-MCNC: 3.5 G/DL
ALBUMIN SERPL-MCNC: 3.5 G/DL (ref 3.4–5)
ALBUMIN/GLOB SERPL: 0.8 {RATIO} (ref 1–2)
ALP LIVER SERPL-CCNC: 130 U/L
ALP SERPL-CCNC: 130 U/L
ALT SERPL-CCNC: 44 U/L
ALT SERPL-CCNC: 44 UNITS/L
ANION GAP SERPL CALC-SCNC: 4 MMOL/L (ref 0–18)
AST SERPL-CCNC: 31 U/L (ref 15–37)
AST SERPL-CCNC: 31 UNITS/L
ATRIAL RATE: 60 BPM
BASOPHILS # BLD AUTO: 0.04 X10(3) UL (ref 0–0.2)
BASOPHILS NFR BLD AUTO: 0.6 %
BILIRUB SERPL-MCNC: 0.7 MG/DL
BILIRUB SERPL-MCNC: 0.7 MG/DL (ref 0.1–2)
BUN BLD-MCNC: 16 MG/DL (ref 7–18)
BUN SERPL-MCNC: 16 MG/DL
CALCIUM BLD-MCNC: 9.1 MG/DL (ref 8.5–10.1)
CALCIUM SERPL-MCNC: 9.1 MG/DL
CHLORIDE SERPL-SCNC: 100 MMOL/L
CHLORIDE SERPL-SCNC: 100 MMOL/L (ref 98–112)
CO2 SERPL-SCNC: 26 MMOL/L (ref 21–32)
CREAT BLD-MCNC: 1.41 MG/DL
CREAT SERPL-MCNC: 1.41 MG/DL
EOSINOPHIL # BLD AUTO: 0.29 X10(3) UL (ref 0–0.7)
EOSINOPHIL NFR BLD AUTO: 4.4 %
ERYTHROCYTE [DISTWIDTH] IN BLOOD BY AUTOMATED COUNT: 14.6 %
GLOBULIN PLAS-MCNC: 4.2 G/DL (ref 2.8–4.4)
GLOBULIN SER-MCNC: 4.2 G/DL
GLUCOSE BLD-MCNC: 95 MG/DL (ref 70–99)
GLUCOSE SERPL-MCNC: 95 MG/DL
HCT VFR BLD AUTO: 39.5 %
HCT VFR BLD CALC: 39.5 %
HGB BLD-MCNC: 13 G/DL
HGB BLD-MCNC: 13 G/DL
IMM GRANULOCYTES # BLD AUTO: 0.02 X10(3) UL (ref 0–1)
IMM GRANULOCYTES NFR BLD: 0.3 %
LYMPHOCYTES # BLD AUTO: 1.46 X10(3) UL (ref 1–4)
LYMPHOCYTES NFR BLD AUTO: 22.2 %
M PROTEIN MFR SERPL ELPH: 7.7 G/DL (ref 6.4–8.2)
MCH RBC QN AUTO: 30 PG
MCH RBC QN AUTO: 30 PG (ref 26–34)
MCHC RBC AUTO-ENTMCNC: 32.9 G/DL
MCHC RBC AUTO-ENTMCNC: 32.9 G/DL (ref 31–37)
MCV RBC AUTO: 91.2 FL
MCV RBC AUTO: 91.2 FL
MONOCYTES # BLD AUTO: 1.14 X10(3) UL (ref 0.1–1)
MONOCYTES NFR BLD AUTO: 17.3 %
NEUTROPHILS # BLD AUTO: 3.63 X10 (3) UL (ref 1.5–7.7)
NEUTROPHILS # BLD AUTO: 3.63 X10(3) UL (ref 1.5–7.7)
NEUTROPHILS NFR BLD AUTO: 55.2 %
NT-PROBNP SERPL-MCNC: 1015 PG/ML
NT-PROBNP SERPL-MCNC: 1015 PG/ML (ref ?–125)
OSMOLALITY SERPL CALC.SUM OF ELEC: 271 MOSM/KG (ref 275–295)
P AXIS: 4 DEGREES
P-R INTERVAL: 258 MS
PLATELET # BLD AUTO: 188 10(3)UL (ref 150–450)
PLATELET # BLD: 188 K/MCL
POTASSIUM SERPL-SCNC: 4.7 MMOL/L
POTASSIUM SERPL-SCNC: 4.7 MMOL/L (ref 3.5–5.1)
PROT SERPL-MCNC: 7.7 G/DL
Q-T INTERVAL: 420 MS
QRS DURATION: 124 MS
QTC CALCULATION (BEZET): 420 MS
R AXIS: 82 DEGREES
RBC # BLD AUTO: 4.33 X10(6)UL
RBC # BLD: 4.33 10*6/UL
SODIUM SERPL-SCNC: 130 MMOL/L
SODIUM SERPL-SCNC: 130 MMOL/L (ref 136–145)
T AXIS: 23 DEGREES
TROPONIN I SERPL-MCNC: <0.045 NG/ML (ref ?–0.04)
VENTRICULAR RATE: 60 BPM
WBC # BLD AUTO: 6.6 X10(3) UL (ref 4–11)
WBC # BLD: 6.6 K/MCL

## 2021-08-23 PROCEDURE — 84484 ASSAY OF TROPONIN QUANT: CPT | Performed by: EMERGENCY MEDICINE

## 2021-08-23 PROCEDURE — 93010 ELECTROCARDIOGRAM REPORT: CPT

## 2021-08-23 PROCEDURE — 99284 EMERGENCY DEPT VISIT MOD MDM: CPT

## 2021-08-23 PROCEDURE — 93005 ELECTROCARDIOGRAM TRACING: CPT

## 2021-08-23 PROCEDURE — 85025 COMPLETE CBC W/AUTO DIFF WBC: CPT | Performed by: EMERGENCY MEDICINE

## 2021-08-23 PROCEDURE — 99285 EMERGENCY DEPT VISIT HI MDM: CPT

## 2021-08-23 PROCEDURE — 71045 X-RAY EXAM CHEST 1 VIEW: CPT | Performed by: EMERGENCY MEDICINE

## 2021-08-23 PROCEDURE — 36415 COLL VENOUS BLD VENIPUNCTURE: CPT

## 2021-08-23 PROCEDURE — 83880 ASSAY OF NATRIURETIC PEPTIDE: CPT | Performed by: EMERGENCY MEDICINE

## 2021-08-23 PROCEDURE — 80053 COMPREHEN METABOLIC PANEL: CPT | Performed by: EMERGENCY MEDICINE

## 2021-08-23 NOTE — ED INITIAL ASSESSMENT (HPI)
PT TO ED WITH C/O LIGHTHEADED, DIZZINESS, SOB X 1 DAY. WAS STUNG 12 X BY BEES 2 DAYS AGO.  TOOK BENADRYL YESTERDAY WITH HYDROCORTISONE CREAM ON STING

## 2021-08-23 NOTE — ED PROVIDER NOTES
Patient Seen in: [facility] Emergency Department      History   Patient presents with:  Bite Sting,Insect  Difficulty Breathing    Stated Complaint: sob, after bee stings this friday     HPI/Subjective:   HPI    27-year-old white male presents emerg usually   • Frequent urination drink 5 bottles of water daily   • Hearing impairment     hearing loss in right   • Hearing loss 10%    • [facility] 06/24/2019    NONSTEMI   • Hemorrhoids teenager   • High blood pressure    • Hyperkalemia Stopped    Vaping Use      Vaping Use: Never used    Alcohol use: No      Alcohol/week: 0.0 standard drinks    Drug use:  No             Review of Systems    Positive for stated complaint: sob, after bee stings this friday   Other systems are as noted in HP 1,015 (*)     All other components within normal limits   CBC W/ DIFFERENTIAL - Abnormal; Notable for the following components:    Monocyte Absolute 1.14 (*)     All other components within normal limits   TROPONIN I - Normal   CBC WITH DIFFERENTIAL WITH P failure. Patient remained stable during his visit here in the emergency room. He is not short of breath. Lungs are clear on exam.  He has no significant peripheral edema. I spoke to his cardiologist and we discussed the case.   Patient will be discharge

## 2021-08-25 ENCOUNTER — TELEPHONE (OUTPATIENT)
Dept: CARDIOLOGY | Age: 71
End: 2021-08-25

## 2021-08-25 ENCOUNTER — OFFICE VISIT (OUTPATIENT)
Dept: CARDIOLOGY | Age: 71
End: 2021-08-25

## 2021-08-25 VITALS
HEART RATE: 71 BPM | SYSTOLIC BLOOD PRESSURE: 124 MMHG | WEIGHT: 181 LBS | DIASTOLIC BLOOD PRESSURE: 70 MMHG | BODY MASS INDEX: 25.97 KG/M2

## 2021-08-25 DIAGNOSIS — Z95.810 ICD (IMPLANTABLE CARDIOVERTER-DEFIBRILLATOR) IN PLACE: Primary | ICD-10-CM

## 2021-08-25 PROCEDURE — 99215 OFFICE O/P EST HI 40 MIN: CPT | Performed by: INTERNAL MEDICINE

## 2021-08-25 PROCEDURE — 3078F DIAST BP <80 MM HG: CPT | Performed by: INTERNAL MEDICINE

## 2021-08-25 PROCEDURE — 3074F SYST BP LT 130 MM HG: CPT | Performed by: INTERNAL MEDICINE

## 2021-08-25 ASSESSMENT — PATIENT HEALTH QUESTIONNAIRE - PHQ9
SUM OF ALL RESPONSES TO PHQ9 QUESTIONS 1 AND 2: 0
2. FEELING DOWN, DEPRESSED OR HOPELESS: NOT AT ALL
SUM OF ALL RESPONSES TO PHQ9 QUESTIONS 1 AND 2: 0
1. LITTLE INTEREST OR PLEASURE IN DOING THINGS: NOT AT ALL
CLINICAL INTERPRETATION OF PHQ9 SCORE: NO FURTHER SCREENING NEEDED
CLINICAL INTERPRETATION OF PHQ2 SCORE: NO FURTHER SCREENING NEEDED

## 2021-08-25 NOTE — PROGRESS NOTES
THE [facility] Hematology Oncology Group Progress Note      Patient Name: Ry Mcmahon   YOB: 1950  Medical Record Number: [id number]  Attending Physician: Dee Dee Hutchinson. Clark Concepcion M.D.      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 On 10/13/2016 patient began adjuvant chemotherapy with gemcitabine and capecitabine.  Cycle 1 was mcg under the tongue every 6 (six) hours as needed. , Disp: 30 tablet, Rfl: 2  ZENPEP 94275-89327 units Oral Cap DR Particles, TAKE 1 CAPSULE BY MOUTH 6 TIMES DAILY WITH MEALS, Disp: 180 capsule, Rfl: 0  ERGOCALCIFEROL 1.25 MG (57586 UT) Oral Cap, TAKE 1 CA hepatosplenomegaly. Extremities          No lower extremity edema. Neurologic           Motor and sensory grossly intact. Psychiatric          Mood and affect appropriate.     Laboratory  Recent Results (from the past 672 hour(s))   EKG    Collection Ti Eosinophil Absolute 0.29 0.00 - 0.70 x10(3) uL    Basophil Absolute 0.04 0.00 - 0.20 x10(3) uL    Immature Granulocyte Absolute 0.02 0.00 - 1.00 x10(3) uL    Neutrophil % 55.2 %    Lymphocyte % 22.2 %    Monocyte % 17.3 %    Eosinophil % 4.4 %    Basophil  Stable 2.7 cm infrarenal abdominal aortic aneurysm   RETROPERITONEUM:  No mass or adenopathy.     BOWEL/MESENTERY:  No free air.  No free fluid.  Severe diverticulosis of the sigmoid colon, milder changes in the left colon.  Moderate amount of stool seen Follow Up   Patient will be called with the results of his CT imaging. Electronically signed by:    Franki Edgar M.D.   Associate Medical Director of Oncology [facility], [place], [place]

## 2021-08-26 ENCOUNTER — HOSPITAL ENCOUNTER (OUTPATIENT)
Dept: CT IMAGING | Facility: HOSPITAL | Age: 71
Discharge: HOME | End: 2021-08-26
Attending: UROLOGY
Payer: MEDICARE

## 2021-08-26 ENCOUNTER — TELEPHONE (OUTPATIENT)
Dept: INTERNAL MEDICINE CLINIC | Facility: CLINIC | Age: 71
End: 2021-08-26

## 2021-08-26 DIAGNOSIS — R10.9 FLANK PAIN: ICD-10-CM

## 2021-08-26 PROCEDURE — 74176 CT ABD & PELVIS W/O CONTRAST: CPT | Performed by: UROLOGY

## 2021-08-26 NOTE — TELEPHONE ENCOUNTER
TC to pt to schedule ER fu for SOB after hornet stings with Dr. Bruce Hartmann. Patient declined to schedule. Patient stated he was given x-ray of chest which showed some fluid in his lungs.   Pt had follow up appt with his cardiologist yesterday and was told n

## 2021-08-26 NOTE — TELEPHONE ENCOUNTER
Pt states has already tried these both and oral benadryl. Advised could ask the pharmacist at local pharm to see if they have any other options.

## 2021-08-27 ENCOUNTER — OFFICE VISIT (OUTPATIENT)
Dept: HEMATOLOGY/ONCOLOGY | Facility: HOSPITAL | Age: 71
End: 2021-08-27
Attending: SPECIALIST
Payer: MEDICARE

## 2021-08-27 VITALS
BODY MASS INDEX: 26.05 KG/M2 | HEART RATE: 84 BPM | DIASTOLIC BLOOD PRESSURE: 80 MMHG | RESPIRATION RATE: 16 BRPM | HEIGHT: 70 IN | TEMPERATURE: 98 F | SYSTOLIC BLOOD PRESSURE: 133 MMHG | OXYGEN SATURATION: 96 % | WEIGHT: 182 LBS

## 2021-08-27 DIAGNOSIS — C22.1 CHOLANGIOCARCINOMA OF BILIARY TRACT (HCC): ICD-10-CM

## 2021-08-27 DIAGNOSIS — C77.2 SECONDARY MALIGNANT NEOPLASM OF INTRA-ABDOMINAL LYMPH NODES (HCC): ICD-10-CM

## 2021-08-27 DIAGNOSIS — C22.1 CHOLANGIOCARCINOMA (HCC): Primary | ICD-10-CM

## 2021-08-27 DIAGNOSIS — K86.89 PANCREATIC INSUFFICIENCY: ICD-10-CM

## 2021-08-27 PROCEDURE — 99214 OFFICE O/P EST MOD 30 MIN: CPT | Performed by: SPECIALIST

## 2021-08-27 NOTE — PROGRESS NOTES
Patient is here today for follow up with Joana Boudreaux for Cholangiocarcinoma. Patient stated abdominal pain. Medication list and medical history were reviewed and updated.      Education Record    Learner:  Patient and spouse    Disease / Diagnosis: Socorro Mckee

## 2021-08-27 NOTE — PATIENT INSTRUCTIONS
Due to the hospitals NO longer accepting paper applications for medical cannabis, please read the information below to complete your application:    Your doctor has approved your need for medical cannabis.   Here are the steps to proceeding with the

## 2021-08-30 ENCOUNTER — TELEPHONE (OUTPATIENT)
Dept: SURGERY | Facility: CLINIC | Age: 71
End: 2021-08-30

## 2021-08-30 ENCOUNTER — TELEPHONE (OUTPATIENT)
Dept: HEMATOLOGY/ONCOLOGY | Facility: HOSPITAL | Age: 71
End: 2021-08-30

## 2021-08-30 NOTE — TELEPHONE ENCOUNTER
Coull message sent to the pt:    'Good Afternoon,    Dr. Lester Gagnon reviewed your CT. It shows no significant  abnormalities.  You have diverticulosis and quite a bit of stool in colon which can sometimes be seen with constipation but this could also be a no

## 2021-08-30 NOTE — TELEPHONE ENCOUNTER
----- Message from Juan Alvarez MD sent at 8/26/2021  5:57 PM CDT -----  Results reviewed. CT shows no significant  abnormalities.  He has diverticulosis and quite a bit of stool in colon which can sometimes be seen with constipation but this could also

## 2021-09-02 ENCOUNTER — OFFICE VISIT (OUTPATIENT)
Dept: HEMATOLOGY/ONCOLOGY | Facility: HOSPITAL | Age: 71
End: 2021-09-02
Attending: SPECIALIST
Payer: MEDICARE

## 2021-09-02 ENCOUNTER — DOCUMENTATION ONLY (OUTPATIENT)
Dept: HEMATOLOGY/ONCOLOGY | Facility: HOSPITAL | Age: 71
End: 2021-09-02

## 2021-09-02 ENCOUNTER — HOSPITAL ENCOUNTER (OUTPATIENT)
Dept: CT IMAGING | Facility: HOSPITAL | Age: 71
Discharge: HOME | End: 2021-09-02
Attending: SPECIALIST
Payer: MEDICARE

## 2021-09-02 VITALS
RESPIRATION RATE: 18 BRPM | OXYGEN SATURATION: 96 % | HEART RATE: 62 BPM | SYSTOLIC BLOOD PRESSURE: 128 MMHG | DIASTOLIC BLOOD PRESSURE: 76 MMHG | TEMPERATURE: 98 F

## 2021-09-02 DIAGNOSIS — C77.2 SECONDARY MALIGNANT NEOPLASM OF INTRA-ABDOMINAL LYMPH NODES (HCC): ICD-10-CM

## 2021-09-02 DIAGNOSIS — C77.2 SECONDARY MALIGNANT NEOPLASM OF INTRA-ABDOMINAL LYMPH NODES (HCC): Primary | ICD-10-CM

## 2021-09-02 DIAGNOSIS — C22.1 CHOLANGIOCARCINOMA OF BILIARY TRACT (HCC): ICD-10-CM

## 2021-09-02 DIAGNOSIS — C22.1 CHOLANGIOCARCINOMA (HCC): ICD-10-CM

## 2021-09-02 PROCEDURE — 71260 CT THORAX DX C+: CPT | Performed by: SPECIALIST

## 2021-09-02 PROCEDURE — 74177 CT ABD & PELVIS W/CONTRAST: CPT | Performed by: SPECIALIST

## 2021-09-02 PROCEDURE — 96361 HYDRATE IV INFUSION ADD-ON: CPT

## 2021-09-02 PROCEDURE — 96360 HYDRATION IV INFUSION INIT: CPT

## 2021-09-02 RX ORDER — SODIUM CHLORIDE 9 MG/ML
INJECTION, SOLUTION INTRAVENOUS ONCE
Status: COMPLETED | OUTPATIENT
Start: 2021-09-02 | End: 2021-09-02

## 2021-09-02 RX ADMIN — SODIUM CHLORIDE: 9 INJECTION, SOLUTION INTRAVENOUS at 14:10:00

## 2021-09-03 ENCOUNTER — TELEPHONE (OUTPATIENT)
Dept: HEMATOLOGY/ONCOLOGY | Facility: HOSPITAL | Age: 71
End: 2021-09-03

## 2021-09-03 NOTE — TELEPHONE ENCOUNTER
MD Kimberly Howard, TOREY  Let him know that his CT scan is negative for metastatic disease. As he is now 5 years out, I recommend that he see me once per year. No further CT scans are recommended unless he has a problem.      The CT scan

## 2021-09-14 NOTE — PLAN OF CARE
Problem: CARDIOVASCULAR - ADULT  Goal: Maintains optimal cardiac output and hemodynamic stability  Description  INTERVENTIONS:  - Monitor vital signs, rhythm, and trends  - Monitor for bleeding, hypotension and signs of decreased cardiac output  - Evalua PRINCIPAL PROCEDURE  Procedure: Cardiac ablation  Findings and Treatment: [facility] will call you to schedule a 2 week post op follow up, please call [phone] if you dont hear from them or need to be seen sooner.  - Bruising at the groin, sometimes extending down the leg, and/or a small lump under the skin at the groin access site is normal and will resolve within 2 – 3 weeks.   - Occasional skipped beats or palpitations that last for a few beats are common and generally resolve within 1-2 months.   - You make walk and take stairs at a regular pace.   - Do not perform any exercise more strenuous than walking for 1 week.   - Do not strain or lift heavy objects for 1 week.  - You may shower the day after the procedure.  - Do not soak in water (such as tub baths, hot tubs, swimming, etc.) for 1 week.   - You may resume all other activities the day after the procedure.  Call your doctor if:   - you notice bleeding, redness, drainage, swelling, increased tenderness or a hot sensation around the catheter insertion site.   - your temperature is greater than 100 degrees F for more than 24 hours.  - your rapid heart rhythm returns.  - you have any questions or concerns regarding the procedure.  If significant bleeding and/or a large lump (the size of a golf ball or bigger) occurs:  - Lie flat and apply continuous direct pressure just above the puncture site for at least 10 minutes  - If the issue resolves, notify your physician immediately.    - If the bleeding cannot be controlled, please seek immediate medical attention.  If you experience increased difficulty breathing or chest pain, or if you faint or have dizzy spells, please seek immediate medical attention.

## 2021-10-05 ENCOUNTER — ANCILLARY PROCEDURE (OUTPATIENT)
Dept: CARDIOLOGY | Age: 71
End: 2021-10-05
Attending: INTERNAL MEDICINE

## 2021-10-05 ENCOUNTER — ANCILLARY ORDERS (OUTPATIENT)
Dept: CARDIOLOGY | Age: 71
End: 2021-10-05

## 2021-10-05 DIAGNOSIS — Z95.810 ICD (IMPLANTABLE CARDIOVERTER-DEFIBRILLATOR) IN PLACE: ICD-10-CM

## 2021-10-05 PROCEDURE — X1114 CARDIAC DEVICE HOME CHECK - REMOTE UNSCHEDULED: HCPCS | Performed by: INTERNAL MEDICINE

## 2021-11-11 ENCOUNTER — APPOINTMENT (OUTPATIENT)
Dept: CARDIOLOGY | Age: 71
End: 2021-11-11

## 2021-11-23 ENCOUNTER — HOSPITAL ENCOUNTER (OUTPATIENT)
Dept: BONE DENSITY | Age: 71
Discharge: HOME | End: 2021-11-23
Attending: PHYSICIAN ASSISTANT
Payer: MEDICARE

## 2021-11-23 DIAGNOSIS — M81.8 OTHER OSTEOPOROSIS WITHOUT CURRENT PATHOLOGICAL FRACTURE: ICD-10-CM

## 2021-11-23 PROCEDURE — 77080 DXA BONE DENSITY AXIAL: CPT | Performed by: PHYSICIAN ASSISTANT

## 2021-11-24 NOTE — PROGRESS NOTES
DEXA report reviewed and reveals osteopenia.  Recommend continued Prolia therapy due to history of DEXA confirmed osteoporosis 9T-scores of -2.7 in the left femoral neck and -2.5 in the left total hip on 2017 DEXA report) and history of vertebral compressio

## 2022-01-10 ENCOUNTER — ANCILLARY ORDERS (OUTPATIENT)
Dept: CARDIOLOGY | Age: 72
End: 2022-01-10

## 2022-01-10 ENCOUNTER — ANCILLARY PROCEDURE (OUTPATIENT)
Dept: CARDIOLOGY | Age: 72
End: 2022-01-10
Attending: INTERNAL MEDICINE

## 2022-01-10 DIAGNOSIS — Z95.810 ICD (IMPLANTABLE CARDIOVERTER-DEFIBRILLATOR) IN PLACE: ICD-10-CM

## 2022-01-10 PROCEDURE — X1114 CARDIAC DEVICE HOME CHECK - REMOTE UNSCHEDULED: HCPCS | Performed by: INTERNAL MEDICINE

## 2022-01-18 RX ORDER — LOSARTAN POTASSIUM 50 MG/1
TABLET ORAL
Qty: 180 TABLET | Refills: 1 | Status: SHIPPED | OUTPATIENT
Start: 2022-01-18 | End: 2022-05-23 | Stop reason: DRUGHIGH

## 2022-03-22 RX ORDER — METOPROLOL SUCCINATE 100 MG/1
100 TABLET, EXTENDED RELEASE ORAL DAILY
Qty: 90 TABLET | Refills: 3 | Status: SHIPPED | OUTPATIENT
Start: 2022-03-22 | End: 2022-09-20

## 2022-04-04 LAB — AMB EXT COLOGUARD RESULT: NEGATIVE

## 2022-04-18 ENCOUNTER — ANCILLARY PROCEDURE (OUTPATIENT)
Dept: CARDIOLOGY | Age: 72
End: 2022-04-18
Attending: INTERNAL MEDICINE

## 2022-04-18 ENCOUNTER — ANCILLARY ORDERS (OUTPATIENT)
Dept: CARDIOLOGY | Age: 72
End: 2022-04-18

## 2022-04-18 DIAGNOSIS — Z95.810 ICD (IMPLANTABLE CARDIOVERTER-DEFIBRILLATOR) IN PLACE: ICD-10-CM

## 2022-04-18 PROCEDURE — X1114 CARDIAC DEVICE HOME CHECK - REMOTE UNSCHEDULED: HCPCS | Performed by: INTERNAL MEDICINE

## 2022-05-02 ENCOUNTER — TELEPHONE (OUTPATIENT)
Dept: CARDIOLOGY | Age: 72
End: 2022-05-02

## 2022-05-19 ENCOUNTER — APPOINTMENT (OUTPATIENT)
Dept: CARDIOLOGY | Age: 72
End: 2022-05-19

## 2022-05-23 ENCOUNTER — TELEPHONE (OUTPATIENT)
Dept: CARDIOLOGY | Age: 72
End: 2022-05-23

## 2022-05-23 RX ORDER — LOSARTAN POTASSIUM 50 MG/1
50 TABLET ORAL DAILY
COMMUNITY
End: 2022-08-29

## 2022-05-24 ENCOUNTER — OFFICE VISIT (OUTPATIENT)
Dept: NEUROLOGY | Facility: CLINIC | Age: 72
End: 2022-05-24
Payer: COMMERCIAL

## 2022-05-24 VITALS
HEART RATE: 64 BPM | BODY MASS INDEX: 27 KG/M2 | RESPIRATION RATE: 16 BRPM | DIASTOLIC BLOOD PRESSURE: 70 MMHG | WEIGHT: 190.63 LBS | SYSTOLIC BLOOD PRESSURE: 120 MMHG

## 2022-05-24 DIAGNOSIS — H53.9 VISUAL CHANGES: ICD-10-CM

## 2022-05-24 DIAGNOSIS — H53.8 BLURRED VISION: ICD-10-CM

## 2022-05-24 DIAGNOSIS — R42 DIZZINESS: Primary | ICD-10-CM

## 2022-05-24 PROCEDURE — 3078F DIAST BP <80 MM HG: CPT | Performed by: OTHER

## 2022-05-24 PROCEDURE — 3074F SYST BP LT 130 MM HG: CPT | Performed by: OTHER

## 2022-05-24 PROCEDURE — 99214 OFFICE O/P EST MOD 30 MIN: CPT | Performed by: OTHER

## 2022-05-25 ENCOUNTER — APPOINTMENT (OUTPATIENT)
Dept: CARDIOLOGY | Age: 72
End: 2022-05-25

## 2022-05-27 ENCOUNTER — OFFICE VISIT (OUTPATIENT)
Dept: CARDIOLOGY | Age: 72
End: 2022-05-27

## 2022-05-27 VITALS
SYSTOLIC BLOOD PRESSURE: 127 MMHG | HEART RATE: 73 BPM | BODY MASS INDEX: 27.35 KG/M2 | DIASTOLIC BLOOD PRESSURE: 79 MMHG | HEIGHT: 70 IN | WEIGHT: 191 LBS

## 2022-05-27 DIAGNOSIS — E78.00 PURE HYPERCHOLESTEROLEMIA: ICD-10-CM

## 2022-05-27 DIAGNOSIS — I25.10 CORONARY ARTERY DISEASE INVOLVING NATIVE CORONARY ARTERY OF NATIVE HEART WITHOUT ANGINA PECTORIS: ICD-10-CM

## 2022-05-27 DIAGNOSIS — I25.5 CARDIOMYOPATHY, ISCHEMIC: ICD-10-CM

## 2022-05-27 DIAGNOSIS — Z95.1 S/P CABG (CORONARY ARTERY BYPASS GRAFT): Primary | ICD-10-CM

## 2022-05-27 DIAGNOSIS — I48.0 PAROXYSMAL ATRIAL FIBRILLATION (CMD): ICD-10-CM

## 2022-05-27 DIAGNOSIS — I50.22 CHRONIC SYSTOLIC CONGESTIVE HEART FAILURE (CMD): ICD-10-CM

## 2022-05-27 DIAGNOSIS — I49.3 PVCS (PREMATURE VENTRICULAR CONTRACTIONS): ICD-10-CM

## 2022-05-27 DIAGNOSIS — I10 BENIGN ESSENTIAL HTN: ICD-10-CM

## 2022-05-27 DIAGNOSIS — I65.23 CAROTID STENOSIS, ASYMPTOMATIC, BILATERAL: ICD-10-CM

## 2022-05-27 PROBLEM — Z86.69 HISTORY OF SLEEP APNEA: Chronic | Status: ACTIVE | Noted: 2022-05-27

## 2022-05-27 PROCEDURE — 3078F DIAST BP <80 MM HG: CPT | Performed by: INTERNAL MEDICINE

## 2022-05-27 PROCEDURE — 3074F SYST BP LT 130 MM HG: CPT | Performed by: INTERNAL MEDICINE

## 2022-05-27 PROCEDURE — 99215 OFFICE O/P EST HI 40 MIN: CPT | Performed by: INTERNAL MEDICINE

## 2022-06-03 ENCOUNTER — MED REC SCAN ONLY (OUTPATIENT)
Dept: INTERNAL MEDICINE CLINIC | Facility: CLINIC | Age: 72
End: 2022-06-03

## 2022-06-03 ENCOUNTER — TELEPHONE (OUTPATIENT)
Dept: INTERNAL MEDICINE CLINIC | Facility: CLINIC | Age: 72
End: 2022-06-03

## 2022-06-03 NOTE — PROGRESS NOTES
Consultation received from [address] [place] [address] ; placed on [address] [place] [address] for signature. Barcode printed ; report sent to scanning.

## 2022-06-03 NOTE — TELEPHONE ENCOUNTER
Cologuard results pulled from Care Everywhere. Epic updated, health maintenance updated and report sent to scanning.

## 2022-06-09 ENCOUNTER — TELEPHONE (OUTPATIENT)
Dept: INTERNAL MEDICINE CLINIC | Facility: CLINIC | Age: 72
End: 2022-06-09

## 2022-06-09 ENCOUNTER — OFFICE VISIT (OUTPATIENT)
Dept: INTERNAL MEDICINE CLINIC | Facility: CLINIC | Age: 72
End: 2022-06-09
Payer: COMMERCIAL

## 2022-06-09 VITALS
HEIGHT: 69.75 IN | BODY MASS INDEX: 27.22 KG/M2 | OXYGEN SATURATION: 98 % | DIASTOLIC BLOOD PRESSURE: 56 MMHG | HEART RATE: 60 BPM | WEIGHT: 188 LBS | SYSTOLIC BLOOD PRESSURE: 120 MMHG

## 2022-06-09 DIAGNOSIS — Z95.0 PACEMAKER: ICD-10-CM

## 2022-06-09 DIAGNOSIS — Z00.00 ENCOUNTER FOR ANNUAL HEALTH EXAMINATION: ICD-10-CM

## 2022-06-09 DIAGNOSIS — I25.5 CARDIOMYOPATHY, ISCHEMIC: ICD-10-CM

## 2022-06-09 DIAGNOSIS — Z99.89 OSA ON CPAP: ICD-10-CM

## 2022-06-09 DIAGNOSIS — Z86.018 HISTORY OF PHEOCHROMOCYTOMA: ICD-10-CM

## 2022-06-09 DIAGNOSIS — C77.2 SECONDARY MALIGNANT NEOPLASM OF INTRA-ABDOMINAL LYMPH NODES (HCC): ICD-10-CM

## 2022-06-09 DIAGNOSIS — I25.83 CORONARY ARTERY DISEASE DUE TO LIPID RICH PLAQUE: ICD-10-CM

## 2022-06-09 DIAGNOSIS — I10 BENIGN ESSENTIAL HTN: Primary | ICD-10-CM

## 2022-06-09 DIAGNOSIS — I71.4 AAA (ABDOMINAL AORTIC ANEURYSM) WITHOUT RUPTURE (HCC): ICD-10-CM

## 2022-06-09 DIAGNOSIS — R91.8 PULMONARY NODULES: ICD-10-CM

## 2022-06-09 DIAGNOSIS — H53.8 BLURRED VISION: ICD-10-CM

## 2022-06-09 DIAGNOSIS — I48.0 PAROXYSMAL ATRIAL FIBRILLATION (HCC): ICD-10-CM

## 2022-06-09 DIAGNOSIS — Z86.73 HISTORY OF TRANSIENT ISCHEMIC ATTACK (TIA): ICD-10-CM

## 2022-06-09 DIAGNOSIS — D63.8 ANEMIA OF CHRONIC DISEASE: ICD-10-CM

## 2022-06-09 DIAGNOSIS — N18.32 STAGE 3B CHRONIC KIDNEY DISEASE (HCC): Chronic | ICD-10-CM

## 2022-06-09 DIAGNOSIS — E27.40 LOW SERUM ALDOSTERONE (HCC): ICD-10-CM

## 2022-06-09 DIAGNOSIS — I50.22 CHRONIC SYSTOLIC CONGESTIVE HEART FAILURE (HCC): ICD-10-CM

## 2022-06-09 DIAGNOSIS — E16.2 HYPOGLYCEMIA: ICD-10-CM

## 2022-06-09 DIAGNOSIS — D69.6 THROMBOCYTOPENIA (HCC): ICD-10-CM

## 2022-06-09 DIAGNOSIS — R16.0 LIVER MASS, LEFT LOBE: ICD-10-CM

## 2022-06-09 DIAGNOSIS — J44.9 ASTHMA WITH COPD (CHRONIC OBSTRUCTIVE PULMONARY DISEASE) (HCC): ICD-10-CM

## 2022-06-09 DIAGNOSIS — M81.8 OTHER OSTEOPOROSIS WITHOUT CURRENT PATHOLOGICAL FRACTURE: ICD-10-CM

## 2022-06-09 DIAGNOSIS — D17.79 MYELOLIPOMA OF LEFT ADRENAL GLAND: ICD-10-CM

## 2022-06-09 DIAGNOSIS — I49.3 PVCS (PREMATURE VENTRICULAR CONTRACTIONS): ICD-10-CM

## 2022-06-09 DIAGNOSIS — K86.81 EXOCRINE PANCREATIC INSUFFICIENCY: ICD-10-CM

## 2022-06-09 DIAGNOSIS — M46.96 UNSPECIFIED INFLAMMATORY SPONDYLOPATHY, LUMBAR REGION (HCC): ICD-10-CM

## 2022-06-09 DIAGNOSIS — Z95.1 S/P CABG (CORONARY ARTERY BYPASS GRAFT): ICD-10-CM

## 2022-06-09 DIAGNOSIS — I70.0 AORTIC ATHEROSCLEROSIS (HCC): ICD-10-CM

## 2022-06-09 DIAGNOSIS — D36.15: ICD-10-CM

## 2022-06-09 DIAGNOSIS — I25.10 CORONARY ARTERY DISEASE DUE TO LIPID RICH PLAQUE: ICD-10-CM

## 2022-06-09 DIAGNOSIS — Z85.09 HISTORY OF CHOLANGIOCARCINOMA: ICD-10-CM

## 2022-06-09 DIAGNOSIS — M47.816 ARTHRITIS OF FACET JOINT OF LUMBAR SPINE: ICD-10-CM

## 2022-06-09 DIAGNOSIS — G47.33 OSA ON CPAP: ICD-10-CM

## 2022-06-09 DIAGNOSIS — I49.5 SSS (SICK SINUS SYNDROME) (HCC): ICD-10-CM

## 2022-06-09 DIAGNOSIS — E05.90 HYPERTHYROIDISM: ICD-10-CM

## 2022-06-09 DIAGNOSIS — I67.9 INTRACRANIAL VASCULAR STENOSIS: ICD-10-CM

## 2022-06-09 DIAGNOSIS — I25.10 CORONARY ARTERY DISEASE INVOLVING NATIVE CORONARY ARTERY OF NATIVE HEART, UNSPECIFIED WHETHER ANGINA PRESENT: ICD-10-CM

## 2022-06-09 PROCEDURE — G0439 PPPS, SUBSEQ VISIT: HCPCS | Performed by: INTERNAL MEDICINE

## 2022-06-09 PROCEDURE — 99397 PER PM REEVAL EST PAT 65+ YR: CPT | Performed by: INTERNAL MEDICINE

## 2022-06-09 PROCEDURE — 3008F BODY MASS INDEX DOCD: CPT | Performed by: INTERNAL MEDICINE

## 2022-06-09 PROCEDURE — 96160 PT-FOCUSED HLTH RISK ASSMT: CPT | Performed by: INTERNAL MEDICINE

## 2022-06-09 PROCEDURE — 3078F DIAST BP <80 MM HG: CPT | Performed by: INTERNAL MEDICINE

## 2022-06-09 PROCEDURE — 1126F AMNT PAIN NOTED NONE PRSNT: CPT | Performed by: INTERNAL MEDICINE

## 2022-06-09 PROCEDURE — 3074F SYST BP LT 130 MM HG: CPT | Performed by: INTERNAL MEDICINE

## 2022-06-09 NOTE — TELEPHONE ENCOUNTER
Future Appointments   Date Time Provider Tanika Anderson   6/9/2022 12:30 PM Rosaline Cortez MD [facility]     Patient stopped at the hospital to do labs but no orders. Please place orders if needed.

## 2022-06-10 PROBLEM — R42 DIZZINESS: Status: RESOLVED | Noted: 2020-10-27 | Resolved: 2022-06-10

## 2022-06-10 PROBLEM — E87.1 HYPONATREMIA: Status: RESOLVED | Noted: 2019-11-18 | Resolved: 2022-06-10

## 2022-06-10 PROBLEM — R93.3 ABNORMAL FINDING ON GI TRACT IMAGING: Status: RESOLVED | Noted: 2020-11-04 | Resolved: 2022-06-10

## 2022-06-13 ENCOUNTER — ANCILLARY PROCEDURE (OUTPATIENT)
Dept: CARDIOLOGY | Age: 72
End: 2022-06-13
Attending: INTERNAL MEDICINE

## 2022-06-13 DIAGNOSIS — E78.00 PURE HYPERCHOLESTEROLEMIA: ICD-10-CM

## 2022-06-13 DIAGNOSIS — I50.22 CHRONIC SYSTOLIC CONGESTIVE HEART FAILURE (CMD): ICD-10-CM

## 2022-06-13 DIAGNOSIS — I25.5 CARDIOMYOPATHY, ISCHEMIC: ICD-10-CM

## 2022-06-13 DIAGNOSIS — I10 BENIGN ESSENTIAL HTN: ICD-10-CM

## 2022-06-13 DIAGNOSIS — I65.23 CAROTID STENOSIS, ASYMPTOMATIC, BILATERAL: ICD-10-CM

## 2022-06-13 DIAGNOSIS — I48.0 PAROXYSMAL ATRIAL FIBRILLATION (CMD): ICD-10-CM

## 2022-06-13 DIAGNOSIS — I49.3 PVCS (PREMATURE VENTRICULAR CONTRACTIONS): ICD-10-CM

## 2022-06-13 DIAGNOSIS — I25.10 CORONARY ARTERY DISEASE INVOLVING NATIVE CORONARY ARTERY OF NATIVE HEART WITHOUT ANGINA PECTORIS: ICD-10-CM

## 2022-06-13 DIAGNOSIS — Z95.1 S/P CABG (CORONARY ARTERY BYPASS GRAFT): ICD-10-CM

## 2022-06-13 LAB
AORTIC VALVE AREA: 2.63
AV MEAN GRADIENT (AVMG): 3
AV MEAN VELOCITY (AVMV): 0.77
AV PEAK GRADIENT (AVPG): 9
AV PEAK VELOCITY (AVPV): 1.12
AV STENOSIS SEVERITY TEXT: NORMAL
DOP CALC LVOT PEAK VEL (LVOTPV): 0.84
E WAVE DECELARATION TIME (MDT): 243
EST RIGHT VENT SYSTOLIC PRESSURE BY TRICUSPID REGURGITATION JET (RVSP): 32.81
LEFT INTERNAL DIMENSION IN SYSTOLE (LVSD): 4.34
LEFT VENTRICULAR INTERNAL DIMENSION IN DIASTOLE (LVDD): 5.23
LV EF: NORMAL %
LVOT 2D (LVOTD): 2.2
LVOT VTI (LVOTVTI): 22.3
MV E TISSUE VEL LAT (MELV): 11.7
MV E TISSUE VEL MED (MESV): 10.1
MV E WAVE VEL/E TISSUE VEL MED(MSR): 6.24
PV PEAK VELOCITY (PVPV): 0.52
TRICUSPID ANNULAR PLANE SYSTOLIC EXCURSION (TAPSE): 1.98
TRICUSPID VALVE ANNULAR PEAK VELOCITY (TVAPV): 9.68

## 2022-06-13 PROCEDURE — 93306 TTE W/DOPPLER COMPLETE: CPT | Performed by: INTERNAL MEDICINE

## 2022-06-21 ENCOUNTER — APPOINTMENT (OUTPATIENT)
Dept: GENERAL RADIOLOGY | Facility: HOSPITAL | Age: 72
End: 2022-06-21
Attending: EMERGENCY MEDICINE
Payer: MEDICARE

## 2022-06-21 ENCOUNTER — HOSPITAL ENCOUNTER (EMERGENCY)
Facility: HOSPITAL | Age: 72
Discharge: HOME | End: 2022-06-21
Attending: EMERGENCY MEDICINE
Payer: MEDICARE

## 2022-06-21 VITALS
DIASTOLIC BLOOD PRESSURE: 92 MMHG | OXYGEN SATURATION: 100 % | TEMPERATURE: 98 F | HEART RATE: 60 BPM | RESPIRATION RATE: 18 BRPM | SYSTOLIC BLOOD PRESSURE: 151 MMHG

## 2022-06-21 DIAGNOSIS — R23.2 FLUSHING: Primary | ICD-10-CM

## 2022-06-21 LAB
ALBUMIN SERPL-MCNC: 3.7 G/DL (ref 3.4–5)
ALBUMIN/GLOB SERPL: 0.9 {RATIO} (ref 1–2)
ALP LIVER SERPL-CCNC: 102 U/L
ALT SERPL-CCNC: 30 U/L
ANION GAP SERPL CALC-SCNC: 6 MMOL/L (ref 0–18)
AST SERPL-CCNC: 21 U/L (ref 15–37)
BASOPHILS # BLD AUTO: 0.03 X10(3) UL (ref 0–0.2)
BASOPHILS NFR BLD AUTO: 0.4 %
BILIRUB SERPL-MCNC: 0.8 MG/DL (ref 0.1–2)
BILIRUB UR QL STRIP.AUTO: NEGATIVE
BUN BLD-MCNC: 17 MG/DL (ref 7–18)
CALCIUM BLD-MCNC: 8.9 MG/DL (ref 8.5–10.1)
CHLORIDE SERPL-SCNC: 106 MMOL/L (ref 98–112)
CLARITY UR REFRACT.AUTO: CLEAR
CO2 SERPL-SCNC: 24 MMOL/L (ref 21–32)
COLOR UR AUTO: YELLOW
CREAT BLD-MCNC: 1.51 MG/DL
EOSINOPHIL # BLD AUTO: 0.26 X10(3) UL (ref 0–0.7)
EOSINOPHIL NFR BLD AUTO: 3.6 %
ERYTHROCYTE [DISTWIDTH] IN BLOOD BY AUTOMATED COUNT: 15 %
GLOBULIN PLAS-MCNC: 3.9 G/DL (ref 2.8–4.4)
GLUCOSE BLD-MCNC: 94 MG/DL (ref 70–99)
GLUCOSE UR STRIP.AUTO-MCNC: NEGATIVE MG/DL
HCT VFR BLD AUTO: 41.2 %
HGB BLD-MCNC: 13.6 G/DL
IMM GRANULOCYTES # BLD AUTO: 0.03 X10(3) UL (ref 0–1)
IMM GRANULOCYTES NFR BLD: 0.4 %
KETONES UR STRIP.AUTO-MCNC: NEGATIVE MG/DL
LEUKOCYTE ESTERASE UR QL STRIP.AUTO: NEGATIVE
LYMPHOCYTES # BLD AUTO: 1.5 X10(3) UL (ref 1–4)
LYMPHOCYTES NFR BLD AUTO: 20.9 %
MCH RBC QN AUTO: 30.4 PG (ref 26–34)
MCHC RBC AUTO-ENTMCNC: 33 G/DL (ref 31–37)
MCV RBC AUTO: 92 FL
MONOCYTES # BLD AUTO: 1 X10(3) UL (ref 0.1–1)
MONOCYTES NFR BLD AUTO: 13.9 %
NEUTROPHILS # BLD AUTO: 4.37 X10 (3) UL (ref 1.5–7.7)
NEUTROPHILS # BLD AUTO: 4.37 X10(3) UL (ref 1.5–7.7)
NEUTROPHILS NFR BLD AUTO: 60.8 %
NITRITE UR QL STRIP.AUTO: NEGATIVE
OSMOLALITY SERPL CALC.SUM OF ELEC: 283 MOSM/KG (ref 275–295)
PH UR STRIP.AUTO: 5.5 [PH] (ref 5–8)
PLATELET # BLD AUTO: 192 10(3)UL (ref 150–450)
POTASSIUM SERPL-SCNC: 4.5 MMOL/L (ref 3.5–5.1)
PROT SERPL-MCNC: 7.6 G/DL (ref 6.4–8.2)
PROT UR STRIP.AUTO-MCNC: NEGATIVE MG/DL
RBC # BLD AUTO: 4.48 X10(6)UL
RBC UR QL AUTO: NEGATIVE
SODIUM SERPL-SCNC: 136 MMOL/L (ref 136–145)
SP GR UR STRIP.AUTO: 1.01 (ref 1–1.03)
TROPONIN I HIGH SENSITIVITY: 13 NG/L
UROBILINOGEN UR STRIP.AUTO-MCNC: 0.2 MG/DL
WBC # BLD AUTO: 7.2 X10(3) UL (ref 4–11)

## 2022-06-21 PROCEDURE — 99285 EMERGENCY DEPT VISIT HI MDM: CPT

## 2022-06-21 PROCEDURE — 71045 X-RAY EXAM CHEST 1 VIEW: CPT | Performed by: EMERGENCY MEDICINE

## 2022-06-21 PROCEDURE — 36415 COLL VENOUS BLD VENIPUNCTURE: CPT

## 2022-06-21 PROCEDURE — 84484 ASSAY OF TROPONIN QUANT: CPT | Performed by: EMERGENCY MEDICINE

## 2022-06-21 PROCEDURE — 81003 URINALYSIS AUTO W/O SCOPE: CPT | Performed by: EMERGENCY MEDICINE

## 2022-06-21 PROCEDURE — 93005 ELECTROCARDIOGRAM TRACING: CPT

## 2022-06-21 PROCEDURE — 99284 EMERGENCY DEPT VISIT MOD MDM: CPT

## 2022-06-21 PROCEDURE — 93010 ELECTROCARDIOGRAM REPORT: CPT

## 2022-06-21 PROCEDURE — 85025 COMPLETE CBC W/AUTO DIFF WBC: CPT | Performed by: EMERGENCY MEDICINE

## 2022-06-21 PROCEDURE — 80053 COMPREHEN METABOLIC PANEL: CPT | Performed by: EMERGENCY MEDICINE

## 2022-06-21 PROCEDURE — 96361 HYDRATE IV INFUSION ADD-ON: CPT

## 2022-06-21 PROCEDURE — 96374 THER/PROPH/DIAG INJ IV PUSH: CPT

## 2022-06-21 PROCEDURE — S0028 INJECTION, FAMOTIDINE, 20 MG: HCPCS | Performed by: EMERGENCY MEDICINE

## 2022-06-21 RX ORDER — FAMOTIDINE 10 MG/ML
20 INJECTION, SOLUTION INTRAVENOUS ONCE
Status: COMPLETED | OUTPATIENT
Start: 2022-06-21 | End: 2022-06-21

## 2022-06-21 RX ORDER — SODIUM CHLORIDE 9 MG/ML
125 INJECTION, SOLUTION INTRAVENOUS CONTINUOUS
Status: DISCONTINUED | OUTPATIENT
Start: 2022-06-21 | End: 2022-06-21

## 2022-06-21 NOTE — ED INITIAL ASSESSMENT (HPI)
A&Ox3 patient p/w c/o LH/SOB and thigh burning    Patient reports he was outside around 1230 for 15 minutes, then went inside and \"started feeling like my skin was burning like I got sunburnt but on my thighs and back of my legs\" also endorses intermittent LH and SOB.     Denies any cp/n/v/d/c/f/vision changes/urinary sx at this time  RR even/NL, speaking in full clear sentences, ambulatory w/ steady gait

## 2022-06-22 ENCOUNTER — APPOINTMENT (OUTPATIENT)
Dept: CT IMAGING | Facility: HOSPITAL | Age: 72
End: 2022-06-22
Attending: EMERGENCY MEDICINE
Payer: MEDICARE

## 2022-06-22 ENCOUNTER — HOSPITAL ENCOUNTER (EMERGENCY)
Facility: HOSPITAL | Age: 72
Discharge: HOME | End: 2022-06-22
Attending: EMERGENCY MEDICINE
Payer: MEDICARE

## 2022-06-22 VITALS
RESPIRATION RATE: 18 BRPM | SYSTOLIC BLOOD PRESSURE: 148 MMHG | WEIGHT: 184 LBS | DIASTOLIC BLOOD PRESSURE: 80 MMHG | BODY MASS INDEX: 26.34 KG/M2 | HEART RATE: 72 BPM | HEIGHT: 70 IN | TEMPERATURE: 98 F | OXYGEN SATURATION: 95 %

## 2022-06-22 DIAGNOSIS — R51.9 NONINTRACTABLE HEADACHE, UNSPECIFIED CHRONICITY PATTERN, UNSPECIFIED HEADACHE TYPE: ICD-10-CM

## 2022-06-22 DIAGNOSIS — R23.2 FLUSHING: Primary | ICD-10-CM

## 2022-06-22 LAB
ALBUMIN SERPL-MCNC: 3.7 G/DL (ref 3.4–5)
ALBUMIN/GLOB SERPL: 0.9 {RATIO} (ref 1–2)
ALP LIVER SERPL-CCNC: 104 U/L
ALT SERPL-CCNC: 31 U/L
ANION GAP SERPL CALC-SCNC: 5 MMOL/L (ref 0–18)
AST SERPL-CCNC: 22 U/L (ref 15–37)
ATRIAL RATE: 60 BPM
BASOPHILS # BLD AUTO: 0.05 X10(3) UL (ref 0–0.2)
BASOPHILS NFR BLD AUTO: 0.8 %
BILIRUB SERPL-MCNC: 0.7 MG/DL (ref 0.1–2)
BUN BLD-MCNC: 19 MG/DL (ref 7–18)
CALCIUM BLD-MCNC: 9.1 MG/DL (ref 8.5–10.1)
CHLORIDE SERPL-SCNC: 108 MMOL/L (ref 98–112)
CO2 SERPL-SCNC: 24 MMOL/L (ref 21–32)
CREAT BLD-MCNC: 1.41 MG/DL
EOSINOPHIL # BLD AUTO: 0.25 X10(3) UL (ref 0–0.7)
EOSINOPHIL NFR BLD AUTO: 3.8 %
ERYTHROCYTE [DISTWIDTH] IN BLOOD BY AUTOMATED COUNT: 14.9 %
GLOBULIN PLAS-MCNC: 4.2 G/DL (ref 2.8–4.4)
GLUCOSE BLD-MCNC: 92 MG/DL (ref 70–99)
HCT VFR BLD AUTO: 40.8 %
HGB BLD-MCNC: 13.8 G/DL
IMM GRANULOCYTES # BLD AUTO: 0.02 X10(3) UL (ref 0–1)
IMM GRANULOCYTES NFR BLD: 0.3 %
LYMPHOCYTES # BLD AUTO: 1.45 X10(3) UL (ref 1–4)
LYMPHOCYTES NFR BLD AUTO: 22.3 %
MCH RBC QN AUTO: 30.8 PG (ref 26–34)
MCHC RBC AUTO-ENTMCNC: 33.8 G/DL (ref 31–37)
MCV RBC AUTO: 91.1 FL
MONOCYTES # BLD AUTO: 0.8 X10(3) UL (ref 0.1–1)
MONOCYTES NFR BLD AUTO: 12.3 %
NEUTROPHILS # BLD AUTO: 3.93 X10 (3) UL (ref 1.5–7.7)
NEUTROPHILS # BLD AUTO: 3.93 X10(3) UL (ref 1.5–7.7)
NEUTROPHILS NFR BLD AUTO: 60.5 %
OSMOLALITY SERPL CALC.SUM OF ELEC: 286 MOSM/KG (ref 275–295)
P AXIS: -3 DEGREES
P-R INTERVAL: 262 MS
PLATELET # BLD AUTO: 194 10(3)UL (ref 150–450)
POTASSIUM SERPL-SCNC: 4.8 MMOL/L (ref 3.5–5.1)
PROT SERPL-MCNC: 7.9 G/DL (ref 6.4–8.2)
Q-T INTERVAL: 408 MS
QRS DURATION: 122 MS
QTC CALCULATION (BEZET): 408 MS
R AXIS: 79 DEGREES
RBC # BLD AUTO: 4.48 X10(6)UL
SODIUM SERPL-SCNC: 137 MMOL/L (ref 136–145)
T AXIS: 1 DEGREES
TROPONIN I HIGH SENSITIVITY: 18 NG/L
TSI SER-ACNC: 1.91 MIU/ML (ref 0.36–3.74)
VENTRICULAR RATE: 60 BPM
WBC # BLD AUTO: 6.5 X10(3) UL (ref 4–11)

## 2022-06-22 PROCEDURE — 80053 COMPREHEN METABOLIC PANEL: CPT | Performed by: EMERGENCY MEDICINE

## 2022-06-22 PROCEDURE — 70496 CT ANGIOGRAPHY HEAD: CPT | Performed by: EMERGENCY MEDICINE

## 2022-06-22 PROCEDURE — 85025 COMPLETE CBC W/AUTO DIFF WBC: CPT | Performed by: EMERGENCY MEDICINE

## 2022-06-22 PROCEDURE — 70498 CT ANGIOGRAPHY NECK: CPT | Performed by: EMERGENCY MEDICINE

## 2022-06-22 PROCEDURE — 84443 ASSAY THYROID STIM HORMONE: CPT | Performed by: EMERGENCY MEDICINE

## 2022-06-22 PROCEDURE — 84484 ASSAY OF TROPONIN QUANT: CPT | Performed by: EMERGENCY MEDICINE

## 2022-06-22 RX ORDER — CLINDAMYCIN HYDROCHLORIDE 150 MG/1
450 CAPSULE ORAL 3 TIMES DAILY
Qty: 30 CAPSULE | Refills: 0 | Status: SHIPPED | OUTPATIENT
Start: 2022-06-22 | End: 2022-07-02

## 2022-06-22 RX ORDER — CLINDAMYCIN HYDROCHLORIDE 150 MG/1
300 CAPSULE ORAL EVERY 6 HOURS
Status: DISCONTINUED | OUTPATIENT
Start: 2022-06-22 | End: 2022-06-22

## 2022-06-22 NOTE — ED INITIAL ASSESSMENT (HPI)
PT was here earlier for same issue. PT was told if symptoms persist, to come back to ER. PT c/o \"feeling flushed\". PT also reports redness, dyspnea with exertion and headache. PT is unsure what is causing the reaction.

## 2022-06-27 ENCOUNTER — TELEPHONE (OUTPATIENT)
Dept: CARDIOLOGY | Age: 72
End: 2022-06-27

## 2022-06-28 ENCOUNTER — TELEPHONE (OUTPATIENT)
Dept: CARDIOLOGY | Age: 72
End: 2022-06-28

## 2022-06-28 ENCOUNTER — OFFICE VISIT (OUTPATIENT)
Dept: INTERNAL MEDICINE CLINIC | Facility: CLINIC | Age: 72
End: 2022-06-28
Payer: COMMERCIAL

## 2022-06-28 VITALS
DIASTOLIC BLOOD PRESSURE: 78 MMHG | RESPIRATION RATE: 18 BRPM | BODY MASS INDEX: 27.32 KG/M2 | HEIGHT: 70 IN | SYSTOLIC BLOOD PRESSURE: 132 MMHG | OXYGEN SATURATION: 96 % | WEIGHT: 190.81 LBS | TEMPERATURE: 98 F | HEART RATE: 92 BPM

## 2022-06-28 DIAGNOSIS — L28.2 PRURITIC RASH: Primary | ICD-10-CM

## 2022-06-28 PROCEDURE — 3078F DIAST BP <80 MM HG: CPT | Performed by: FAMILY MEDICINE

## 2022-06-28 PROCEDURE — 3075F SYST BP GE 130 - 139MM HG: CPT | Performed by: FAMILY MEDICINE

## 2022-06-28 PROCEDURE — 99213 OFFICE O/P EST LOW 20 MIN: CPT | Performed by: FAMILY MEDICINE

## 2022-06-28 PROCEDURE — 3008F BODY MASS INDEX DOCD: CPT | Performed by: FAMILY MEDICINE

## 2022-06-28 RX ORDER — TRIAMCINOLONE ACETONIDE 1 MG/G
CREAM TOPICAL 2 TIMES DAILY
Qty: 80 G | Refills: 1 | Status: SHIPPED | OUTPATIENT
Start: 2022-06-28

## 2022-07-25 ENCOUNTER — TELEPHONE (OUTPATIENT)
Dept: CARDIOLOGY | Age: 72
End: 2022-07-25

## 2022-07-25 ENCOUNTER — ANCILLARY PROCEDURE (OUTPATIENT)
Dept: CARDIOLOGY | Age: 72
End: 2022-07-25
Attending: INTERNAL MEDICINE

## 2022-07-25 DIAGNOSIS — Z95.810 ICD (IMPLANTABLE CARDIOVERTER-DEFIBRILLATOR) IN PLACE: ICD-10-CM

## 2022-07-28 ENCOUNTER — OFFICE VISIT (OUTPATIENT)
Facility: LOCATION | Age: 72
End: 2022-07-28
Payer: COMMERCIAL

## 2022-07-28 VITALS
HEART RATE: 63 BPM | BODY MASS INDEX: 28.03 KG/M2 | WEIGHT: 193.63 LBS | HEIGHT: 69.5 IN | DIASTOLIC BLOOD PRESSURE: 71 MMHG | SYSTOLIC BLOOD PRESSURE: 127 MMHG

## 2022-07-28 DIAGNOSIS — K43.9 VENTRAL HERNIA WITHOUT OBSTRUCTION OR GANGRENE: Primary | ICD-10-CM

## 2022-07-28 DIAGNOSIS — Z85.09 HISTORY OF CHOLANGIOCARCINOMA: ICD-10-CM

## 2022-07-28 DIAGNOSIS — Z86.018 HISTORY OF PHEOCHROMOCYTOMA: ICD-10-CM

## 2022-07-28 DIAGNOSIS — I49.5 SSS (SICK SINUS SYNDROME) (HCC): ICD-10-CM

## 2022-07-28 DIAGNOSIS — Z99.89 OSA ON CPAP: ICD-10-CM

## 2022-07-28 DIAGNOSIS — I50.22 CHRONIC SYSTOLIC CONGESTIVE HEART FAILURE (HCC): ICD-10-CM

## 2022-07-28 DIAGNOSIS — Z86.73 HISTORY OF TRANSIENT ISCHEMIC ATTACK (TIA): ICD-10-CM

## 2022-07-28 DIAGNOSIS — Z95.1 S/P CABG (CORONARY ARTERY BYPASS GRAFT): ICD-10-CM

## 2022-07-28 DIAGNOSIS — I48.0 PAROXYSMAL ATRIAL FIBRILLATION (HCC): ICD-10-CM

## 2022-07-28 DIAGNOSIS — N18.32 STAGE 3B CHRONIC KIDNEY DISEASE (HCC): Chronic | ICD-10-CM

## 2022-07-28 DIAGNOSIS — G47.33 OSA ON CPAP: ICD-10-CM

## 2022-07-28 DIAGNOSIS — J44.9 ASTHMA WITH COPD (CHRONIC OBSTRUCTIVE PULMONARY DISEASE) (HCC): Chronic | ICD-10-CM

## 2022-07-28 DIAGNOSIS — Z95.0 PACEMAKER: ICD-10-CM

## 2022-07-28 DIAGNOSIS — I25.5 CARDIOMYOPATHY, ISCHEMIC: ICD-10-CM

## 2022-07-28 DIAGNOSIS — I10 BENIGN ESSENTIAL HTN: ICD-10-CM

## 2022-07-28 DIAGNOSIS — I25.10 CORONARY ARTERY DISEASE INVOLVING NATIVE CORONARY ARTERY OF NATIVE HEART, UNSPECIFIED WHETHER ANGINA PRESENT: ICD-10-CM

## 2022-07-28 PROCEDURE — 3078F DIAST BP <80 MM HG: CPT | Performed by: COLON & RECTAL SURGERY

## 2022-07-28 PROCEDURE — 99205 OFFICE O/P NEW HI 60 MIN: CPT | Performed by: COLON & RECTAL SURGERY

## 2022-07-28 PROCEDURE — 3074F SYST BP LT 130 MM HG: CPT | Performed by: COLON & RECTAL SURGERY

## 2022-07-28 PROCEDURE — 3008F BODY MASS INDEX DOCD: CPT | Performed by: COLON & RECTAL SURGERY

## 2022-07-29 ENCOUNTER — TELEPHONE (OUTPATIENT)
Dept: CARDIOLOGY | Age: 72
End: 2022-07-29

## 2022-07-29 PROBLEM — K43.9 VENTRAL HERNIA WITHOUT OBSTRUCTION OR GANGRENE: Status: ACTIVE | Noted: 2022-07-29

## 2022-08-04 ENCOUNTER — HOSPITAL ENCOUNTER (OUTPATIENT)
Dept: CT IMAGING | Facility: HOSPITAL | Age: 72
Discharge: HOME | End: 2022-08-04
Attending: COLON & RECTAL SURGERY
Payer: MEDICARE

## 2022-08-04 DIAGNOSIS — K43.9 VENTRAL HERNIA WITHOUT OBSTRUCTION OR GANGRENE: ICD-10-CM

## 2022-08-04 LAB
CREAT BLD-MCNC: 1.2 MG/DL
GFR SERPLBLD BASED ON 1.73 SQ M-ARVRAT: 65 ML/MIN/1.73M2 (ref 60–?)

## 2022-08-04 PROCEDURE — 74177 CT ABD & PELVIS W/CONTRAST: CPT | Performed by: COLON & RECTAL SURGERY

## 2022-08-04 PROCEDURE — 82565 ASSAY OF CREATININE: CPT

## 2022-08-04 RX ORDER — IOHEXOL 350 MG/ML
100 INJECTION, SOLUTION INTRAVENOUS
Status: COMPLETED | OUTPATIENT
Start: 2022-08-04 | End: 2022-08-04

## 2022-08-04 RX ADMIN — IOHEXOL 100 ML: 350 INJECTION, SOLUTION INTRAVENOUS at 17:10:00

## 2022-08-09 ENCOUNTER — TELEPHONE (OUTPATIENT)
Facility: LOCATION | Age: 72
End: 2022-08-09

## 2022-08-09 NOTE — TELEPHONE ENCOUNTER
Pt had appt today at 4:45, cancelled due to 100.7 temp/raging headache. Tried to reschedule(has sx 9/14)pt stated his cardiologist wont allow him to be off of aspirin 9 days prior, Dr stated heart is more important than hernia.  Also stated that he cant fast more than 2 hours at a time so pre-op prep wont work for him due to prior intestinal resection

## 2022-08-19 ENCOUNTER — TELEPHONE (OUTPATIENT)
Dept: INTERNAL MEDICINE CLINIC | Facility: CLINIC | Age: 72
End: 2022-08-19

## 2022-08-19 NOTE — TELEPHONE ENCOUNTER
Pt called stating he did home covid test 12 days ago and was positive-still having symptoms-no energy, slight headache, still coughing up phlegm-not sure what to do

## 2022-08-23 ENCOUNTER — TELEPHONE (OUTPATIENT)
Dept: INTERNAL MEDICINE CLINIC | Facility: CLINIC | Age: 72
End: 2022-08-23

## 2022-08-24 NOTE — TELEPHONE ENCOUNTER
Spoke to patient for medication adherence consult. Patient overdue for refill on losartan per insurance report. Losartan last filled 4/1/22. Patient states that he takes his medication as prescribed and his wife helps manage his medications and reminds him to take his medications. Patient reports no issues with remembering to take his medication. Patient reports that he has a good supply of his medications and that he receives many of them through the [place] system including his losartan and atorvastatin. Provided education to patient to encourage continued adherence to obtain the most benefit from the medications. Discussed the long term risk reductions that atorvastatin and losartan provide. Patient verbalized understanding. Patient denies any medication questions or concerns at this time.

## 2022-08-26 ENCOUNTER — TELEPHONE (OUTPATIENT)
Dept: CARDIOLOGY | Age: 72
End: 2022-08-26

## 2022-08-26 DIAGNOSIS — I25.10 CORONARY ARTERY DISEASE INVOLVING NATIVE CORONARY ARTERY OF NATIVE HEART WITHOUT ANGINA PECTORIS: Primary | ICD-10-CM

## 2022-08-26 RX ORDER — ATORVASTATIN CALCIUM 20 MG/1
20 TABLET, FILM COATED ORAL DAILY
Qty: 90 TABLET | Refills: 1 | Status: SHIPPED | OUTPATIENT
Start: 2022-08-26 | End: 2023-05-09

## 2022-08-29 ENCOUNTER — OFFICE VISIT (OUTPATIENT)
Facility: LOCATION | Age: 72
End: 2022-08-29
Payer: COMMERCIAL

## 2022-08-29 VITALS — TEMPERATURE: 98 F | HEART RATE: 75 BPM

## 2022-08-29 DIAGNOSIS — R16.0 LIVER MASS, LEFT LOBE: ICD-10-CM

## 2022-08-29 DIAGNOSIS — R16.0 LIVER MASS: Primary | ICD-10-CM

## 2022-08-29 DIAGNOSIS — K43.9 VENTRAL HERNIA WITHOUT OBSTRUCTION OR GANGRENE: ICD-10-CM

## 2022-08-29 DIAGNOSIS — C22.1 CHOLANGIOCARCINOMA (HCC): ICD-10-CM

## 2022-08-29 RX ORDER — LOSARTAN POTASSIUM 50 MG/1
TABLET ORAL
Qty: 180 TABLET | Refills: 3 | Status: SHIPPED | OUTPATIENT
Start: 2022-08-29 | End: 2022-09-21 | Stop reason: DRUGHIGH

## 2022-08-29 NOTE — PATIENT INSTRUCTIONS
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 The patient has subsequently undergone a CT scan at [facility].  There is no evidence of ventral hernia. It turns out that his abnormal bulge is abnormal fat distribution after the Whipple procedure. It has bunched up his abdominal wall fat in the midline. This patient is otherwise thin and very fit. The Whipple chevron has concentrated what little fat he has in his central abdomen into a large ball. This is evident not only on the CT scan but on the physical exam.    I have personally reviewed this patient's CT scan obtained at [facility] obtained on August 4, 2022. As stated above I did not see any evidence of a hernia in the epigastrium. Unfortunately I agree with the radiologist findings, there is an enhancing lesion within the right lobe of the liver. Radiology recommends MRI with Eovist for further evaluation. Clinical exam today reveals his abdomen to be soft, nondistended, nontender, good bowel sounds. No guarding or rebound. No masses. No ascites. Liver and spleen are not palpable. He has a large chevron incision. He definitely remains with that abnormal accumulation of fat in the central epigastric region. There is no bulge on Valsalva or coughing. Bowel sounds have normal activity and normal pitch. I reviewed the CT scan with the patient and his wife. I showed them the lesion of question that I can see. It appears to be an approximate 1 to 2 cm lesion, irregular, enhancing on contrast.    I discussed the case with Dr. Ellis Noonan. We will order the MRI with Eovist.  We will have him follow-up with Dr. Ellis Noonan regarding this matter. In the meantime, we will cancel his robotic ventral incisional herniorrhaphy with mesh.     This patient can present for further evaluation for any abnormalities of the incision, or within the abdominal cavity, in the future. I will see this patient on an as-needed basis and transfer his complete care to Dr. Rios Landaverde at this time.

## 2022-09-06 ENCOUNTER — ANCILLARY PROCEDURE (OUTPATIENT)
Dept: CARDIOLOGY | Age: 72
End: 2022-09-06
Attending: INTERNAL MEDICINE

## 2022-09-06 ENCOUNTER — TELEPHONE (OUTPATIENT)
Dept: CARDIOLOGY | Age: 72
End: 2022-09-06

## 2022-09-06 DIAGNOSIS — Z95.810 ICD (IMPLANTABLE CARDIOVERTER-DEFIBRILLATOR) IN PLACE: ICD-10-CM

## 2022-09-08 RX ORDER — FUROSEMIDE 40 MG/1
40 TABLET ORAL DAILY
Qty: 30 TABLET | Refills: 1 | Status: SHIPPED | OUTPATIENT
Start: 2022-09-08 | End: 2022-09-21 | Stop reason: DRUGHIGH

## 2022-09-09 ENCOUNTER — TELEPHONE (OUTPATIENT)
Dept: CARDIOLOGY | Age: 72
End: 2022-09-09

## 2022-09-10 ENCOUNTER — APPOINTMENT (OUTPATIENT)
Dept: CT IMAGING | Facility: HOSPITAL | Age: 72
End: 2022-09-10
Attending: EMERGENCY MEDICINE
Payer: OTHER GOVERNMENT

## 2022-09-10 ENCOUNTER — HOSPITAL ENCOUNTER (EMERGENCY)
Facility: HOSPITAL | Age: 72
Discharge: HOME | End: 2022-09-11
Attending: EMERGENCY MEDICINE
Payer: OTHER GOVERNMENT

## 2022-09-10 ENCOUNTER — APPOINTMENT (OUTPATIENT)
Dept: GENERAL RADIOLOGY | Facility: HOSPITAL | Age: 72
End: 2022-09-10
Attending: EMERGENCY MEDICINE
Payer: OTHER GOVERNMENT

## 2022-09-10 DIAGNOSIS — I50.9 ACUTE ON CHRONIC CONGESTIVE HEART FAILURE, UNSPECIFIED HEART FAILURE TYPE (HCC): Primary | ICD-10-CM

## 2022-09-10 LAB
ALBUMIN SERPL-MCNC: 3.6 G/DL (ref 3.4–5)
ALBUMIN/GLOB SERPL: 0.8 {RATIO} (ref 1–2)
ALP LIVER SERPL-CCNC: 120 U/L
ALT SERPL-CCNC: 37 U/L
ANION GAP SERPL CALC-SCNC: 8 MMOL/L (ref 0–18)
AST SERPL-CCNC: 18 U/L (ref 15–37)
BASOPHILS # BLD AUTO: 0.04 X10(3) UL (ref 0–0.2)
BASOPHILS NFR BLD AUTO: 0.6 %
BILIRUB SERPL-MCNC: 0.7 MG/DL (ref 0.1–2)
BUN BLD-MCNC: 23 MG/DL (ref 7–18)
CALCIUM BLD-MCNC: 8.7 MG/DL (ref 8.5–10.1)
CHLORIDE SERPL-SCNC: 104 MMOL/L (ref 98–112)
CO2 SERPL-SCNC: 25 MMOL/L (ref 21–32)
CREAT BLD-MCNC: 1.4 MG/DL
D DIMER PPP FEU-MCNC: 0.73 UG/ML FEU (ref ?–0.71)
EOSINOPHIL # BLD AUTO: 0.26 X10(3) UL (ref 0–0.7)
EOSINOPHIL NFR BLD AUTO: 3.9 %
ERYTHROCYTE [DISTWIDTH] IN BLOOD BY AUTOMATED COUNT: 14.9 %
GFR SERPLBLD BASED ON 1.73 SQ M-ARVRAT: 54 ML/MIN/1.73M2 (ref 60–?)
GLOBULIN PLAS-MCNC: 4.3 G/DL (ref 2.8–4.4)
GLUCOSE BLD-MCNC: 80 MG/DL (ref 70–99)
HCT VFR BLD AUTO: 39.2 %
HGB BLD-MCNC: 13 G/DL
IMM GRANULOCYTES # BLD AUTO: 0.03 X10(3) UL (ref 0–1)
IMM GRANULOCYTES NFR BLD: 0.4 %
LYMPHOCYTES # BLD AUTO: 1.8 X10(3) UL (ref 1–4)
LYMPHOCYTES NFR BLD AUTO: 26.9 %
MCH RBC QN AUTO: 30.5 PG (ref 26–34)
MCHC RBC AUTO-ENTMCNC: 33.2 G/DL (ref 31–37)
MCV RBC AUTO: 92 FL
MONOCYTES # BLD AUTO: 1.3 X10(3) UL (ref 0.1–1)
MONOCYTES NFR BLD AUTO: 19.4 %
NEUTROPHILS # BLD AUTO: 3.27 X10 (3) UL (ref 1.5–7.7)
NEUTROPHILS # BLD AUTO: 3.27 X10(3) UL (ref 1.5–7.7)
NEUTROPHILS NFR BLD AUTO: 48.8 %
NT-PROBNP SERPL-MCNC: 1245 PG/ML (ref ?–125)
OSMOLALITY SERPL CALC.SUM OF ELEC: 287 MOSM/KG (ref 275–295)
PLATELET # BLD AUTO: 200 10(3)UL (ref 150–450)
POTASSIUM SERPL-SCNC: 3.7 MMOL/L (ref 3.5–5.1)
PROT SERPL-MCNC: 7.9 G/DL (ref 6.4–8.2)
RBC # BLD AUTO: 4.26 X10(6)UL
SODIUM SERPL-SCNC: 137 MMOL/L (ref 136–145)
TROPONIN I HIGH SENSITIVITY: 16 NG/L
WBC # BLD AUTO: 6.7 X10(3) UL (ref 4–11)

## 2022-09-10 PROCEDURE — 84484 ASSAY OF TROPONIN QUANT: CPT | Performed by: EMERGENCY MEDICINE

## 2022-09-10 PROCEDURE — 83880 ASSAY OF NATRIURETIC PEPTIDE: CPT | Performed by: EMERGENCY MEDICINE

## 2022-09-10 PROCEDURE — 71045 X-RAY EXAM CHEST 1 VIEW: CPT | Performed by: EMERGENCY MEDICINE

## 2022-09-10 PROCEDURE — 85379 FIBRIN DEGRADATION QUANT: CPT | Performed by: EMERGENCY MEDICINE

## 2022-09-10 PROCEDURE — 93010 ELECTROCARDIOGRAM REPORT: CPT

## 2022-09-10 PROCEDURE — 99284 EMERGENCY DEPT VISIT MOD MDM: CPT

## 2022-09-10 PROCEDURE — 71275 CT ANGIOGRAPHY CHEST: CPT | Performed by: EMERGENCY MEDICINE

## 2022-09-10 PROCEDURE — 93005 ELECTROCARDIOGRAM TRACING: CPT

## 2022-09-10 PROCEDURE — 85025 COMPLETE CBC W/AUTO DIFF WBC: CPT | Performed by: EMERGENCY MEDICINE

## 2022-09-10 PROCEDURE — 80053 COMPREHEN METABOLIC PANEL: CPT | Performed by: EMERGENCY MEDICINE

## 2022-09-10 PROCEDURE — 36415 COLL VENOUS BLD VENIPUNCTURE: CPT

## 2022-09-10 RX ORDER — IOHEXOL 350 MG/ML
100 INJECTION, SOLUTION INTRAVENOUS
Status: COMPLETED | OUTPATIENT
Start: 2022-09-10 | End: 2022-09-10

## 2022-09-11 VITALS
RESPIRATION RATE: 13 BRPM | WEIGHT: 179 LBS | HEART RATE: 59 BPM | DIASTOLIC BLOOD PRESSURE: 60 MMHG | TEMPERATURE: 98 F | BODY MASS INDEX: 26.51 KG/M2 | HEIGHT: 69 IN | OXYGEN SATURATION: 99 % | SYSTOLIC BLOOD PRESSURE: 117 MMHG

## 2022-09-12 LAB
ATRIAL RATE: 63 BPM
P AXIS: -4 DEGREES
P-R INTERVAL: 152 MS
Q-T INTERVAL: 426 MS
QRS DURATION: 128 MS
QTC CALCULATION (BEZET): 435 MS
R AXIS: 82 DEGREES
T AXIS: -34 DEGREES
VENTRICULAR RATE: 63 BPM

## 2022-09-13 ENCOUNTER — OFFICE VISIT (OUTPATIENT)
Dept: CARDIOLOGY | Age: 72
End: 2022-09-13

## 2022-09-13 VITALS
WEIGHT: 182.76 LBS | SYSTOLIC BLOOD PRESSURE: 131 MMHG | HEART RATE: 76 BPM | DIASTOLIC BLOOD PRESSURE: 74 MMHG | BODY MASS INDEX: 26.22 KG/M2

## 2022-09-13 DIAGNOSIS — I48.0 PAROXYSMAL ATRIAL FIBRILLATION (CMD): ICD-10-CM

## 2022-09-13 DIAGNOSIS — Z95.1 S/P CABG (CORONARY ARTERY BYPASS GRAFT): Primary | ICD-10-CM

## 2022-09-13 DIAGNOSIS — I50.22 CHRONIC SYSTOLIC CONGESTIVE HEART FAILURE (CMD): ICD-10-CM

## 2022-09-13 DIAGNOSIS — I25.10 CORONARY ARTERY DISEASE INVOLVING NATIVE CORONARY ARTERY OF NATIVE HEART WITHOUT ANGINA PECTORIS: ICD-10-CM

## 2022-09-13 DIAGNOSIS — I10 BENIGN ESSENTIAL HTN: ICD-10-CM

## 2022-09-13 DIAGNOSIS — I25.5 CARDIOMYOPATHY, ISCHEMIC: ICD-10-CM

## 2022-09-13 PROCEDURE — 3075F SYST BP GE 130 - 139MM HG: CPT | Performed by: INTERNAL MEDICINE

## 2022-09-13 PROCEDURE — 99215 OFFICE O/P EST HI 40 MIN: CPT | Performed by: INTERNAL MEDICINE

## 2022-09-13 PROCEDURE — 3078F DIAST BP <80 MM HG: CPT | Performed by: INTERNAL MEDICINE

## 2022-09-14 ENCOUNTER — TELEPHONE (OUTPATIENT)
Dept: INTERNAL MEDICINE CLINIC | Facility: CLINIC | Age: 72
End: 2022-09-14

## 2022-09-14 ENCOUNTER — TELEPHONE (OUTPATIENT)
Facility: LOCATION | Age: 72
End: 2022-09-14

## 2022-09-14 RX ORDER — FUROSEMIDE 40 MG/1
40 TABLET ORAL DAILY
Qty: 30 TABLET | Refills: 1 | OUTPATIENT
Start: 2022-09-14

## 2022-09-14 NOTE — TELEPHONE ENCOUNTER
Pt is schedule for a MRI on 10/6 and he is claustrophobic. Patient is requesting a medication to help him relax during MRI. Please send Rx if able and update patient.

## 2022-09-15 ENCOUNTER — OFFICE VISIT (OUTPATIENT)
Dept: INTERNAL MEDICINE CLINIC | Facility: CLINIC | Age: 72
End: 2022-09-15
Payer: COMMERCIAL

## 2022-09-15 VITALS
DIASTOLIC BLOOD PRESSURE: 64 MMHG | RESPIRATION RATE: 18 BRPM | SYSTOLIC BLOOD PRESSURE: 108 MMHG | BODY MASS INDEX: 26.86 KG/M2 | WEIGHT: 181.38 LBS | HEART RATE: 68 BPM | HEIGHT: 69 IN

## 2022-09-15 DIAGNOSIS — Z90.410 HISTORY OF WHIPPLE PROCEDURE: ICD-10-CM

## 2022-09-15 DIAGNOSIS — I50.23 ACUTE ON CHRONIC SYSTOLIC CONGESTIVE HEART FAILURE (HCC): ICD-10-CM

## 2022-09-15 DIAGNOSIS — Z85.09 HISTORY OF CHOLANGIOCARCINOMA: Primary | ICD-10-CM

## 2022-09-15 DIAGNOSIS — Z90.49 HISTORY OF WHIPPLE PROCEDURE: ICD-10-CM

## 2022-09-15 DIAGNOSIS — R16.0 LIVER MASS: ICD-10-CM

## 2022-09-15 PROCEDURE — 3008F BODY MASS INDEX DOCD: CPT | Performed by: FAMILY MEDICINE

## 2022-09-15 PROCEDURE — 3074F SYST BP LT 130 MM HG: CPT | Performed by: FAMILY MEDICINE

## 2022-09-15 PROCEDURE — 3078F DIAST BP <80 MM HG: CPT | Performed by: FAMILY MEDICINE

## 2022-09-15 PROCEDURE — 99214 OFFICE O/P EST MOD 30 MIN: CPT | Performed by: FAMILY MEDICINE

## 2022-09-15 RX ORDER — FUROSEMIDE 40 MG/1
40 TABLET ORAL DAILY
COMMUNITY
Start: 2022-09-09

## 2022-09-15 NOTE — TELEPHONE ENCOUNTER
I called the patient and informed him that we have updated his order to be an MRI with sedation. He expressed understanding to the plan and will proceed with his MRI on 10/6/2022.

## 2022-09-19 ENCOUNTER — TELEPHONE (OUTPATIENT)
Dept: CARDIOLOGY | Age: 72
End: 2022-09-19

## 2022-09-20 ENCOUNTER — TELEPHONE (OUTPATIENT)
Dept: CARDIOLOGY | Age: 72
End: 2022-09-20

## 2022-09-20 RX ORDER — METOPROLOL SUCCINATE 100 MG/1
100 TABLET, EXTENDED RELEASE ORAL DAILY
Qty: 90 TABLET | Refills: 0 | Status: SHIPPED | OUTPATIENT
Start: 2022-09-20 | End: 2023-03-16

## 2022-09-21 ENCOUNTER — OFFICE VISIT (OUTPATIENT)
Dept: CARDIOLOGY | Age: 72
End: 2022-09-21
Attending: NURSE PRACTITIONER

## 2022-09-21 VITALS
BODY MASS INDEX: 26.46 KG/M2 | DIASTOLIC BLOOD PRESSURE: 64 MMHG | SYSTOLIC BLOOD PRESSURE: 118 MMHG | WEIGHT: 184.41 LBS | HEART RATE: 57 BPM

## 2022-09-21 DIAGNOSIS — I48.0 PAROXYSMAL ATRIAL FIBRILLATION (CMD): ICD-10-CM

## 2022-09-21 DIAGNOSIS — I25.5 CARDIOMYOPATHY, ISCHEMIC: ICD-10-CM

## 2022-09-21 DIAGNOSIS — I50.22 CHRONIC SYSTOLIC CONGESTIVE HEART FAILURE (CMD): Primary | ICD-10-CM

## 2022-09-21 DIAGNOSIS — Z95.1 S/P CABG (CORONARY ARTERY BYPASS GRAFT): ICD-10-CM

## 2022-09-21 LAB
ALBUMIN SERPL-MCNC: 3.5 G/DL (ref 3.6–5.1)
ALBUMIN/GLOB SERPL: 0.9 {RATIO} (ref 1–2.4)
ALP SERPL-CCNC: 119 UNITS/L (ref 45–117)
ALT SERPL-CCNC: 40 UNITS/L
ANION GAP SERPL CALC-SCNC: 9 MMOL/L (ref 7–19)
AST SERPL-CCNC: 29 UNITS/L
BASOPHILS # BLD: 0 K/MCL (ref 0–0.3)
BASOPHILS NFR BLD: 1 %
BILIRUB SERPL-MCNC: 0.7 MG/DL (ref 0.2–1)
BUN SERPL-MCNC: 26 MG/DL (ref 6–20)
BUN/CREAT SERPL: 20 (ref 7–25)
CALCIUM SERPL-MCNC: 9.1 MG/DL (ref 8.4–10.2)
CHLORIDE SERPL-SCNC: 104 MMOL/L (ref 97–110)
CO2 SERPL-SCNC: 24 MMOL/L (ref 21–32)
CREAT SERPL-MCNC: 1.33 MG/DL (ref 0.67–1.17)
DEPRECATED RDW RBC: 49.4 FL (ref 39–50)
EOSINOPHIL # BLD: 0.3 K/MCL (ref 0–0.5)
EOSINOPHIL NFR BLD: 5 %
ERYTHROCYTE [DISTWIDTH] IN BLOOD: 14.7 % (ref 11–15)
FASTING DURATION TIME PATIENT: ABNORMAL H
GFR SERPLBLD BASED ON 1.73 SQ M-ARVRAT: 57 ML/MIN
GLOBULIN SER-MCNC: 4.1 G/DL (ref 2–4)
GLUCOSE SERPL-MCNC: 108 MG/DL (ref 70–99)
HCT VFR BLD CALC: 36.7 % (ref 39–51)
HGB BLD-MCNC: 12.4 G/DL (ref 13–17)
IMM GRANULOCYTES # BLD AUTO: 0 K/MCL (ref 0–0.2)
IMM GRANULOCYTES # BLD: 1 %
LYMPHOCYTES # BLD: 1.7 K/MCL (ref 1–4)
LYMPHOCYTES NFR BLD: 28 %
MCH RBC QN AUTO: 30.5 PG (ref 26–34)
MCHC RBC AUTO-ENTMCNC: 33.8 G/DL (ref 32–36.5)
MCV RBC AUTO: 90.4 FL (ref 78–100)
MONOCYTES # BLD: 1.1 K/MCL (ref 0.3–0.9)
MONOCYTES NFR BLD: 18 %
NEUTROPHILS # BLD: 3 K/MCL (ref 1.8–7.7)
NEUTROPHILS NFR BLD: 47 %
NRBC BLD MANUAL-RTO: 0 /100 WBC
NT-PROBNP SERPL-MCNC: 1896 PG/ML
PLATELET # BLD AUTO: 196 K/MCL (ref 140–450)
POTASSIUM SERPL-SCNC: 4.1 MMOL/L (ref 3.4–5.1)
PROT SERPL-MCNC: 7.6 G/DL (ref 6.4–8.2)
RBC # BLD: 4.06 MIL/MCL (ref 4.5–5.9)
SODIUM SERPL-SCNC: 133 MMOL/L (ref 135–145)
WBC # BLD: 6.2 K/MCL (ref 4.2–11)

## 2022-09-21 PROCEDURE — 3074F SYST BP LT 130 MM HG: CPT | Performed by: NURSE PRACTITIONER

## 2022-09-21 PROCEDURE — 85025 COMPLETE CBC W/AUTO DIFF WBC: CPT | Performed by: NURSE PRACTITIONER

## 2022-09-21 PROCEDURE — 83880 ASSAY OF NATRIURETIC PEPTIDE: CPT | Performed by: NURSE PRACTITIONER

## 2022-09-21 PROCEDURE — 80053 COMPREHEN METABOLIC PANEL: CPT | Performed by: NURSE PRACTITIONER

## 2022-09-21 PROCEDURE — 99212 OFFICE O/P EST SF 10 MIN: CPT

## 2022-09-21 PROCEDURE — 36415 COLL VENOUS BLD VENIPUNCTURE: CPT | Performed by: NURSE PRACTITIONER

## 2022-09-21 PROCEDURE — 3078F DIAST BP <80 MM HG: CPT | Performed by: NURSE PRACTITIONER

## 2022-09-21 PROCEDURE — 99214 OFFICE O/P EST MOD 30 MIN: CPT | Performed by: NURSE PRACTITIONER

## 2022-09-21 RX ORDER — VITAMIN A 10000 UNIT
10000 TABLET ORAL DAILY
COMMUNITY
End: 2023-04-28

## 2022-09-21 RX ORDER — FUROSEMIDE 40 MG/1
20 TABLET ORAL PRN
Qty: 30 TABLET | Refills: 1 | Status: SHIPPED | COMMUNITY
Start: 2022-09-21 | End: 2022-11-10 | Stop reason: ALTCHOICE

## 2022-09-22 ENCOUNTER — TELEPHONE (OUTPATIENT)
Dept: CARDIOLOGY | Age: 72
End: 2022-09-22

## 2022-09-23 ENCOUNTER — TELEPHONE (OUTPATIENT)
Dept: CARDIOLOGY | Age: 72
End: 2022-09-23

## 2022-09-26 ENCOUNTER — TELEPHONE (OUTPATIENT)
Dept: CARDIOLOGY | Age: 72
End: 2022-09-26

## 2022-09-27 ENCOUNTER — OFFICE VISIT (OUTPATIENT)
Dept: CARDIOLOGY | Age: 72
End: 2022-09-27
Attending: NURSE PRACTITIONER

## 2022-09-27 VITALS
BODY MASS INDEX: 26.86 KG/M2 | HEART RATE: 65 BPM | SYSTOLIC BLOOD PRESSURE: 100 MMHG | WEIGHT: 187.17 LBS | DIASTOLIC BLOOD PRESSURE: 60 MMHG

## 2022-09-27 DIAGNOSIS — I25.5 CARDIOMYOPATHY, ISCHEMIC: ICD-10-CM

## 2022-09-27 DIAGNOSIS — I25.10 CORONARY ARTERY DISEASE INVOLVING NATIVE CORONARY ARTERY OF NATIVE HEART WITHOUT ANGINA PECTORIS: ICD-10-CM

## 2022-09-27 DIAGNOSIS — Z95.1 S/P CABG (CORONARY ARTERY BYPASS GRAFT): ICD-10-CM

## 2022-09-27 DIAGNOSIS — I50.22 CHRONIC SYSTOLIC CONGESTIVE HEART FAILURE (CMD): Primary | ICD-10-CM

## 2022-09-27 LAB
ALBUMIN SERPL-MCNC: 3.7 G/DL (ref 3.6–5.1)
ALBUMIN/GLOB SERPL: 0.9 {RATIO} (ref 1–2.4)
ALP SERPL-CCNC: 114 UNITS/L (ref 45–117)
ALT SERPL-CCNC: 40 UNITS/L
ANION GAP SERPL CALC-SCNC: 9 MMOL/L (ref 7–19)
AST SERPL-CCNC: 27 UNITS/L
BILIRUB SERPL-MCNC: 0.7 MG/DL (ref 0.2–1)
BUN SERPL-MCNC: 20 MG/DL (ref 6–20)
BUN/CREAT SERPL: 15 (ref 7–25)
CALCIUM SERPL-MCNC: 8.9 MG/DL (ref 8.4–10.2)
CHLORIDE SERPL-SCNC: 102 MMOL/L (ref 97–110)
CO2 SERPL-SCNC: 26 MMOL/L (ref 21–32)
CREAT SERPL-MCNC: 1.32 MG/DL (ref 0.67–1.17)
FASTING DURATION TIME PATIENT: ABNORMAL H
GFR SERPLBLD BASED ON 1.73 SQ M-ARVRAT: 58 ML/MIN
GLOBULIN SER-MCNC: 4 G/DL (ref 2–4)
GLUCOSE SERPL-MCNC: 67 MG/DL (ref 70–99)
NT-PROBNP SERPL-MCNC: 1258 PG/ML
POTASSIUM SERPL-SCNC: 4.3 MMOL/L (ref 3.4–5.1)
PROT SERPL-MCNC: 7.7 G/DL (ref 6.4–8.2)
SODIUM SERPL-SCNC: 133 MMOL/L (ref 135–145)

## 2022-09-27 PROCEDURE — 99214 OFFICE O/P EST MOD 30 MIN: CPT | Performed by: NURSE PRACTITIONER

## 2022-09-27 PROCEDURE — 80053 COMPREHEN METABOLIC PANEL: CPT | Performed by: NURSE PRACTITIONER

## 2022-09-27 PROCEDURE — 99211 OFF/OP EST MAY X REQ PHY/QHP: CPT

## 2022-09-27 PROCEDURE — 3078F DIAST BP <80 MM HG: CPT | Performed by: NURSE PRACTITIONER

## 2022-09-27 PROCEDURE — 3074F SYST BP LT 130 MM HG: CPT | Performed by: NURSE PRACTITIONER

## 2022-09-27 PROCEDURE — 36415 COLL VENOUS BLD VENIPUNCTURE: CPT | Performed by: NURSE PRACTITIONER

## 2022-09-27 PROCEDURE — 83880 ASSAY OF NATRIURETIC PEPTIDE: CPT | Performed by: NURSE PRACTITIONER

## 2022-09-27 RX ORDER — CHOLECALCIFEROL (VITAMIN D3) 125 MCG
5000 CAPSULE ORAL DAILY
COMMUNITY
End: 2023-04-28

## 2022-09-29 ENCOUNTER — TELEPHONE (OUTPATIENT)
Dept: CARDIOLOGY | Age: 72
End: 2022-09-29

## 2022-10-04 ENCOUNTER — TELEPHONE (OUTPATIENT)
Dept: CARDIOLOGY | Age: 72
End: 2022-10-04

## 2022-10-06 ENCOUNTER — HOSPITAL ENCOUNTER (OUTPATIENT)
Dept: MRI IMAGING | Facility: HOSPITAL | Age: 72
Discharge: HOME | End: 2022-10-06
Attending: COLON & RECTAL SURGERY
Payer: MEDICARE

## 2022-10-06 ENCOUNTER — APPOINTMENT (OUTPATIENT)
Dept: MRI IMAGING | Facility: HOSPITAL | Age: 72
End: 2022-10-06
Payer: MEDICARE

## 2022-10-06 ENCOUNTER — TELEPHONE (OUTPATIENT)
Dept: INTERNAL MEDICINE CLINIC | Facility: CLINIC | Age: 72
End: 2022-10-06

## 2022-10-06 ENCOUNTER — ORDER TRANSCRIPTION (OUTPATIENT)
Dept: ADMINISTRATIVE | Facility: HOSPITAL | Age: 72
End: 2022-10-06

## 2022-10-06 DIAGNOSIS — Z01.818 PREOPERATIVE EXAMINATION, UNSPECIFIED: ICD-10-CM

## 2022-10-06 DIAGNOSIS — Z11.59 ENCOUNTER FOR SCREENING FOR OTHER VIRAL DISEASES: Primary | ICD-10-CM

## 2022-10-06 NOTE — TELEPHONE ENCOUNTER
Called pre-admission testing at [phone] to have them send over something for this pt's clearance - left message.   High TE

## 2022-10-06 NOTE — TELEPHONE ENCOUNTER
Pt stated he's going to get anesthesia for an MRI that was rescheduled for 10/13, was supposed to be today. Pt stating he needs clearance, needs to be sure he's well enough.       Future Appointments   Date Time Provider Tanika Anderson   10/11/2022  2:00 PM Rajat Smallwood MD [facility]

## 2022-10-06 NOTE — TELEPHONE ENCOUNTER
Pt requesting some medication to calm him for an MRI, but appt is 10am.  Per SD, it's too late and advised her reschedule the MRI. Wife, chiquis aware and understands.

## 2022-10-07 ENCOUNTER — ORDER TRANSCRIPTION (OUTPATIENT)
Dept: ADMINISTRATIVE | Facility: HOSPITAL | Age: 72
End: 2022-10-07

## 2022-10-07 DIAGNOSIS — Z01.818 PREOP EXAMINATION: Primary | ICD-10-CM

## 2022-10-10 ENCOUNTER — TELEPHONE (OUTPATIENT)
Facility: LOCATION | Age: 72
End: 2022-10-10

## 2022-10-10 ENCOUNTER — LAB ENCOUNTER (OUTPATIENT)
Dept: LAB | Facility: HOSPITAL | Age: 72
End: 2022-10-10
Payer: MEDICARE

## 2022-10-10 DIAGNOSIS — R16.0 LIVER MASS: Primary | ICD-10-CM

## 2022-10-10 DIAGNOSIS — Z11.59 ENCOUNTER FOR SCREENING FOR OTHER VIRAL DISEASES: ICD-10-CM

## 2022-10-10 DIAGNOSIS — Z01.818 PREOPERATIVE EXAMINATION, UNSPECIFIED: ICD-10-CM

## 2022-10-10 RX ORDER — DIAZEPAM 10 MG/1
10 TABLET ORAL AS NEEDED
Qty: 1 TABLET | Refills: 0 | Status: SHIPPED | OUTPATIENT
Start: 2022-10-10

## 2022-10-10 NOTE — TELEPHONE ENCOUNTER
Pt has an MRI scheduled on 10/13, wants to know if he can do an oral sedation instead of anaesthesia. He said he can't fast for 8 hours. He also has claustrophobia and is hoping that he can be under during the MRI in some way.  Best callback number - [phone]

## 2022-10-10 NOTE — TELEPHONE ENCOUNTER
Pt did not know he would need to fast for 8 hours to have anesthesia. He states he cannot do this due to his past medical history. Pt wishes to have valium for his anxiety and no anesthesia. MRI informed of this and will change it to medication only. Order sent to pharmacy by PA.

## 2022-10-11 ENCOUNTER — TELEPHONE (OUTPATIENT)
Dept: CARDIOLOGY | Age: 72
End: 2022-10-11

## 2022-10-11 RX ORDER — SODIUM CHLORIDE 9 MG/ML
INJECTION, SOLUTION INTRAVENOUS CONTINUOUS
OUTPATIENT
Start: 2022-10-11

## 2022-10-12 LAB — SARS-COV-2 RNA RESP QL NAA+PROBE: NOT DETECTED

## 2022-10-12 NOTE — IMAGING NOTE
10/12 @ 1012 Patient states he could not fast for 8 hours and opted to go for oral Valium instead of MRI under anesthesia.  FRTZ

## 2022-10-13 ENCOUNTER — HOSPITAL ENCOUNTER (OUTPATIENT)
Dept: MRI IMAGING | Facility: HOSPITAL | Age: 72
Discharge: HOME | End: 2022-10-13
Payer: MEDICARE

## 2022-10-13 VITALS
HEIGHT: 69 IN | SYSTOLIC BLOOD PRESSURE: 130 MMHG | DIASTOLIC BLOOD PRESSURE: 71 MMHG | HEART RATE: 70 BPM | BODY MASS INDEX: 26.81 KG/M2 | WEIGHT: 181 LBS | OXYGEN SATURATION: 99 %

## 2022-10-13 DIAGNOSIS — R16.0 LIVER MASS: ICD-10-CM

## 2022-10-13 PROCEDURE — 74183 MRI ABD W/O CNTR FLWD CNTR: CPT

## 2022-10-13 PROCEDURE — A9581 GADOXETATE DISODIUM INJ: HCPCS

## 2022-10-18 ENCOUNTER — OFFICE VISIT (OUTPATIENT)
Dept: CARDIOLOGY | Age: 72
End: 2022-10-18
Attending: NURSE PRACTITIONER

## 2022-10-18 VITALS
DIASTOLIC BLOOD PRESSURE: 68 MMHG | BODY MASS INDEX: 27.17 KG/M2 | WEIGHT: 189.38 LBS | HEART RATE: 60 BPM | RESPIRATION RATE: 18 BRPM | SYSTOLIC BLOOD PRESSURE: 132 MMHG

## 2022-10-18 DIAGNOSIS — I25.10 CORONARY ARTERY DISEASE INVOLVING NATIVE CORONARY ARTERY OF NATIVE HEART WITHOUT ANGINA PECTORIS: ICD-10-CM

## 2022-10-18 DIAGNOSIS — I50.22 CHRONIC SYSTOLIC CONGESTIVE HEART FAILURE (CMD): Primary | ICD-10-CM

## 2022-10-18 DIAGNOSIS — R53.83 OTHER FATIGUE: ICD-10-CM

## 2022-10-18 DIAGNOSIS — Z95.1 S/P CABG (CORONARY ARTERY BYPASS GRAFT): ICD-10-CM

## 2022-10-18 LAB
ANION GAP SERPL CALC-SCNC: 11 MMOL/L (ref 7–19)
BUN SERPL-MCNC: 14 MG/DL (ref 6–20)
BUN/CREAT SERPL: 13 (ref 7–25)
CALCIUM SERPL-MCNC: 8.9 MG/DL (ref 8.4–10.2)
CHLORIDE SERPL-SCNC: 107 MMOL/L (ref 97–110)
CO2 SERPL-SCNC: 22 MMOL/L (ref 21–32)
CREAT SERPL-MCNC: 1.12 MG/DL (ref 0.51–0.95)
FASTING DURATION TIME PATIENT: ABNORMAL H
GFR SERPLBLD BASED ON 1.73 SQ M-ARVRAT: 70 ML/MIN
GLUCOSE SERPL-MCNC: 86 MG/DL (ref 70–99)
NT-PROBNP SERPL-MCNC: 2355 PG/ML
POTASSIUM SERPL-SCNC: 4.1 MMOL/L (ref 3.4–5.1)
SODIUM SERPL-SCNC: 136 MMOL/L (ref 135–145)

## 2022-10-18 PROCEDURE — 83880 ASSAY OF NATRIURETIC PEPTIDE: CPT | Performed by: NURSE PRACTITIONER

## 2022-10-18 PROCEDURE — 3075F SYST BP GE 130 - 139MM HG: CPT | Performed by: NURSE PRACTITIONER

## 2022-10-18 PROCEDURE — 80048 BASIC METABOLIC PNL TOTAL CA: CPT | Performed by: NURSE PRACTITIONER

## 2022-10-18 PROCEDURE — 99214 OFFICE O/P EST MOD 30 MIN: CPT | Performed by: NURSE PRACTITIONER

## 2022-10-18 PROCEDURE — 99211 OFF/OP EST MAY X REQ PHY/QHP: CPT

## 2022-10-18 PROCEDURE — 36415 COLL VENOUS BLD VENIPUNCTURE: CPT | Performed by: NURSE PRACTITIONER

## 2022-10-18 PROCEDURE — 3078F DIAST BP <80 MM HG: CPT | Performed by: NURSE PRACTITIONER

## 2022-10-18 RX ORDER — SPIRONOLACTONE 25 MG/1
TABLET ORAL
Qty: 45 TABLET | Refills: 0 | Status: SHIPPED | OUTPATIENT
Start: 2022-10-18 | End: 2022-11-03 | Stop reason: DRUGHIGH

## 2022-10-18 RX ORDER — SPIRONOLACTONE 25 MG/1
12.5 TABLET ORAL DAILY
Qty: 30 TABLET | Refills: 0 | Status: SHIPPED | OUTPATIENT
Start: 2022-10-18 | End: 2022-10-18

## 2022-10-19 RX ORDER — SPIRONOLACTONE 25 MG/1
TABLET ORAL
Qty: 45 TABLET | Refills: 0 | OUTPATIENT
Start: 2022-10-19

## 2022-10-26 ENCOUNTER — ANCILLARY PROCEDURE (OUTPATIENT)
Dept: CARDIOLOGY | Age: 72
End: 2022-10-26
Attending: INTERNAL MEDICINE

## 2022-10-26 ENCOUNTER — TELEPHONE (OUTPATIENT)
Dept: CARDIOLOGY | Age: 72
End: 2022-10-26

## 2022-10-26 VITALS
HEART RATE: 60 BPM | SYSTOLIC BLOOD PRESSURE: 122 MMHG | WEIGHT: 186.3 LBS | DIASTOLIC BLOOD PRESSURE: 64 MMHG | BODY MASS INDEX: 26.73 KG/M2

## 2022-10-26 DIAGNOSIS — Z95.810 ICD (IMPLANTABLE CARDIOVERTER-DEFIBRILLATOR) IN PLACE: ICD-10-CM

## 2022-10-26 PROCEDURE — 93283 PRGRMG EVAL IMPLANTABLE DFB: CPT | Performed by: INTERNAL MEDICINE

## 2022-10-27 ENCOUNTER — TELEPHONE (OUTPATIENT)
Dept: CARDIOLOGY | Age: 72
End: 2022-10-27

## 2022-10-31 ENCOUNTER — TELEPHONE (OUTPATIENT)
Dept: INTERNAL MEDICINE CLINIC | Facility: CLINIC | Age: 72
End: 2022-10-31

## 2022-11-02 ENCOUNTER — TELEPHONE (OUTPATIENT)
Facility: LOCATION | Age: 72
End: 2022-11-02

## 2022-11-02 NOTE — TELEPHONE ENCOUNTER
Gave patient the recommendation that patient see Dr. Carlos Morgan since he did his original surgery and discuss MRI. Pt JOSELITO.

## 2022-11-03 ENCOUNTER — OFFICE VISIT (OUTPATIENT)
Dept: CARDIOLOGY | Age: 72
End: 2022-11-03
Attending: NURSE PRACTITIONER

## 2022-11-03 VITALS
SYSTOLIC BLOOD PRESSURE: 126 MMHG | RESPIRATION RATE: 18 BRPM | DIASTOLIC BLOOD PRESSURE: 64 MMHG | WEIGHT: 188.71 LBS | HEART RATE: 58 BPM | BODY MASS INDEX: 27.08 KG/M2

## 2022-11-03 DIAGNOSIS — I25.10 CORONARY ARTERY DISEASE INVOLVING NATIVE CORONARY ARTERY OF NATIVE HEART WITHOUT ANGINA PECTORIS: ICD-10-CM

## 2022-11-03 DIAGNOSIS — I50.22 CHRONIC SYSTOLIC CONGESTIVE HEART FAILURE (CMD): Primary | ICD-10-CM

## 2022-11-03 DIAGNOSIS — Z95.1 S/P CABG (CORONARY ARTERY BYPASS GRAFT): ICD-10-CM

## 2022-11-03 DIAGNOSIS — I48.0 PAROXYSMAL ATRIAL FIBRILLATION (CMD): ICD-10-CM

## 2022-11-03 LAB
ANION GAP SERPL CALC-SCNC: 9 MMOL/L (ref 7–19)
BUN SERPL-MCNC: 14 MG/DL (ref 6–20)
BUN/CREAT SERPL: 13 (ref 7–25)
CALCIUM SERPL-MCNC: 8.6 MG/DL (ref 8.4–10.2)
CHLORIDE SERPL-SCNC: 109 MMOL/L (ref 97–110)
CO2 SERPL-SCNC: 24 MMOL/L (ref 21–32)
CREAT SERPL-MCNC: 1.12 MG/DL (ref 0.67–1.17)
FASTING DURATION TIME PATIENT: ABNORMAL H
GFR SERPLBLD BASED ON 1.73 SQ M-ARVRAT: 70 ML/MIN
GLUCOSE SERPL-MCNC: 117 MG/DL (ref 70–99)
NT-PROBNP SERPL-MCNC: 1832 PG/ML
POTASSIUM SERPL-SCNC: 3.8 MMOL/L (ref 3.4–5.1)
SODIUM SERPL-SCNC: 138 MMOL/L (ref 135–145)

## 2022-11-03 PROCEDURE — 80048 BASIC METABOLIC PNL TOTAL CA: CPT | Performed by: NURSE PRACTITIONER

## 2022-11-03 PROCEDURE — 99211 OFF/OP EST MAY X REQ PHY/QHP: CPT

## 2022-11-03 PROCEDURE — 3078F DIAST BP <80 MM HG: CPT | Performed by: NURSE PRACTITIONER

## 2022-11-03 PROCEDURE — 36415 COLL VENOUS BLD VENIPUNCTURE: CPT | Performed by: NURSE PRACTITIONER

## 2022-11-03 PROCEDURE — 99214 OFFICE O/P EST MOD 30 MIN: CPT | Performed by: NURSE PRACTITIONER

## 2022-11-03 PROCEDURE — 83880 ASSAY OF NATRIURETIC PEPTIDE: CPT | Performed by: NURSE PRACTITIONER

## 2022-11-03 PROCEDURE — 3074F SYST BP LT 130 MM HG: CPT | Performed by: NURSE PRACTITIONER

## 2022-11-03 RX ORDER — SPIRONOLACTONE 25 MG/1
25 TABLET ORAL DAILY
Qty: 90 TABLET | Refills: 1 | Status: SHIPPED | OUTPATIENT
Start: 2022-11-03 | End: 2023-05-09 | Stop reason: ALTCHOICE

## 2022-11-04 ENCOUNTER — TELEPHONE (OUTPATIENT)
Dept: CARDIOLOGY | Age: 72
End: 2022-11-04

## 2022-11-09 ENCOUNTER — OFFICE VISIT (OUTPATIENT)
Dept: SURGERY | Facility: CLINIC | Age: 72
End: 2022-11-09
Payer: COMMERCIAL

## 2022-11-09 VITALS
OXYGEN SATURATION: 95 % | SYSTOLIC BLOOD PRESSURE: 141 MMHG | WEIGHT: 184 LBS | TEMPERATURE: 97 F | HEART RATE: 71 BPM | BODY MASS INDEX: 27 KG/M2 | DIASTOLIC BLOOD PRESSURE: 89 MMHG | RESPIRATION RATE: 16 BRPM

## 2022-11-09 DIAGNOSIS — C22.1 CHOLANGIOCARCINOMA (HCC): Primary | ICD-10-CM

## 2022-11-09 DIAGNOSIS — K86.89 DILATED PANCREATIC DUCT: ICD-10-CM

## 2022-11-09 PROCEDURE — 3077F SYST BP >= 140 MM HG: CPT | Performed by: SURGERY

## 2022-11-09 PROCEDURE — 1126F AMNT PAIN NOTED NONE PRSNT: CPT | Performed by: SURGERY

## 2022-11-09 PROCEDURE — 99213 OFFICE O/P EST LOW 20 MIN: CPT | Performed by: SURGERY

## 2022-11-09 PROCEDURE — 3079F DIAST BP 80-89 MM HG: CPT | Performed by: SURGERY

## 2022-11-09 RX ORDER — SPIRONOLACTONE 25 MG/1
12.5 TABLET ORAL DAILY
COMMUNITY
Start: 2022-10-18

## 2022-11-10 RX ORDER — DIAZEPAM 10 MG/1
TABLET ORAL
COMMUNITY
Start: 2022-10-10 | End: 2022-10-18 | Stop reason: ALTCHOICE

## 2022-11-11 ENCOUNTER — APPOINTMENT (OUTPATIENT)
Dept: CARDIOLOGY | Age: 72
End: 2022-11-11

## 2022-11-14 ENCOUNTER — TELEPHONE (OUTPATIENT)
Dept: CARDIOLOGY | Age: 72
End: 2022-11-14

## 2022-11-18 ENCOUNTER — OFFICE VISIT (OUTPATIENT)
Dept: CARDIOLOGY | Age: 72
End: 2022-11-18

## 2022-11-18 VITALS
HEIGHT: 70 IN | SYSTOLIC BLOOD PRESSURE: 118 MMHG | BODY MASS INDEX: 26.48 KG/M2 | DIASTOLIC BLOOD PRESSURE: 60 MMHG | WEIGHT: 185 LBS

## 2022-11-18 DIAGNOSIS — E78.00 PURE HYPERCHOLESTEROLEMIA: ICD-10-CM

## 2022-11-18 DIAGNOSIS — Z95.1 S/P CABG (CORONARY ARTERY BYPASS GRAFT): Primary | ICD-10-CM

## 2022-11-18 DIAGNOSIS — I49.3 PVCS (PREMATURE VENTRICULAR CONTRACTIONS): ICD-10-CM

## 2022-11-18 DIAGNOSIS — I65.23 CAROTID STENOSIS, ASYMPTOMATIC, BILATERAL: ICD-10-CM

## 2022-11-18 DIAGNOSIS — I50.22 CHRONIC SYSTOLIC CONGESTIVE HEART FAILURE (CMD): ICD-10-CM

## 2022-11-18 DIAGNOSIS — I48.0 PAROXYSMAL ATRIAL FIBRILLATION (CMD): ICD-10-CM

## 2022-11-18 DIAGNOSIS — I25.10 CORONARY ARTERY DISEASE INVOLVING NATIVE CORONARY ARTERY OF NATIVE HEART WITHOUT ANGINA PECTORIS: ICD-10-CM

## 2022-11-18 DIAGNOSIS — I10 BENIGN ESSENTIAL HTN: ICD-10-CM

## 2022-11-18 DIAGNOSIS — I25.5 CARDIOMYOPATHY, ISCHEMIC: ICD-10-CM

## 2022-11-18 PROCEDURE — 3078F DIAST BP <80 MM HG: CPT | Performed by: INTERNAL MEDICINE

## 2022-11-18 PROCEDURE — 99215 OFFICE O/P EST HI 40 MIN: CPT | Performed by: INTERNAL MEDICINE

## 2022-11-18 PROCEDURE — 3074F SYST BP LT 130 MM HG: CPT | Performed by: INTERNAL MEDICINE

## 2022-11-18 NOTE — TELEPHONE ENCOUNTER
Handicap Placard Application Completed, signed by Dr. Fermin Patient and originals mailed to the [facility]. Copy of form sent to scanning with barcode.

## 2022-11-30 ENCOUNTER — TELEPHONE (OUTPATIENT)
Dept: CARDIOLOGY | Age: 72
End: 2022-11-30

## 2022-11-30 RX ORDER — ATORVASTATIN CALCIUM 20 MG/1
TABLET, FILM COATED ORAL
Qty: 90 TABLET | Refills: 1 | OUTPATIENT
Start: 2022-11-30

## 2022-12-01 ENCOUNTER — OFFICE VISIT (OUTPATIENT)
Dept: CARDIOLOGY | Age: 72
End: 2022-12-01
Attending: NURSE PRACTITIONER

## 2022-12-01 ENCOUNTER — HOSPITAL ENCOUNTER (OUTPATIENT)
Dept: CARDIOLOGY | Age: 72
Discharge: HOME | End: 2022-12-01
Attending: INTERNAL MEDICINE

## 2022-12-01 ENCOUNTER — APPOINTMENT (OUTPATIENT)
Dept: CARDIOLOGY | Age: 72
End: 2022-12-01
Attending: NURSE PRACTITIONER

## 2022-12-01 VITALS
BODY MASS INDEX: 26.67 KG/M2 | DIASTOLIC BLOOD PRESSURE: 68 MMHG | SYSTOLIC BLOOD PRESSURE: 110 MMHG | WEIGHT: 183.2 LBS | HEART RATE: 64 BPM

## 2022-12-01 DIAGNOSIS — I50.23 ACUTE ON CHRONIC SYSTOLIC HEART FAILURE (CMD): ICD-10-CM

## 2022-12-01 DIAGNOSIS — I48.0 PAROXYSMAL ATRIAL FIBRILLATION (CMD): ICD-10-CM

## 2022-12-01 DIAGNOSIS — Z95.1 S/P CABG (CORONARY ARTERY BYPASS GRAFT): ICD-10-CM

## 2022-12-01 DIAGNOSIS — I25.10 CORONARY ARTERY DISEASE INVOLVING NATIVE CORONARY ARTERY OF NATIVE HEART WITHOUT ANGINA PECTORIS: ICD-10-CM

## 2022-12-01 DIAGNOSIS — I50.22 CHRONIC SYSTOLIC CONGESTIVE HEART FAILURE (CMD): Primary | ICD-10-CM

## 2022-12-01 LAB
25(OH)D3+25(OH)D2 SERPL-MCNC: 38.1 NG/ML (ref 30–100)
ANION GAP SERPL CALC-SCNC: 9 MMOL/L (ref 7–19)
AORTIC VALVE AREA (AVA): 0.5
AV STENOSIS SEVERITY TEXT: NORMAL
AVI LVOT PEAK GRADIENT (LVOTMG): 1.1
BUN SERPL-MCNC: 20 MG/DL (ref 6–20)
BUN/CREAT SERPL: 18 (ref 7–25)
CALCIUM SERPL-MCNC: 8.9 MG/DL (ref 8.4–10.2)
CHLORIDE SERPL-SCNC: 107 MMOL/L (ref 97–110)
CHOLEST SERPL-MCNC: 98 MG/DL
CHOLEST/HDLC SERPL: 2 {RATIO}
CO2 SERPL-SCNC: 24 MMOL/L (ref 21–32)
CREAT SERPL-MCNC: 1.14 MG/DL (ref 0.67–1.17)
FASTING DURATION TIME PATIENT: ABNORMAL H
FASTING DURATION TIME PATIENT: NORMAL H
GFR SERPLBLD BASED ON 1.73 SQ M-ARVRAT: 68 ML/MIN
GLUCOSE SERPL-MCNC: 107 MG/DL (ref 70–99)
HDLC SERPL-MCNC: 50 MG/DL
LDLC SERPL CALC-MCNC: 32 MG/DL
LEFT INTERNAL DIMENSION IN SYSTOLE (LVSD): 1.2
LEFT VENTRICULAR INTERNAL DIMENSION IN DIASTOLE (LVDD): 4.1
LEFT VENTRICULAR POSTERIOR WALL IN END DIASTOLE (LVPW): 5
LV EF: NORMAL %
MV PEAK A VELOCITY (MVPAV): 391
MV PEAK E VELOCITY (MVPEV): 0.66
NONHDLC SERPL-MCNC: 48 MG/DL
NT-PROBNP SERPL-MCNC: 1186 PG/ML
POTASSIUM SERPL-SCNC: 4.2 MMOL/L (ref 3.4–5.1)
RV END SYSTOLIC LONGITUDINAL STRAIN FREE WALL (RVGS): 2.1
SODIUM SERPL-SCNC: 136 MMOL/L (ref 135–145)
TRIGL SERPL-MCNC: 81 MG/DL

## 2022-12-01 PROCEDURE — 93308 TTE F-UP OR LMTD: CPT | Performed by: INTERNAL MEDICINE

## 2022-12-01 PROCEDURE — 93321 DOPPLER ECHO F-UP/LMTD STD: CPT | Performed by: INTERNAL MEDICINE

## 2022-12-01 PROCEDURE — 10002805 HB CONTRAST AGENT: Performed by: INTERNAL MEDICINE

## 2022-12-01 PROCEDURE — 36415 COLL VENOUS BLD VENIPUNCTURE: CPT | Performed by: INTERNAL MEDICINE

## 2022-12-01 PROCEDURE — 99211 OFF/OP EST MAY X REQ PHY/QHP: CPT

## 2022-12-01 PROCEDURE — 99213 OFFICE O/P EST LOW 20 MIN: CPT | Performed by: NURSE PRACTITIONER

## 2022-12-01 PROCEDURE — 82306 VITAMIN D 25 HYDROXY: CPT | Performed by: INTERNAL MEDICINE

## 2022-12-01 PROCEDURE — 80061 LIPID PANEL: CPT | Performed by: INTERNAL MEDICINE

## 2022-12-01 PROCEDURE — 83880 ASSAY OF NATRIURETIC PEPTIDE: CPT | Performed by: NURSE PRACTITIONER

## 2022-12-01 PROCEDURE — 3074F SYST BP LT 130 MM HG: CPT | Performed by: NURSE PRACTITIONER

## 2022-12-01 PROCEDURE — 80048 BASIC METABOLIC PNL TOTAL CA: CPT | Performed by: NURSE PRACTITIONER

## 2022-12-01 PROCEDURE — 93325 DOPPLER ECHO COLOR FLOW MAPG: CPT | Performed by: INTERNAL MEDICINE

## 2022-12-01 PROCEDURE — 93308 TTE F-UP OR LMTD: CPT

## 2022-12-01 PROCEDURE — 3078F DIAST BP <80 MM HG: CPT | Performed by: NURSE PRACTITIONER

## 2022-12-01 RX ORDER — OMEPRAZOLE 20 MG/1
20 CAPSULE, DELAYED RELEASE ORAL DAILY
COMMUNITY
End: 2023-04-28

## 2022-12-01 RX ORDER — FUROSEMIDE 40 MG/1
20 TABLET ORAL DAILY PRN
Status: ON HOLD | COMMUNITY
End: 2023-05-02 | Stop reason: HOSPADM

## 2022-12-01 RX ADMIN — PERFLUTREN 2 ML: 6.52 INJECTION, SUSPENSION INTRAVENOUS at 14:17

## 2022-12-12 ENCOUNTER — TELEPHONE (OUTPATIENT)
Dept: CARDIOLOGY | Age: 72
End: 2022-12-12

## 2022-12-12 ENCOUNTER — ANCILLARY PROCEDURE (OUTPATIENT)
Dept: CARDIOLOGY | Age: 72
End: 2022-12-12
Attending: INTERNAL MEDICINE

## 2022-12-12 DIAGNOSIS — Z95.810 ICD (IMPLANTABLE CARDIOVERTER-DEFIBRILLATOR) IN PLACE: ICD-10-CM

## 2022-12-12 LAB
MDC_IDC_LEAD_IMPLANT_DT: NORMAL
MDC_IDC_LEAD_IMPLANT_DT: NORMAL
MDC_IDC_LEAD_LOCATION: NORMAL
MDC_IDC_LEAD_LOCATION: NORMAL
MDC_IDC_LEAD_LOCATION_DETAIL_1: NORMAL
MDC_IDC_LEAD_LOCATION_DETAIL_1: NORMAL
MDC_IDC_LEAD_MFG: NORMAL
MDC_IDC_LEAD_MFG: NORMAL
MDC_IDC_LEAD_MODEL: NORMAL
MDC_IDC_LEAD_MODEL: NORMAL
MDC_IDC_LEAD_POLARITY_TYPE: NORMAL
MDC_IDC_LEAD_POLARITY_TYPE: NORMAL
MDC_IDC_LEAD_SERIAL: NORMAL
MDC_IDC_LEAD_SERIAL: NORMAL
MDC_IDC_PG_IMPLANT_DTM: NORMAL
MDC_IDC_PG_MFG: NORMAL
MDC_IDC_PG_MODEL: NORMAL
MDC_IDC_PG_SERIAL: NORMAL
MDC_IDC_PG_TYPE: NORMAL
MDC_IDC_SESS_CLINIC_NAME: NORMAL
MDC_IDC_SESS_TYPE: NORMAL

## 2022-12-14 ENCOUNTER — TELEMEDICINE (OUTPATIENT)
Facility: CLINIC | Age: 72
End: 2022-12-14

## 2022-12-14 DIAGNOSIS — Z99.89 OSA ON CPAP: Primary | ICD-10-CM

## 2022-12-14 DIAGNOSIS — Z95.0 PACEMAKER: ICD-10-CM

## 2022-12-14 DIAGNOSIS — G47.33 OSA ON CPAP: Primary | ICD-10-CM

## 2022-12-14 DIAGNOSIS — I50.22 CHRONIC SYSTOLIC CONGESTIVE HEART FAILURE (HCC): ICD-10-CM

## 2022-12-14 DIAGNOSIS — J44.9 ASTHMA WITH COPD (CHRONIC OBSTRUCTIVE PULMONARY DISEASE) (HCC): Chronic | ICD-10-CM

## 2022-12-14 PROCEDURE — 99214 OFFICE O/P EST MOD 30 MIN: CPT | Performed by: OTHER

## 2022-12-21 RX ORDER — SACUBITRIL AND VALSARTAN 24; 26 MG/1; MG/1
TABLET, FILM COATED ORAL
Qty: 60 TABLET | Refills: 3 | Status: SHIPPED | OUTPATIENT
Start: 2022-12-21 | End: 2023-04-26

## 2023-01-19 NOTE — TELEPHONE ENCOUNTER
Sent call to TOREY Holloway states patient feels like he will be Passing out and shaking. PLease call. Thank you. O-T Advancement Flap Text: The defect edges were debeveled with a #15 scalpel blade.  Given the location of the defect, shape of the defect and the proximity to free margins an O-T advancement flap was deemed most appropriate.  Using a sterile surgical marker, an appropriate advancement flap was drawn incorporating the defect and placing the expected incisions within the relaxed skin tension lines where possible.    The area thus outlined was incised deep to adipose tissue with a #15 scalpel blade.  The skin margins were undermined to an appropriate distance in all directions utilizing iris scissors.

## 2023-02-03 NOTE — TELEPHONE ENCOUNTER
Patient's wife states he saw Dr Olga Potter yesterday, wants pt to take magnesium Sulfate to clear his colon, hasn't started today yet because he developed abdominal pain, weak, dizziness, shaking, SI=244. Wife notified pt should go to the ER for evaluation. Yes

## 2023-02-06 ENCOUNTER — TELEPHONE (OUTPATIENT)
Facility: CLINIC | Age: 73
End: 2023-02-06

## 2023-02-06 ENCOUNTER — ANCILLARY PROCEDURE (OUTPATIENT)
Dept: CARDIOLOGY | Age: 73
End: 2023-02-06
Attending: INTERNAL MEDICINE

## 2023-02-06 DIAGNOSIS — Z95.810 ICD (IMPLANTABLE CARDIOVERTER-DEFIBRILLATOR) IN PLACE: ICD-10-CM

## 2023-02-06 PROCEDURE — 93296 REM INTERROG EVL PM/IDS: CPT | Performed by: INTERNAL MEDICINE

## 2023-02-06 PROCEDURE — 93295 DEV INTERROG REMOTE 1/2/MLT: CPT | Performed by: INTERNAL MEDICINE

## 2023-02-06 NOTE — TELEPHONE ENCOUNTER
Pt having cataract surgery and was told by surgeon that he can't use his cpap for at least a week after surgery. Informed pt to follow surgeons recommendation to not use cpap . Instructed him  to sleep on his side after surgery (his ahi was 2.7 during his sleeps study on his side)  . Pt verbalized understanding and agreed with plan.

## 2023-03-16 RX ORDER — METOPROLOL SUCCINATE 100 MG/1
100 TABLET, EXTENDED RELEASE ORAL DAILY
Qty: 90 TABLET | Refills: 3 | Status: SHIPPED | OUTPATIENT
Start: 2023-03-16

## 2023-03-28 ENCOUNTER — HOSPITAL ENCOUNTER (EMERGENCY)
Facility: HOSPITAL | Age: 73
Discharge: HOME | End: 2023-03-28
Attending: EMERGENCY MEDICINE
Payer: MEDICARE

## 2023-03-28 ENCOUNTER — APPOINTMENT (OUTPATIENT)
Dept: CT IMAGING | Facility: HOSPITAL | Age: 73
End: 2023-03-28
Attending: EMERGENCY MEDICINE
Payer: MEDICARE

## 2023-03-28 VITALS
TEMPERATURE: 98 F | HEART RATE: 52 BPM | SYSTOLIC BLOOD PRESSURE: 137 MMHG | HEIGHT: 69.5 IN | RESPIRATION RATE: 18 BRPM | OXYGEN SATURATION: 93 % | WEIGHT: 182 LBS | DIASTOLIC BLOOD PRESSURE: 76 MMHG | BODY MASS INDEX: 26.35 KG/M2

## 2023-03-28 DIAGNOSIS — K29.80 DUODENITIS: Primary | ICD-10-CM

## 2023-03-28 DIAGNOSIS — R10.9 ABDOMINAL PAIN OF UNKNOWN ETIOLOGY: ICD-10-CM

## 2023-03-28 LAB
ALBUMIN SERPL-MCNC: 3.7 G/DL (ref 3.4–5)
ALBUMIN/GLOB SERPL: 1 {RATIO} (ref 1–2)
ALP LIVER SERPL-CCNC: 90 U/L
ALT SERPL-CCNC: 40 U/L
ANION GAP SERPL CALC-SCNC: 2 MMOL/L (ref 0–18)
AST SERPL-CCNC: 29 U/L (ref 15–37)
BASOPHILS # BLD AUTO: 0.04 X10(3) UL (ref 0–0.2)
BASOPHILS NFR BLD AUTO: 0.6 %
BILIRUB SERPL-MCNC: 1 MG/DL (ref 0.1–2)
BILIRUB UR QL STRIP.AUTO: NEGATIVE
BUN BLD-MCNC: 23 MG/DL (ref 7–18)
CALCIUM BLD-MCNC: 9 MG/DL (ref 8.5–10.1)
CHLORIDE SERPL-SCNC: 108 MMOL/L (ref 98–112)
CLARITY UR REFRACT.AUTO: CLEAR
CO2 SERPL-SCNC: 23 MMOL/L (ref 21–32)
COLOR UR AUTO: YELLOW
CREAT BLD-MCNC: 1.39 MG/DL
EOSINOPHIL # BLD AUTO: 0.24 X10(3) UL (ref 0–0.7)
EOSINOPHIL NFR BLD AUTO: 3.4 %
ERYTHROCYTE [DISTWIDTH] IN BLOOD BY AUTOMATED COUNT: 14.5 %
GFR SERPLBLD BASED ON 1.73 SQ M-ARVRAT: 54 ML/MIN/1.73M2 (ref 60–?)
GLOBULIN PLAS-MCNC: 3.8 G/DL (ref 2.8–4.4)
GLUCOSE BLD-MCNC: 100 MG/DL (ref 70–99)
GLUCOSE UR STRIP.AUTO-MCNC: NEGATIVE MG/DL
HCT VFR BLD AUTO: 37.6 %
HGB BLD-MCNC: 12.7 G/DL
IMM GRANULOCYTES # BLD AUTO: 0.02 X10(3) UL (ref 0–1)
IMM GRANULOCYTES NFR BLD: 0.3 %
KETONES UR STRIP.AUTO-MCNC: NEGATIVE MG/DL
LEUKOCYTE ESTERASE UR QL STRIP.AUTO: NEGATIVE
LIPASE SERPL-CCNC: 171 U/L (ref 13–75)
LYMPHOCYTES # BLD AUTO: 1.7 X10(3) UL (ref 1–4)
LYMPHOCYTES NFR BLD AUTO: 23.9 %
MCH RBC QN AUTO: 31.1 PG (ref 26–34)
MCHC RBC AUTO-ENTMCNC: 33.8 G/DL (ref 31–37)
MCV RBC AUTO: 91.9 FL
MONOCYTES # BLD AUTO: 0.96 X10(3) UL (ref 0.1–1)
MONOCYTES NFR BLD AUTO: 13.5 %
NEUTROPHILS # BLD AUTO: 4.16 X10 (3) UL (ref 1.5–7.7)
NEUTROPHILS # BLD AUTO: 4.16 X10(3) UL (ref 1.5–7.7)
NEUTROPHILS NFR BLD AUTO: 58.3 %
NITRITE UR QL STRIP.AUTO: NEGATIVE
OSMOLALITY SERPL CALC.SUM OF ELEC: 280 MOSM/KG (ref 275–295)
PH UR STRIP.AUTO: 5 [PH] (ref 5–8)
PLATELET # BLD AUTO: 171 10(3)UL (ref 150–450)
POTASSIUM SERPL-SCNC: 4.4 MMOL/L (ref 3.5–5.1)
PROT SERPL-MCNC: 7.5 G/DL (ref 6.4–8.2)
PROT UR STRIP.AUTO-MCNC: NEGATIVE MG/DL
RBC # BLD AUTO: 4.09 X10(6)UL
RBC UR QL AUTO: NEGATIVE
SARS-COV-2 RNA RESP QL NAA+PROBE: NOT DETECTED
SODIUM SERPL-SCNC: 133 MMOL/L (ref 136–145)
SP GR UR STRIP.AUTO: 1.01 (ref 1–1.03)
TROPONIN I HIGH SENSITIVITY: 14 NG/L
UROBILINOGEN UR STRIP.AUTO-MCNC: <2 MG/DL
WBC # BLD AUTO: 7.1 X10(3) UL (ref 4–11)

## 2023-03-28 PROCEDURE — S0028 INJECTION, FAMOTIDINE, 20 MG: HCPCS | Performed by: EMERGENCY MEDICINE

## 2023-03-28 PROCEDURE — 96375 TX/PRO/DX INJ NEW DRUG ADDON: CPT

## 2023-03-28 PROCEDURE — 80053 COMPREHEN METABOLIC PANEL: CPT | Performed by: EMERGENCY MEDICINE

## 2023-03-28 PROCEDURE — 80053 COMPREHEN METABOLIC PANEL: CPT

## 2023-03-28 PROCEDURE — C9113 INJ PANTOPRAZOLE SODIUM, VIA: HCPCS | Performed by: EMERGENCY MEDICINE

## 2023-03-28 PROCEDURE — 99284 EMERGENCY DEPT VISIT MOD MDM: CPT

## 2023-03-28 PROCEDURE — 74177 CT ABD & PELVIS W/CONTRAST: CPT | Performed by: EMERGENCY MEDICINE

## 2023-03-28 PROCEDURE — 85025 COMPLETE CBC W/AUTO DIFF WBC: CPT | Performed by: EMERGENCY MEDICINE

## 2023-03-28 PROCEDURE — 84484 ASSAY OF TROPONIN QUANT: CPT | Performed by: EMERGENCY MEDICINE

## 2023-03-28 PROCEDURE — 85025 COMPLETE CBC W/AUTO DIFF WBC: CPT

## 2023-03-28 PROCEDURE — 93010 ELECTROCARDIOGRAM REPORT: CPT

## 2023-03-28 PROCEDURE — 83690 ASSAY OF LIPASE: CPT | Performed by: EMERGENCY MEDICINE

## 2023-03-28 PROCEDURE — 83690 ASSAY OF LIPASE: CPT

## 2023-03-28 PROCEDURE — 81001 URINALYSIS AUTO W/SCOPE: CPT | Performed by: EMERGENCY MEDICINE

## 2023-03-28 PROCEDURE — 96374 THER/PROPH/DIAG INJ IV PUSH: CPT

## 2023-03-28 PROCEDURE — 99285 EMERGENCY DEPT VISIT HI MDM: CPT

## 2023-03-28 PROCEDURE — 96361 HYDRATE IV INFUSION ADD-ON: CPT

## 2023-03-28 PROCEDURE — 81001 URINALYSIS AUTO W/SCOPE: CPT

## 2023-03-28 PROCEDURE — 93005 ELECTROCARDIOGRAM TRACING: CPT

## 2023-03-28 RX ORDER — LIDOCAINE HYDROCHLORIDE 20 MG/ML
5 SOLUTION OROPHARYNGEAL
Qty: 1 EACH | Refills: 0 | Status: SHIPPED | OUTPATIENT
Start: 2023-03-28 | End: 2023-04-03

## 2023-03-28 RX ORDER — OMEPRAZOLE 40 MG/1
40 CAPSULE, DELAYED RELEASE ORAL DAILY
Qty: 30 CAPSULE | Refills: 0 | Status: SHIPPED | OUTPATIENT
Start: 2023-03-28 | End: 2023-04-27

## 2023-03-28 RX ORDER — FAMOTIDINE 10 MG/ML
20 INJECTION, SOLUTION INTRAVENOUS ONCE
Status: COMPLETED | OUTPATIENT
Start: 2023-03-28 | End: 2023-03-28

## 2023-03-28 RX ORDER — ONDANSETRON 4 MG/1
4 TABLET, ORALLY DISINTEGRATING ORAL EVERY 4 HOURS PRN
Qty: 10 TABLET | Refills: 0 | Status: SHIPPED | OUTPATIENT
Start: 2023-03-28 | End: 2023-04-10

## 2023-03-28 RX ORDER — HYDROCODONE BITARTRATE AND ACETAMINOPHEN 5; 325 MG/1; MG/1
1 TABLET ORAL EVERY 6 HOURS PRN
Qty: 10 TABLET | Refills: 0 | Status: SHIPPED | OUTPATIENT
Start: 2023-03-28 | End: 2023-04-03

## 2023-03-28 RX ORDER — ONDANSETRON 2 MG/ML
4 INJECTION INTRAMUSCULAR; INTRAVENOUS ONCE
Status: COMPLETED | OUTPATIENT
Start: 2023-03-28 | End: 2023-03-28

## 2023-03-28 RX ORDER — HYDROMORPHONE HYDROCHLORIDE 1 MG/ML
0.5 INJECTION, SOLUTION INTRAMUSCULAR; INTRAVENOUS; SUBCUTANEOUS EVERY 30 MIN PRN
Status: DISCONTINUED | OUTPATIENT
Start: 2023-03-28 | End: 2023-03-29

## 2023-03-29 LAB
ATRIAL RATE: 57 BPM
P-R INTERVAL: 256 MS
Q-T INTERVAL: 382 MS
QRS DURATION: 118 MS
QTC CALCULATION (BEZET): 429 MS
R AXIS: 75 DEGREES
T AXIS: -10 DEGREES
VENTRICULAR RATE: 76 BPM

## 2023-03-29 NOTE — DISCHARGE INSTRUCTIONS
Clear liquids slowly advance your diet. Follow-up with your primary care physician. Follow-up also with your GI, general surgery if you have increasing pain you need to return here to the emergency room. You were seen in the emergency room in a limited time. There is a possibility that although we do not see any acute process at this present time that things can change with time. Is therefore imperative that you follow-up with primary care physician for close follow-up. If there is any significant progression of your pain  or other symptoms you to return immediately to the emergency room.

## 2023-03-30 ENCOUNTER — HOSPITAL ENCOUNTER (INPATIENT)
Facility: HOSPITAL | Age: 73
LOS: 4 days | Discharge: HOME | End: 2023-04-03
Attending: EMERGENCY MEDICINE | Admitting: HOSPITALIST
Payer: OTHER GOVERNMENT

## 2023-03-30 ENCOUNTER — APPOINTMENT (OUTPATIENT)
Dept: CT IMAGING | Facility: HOSPITAL | Age: 73
End: 2023-03-30
Attending: EMERGENCY MEDICINE
Payer: OTHER GOVERNMENT

## 2023-03-30 ENCOUNTER — APPOINTMENT (OUTPATIENT)
Dept: ULTRASOUND IMAGING | Facility: HOSPITAL | Age: 73
End: 2023-03-30
Attending: EMERGENCY MEDICINE
Payer: OTHER GOVERNMENT

## 2023-03-30 DIAGNOSIS — R10.9 ABDOMINAL PAIN OF UNKNOWN ETIOLOGY: Primary | ICD-10-CM

## 2023-03-30 LAB
ALBUMIN SERPL-MCNC: 3.6 G/DL (ref 3.4–5)
ALBUMIN/GLOB SERPL: 0.9 {RATIO} (ref 1–2)
ALP LIVER SERPL-CCNC: 85 U/L
ALT SERPL-CCNC: 27 U/L
ANION GAP SERPL CALC-SCNC: 4 MMOL/L (ref 0–18)
AST SERPL-CCNC: 30 U/L (ref 15–37)
BASOPHILS # BLD AUTO: 0.01 X10(3) UL (ref 0–0.2)
BASOPHILS NFR BLD AUTO: 0.1 %
BILIRUB SERPL-MCNC: 1.6 MG/DL (ref 0.1–2)
BUN BLD-MCNC: 17 MG/DL (ref 7–18)
CALCIUM BLD-MCNC: 8.8 MG/DL (ref 8.5–10.1)
CHLORIDE SERPL-SCNC: 103 MMOL/L (ref 98–112)
CO2 SERPL-SCNC: 23 MMOL/L (ref 21–32)
CREAT BLD-MCNC: 1.23 MG/DL
EOSINOPHIL # BLD AUTO: 0.02 X10(3) UL (ref 0–0.7)
EOSINOPHIL NFR BLD AUTO: 0.2 %
ERYTHROCYTE [DISTWIDTH] IN BLOOD BY AUTOMATED COUNT: 14.4 %
GFR SERPLBLD BASED ON 1.73 SQ M-ARVRAT: 62 ML/MIN/1.73M2 (ref 60–?)
GLOBULIN PLAS-MCNC: 3.8 G/DL (ref 2.8–4.4)
GLUCOSE BLD-MCNC: 117 MG/DL (ref 70–99)
HCT VFR BLD AUTO: 38 %
HGB BLD-MCNC: 13 G/DL
IMM GRANULOCYTES # BLD AUTO: 0.03 X10(3) UL (ref 0–1)
IMM GRANULOCYTES NFR BLD: 0.3 %
LIPASE SERPL-CCNC: 168 U/L (ref 13–75)
LYMPHOCYTES # BLD AUTO: 1.03 X10(3) UL (ref 1–4)
LYMPHOCYTES NFR BLD AUTO: 8.6 %
MCH RBC QN AUTO: 31.3 PG (ref 26–34)
MCHC RBC AUTO-ENTMCNC: 34.2 G/DL (ref 31–37)
MCV RBC AUTO: 91.3 FL
MONOCYTES # BLD AUTO: 1.68 X10(3) UL (ref 0.1–1)
MONOCYTES NFR BLD AUTO: 14 %
NEUTROPHILS # BLD AUTO: 9.2 X10 (3) UL (ref 1.5–7.7)
NEUTROPHILS # BLD AUTO: 9.2 X10(3) UL (ref 1.5–7.7)
NEUTROPHILS NFR BLD AUTO: 76.8 %
OSMOLALITY SERPL CALC.SUM OF ELEC: 273 MOSM/KG (ref 275–295)
PLATELET # BLD AUTO: 163 10(3)UL (ref 150–450)
POTASSIUM SERPL-SCNC: 4.7 MMOL/L (ref 3.5–5.1)
PROT SERPL-MCNC: 7.4 G/DL (ref 6.4–8.2)
Q-T INTERVAL: 384 MS
QRS DURATION: 104 MS
QTC CALCULATION (BEZET): 456 MS
R AXIS: 85 DEGREES
RBC # BLD AUTO: 4.16 X10(6)UL
SARS-COV-2 RNA RESP QL NAA+PROBE: NOT DETECTED
SODIUM SERPL-SCNC: 130 MMOL/L (ref 136–145)
T AXIS: -57 DEGREES
VENTRICULAR RATE: 85 BPM
WBC # BLD AUTO: 12 X10(3) UL (ref 4–11)

## 2023-03-30 PROCEDURE — 76700 US EXAM ABDOM COMPLETE: CPT | Performed by: EMERGENCY MEDICINE

## 2023-03-30 PROCEDURE — 99223 1ST HOSP IP/OBS HIGH 75: CPT | Performed by: INTERNAL MEDICINE

## 2023-03-30 PROCEDURE — 74177 CT ABD & PELVIS W/CONTRAST: CPT | Performed by: EMERGENCY MEDICINE

## 2023-03-30 RX ORDER — HYDROMORPHONE HYDROCHLORIDE 1 MG/ML
0.4 INJECTION, SOLUTION INTRAMUSCULAR; INTRAVENOUS; SUBCUTANEOUS EVERY 2 HOUR PRN
Status: DISCONTINUED | OUTPATIENT
Start: 2023-03-30 | End: 2023-04-03

## 2023-03-30 RX ORDER — MORPHINE SULFATE 4 MG/ML
4 INJECTION, SOLUTION INTRAMUSCULAR; INTRAVENOUS ONCE
Status: COMPLETED | OUTPATIENT
Start: 2023-03-30 | End: 2023-03-30

## 2023-03-30 RX ORDER — BISACODYL 10 MG
10 SUPPOSITORY, RECTAL RECTAL
Status: DISCONTINUED | OUTPATIENT
Start: 2023-03-30 | End: 2023-04-03

## 2023-03-30 RX ORDER — HYDROCODONE BITARTRATE AND ACETAMINOPHEN 5; 325 MG/1; MG/1
2 TABLET ORAL EVERY 4 HOURS PRN
Status: DISCONTINUED | OUTPATIENT
Start: 2023-03-30 | End: 2023-04-03

## 2023-03-30 RX ORDER — ACETAMINOPHEN 325 MG/1
650 TABLET ORAL EVERY 4 HOURS PRN
Status: DISCONTINUED | OUTPATIENT
Start: 2023-03-30 | End: 2023-04-03

## 2023-03-30 RX ORDER — HYDROCODONE BITARTRATE AND ACETAMINOPHEN 5; 325 MG/1; MG/1
1 TABLET ORAL EVERY 4 HOURS PRN
Status: DISCONTINUED | OUTPATIENT
Start: 2023-03-30 | End: 2023-04-01

## 2023-03-30 RX ORDER — MELATONIN
3 NIGHTLY PRN
Status: DISCONTINUED | OUTPATIENT
Start: 2023-03-30 | End: 2023-04-03

## 2023-03-30 RX ORDER — SODIUM CHLORIDE 9 MG/ML
125 INJECTION, SOLUTION INTRAVENOUS CONTINUOUS
Status: DISCONTINUED | OUTPATIENT
Start: 2023-03-30 | End: 2023-04-01

## 2023-03-30 RX ORDER — HYDROMORPHONE HYDROCHLORIDE 1 MG/ML
0.5 INJECTION, SOLUTION INTRAMUSCULAR; INTRAVENOUS; SUBCUTANEOUS EVERY 30 MIN PRN
Status: DISCONTINUED | OUTPATIENT
Start: 2023-03-30 | End: 2023-03-30

## 2023-03-30 RX ORDER — ONDANSETRON 2 MG/ML
4 INJECTION INTRAMUSCULAR; INTRAVENOUS EVERY 6 HOURS PRN
Status: DISCONTINUED | OUTPATIENT
Start: 2023-03-30 | End: 2023-04-03

## 2023-03-30 RX ORDER — HYDROMORPHONE HYDROCHLORIDE 1 MG/ML
0.8 INJECTION, SOLUTION INTRAMUSCULAR; INTRAVENOUS; SUBCUTANEOUS EVERY 2 HOUR PRN
Status: DISCONTINUED | OUTPATIENT
Start: 2023-03-30 | End: 2023-04-03

## 2023-03-30 RX ORDER — POLYETHYLENE GLYCOL 3350 17 G/17G
17 POWDER, FOR SOLUTION ORAL DAILY PRN
Status: DISCONTINUED | OUTPATIENT
Start: 2023-03-30 | End: 2023-04-03

## 2023-03-30 RX ORDER — SODIUM PHOSPHATE, DIBASIC AND SODIUM PHOSPHATE, MONOBASIC 7; 19 G/133ML; G/133ML
1 ENEMA RECTAL ONCE AS NEEDED
Status: DISCONTINUED | OUTPATIENT
Start: 2023-03-30 | End: 2023-04-03

## 2023-03-30 RX ORDER — SODIUM CHLORIDE 9 MG/ML
INJECTION, SOLUTION INTRAVENOUS CONTINUOUS
Status: ACTIVE | OUTPATIENT
Start: 2023-03-30 | End: 2023-03-30

## 2023-03-30 RX ORDER — ONDANSETRON 2 MG/ML
4 INJECTION INTRAMUSCULAR; INTRAVENOUS ONCE
Status: COMPLETED | OUTPATIENT
Start: 2023-03-30 | End: 2023-03-30

## 2023-03-30 RX ORDER — HYDROMORPHONE HYDROCHLORIDE 1 MG/ML
0.2 INJECTION, SOLUTION INTRAMUSCULAR; INTRAVENOUS; SUBCUTANEOUS EVERY 2 HOUR PRN
Status: DISCONTINUED | OUTPATIENT
Start: 2023-03-30 | End: 2023-04-03

## 2023-03-30 RX ORDER — SENNOSIDES 8.6 MG
17.2 TABLET ORAL NIGHTLY PRN
Status: DISCONTINUED | OUTPATIENT
Start: 2023-03-30 | End: 2023-04-03

## 2023-03-30 RX ORDER — METOCLOPRAMIDE HYDROCHLORIDE 5 MG/ML
10 INJECTION INTRAMUSCULAR; INTRAVENOUS EVERY 8 HOURS PRN
Status: DISCONTINUED | OUTPATIENT
Start: 2023-03-30 | End: 2023-04-03

## 2023-03-30 RX ORDER — SODIUM CHLORIDE, SODIUM LACTATE, POTASSIUM CHLORIDE, CALCIUM CHLORIDE 600; 310; 30; 20 MG/100ML; MG/100ML; MG/100ML; MG/100ML
INJECTION, SOLUTION INTRAVENOUS ONCE
Status: DISCONTINUED | OUTPATIENT
Start: 2023-03-30 | End: 2023-04-02

## 2023-03-30 RX ORDER — HYDROMORPHONE HYDROCHLORIDE 1 MG/ML
1 INJECTION, SOLUTION INTRAMUSCULAR; INTRAVENOUS; SUBCUTANEOUS ONCE
Status: COMPLETED | OUTPATIENT
Start: 2023-03-30 | End: 2023-03-30

## 2023-03-30 RX ORDER — ONDANSETRON 2 MG/ML
4 INJECTION INTRAMUSCULAR; INTRAVENOUS EVERY 4 HOURS PRN
Status: DISCONTINUED | OUTPATIENT
Start: 2023-03-30 | End: 2023-03-30

## 2023-03-30 RX ORDER — SODIUM CHLORIDE, SODIUM LACTATE, POTASSIUM CHLORIDE, CALCIUM CHLORIDE 600; 310; 30; 20 MG/100ML; MG/100ML; MG/100ML; MG/100ML
INJECTION, SOLUTION INTRAVENOUS CONTINUOUS
Status: ACTIVE | OUTPATIENT
Start: 2023-03-30 | End: 2023-03-31

## 2023-03-30 NOTE — PLAN OF CARE
NURSING ADMISSION NOTE      Patient admitted via Cart  Oriented to room. Safety precautions initiated. Bed in low position. Call light in reach.     Admission navigator and med rec completed  Alert & oriented x4  Tele; NSR with 1st degree heart block; occasional V-paced  Room air  C/o 8/10 R sided abdominal pain; Declining pharmacological intervention  Denies nausea  Kept NPO per order  LR bolus infusing     Plan for IVF, IV abx, NPO, PPI

## 2023-03-30 NOTE — ED INITIAL ASSESSMENT (HPI)
Patient to the ED with c/o abd pain that starts on the left side of his belly, and wraps around to his right side and to his spine. Patient was seen here in the [facility] ED two days ago for same. Was sent home with Norco, unable to take it at the moment d/t not wanting his ulcer to bleed. Patient has significant medical and surgical hx.

## 2023-03-31 LAB
ALBUMIN SERPL-MCNC: 3 G/DL (ref 3.4–5)
ALBUMIN/GLOB SERPL: 0.9 {RATIO} (ref 1–2)
ALP LIVER SERPL-CCNC: 69 U/L
ALT SERPL-CCNC: 21 U/L
ANION GAP SERPL CALC-SCNC: 5 MMOL/L (ref 0–18)
AST SERPL-CCNC: 16 U/L (ref 15–37)
BILIRUB SERPL-MCNC: 1.5 MG/DL (ref 0.1–2)
BUN BLD-MCNC: 18 MG/DL (ref 7–18)
CALCIUM BLD-MCNC: 8.2 MG/DL (ref 8.5–10.1)
CHLORIDE SERPL-SCNC: 104 MMOL/L (ref 98–112)
CO2 SERPL-SCNC: 25 MMOL/L (ref 21–32)
CREAT BLD-MCNC: 1.32 MG/DL
ERYTHROCYTE [DISTWIDTH] IN BLOOD BY AUTOMATED COUNT: 14.7 %
GFR SERPLBLD BASED ON 1.73 SQ M-ARVRAT: 57 ML/MIN/1.73M2 (ref 60–?)
GLOBULIN PLAS-MCNC: 3.4 G/DL (ref 2.8–4.4)
GLUCOSE BLD-MCNC: 125 MG/DL (ref 70–99)
HCT VFR BLD AUTO: 35.3 %
HGB BLD-MCNC: 11.9 G/DL
MAGNESIUM SERPL-MCNC: 1.9 MG/DL (ref 1.6–2.6)
MCH RBC QN AUTO: 31.2 PG (ref 26–34)
MCHC RBC AUTO-ENTMCNC: 33.7 G/DL (ref 31–37)
MCV RBC AUTO: 92.4 FL
OSMOLALITY SERPL CALC.SUM OF ELEC: 281 MOSM/KG (ref 275–295)
PHOSPHATE SERPL-MCNC: 2.8 MG/DL (ref 2.5–4.9)
PLATELET # BLD AUTO: 147 10(3)UL (ref 150–450)
POTASSIUM SERPL-SCNC: 4.5 MMOL/L (ref 3.5–5.1)
PROT SERPL-MCNC: 6.4 G/DL (ref 6.4–8.2)
RBC # BLD AUTO: 3.82 X10(6)UL
SODIUM SERPL-SCNC: 134 MMOL/L (ref 136–145)
WBC # BLD AUTO: 20.4 X10(3) UL (ref 4–11)

## 2023-03-31 PROCEDURE — 99232 SBSQ HOSP IP/OBS MODERATE 35: CPT | Performed by: STUDENT IN AN ORGANIZED HEALTH CARE EDUCATION/TRAINING PROGRAM

## 2023-03-31 RX ORDER — SODIUM CHLORIDE 9 MG/ML
INJECTION, SOLUTION INTRAVENOUS CONTINUOUS
Status: DISCONTINUED | OUTPATIENT
Start: 2023-03-31 | End: 2023-04-01

## 2023-03-31 RX ORDER — SODIUM CHLORIDE, SODIUM LACTATE, POTASSIUM CHLORIDE, CALCIUM CHLORIDE 600; 310; 30; 20 MG/100ML; MG/100ML; MG/100ML; MG/100ML
INJECTION, SOLUTION INTRAVENOUS CONTINUOUS
Status: DISCONTINUED | OUTPATIENT
Start: 2023-03-31 | End: 2023-03-31

## 2023-03-31 NOTE — PLAN OF CARE
Assumed care @ 0700. PT A/OX4 and on 2L NC, VSS. Pt c/o of abd pain this am. Pain med declined. Pain reassessed and pt states he feels better. GI consulted. NPO with sips of clears, IV abx contin. Plan of care updated with pt.    Problem: Patient/Family Goals  Goal: Patient/Family Long Term Goal  Description: Patient's Long Term Goal: Discharge     Interventions:  - Pain management  - GI consulted   - Surg onc following   - See additional Care Plan goals for specific interventions  Outcome: Progressing  Goal: Patient/Family Short Term Goal  Description: Patient's Short Term Goal: Pain management     Interventions:   - PPI  - Repositioning  - Pain meds prn   - See additional Care Plan goals for specific interventions  Outcome: Progressing     Problem: PAIN - ADULT  Goal: Verbalizes/displays adequate comfort level or patient's stated pain goal  Description: INTERVENTIONS:  - Encourage pt to monitor pain and request assistance  - Assess pain using appropriate pain scale  - Administer analgesics based on type and severity of pain and evaluate response  - Implement non-pharmacological measures as appropriate and evaluate response  - Consider cultural and social influences on pain and pain management  - Manage/alleviate anxiety  - Utilize distraction and/or relaxation techniques  - Monitor for opioid side effects  - Notify MD/LIP if interventions unsuccessful or patient reports new pain  - Anticipate increased pain with activity and pre-medicate as appropriate  Outcome: Progressing

## 2023-03-31 NOTE — PLAN OF CARE
Received pt at 2000  Pt AOx4, SR/ Vpaced, RA/2L NC, VSS  Abd pain. PRN dilaudid  IVF  IV Abx  NPO  Call light within reach.   Needs currently met

## 2023-04-01 LAB
ALBUMIN SERPL-MCNC: 2.5 G/DL (ref 3.4–5)
ALBUMIN/GLOB SERPL: 0.7 {RATIO} (ref 1–2)
ALP LIVER SERPL-CCNC: 75 U/L
ALT SERPL-CCNC: 20 U/L
ANION GAP SERPL CALC-SCNC: 6 MMOL/L (ref 0–18)
AST SERPL-CCNC: 20 U/L (ref 15–37)
BILIRUB SERPL-MCNC: 1.5 MG/DL (ref 0.1–2)
BUN BLD-MCNC: 18 MG/DL (ref 7–18)
CALCIUM BLD-MCNC: 7.3 MG/DL (ref 8.5–10.1)
CHLORIDE SERPL-SCNC: 105 MMOL/L (ref 98–112)
CO2 SERPL-SCNC: 22 MMOL/L (ref 21–32)
CREAT BLD-MCNC: 1.17 MG/DL
ERYTHROCYTE [DISTWIDTH] IN BLOOD BY AUTOMATED COUNT: 14.5 %
GFR SERPLBLD BASED ON 1.73 SQ M-ARVRAT: 66 ML/MIN/1.73M2 (ref 60–?)
GLOBULIN PLAS-MCNC: 3.5 G/DL (ref 2.8–4.4)
GLUCOSE BLD-MCNC: 91 MG/DL (ref 70–99)
HCT VFR BLD AUTO: 33 %
HGB BLD-MCNC: 11.2 G/DL
MCH RBC QN AUTO: 31.4 PG (ref 26–34)
MCHC RBC AUTO-ENTMCNC: 33.9 G/DL (ref 31–37)
MCV RBC AUTO: 92.4 FL
OSMOLALITY SERPL CALC.SUM OF ELEC: 277 MOSM/KG (ref 275–295)
PLATELET # BLD AUTO: 130 10(3)UL (ref 150–450)
POTASSIUM SERPL-SCNC: 3.6 MMOL/L (ref 3.5–5.1)
PROT SERPL-MCNC: 6 G/DL (ref 6.4–8.2)
RBC # BLD AUTO: 3.57 X10(6)UL
SODIUM SERPL-SCNC: 133 MMOL/L (ref 136–145)
WBC # BLD AUTO: 15.6 X10(3) UL (ref 4–11)

## 2023-04-01 PROCEDURE — 99233 SBSQ HOSP IP/OBS HIGH 50: CPT | Performed by: INTERNAL MEDICINE

## 2023-04-01 RX ORDER — ASPIRIN 81 MG/1
81 TABLET ORAL DAILY
Status: DISCONTINUED | OUTPATIENT
Start: 2023-04-01 | End: 2023-04-03

## 2023-04-01 RX ORDER — HYOSCYAMINE SULFATE 0.125 MG
0.12 TABLET,DISINTEGRATING ORAL EVERY 6 HOURS PRN
Status: DISCONTINUED | OUTPATIENT
Start: 2023-04-01 | End: 2023-04-03

## 2023-04-01 RX ORDER — SPIRONOLACTONE 25 MG/1
12.5 TABLET ORAL DAILY
Status: DISCONTINUED | OUTPATIENT
Start: 2023-04-01 | End: 2023-04-03

## 2023-04-01 RX ORDER — METOPROLOL SUCCINATE 100 MG/1
100 TABLET, EXTENDED RELEASE ORAL DAILY
Status: DISCONTINUED | OUTPATIENT
Start: 2023-04-01 | End: 2023-04-03

## 2023-04-01 RX ORDER — ATORVASTATIN CALCIUM 20 MG/1
20 TABLET, FILM COATED ORAL DAILY
Status: DISCONTINUED | OUTPATIENT
Start: 2023-04-01 | End: 2023-04-03

## 2023-04-01 RX ORDER — HYDROCODONE BITARTRATE AND ACETAMINOPHEN 5; 325 MG/1; MG/1
1 TABLET ORAL EVERY 6 HOURS PRN
Status: DISCONTINUED | OUTPATIENT
Start: 2023-04-01 | End: 2023-04-03

## 2023-04-01 NOTE — PROGRESS NOTES
Assumed care at [address]. Patient A&Ox4. Tele SR, on room air. CPAP HS. C/o abd pain, PRN dilaudid with relief. NPO, IVF infusing. IV abx as ordered. Possible repeat CT scan tomorrow.

## 2023-04-01 NOTE — PLAN OF CARE
Assumed care at 0700. Pt A/O x4. 96%-98% on 1L O2 per NC. NSR with heart block, intermittently Apaced on tele. Diet advanced to clears, tolerating well. Pt updated on POC. Call light within reach. Will continue to monitor.

## 2023-04-02 LAB
ALBUMIN SERPL-MCNC: 2.8 G/DL (ref 3.4–5)
ALBUMIN/GLOB SERPL: 0.7 {RATIO} (ref 1–2)
ALP LIVER SERPL-CCNC: 97 U/L
ALT SERPL-CCNC: 28 U/L
ANION GAP SERPL CALC-SCNC: 6 MMOL/L (ref 0–18)
AST SERPL-CCNC: 36 U/L (ref 15–37)
BILIRUB SERPL-MCNC: 1.5 MG/DL (ref 0.1–2)
BUN BLD-MCNC: 15 MG/DL (ref 7–18)
CALCIUM BLD-MCNC: 7.8 MG/DL (ref 8.5–10.1)
CHLORIDE SERPL-SCNC: 105 MMOL/L (ref 98–112)
CO2 SERPL-SCNC: 22 MMOL/L (ref 21–32)
CREAT BLD-MCNC: 1.17 MG/DL
ERYTHROCYTE [DISTWIDTH] IN BLOOD BY AUTOMATED COUNT: 14.3 %
GFR SERPLBLD BASED ON 1.73 SQ M-ARVRAT: 66 ML/MIN/1.73M2 (ref 60–?)
GLOBULIN PLAS-MCNC: 3.9 G/DL (ref 2.8–4.4)
GLUCOSE BLD-MCNC: 97 MG/DL (ref 70–99)
HCT VFR BLD AUTO: 35.9 %
HGB BLD-MCNC: 11.9 G/DL
MCH RBC QN AUTO: 31 PG (ref 26–34)
MCHC RBC AUTO-ENTMCNC: 33.1 G/DL (ref 31–37)
MCV RBC AUTO: 93.5 FL
OSMOLALITY SERPL CALC.SUM OF ELEC: 277 MOSM/KG (ref 275–295)
PLATELET # BLD AUTO: 137 10(3)UL (ref 150–450)
POTASSIUM SERPL-SCNC: 3.7 MMOL/L (ref 3.5–5.1)
PROT SERPL-MCNC: 6.7 G/DL (ref 6.4–8.2)
RBC # BLD AUTO: 3.84 X10(6)UL
SODIUM SERPL-SCNC: 133 MMOL/L (ref 136–145)
WBC # BLD AUTO: 12.7 X10(3) UL (ref 4–11)

## 2023-04-02 PROCEDURE — 99232 SBSQ HOSP IP/OBS MODERATE 35: CPT | Performed by: INTERNAL MEDICINE

## 2023-04-02 RX ORDER — PANTOPRAZOLE SODIUM 40 MG/1
40 TABLET, DELAYED RELEASE ORAL
Status: DISCONTINUED | OUTPATIENT
Start: 2023-04-02 | End: 2023-04-03

## 2023-04-02 RX ORDER — AMOXICILLIN AND CLAVULANATE POTASSIUM 875; 125 MG/1; MG/1
875 TABLET, FILM COATED ORAL EVERY 12 HOURS SCHEDULED
Status: DISCONTINUED | OUTPATIENT
Start: 2023-04-02 | End: 2023-04-03

## 2023-04-02 RX ORDER — METRONIDAZOLE 500 MG/1
500 TABLET ORAL EVERY 8 HOURS SCHEDULED
Status: DISCONTINUED | OUTPATIENT
Start: 2023-04-02 | End: 2023-04-03

## 2023-04-02 NOTE — PLAN OF CARE
Assumed care at 2300  Recd pt on O21L/NC, no CPAP  Denies pain at this time.   Plan:IV antibiotic, PPI  Labs in am.  Problem: Patient/Family Goals  Goal: Patient/Family Long Term Goal  Description: Patient's Long Term Goal: Discharge     Interventions:  - Pain management  - GI/surg onco consults  -labs  -re-imaging  - See additional Care Plan goals for specific interventions  Outcome: Progressing  Goal: Patient/Family Short Term Goal  Description: Patient's Short Term Goal: Pain management     Interventions:   - PPI  - Repositioning  - Pain meds prn   - See additional Care Plan goals for specific interventions  Outcome: Progressing     Problem: PAIN - ADULT  Goal: Verbalizes/displays adequate comfort level or patient's stated pain goal  Description: INTERVENTIONS:  - Encourage pt to monitor pain and request assistance  - Assess pain using appropriate pain scale  - Administer analgesics based on type and severity of pain and evaluate response  - Implement non-pharmacological measures as appropriate and evaluate response  - Consider cultural and social influences on pain and pain management  - Manage/alleviate anxiety  - Utilize distraction and/or relaxation techniques  - Monitor for opioid side effects  - Notify MD/LIP if interventions unsuccessful or patient reports new pain  - Anticipate increased pain with activity and pre-medicate as appropriate  Outcome: Progressing     Problem: GASTROINTESTINAL - ADULT  Goal: Minimal or absence of nausea and vomiting  Description: INTERVENTIONS:  - Maintain adequate hydration with IV or PO as ordered and tolerated  - Nasogastric tube to low intermittent suction as ordered  - Evaluate effectiveness of ordered antiemetic medications  - Provide nonpharmacologic comfort measures as appropriate  - Advance diet as tolerated, if ordered  - Obtain nutritional consult as needed  - Evaluate fluid balance  Outcome: Progressing  Goal: Maintains or returns to baseline bowel function  Description: INTERVENTIONS:  - Assess bowel function  - Maintain adequate hydration with IV or PO as ordered and tolerated  - Evaluate effectiveness of GI medications  - Encourage mobilization and activity  - Obtain nutritional consult as needed  - Establish a toileting routine/schedule  - Consider collaborating with pharmacy to review patient's medication profile  Outcome: Progressing  Goal: Maintains adequate nutritional intake (undernourished)  Description: INTERVENTIONS:  - Monitor percentage of each meal consumed  - Identify factors contributing to decreased intake, treat as appropriate  - Assist with meals as needed  - Monitor I&O, WT and lab values  - Obtain nutritional consult as needed  - Optimize oral hygiene and moisture  - Encourage food from home; allow for food preferences  - Enhance eating environment  Outcome: Progressing

## 2023-04-02 NOTE — PLAN OF CARE
Assumed care at 0730  Orientated x4, A paced with BBB, RA   Denies abdominal pain     VSS; afebrile   No bowel movement, passing gas   Advanced diet; tolerating soft   IV abx changed to PO   Needs met, call light within reach     Plan for further evaluation of GI

## 2023-04-03 VITALS
HEIGHT: 70 IN | WEIGHT: 161 LBS | OXYGEN SATURATION: 96 % | DIASTOLIC BLOOD PRESSURE: 67 MMHG | RESPIRATION RATE: 15 BRPM | SYSTOLIC BLOOD PRESSURE: 128 MMHG | BODY MASS INDEX: 23.05 KG/M2 | TEMPERATURE: 98 F | HEART RATE: 72 BPM

## 2023-04-03 PROBLEM — R10.9 ABDOMINAL PAIN OF UNKNOWN ETIOLOGY: Status: RESOLVED | Noted: 2023-03-30 | Resolved: 2023-04-03

## 2023-04-03 LAB
ALBUMIN SERPL-MCNC: 2.6 G/DL (ref 3.4–5)
ALBUMIN/GLOB SERPL: 0.7 {RATIO} (ref 1–2)
ALP LIVER SERPL-CCNC: 96 U/L
ALT SERPL-CCNC: 25 U/L
ANION GAP SERPL CALC-SCNC: 5 MMOL/L (ref 0–18)
AST SERPL-CCNC: 28 U/L (ref 15–37)
BILIRUB SERPL-MCNC: 1.3 MG/DL (ref 0.1–2)
BUN BLD-MCNC: 13 MG/DL (ref 7–18)
CALCIUM BLD-MCNC: 7.9 MG/DL (ref 8.5–10.1)
CHLORIDE SERPL-SCNC: 104 MMOL/L (ref 98–112)
CO2 SERPL-SCNC: 22 MMOL/L (ref 21–32)
CREAT BLD-MCNC: 1.1 MG/DL
ERYTHROCYTE [DISTWIDTH] IN BLOOD BY AUTOMATED COUNT: 14.1 %
GFR SERPLBLD BASED ON 1.73 SQ M-ARVRAT: 71 ML/MIN/1.73M2 (ref 60–?)
GLOBULIN PLAS-MCNC: 3.6 G/DL (ref 2.8–4.4)
GLUCOSE BLD-MCNC: 100 MG/DL (ref 70–99)
HCT VFR BLD AUTO: 32 %
HGB BLD-MCNC: 11 G/DL
MCH RBC QN AUTO: 30.7 PG (ref 26–34)
MCHC RBC AUTO-ENTMCNC: 34.4 G/DL (ref 31–37)
MCV RBC AUTO: 89.4 FL
OSMOLALITY SERPL CALC.SUM OF ELEC: 272 MOSM/KG (ref 275–295)
PLATELET # BLD AUTO: 156 10(3)UL (ref 150–450)
POTASSIUM SERPL-SCNC: 3.8 MMOL/L (ref 3.5–5.1)
PROT SERPL-MCNC: 6.2 G/DL (ref 6.4–8.2)
RBC # BLD AUTO: 3.58 X10(6)UL
SODIUM SERPL-SCNC: 131 MMOL/L (ref 136–145)
WBC # BLD AUTO: 10.6 X10(3) UL (ref 4–11)

## 2023-04-03 PROCEDURE — 99239 HOSP IP/OBS DSCHRG MGMT >30: CPT | Performed by: INTERNAL MEDICINE

## 2023-04-03 RX ORDER — AMOXICILLIN AND CLAVULANATE POTASSIUM 875; 125 MG/1; MG/1
875 TABLET, FILM COATED ORAL EVERY 12 HOURS SCHEDULED
Qty: 12 TABLET | Refills: 0 | Status: ON HOLD | OUTPATIENT
Start: 2023-04-03 | End: 2023-04-10

## 2023-04-03 RX ORDER — AMOXICILLIN AND CLAVULANATE POTASSIUM 875; 125 MG/1; MG/1
875 TABLET, FILM COATED ORAL EVERY 12 HOURS SCHEDULED
Qty: 12 TABLET | Refills: 0 | Status: SHIPPED | OUTPATIENT
Start: 2023-04-03 | End: 2023-04-03

## 2023-04-03 RX ORDER — METRONIDAZOLE 500 MG/1
500 TABLET ORAL EVERY 8 HOURS SCHEDULED
Qty: 18 TABLET | Refills: 0 | Status: SHIPPED | OUTPATIENT
Start: 2023-04-03 | End: 2023-04-03

## 2023-04-03 RX ORDER — METRONIDAZOLE 500 MG/1
500 TABLET ORAL EVERY 8 HOURS SCHEDULED
Qty: 18 TABLET | Refills: 0 | Status: ON HOLD | OUTPATIENT
Start: 2023-04-03 | End: 2023-04-10

## 2023-04-03 NOTE — PLAN OF CARE
NURSING DISCHARGE NOTE    Discharged Home via Wheelchair. Accompanied by Family member and Support staff  Belongings Taken by patient/family.     All consults cleared for discharge   Medications scripts sent to pharmacy   Patient educated on discharge paperwork; no further questions

## 2023-04-03 NOTE — PLAN OF CARE
Assumed care of pt at [address] [place] [address]. Alert, oriented x4. CPAP with sleep. Reports mild right abdominal pain, declined need for pain medication. Had a low fiber lunch, declined to eat dinner, reports bloating. Awaiting a bowel movement. Miralax given at pt's request.  PO abx administered. Plan of care reviewed with pt.     Problem: Patient/Family Goals  Goal: Patient/Family Long Term Goal  Description: Patient's Long Term Goal: Discharge     Interventions:  - Pain management  - GI consulted   - Surg onc following   - See additional Care Plan goals for specific interventions  Outcome: Progressing     Problem: PAIN - ADULT  Goal: Verbalizes/displays adequate comfort level or patient's stated pain goal  Description: INTERVENTIONS:  - Encourage pt to monitor pain and request assistance  - Assess pain using appropriate pain scale  - Administer analgesics based on type and severity of pain and evaluate response  - Implement non-pharmacological measures as appropriate and evaluate response  - Consider cultural and social influences on pain and pain management  - Manage/alleviate anxiety  - Utilize distraction and/or relaxation techniques  - Monitor for opioid side effects  - Notify MD/LIP if interventions unsuccessful or patient reports new pain  - Anticipate increased pain with activity and pre-medicate as appropriate  Outcome: Progressing     Problem: GASTROINTESTINAL - ADULT  Goal: Maintains or returns to baseline bowel function  Description: INTERVENTIONS:  - Assess bowel function  - Maintain adequate hydration with IV or PO as ordered and tolerated  - Evaluate effectiveness of GI medications  - Encourage mobilization and activity  - Obtain nutritional consult as needed  - Establish a toileting routine/schedule  - Consider collaborating with pharmacy to review patient's medication profile  Outcome: Progressing     Problem: Patient/Family Goals  Goal: Patient/Family Short Term Goal  Description: Patient's Short Term Goal: Pain management     Interventions:   - PPI  - Repositioning  - Pain meds prn   - See additional Care Plan goals for specific interventions  Outcome: Adequate for Discharge

## 2023-04-03 NOTE — PLAN OF CARE
Assumed care at 0730   Orientated x4, A paced with BBB, RA   Denies pain    VSS; afebrile   Bowel movement today   Needs met, call light within reach     Plan for discharge home if okay with consults

## 2023-04-04 ENCOUNTER — PATIENT OUTREACH (OUTPATIENT)
Dept: CASE MANAGEMENT | Age: 73
End: 2023-04-04

## 2023-04-06 ENCOUNTER — APPOINTMENT (OUTPATIENT)
Dept: CT IMAGING | Facility: HOSPITAL | Age: 73
End: 2023-04-06
Attending: EMERGENCY MEDICINE
Payer: OTHER GOVERNMENT

## 2023-04-06 ENCOUNTER — APPOINTMENT (OUTPATIENT)
Dept: GENERAL RADIOLOGY | Facility: HOSPITAL | Age: 73
End: 2023-04-06
Attending: EMERGENCY MEDICINE
Payer: OTHER GOVERNMENT

## 2023-04-06 ENCOUNTER — HOSPITAL ENCOUNTER (INPATIENT)
Facility: HOSPITAL | Age: 73
LOS: 4 days | Discharge: HOME | End: 2023-04-10
Attending: EMERGENCY MEDICINE | Admitting: INTERNAL MEDICINE
Payer: OTHER GOVERNMENT

## 2023-04-06 DIAGNOSIS — K65.1 INTRA-ABDOMINAL ABSCESS (HCC): Primary | ICD-10-CM

## 2023-04-06 DIAGNOSIS — R10.9 ABDOMINAL PAIN OF UNKNOWN ETIOLOGY: ICD-10-CM

## 2023-04-06 LAB
ALBUMIN SERPL-MCNC: 3 G/DL (ref 3.4–5)
ALBUMIN/GLOB SERPL: 0.7 {RATIO} (ref 1–2)
ALP LIVER SERPL-CCNC: 90 U/L
ALT SERPL-CCNC: 34 U/L
ANION GAP SERPL CALC-SCNC: 10 MMOL/L (ref 0–18)
AST SERPL-CCNC: 44 U/L (ref 15–37)
BASOPHILS # BLD AUTO: 0.06 X10(3) UL (ref 0–0.2)
BASOPHILS NFR BLD AUTO: 0.4 %
BILIRUB SERPL-MCNC: 0.7 MG/DL (ref 0.1–2)
BILIRUB UR QL STRIP.AUTO: NEGATIVE
BUN BLD-MCNC: 8 MG/DL (ref 7–18)
CALCIUM BLD-MCNC: 8.1 MG/DL (ref 8.5–10.1)
CHLORIDE SERPL-SCNC: 103 MMOL/L (ref 98–112)
CLARITY UR REFRACT.AUTO: CLEAR
CO2 SERPL-SCNC: 21 MMOL/L (ref 21–32)
COLOR UR AUTO: YELLOW
CREAT BLD-MCNC: 1.03 MG/DL
EOSINOPHIL # BLD AUTO: 0.11 X10(3) UL (ref 0–0.7)
EOSINOPHIL NFR BLD AUTO: 0.8 %
ERYTHROCYTE [DISTWIDTH] IN BLOOD BY AUTOMATED COUNT: 14.6 %
GFR SERPLBLD BASED ON 1.73 SQ M-ARVRAT: 77 ML/MIN/1.73M2 (ref 60–?)
GLOBULIN PLAS-MCNC: 4.2 G/DL (ref 2.8–4.4)
GLUCOSE BLD-MCNC: 105 MG/DL (ref 70–99)
GLUCOSE UR STRIP.AUTO-MCNC: NEGATIVE MG/DL
HCT VFR BLD AUTO: 34.7 %
HGB BLD-MCNC: 12.1 G/DL
IMM GRANULOCYTES # BLD AUTO: 0.07 X10(3) UL (ref 0–1)
IMM GRANULOCYTES NFR BLD: 0.5 %
LACTATE SERPL-SCNC: 0.8 MMOL/L (ref 0.4–2)
LEUKOCYTE ESTERASE UR QL STRIP.AUTO: NEGATIVE
LIPASE SERPL-CCNC: 47 U/L (ref 13–75)
LYMPHOCYTES # BLD AUTO: 1.38 X10(3) UL (ref 1–4)
LYMPHOCYTES NFR BLD AUTO: 9.5 %
MCH RBC QN AUTO: 31.3 PG (ref 26–34)
MCHC RBC AUTO-ENTMCNC: 34.9 G/DL (ref 31–37)
MCV RBC AUTO: 89.7 FL
MONOCYTES # BLD AUTO: 1.92 X10(3) UL (ref 0.1–1)
MONOCYTES NFR BLD AUTO: 13.2 %
NEUTROPHILS # BLD AUTO: 11.01 X10 (3) UL (ref 1.5–7.7)
NEUTROPHILS # BLD AUTO: 11.01 X10(3) UL (ref 1.5–7.7)
NEUTROPHILS NFR BLD AUTO: 75.6 %
NITRITE UR QL STRIP.AUTO: NEGATIVE
OSMOLALITY SERPL CALC.SUM OF ELEC: 277 MOSM/KG (ref 275–295)
PH UR STRIP.AUTO: 7 [PH] (ref 5–8)
PLATELET # BLD AUTO: 260 10(3)UL (ref 150–450)
POTASSIUM SERPL-SCNC: 4 MMOL/L (ref 3.5–5.1)
PROT SERPL-MCNC: 7.2 G/DL (ref 6.4–8.2)
PROT UR STRIP.AUTO-MCNC: NEGATIVE MG/DL
RBC # BLD AUTO: 3.87 X10(6)UL
RBC UR QL AUTO: NEGATIVE
SODIUM SERPL-SCNC: 134 MMOL/L (ref 136–145)
SP GR UR STRIP.AUTO: 1.02 (ref 1–1.03)
UROBILINOGEN UR STRIP.AUTO-MCNC: <2 MG/DL
WBC # BLD AUTO: 14.6 X10(3) UL (ref 4–11)

## 2023-04-06 PROCEDURE — 99223 1ST HOSP IP/OBS HIGH 75: CPT | Performed by: INTERNAL MEDICINE

## 2023-04-06 PROCEDURE — 71045 X-RAY EXAM CHEST 1 VIEW: CPT | Performed by: EMERGENCY MEDICINE

## 2023-04-06 PROCEDURE — 74177 CT ABD & PELVIS W/CONTRAST: CPT | Performed by: EMERGENCY MEDICINE

## 2023-04-06 RX ORDER — SPIRONOLACTONE 25 MG/1
12.5 TABLET ORAL DAILY
Status: DISCONTINUED | OUTPATIENT
Start: 2023-04-06 | End: 2023-04-07

## 2023-04-06 RX ORDER — ACETAMINOPHEN 500 MG
500 TABLET ORAL EVERY 4 HOURS PRN
Status: DISCONTINUED | OUTPATIENT
Start: 2023-04-06 | End: 2023-04-07

## 2023-04-06 RX ORDER — MORPHINE SULFATE 2 MG/ML
1 INJECTION, SOLUTION INTRAMUSCULAR; INTRAVENOUS EVERY 2 HOUR PRN
Status: DISCONTINUED | OUTPATIENT
Start: 2023-04-06 | End: 2023-04-10

## 2023-04-06 RX ORDER — ATORVASTATIN CALCIUM 20 MG/1
20 TABLET, FILM COATED ORAL DAILY
Status: DISCONTINUED | OUTPATIENT
Start: 2023-04-06 | End: 2023-04-10

## 2023-04-06 RX ORDER — METOPROLOL SUCCINATE 100 MG/1
100 TABLET, EXTENDED RELEASE ORAL DAILY
Status: DISCONTINUED | OUTPATIENT
Start: 2023-04-07 | End: 2023-04-10

## 2023-04-06 RX ORDER — SODIUM CHLORIDE 9 MG/ML
INJECTION, SOLUTION INTRAVENOUS CONTINUOUS
Status: DISCONTINUED | OUTPATIENT
Start: 2023-04-06 | End: 2023-04-07

## 2023-04-06 RX ORDER — CHOLECALCIFEROL (VITAMIN D3) 125 MCG
5000 CAPSULE ORAL DAILY
Status: DISCONTINUED | OUTPATIENT
Start: 2023-04-07 | End: 2023-04-07

## 2023-04-06 RX ORDER — MORPHINE SULFATE 2 MG/ML
2 INJECTION, SOLUTION INTRAMUSCULAR; INTRAVENOUS EVERY 2 HOUR PRN
Status: DISCONTINUED | OUTPATIENT
Start: 2023-04-06 | End: 2023-04-10

## 2023-04-06 RX ORDER — HEPARIN SODIUM 5000 [USP'U]/ML
5000 INJECTION, SOLUTION INTRAVENOUS; SUBCUTANEOUS EVERY 12 HOURS SCHEDULED
Status: DISCONTINUED | OUTPATIENT
Start: 2023-04-06 | End: 2023-04-06

## 2023-04-06 RX ORDER — SODIUM CHLORIDE 9 MG/ML
INJECTION, SOLUTION INTRAVENOUS CONTINUOUS
Status: ACTIVE | OUTPATIENT
Start: 2023-04-06 | End: 2023-04-06

## 2023-04-06 RX ORDER — MORPHINE SULFATE 4 MG/ML
4 INJECTION, SOLUTION INTRAMUSCULAR; INTRAVENOUS EVERY 2 HOUR PRN
Status: DISCONTINUED | OUTPATIENT
Start: 2023-04-06 | End: 2023-04-10

## 2023-04-06 RX ORDER — ONDANSETRON 2 MG/ML
4 INJECTION INTRAMUSCULAR; INTRAVENOUS EVERY 4 HOURS PRN
Status: ACTIVE | OUTPATIENT
Start: 2023-04-06 | End: 2023-04-06

## 2023-04-06 RX ORDER — PANTOPRAZOLE SODIUM 40 MG/1
40 TABLET, DELAYED RELEASE ORAL
Status: DISCONTINUED | OUTPATIENT
Start: 2023-04-07 | End: 2023-04-06

## 2023-04-06 RX ORDER — HYDROMORPHONE HYDROCHLORIDE 1 MG/ML
0.5 INJECTION, SOLUTION INTRAMUSCULAR; INTRAVENOUS; SUBCUTANEOUS EVERY 30 MIN PRN
Status: ACTIVE | OUTPATIENT
Start: 2023-04-06 | End: 2023-04-06

## 2023-04-06 RX ORDER — ASPIRIN 81 MG/1
81 TABLET ORAL DAILY
Status: DISCONTINUED | OUTPATIENT
Start: 2023-04-07 | End: 2023-04-06

## 2023-04-06 RX ORDER — ONDANSETRON 2 MG/ML
4 INJECTION INTRAMUSCULAR; INTRAVENOUS EVERY 6 HOURS PRN
Status: DISCONTINUED | OUTPATIENT
Start: 2023-04-06 | End: 2023-04-10

## 2023-04-06 RX ORDER — METOCLOPRAMIDE HYDROCHLORIDE 5 MG/ML
10 INJECTION INTRAMUSCULAR; INTRAVENOUS EVERY 8 HOURS PRN
Status: DISCONTINUED | OUTPATIENT
Start: 2023-04-06 | End: 2023-04-10

## 2023-04-06 RX ORDER — MELATONIN
325
Status: DISCONTINUED | OUTPATIENT
Start: 2023-04-07 | End: 2023-04-07

## 2023-04-06 NOTE — ED INITIAL ASSESSMENT (HPI)
A&Ox3 patient p/w abdominal pain    Patient was admitted last week for IV ABx and pain management for \"leaking duodenal ulcer\" DCd on Thursday, sx started worsening again today, also endorses \"coffee-ground stool\"    RR even/NL, speaking in full clear sentences

## 2023-04-07 LAB
ALBUMIN SERPL-MCNC: 2.8 G/DL (ref 3.4–5)
ALBUMIN/GLOB SERPL: 0.7 {RATIO} (ref 1–2)
ALP LIVER SERPL-CCNC: 77 U/L
ALT SERPL-CCNC: 32 U/L
ANION GAP SERPL CALC-SCNC: 7 MMOL/L (ref 0–18)
AST SERPL-CCNC: 42 U/L (ref 15–37)
BASOPHILS # BLD AUTO: 0.03 X10(3) UL (ref 0–0.2)
BASOPHILS NFR BLD AUTO: 0.3 %
BILIRUB SERPL-MCNC: 0.9 MG/DL (ref 0.1–2)
BUN BLD-MCNC: 8 MG/DL (ref 7–18)
CALCIUM BLD-MCNC: 7.7 MG/DL (ref 8.5–10.1)
CHLORIDE SERPL-SCNC: 107 MMOL/L (ref 98–112)
CO2 SERPL-SCNC: 20 MMOL/L (ref 21–32)
CREAT BLD-MCNC: 0.95 MG/DL
EOSINOPHIL # BLD AUTO: 0.06 X10(3) UL (ref 0–0.7)
EOSINOPHIL NFR BLD AUTO: 0.5 %
ERYTHROCYTE [DISTWIDTH] IN BLOOD BY AUTOMATED COUNT: 14.8 %
GFR SERPLBLD BASED ON 1.73 SQ M-ARVRAT: 85 ML/MIN/1.73M2 (ref 60–?)
GLOBULIN PLAS-MCNC: 4.1 G/DL (ref 2.8–4.4)
GLUCOSE BLD-MCNC: 100 MG/DL (ref 70–99)
HCT VFR BLD AUTO: 32.9 %
HGB BLD-MCNC: 11.3 G/DL
IMM GRANULOCYTES # BLD AUTO: 0.07 X10(3) UL (ref 0–1)
IMM GRANULOCYTES NFR BLD: 0.6 %
LYMPHOCYTES # BLD AUTO: 1.12 X10(3) UL (ref 1–4)
LYMPHOCYTES NFR BLD AUTO: 9.9 %
MCH RBC QN AUTO: 30.8 PG (ref 26–34)
MCHC RBC AUTO-ENTMCNC: 34.3 G/DL (ref 31–37)
MCV RBC AUTO: 89.6 FL
MONOCYTES # BLD AUTO: 1.72 X10(3) UL (ref 0.1–1)
MONOCYTES NFR BLD AUTO: 15.3 %
NEUTROPHILS # BLD AUTO: 8.27 X10 (3) UL (ref 1.5–7.7)
NEUTROPHILS # BLD AUTO: 8.27 X10(3) UL (ref 1.5–7.7)
NEUTROPHILS NFR BLD AUTO: 73.4 %
OSMOLALITY SERPL CALC.SUM OF ELEC: 276 MOSM/KG (ref 275–295)
PLATELET # BLD AUTO: 268 10(3)UL (ref 150–450)
POTASSIUM SERPL-SCNC: 3.9 MMOL/L (ref 3.5–5.1)
PROCALCITONIN SERPL-MCNC: 0.12 NG/ML (ref ?–0.16)
PROT SERPL-MCNC: 6.9 G/DL (ref 6.4–8.2)
RBC # BLD AUTO: 3.67 X10(6)UL
SODIUM SERPL-SCNC: 134 MMOL/L (ref 136–145)
WBC # BLD AUTO: 11.3 X10(3) UL (ref 4–11)

## 2023-04-07 PROCEDURE — 3074F SYST BP LT 130 MM HG: CPT | Performed by: HOSPITALIST

## 2023-04-07 PROCEDURE — 99233 SBSQ HOSP IP/OBS HIGH 50: CPT | Performed by: HOSPITALIST

## 2023-04-07 PROCEDURE — 3078F DIAST BP <80 MM HG: CPT | Performed by: HOSPITALIST

## 2023-04-07 RX ORDER — ACETAMINOPHEN 500 MG
500 TABLET ORAL EVERY 4 HOURS PRN
Status: DISCONTINUED | OUTPATIENT
Start: 2023-04-07 | End: 2023-04-10

## 2023-04-07 RX ORDER — SODIUM CHLORIDE 9 MG/ML
INJECTION, SOLUTION INTRAVENOUS CONTINUOUS
Status: DISCONTINUED | OUTPATIENT
Start: 2023-04-07 | End: 2023-04-08

## 2023-04-07 NOTE — PLAN OF CARE
Assumed care at 2100. Aox4. VSS. A-paced. RA.  C/o abdominal pain; prn tylenol given  Up as tolerated.    NPO  Pt reported diarrhea at home; cdiff sample needs to be collected  IVF infusing 0.9 NS @ 60 ml/hr  Consults called

## 2023-04-07 NOTE — PROGRESS NOTES
Patient was readmitted into THE [facility] on 4/6/23. Encounter closing.

## 2023-04-07 NOTE — ED QUICK NOTES
Orders for admission, patient is aware of plan and ready to go upstairs. Any questions, please call ED RN Erasmo Aldrich at extension [phone].      Patient Covid vaccination status: Unvaccinated     COVID Test Ordered in ED: None    COVID Suspicion at Admission: N/A    Running Infusions:     Mental Status/LOC at time of transport: a/ox4    Other pertinent information: none  CIWA score: N/A   NIH score:  N/A

## 2023-04-08 LAB
ALBUMIN SERPL-MCNC: 2.6 G/DL (ref 3.4–5)
ALBUMIN/GLOB SERPL: 0.6 {RATIO} (ref 1–2)
ALP LIVER SERPL-CCNC: 84 U/L
ALT SERPL-CCNC: 29 U/L
ANION GAP SERPL CALC-SCNC: 9 MMOL/L (ref 0–18)
AST SERPL-CCNC: 31 U/L (ref 15–37)
BASOPHILS # BLD AUTO: 0.04 X10(3) UL (ref 0–0.2)
BASOPHILS NFR BLD AUTO: 0.2 %
BILIRUB SERPL-MCNC: 1 MG/DL (ref 0.1–2)
BUN BLD-MCNC: 10 MG/DL (ref 7–18)
C DIFF TOX B STL QL: NEGATIVE
CALCIUM BLD-MCNC: 7.9 MG/DL (ref 8.5–10.1)
CHLORIDE SERPL-SCNC: 107 MMOL/L (ref 98–112)
CO2 SERPL-SCNC: 18 MMOL/L (ref 21–32)
CREAT BLD-MCNC: 1.02 MG/DL
EOSINOPHIL # BLD AUTO: 0.08 X10(3) UL (ref 0–0.7)
EOSINOPHIL NFR BLD AUTO: 0.5 %
ERYTHROCYTE [DISTWIDTH] IN BLOOD BY AUTOMATED COUNT: 15 %
GFR SERPLBLD BASED ON 1.73 SQ M-ARVRAT: 78 ML/MIN/1.73M2 (ref 60–?)
GLOBULIN PLAS-MCNC: 4.5 G/DL (ref 2.8–4.4)
GLUCOSE BLD-MCNC: 81 MG/DL (ref 70–99)
HCT VFR BLD AUTO: 34.7 %
HELMET CELLS BLD QL SMEAR: PRESENT
HGB BLD-MCNC: 11.8 G/DL
IMM GRANULOCYTES # BLD AUTO: 0.12 X10(3) UL (ref 0–1)
IMM GRANULOCYTES NFR BLD: 0.7 %
LYMPHOCYTES # BLD AUTO: 1.22 X10(3) UL (ref 1–4)
LYMPHOCYTES NFR BLD AUTO: 7.4 %
MCH RBC QN AUTO: 30.5 PG (ref 26–34)
MCHC RBC AUTO-ENTMCNC: 34 G/DL (ref 31–37)
MCV RBC AUTO: 89.7 FL
MONOCYTES # BLD AUTO: 2.19 X10(3) UL (ref 0.1–1)
MONOCYTES NFR BLD AUTO: 13.3 %
NEUTROPHILS # BLD AUTO: 12.79 X10 (3) UL (ref 1.5–7.7)
NEUTROPHILS # BLD AUTO: 12.79 X10(3) UL (ref 1.5–7.7)
NEUTROPHILS NFR BLD AUTO: 77.9 %
OSMOLALITY SERPL CALC.SUM OF ELEC: 276 MOSM/KG (ref 275–295)
PLATELET # BLD AUTO: 303 10(3)UL (ref 150–450)
PLATELET MORPHOLOGY: NORMAL
POTASSIUM SERPL-SCNC: 4.1 MMOL/L (ref 3.5–5.1)
PROT SERPL-MCNC: 7.1 G/DL (ref 6.4–8.2)
RBC # BLD AUTO: 3.87 X10(6)UL
SODIUM SERPL-SCNC: 134 MMOL/L (ref 136–145)
WBC # BLD AUTO: 16.4 X10(3) UL (ref 4–11)

## 2023-04-08 PROCEDURE — 99233 SBSQ HOSP IP/OBS HIGH 50: CPT | Performed by: HOSPITALIST

## 2023-04-08 RX ORDER — ENOXAPARIN SODIUM 100 MG/ML
40 INJECTION SUBCUTANEOUS DAILY
Status: DISCONTINUED | OUTPATIENT
Start: 2023-04-08 | End: 2023-04-10

## 2023-04-08 NOTE — PROGRESS NOTES
Discussed case with SurgOnc. Discussed case with interventional radiologist, will review to see if fluid can be accessed and drained. RN updated. Will follow-up. Malena BARAHONA    Addendum    IR unable to access site to drain. Malena BARAHONA    Addendum  Discussed with SurgOnc    Plan to advance diet as tolerated.     Malena BARAHONA

## 2023-04-08 NOTE — PLAN OF CARE
Assumed patient care 0730  Patient alert and oriented X4  On room air, VSS, NSR/APaced on tele  Denies pain  Up ad pankaj, voiding, 1 BM this shift, stool sample sent, cdiff (-)  Ambulating in hallway   IVF/ABX infusing per order   Patient upgraded to low fiber diet, tolerating well  Zenpep sprinkles added for meals   Continue conservative management  Patient and family at bedside updated on POC     Problem: GASTROINTESTINAL - ADULT  Goal: Minimal or absence of nausea and vomiting  Description: INTERVENTIONS:  - Maintain adequate hydration with IV or PO as ordered and tolerated  - Nasogastric tube to low intermittent suction as ordered  - Evaluate effectiveness of ordered antiemetic medications  - Provide nonpharmacologic comfort measures as appropriate  - Advance diet as tolerated, if ordered  - Obtain nutritional consult as needed  - Evaluate fluid balance  Outcome: Progressing  Goal: Maintains or returns to baseline bowel function  Description: INTERVENTIONS:  - Assess bowel function  - Maintain adequate hydration with IV or PO as ordered and tolerated  - Evaluate effectiveness of GI medications  - Encourage mobilization and activity  - Obtain nutritional consult as needed  - Establish a toileting routine/schedule  - Consider collaborating with pharmacy to review patient's medication profile  Outcome: Progressing     Problem: PAIN - ADULT  Goal: Verbalizes/displays adequate comfort level or patient's stated pain goal  Description: INTERVENTIONS:  - Encourage pt to monitor pain and request assistance  - Assess pain using appropriate pain scale  - Administer analgesics based on type and severity of pain and evaluate response  - Implement non-pharmacological measures as appropriate and evaluate response  - Consider cultural and social influences on pain and pain management  - Manage/alleviate anxiety  - Utilize distraction and/or relaxation techniques  - Monitor for opioid side effects  - Notify MD/LIP if interventions unsuccessful or patient reports new pain  - Anticipate increased pain with activity and pre-medicate as appropriate  Outcome: Progressing

## 2023-04-08 NOTE — PLAN OF CARE
Assumed patient care 0730  Patient alert and oriented X4  On room air, VSS, NSR/APaced on tele  Denies pain  Up ad pankaj, voiding, no BM this shift  Ambulating in hallway   IVF/ABX infusing per order   NPO maintained   Patient angry/upset with POC, MDs notified, await decision for treatment  Patient and family at bedside updated on POC     Problem: GASTROINTESTINAL - ADULT  Goal: Minimal or absence of nausea and vomiting  Description: INTERVENTIONS:  - Maintain adequate hydration with IV or PO as ordered and tolerated  - Nasogastric tube to low intermittent suction as ordered  - Evaluate effectiveness of ordered antiemetic medications  - Provide nonpharmacologic comfort measures as appropriate  - Advance diet as tolerated, if ordered  - Obtain nutritional consult as needed  - Evaluate fluid balance  Outcome: Progressing  Goal: Maintains or returns to baseline bowel function  Description: INTERVENTIONS:  - Assess bowel function  - Maintain adequate hydration with IV or PO as ordered and tolerated  - Evaluate effectiveness of GI medications  - Encourage mobilization and activity  - Obtain nutritional consult as needed  - Establish a toileting routine/schedule  - Consider collaborating with pharmacy to review patient's medication profile  Outcome: Progressing     Problem: PAIN - ADULT  Goal: Verbalizes/displays adequate comfort level or patient's stated pain goal  Description: INTERVENTIONS:  - Encourage pt to monitor pain and request assistance  - Assess pain using appropriate pain scale  - Administer analgesics based on type and severity of pain and evaluate response  - Implement non-pharmacological measures as appropriate and evaluate response  - Consider cultural and social influences on pain and pain management  - Manage/alleviate anxiety  - Utilize distraction and/or relaxation techniques  - Monitor for opioid side effects  - Notify MD/LIP if interventions unsuccessful or patient reports new pain  - Anticipate increased pain with activity and pre-medicate as appropriate  Outcome: Progressing

## 2023-04-08 NOTE — PLAN OF CARE
Assumed care at [facility].    A&Ox4, RA, NSR on tele  Prn tylenol given for pain   Voiding up in bathroom   Clear liquid diet   Call light within reach    Patient updated on plan of care

## 2023-04-08 NOTE — PROGRESS NOTES
No role for endoscopic evaluation per GI  No surgical intervention per SurgOnc    Plan:  Start CLD  Stop IVF  IR evaluation? Will need to discuss POC with consultants given repeat admission for ongoing symptoms.     Malena BARAHONA

## 2023-04-09 LAB
ANION GAP SERPL CALC-SCNC: 4 MMOL/L (ref 0–18)
BASOPHILS # BLD AUTO: 0.03 X10(3) UL (ref 0–0.2)
BASOPHILS NFR BLD AUTO: 0.3 %
BUN BLD-MCNC: 8 MG/DL (ref 7–18)
CALCIUM BLD-MCNC: 8.2 MG/DL (ref 8.5–10.1)
CHLORIDE SERPL-SCNC: 106 MMOL/L (ref 98–112)
CO2 SERPL-SCNC: 24 MMOL/L (ref 21–32)
CREAT BLD-MCNC: 1.02 MG/DL
EOSINOPHIL # BLD AUTO: 0.21 X10(3) UL (ref 0–0.7)
EOSINOPHIL NFR BLD AUTO: 2.2 %
ERYTHROCYTE [DISTWIDTH] IN BLOOD BY AUTOMATED COUNT: 14.8 %
GFR SERPLBLD BASED ON 1.73 SQ M-ARVRAT: 78 ML/MIN/1.73M2 (ref 60–?)
GLUCOSE BLD-MCNC: 111 MG/DL (ref 70–99)
HCT VFR BLD AUTO: 33.8 %
HGB BLD-MCNC: 11.4 G/DL
IMM GRANULOCYTES # BLD AUTO: 0.04 X10(3) UL (ref 0–1)
IMM GRANULOCYTES NFR BLD: 0.4 %
LYMPHOCYTES # BLD AUTO: 1.3 X10(3) UL (ref 1–4)
LYMPHOCYTES NFR BLD AUTO: 13.6 %
MCH RBC QN AUTO: 30.5 PG (ref 26–34)
MCHC RBC AUTO-ENTMCNC: 33.7 G/DL (ref 31–37)
MCV RBC AUTO: 90.4 FL
MONOCYTES # BLD AUTO: 1.51 X10(3) UL (ref 0.1–1)
MONOCYTES NFR BLD AUTO: 15.8 %
NEUTROPHILS # BLD AUTO: 6.44 X10 (3) UL (ref 1.5–7.7)
NEUTROPHILS # BLD AUTO: 6.44 X10(3) UL (ref 1.5–7.7)
NEUTROPHILS NFR BLD AUTO: 67.7 %
OSMOLALITY SERPL CALC.SUM OF ELEC: 277 MOSM/KG (ref 275–295)
PLATELET # BLD AUTO: 321 10(3)UL (ref 150–450)
POTASSIUM SERPL-SCNC: 3.6 MMOL/L (ref 3.5–5.1)
RBC # BLD AUTO: 3.74 X10(6)UL
SODIUM SERPL-SCNC: 134 MMOL/L (ref 136–145)
WBC # BLD AUTO: 9.5 X10(3) UL (ref 4–11)

## 2023-04-09 PROCEDURE — 99231 SBSQ HOSP IP/OBS SF/LOW 25: CPT | Performed by: HOSPITALIST

## 2023-04-09 RX ORDER — PANTOPRAZOLE SODIUM 40 MG/1
40 TABLET, DELAYED RELEASE ORAL
Status: DISCONTINUED | OUTPATIENT
Start: 2023-04-09 | End: 2023-04-10

## 2023-04-09 RX ORDER — SPIRONOLACTONE 25 MG/1
12.5 TABLET ORAL DAILY
Status: DISCONTINUED | OUTPATIENT
Start: 2023-04-09 | End: 2023-04-10

## 2023-04-09 NOTE — PLAN OF CARE
Assumed care at [address] [place] [address]. Aox4. Apaced on tele. RA. VSS. Assessment Complete. No c/o Pain at this time. No acute distress noted. Tolerating diet. IV abx running. Safety precautions in place. Bed lowered and locked. Call light in reach. All questions and concerns addressed. Cont with current POC.    Plan- IV abx and bowel rest.

## 2023-04-09 NOTE — PLAN OF CARE
Received care @ 5163  A&Ox4  RA, NS on tele  IV Zosyn infusing per order  Tolerating low fiber soft diet  Good PO intake   Denies pain     POC discussed w/ patient and physicians

## 2023-04-10 VITALS
OXYGEN SATURATION: 98 % | DIASTOLIC BLOOD PRESSURE: 59 MMHG | WEIGHT: 174.5 LBS | RESPIRATION RATE: 18 BRPM | HEART RATE: 88 BPM | SYSTOLIC BLOOD PRESSURE: 111 MMHG | TEMPERATURE: 98 F | BODY MASS INDEX: 25 KG/M2

## 2023-04-10 PROCEDURE — 99239 HOSP IP/OBS DSCHRG MGMT >30: CPT | Performed by: HOSPITALIST

## 2023-04-10 RX ORDER — HYDROCODONE BITARTRATE AND ACETAMINOPHEN 5; 325 MG/1; MG/1
1 TABLET ORAL EVERY 6 HOURS PRN
Qty: 20 TABLET | Refills: 0 | Status: SHIPPED | OUTPATIENT
Start: 2023-04-10

## 2023-04-10 RX ORDER — METRONIDAZOLE 500 MG/1
500 TABLET ORAL EVERY 8 HOURS SCHEDULED
Qty: 18 TABLET | Refills: 0 | Status: SHIPPED | OUTPATIENT
Start: 2023-04-10 | End: 2023-04-16

## 2023-04-10 RX ORDER — AMOXICILLIN AND CLAVULANATE POTASSIUM 875; 125 MG/1; MG/1
875 TABLET, FILM COATED ORAL EVERY 12 HOURS SCHEDULED
Qty: 12 TABLET | Refills: 0 | Status: SHIPPED | OUTPATIENT
Start: 2023-04-10 | End: 2023-04-16

## 2023-04-10 NOTE — PROGRESS NOTES
Assumed patient care at 1930  Patient oriented x 4   A-paced on tele, vitals stable at room air   Tolerating low fiber soft diet   Continue on meeta iv/abt   Not in any form of distress  Will continue to monitor pt

## 2023-04-10 NOTE — PLAN OF CARE
Assumed care at 0700  Patient alert, oriented x4  Tolerating diet  Denies need for pain medication  Ambulating without difficulty    Hospitalist, surgical oncology, and GI cleared for disharge. AVS reviewed with patient. All questions answered. NURSING DISCHARGE NOTE    Discharged Home via Wheelchair. Accompanied by Spouse  Belongings Taken by patient/family.

## 2023-04-11 ENCOUNTER — PATIENT OUTREACH (OUTPATIENT)
Dept: CASE MANAGEMENT | Age: 73
End: 2023-04-11

## 2023-04-11 DIAGNOSIS — Z02.9 ENCOUNTERS FOR UNSPECIFIED ADMINISTRATIVE PURPOSE: ICD-10-CM

## 2023-04-11 PROCEDURE — 1111F DSCHRG MED/CURRENT MED MERGE: CPT

## 2023-04-11 NOTE — PROGRESS NOTES
[facility]. [facility] contact information provided as well as [facility] office number, [phone].

## 2023-04-11 NOTE — PROGRESS NOTES
LM for pt to call [facility] for TCM since discharge. NCM phone number was provided for pt to call back. [facility] contact information was provided for pt to call back as well.

## 2023-04-17 ENCOUNTER — OFFICE VISIT (OUTPATIENT)
Dept: INTERNAL MEDICINE CLINIC | Facility: CLINIC | Age: 73
End: 2023-04-17
Payer: MEDICARE

## 2023-04-17 VITALS
WEIGHT: 177.19 LBS | RESPIRATION RATE: 18 BRPM | DIASTOLIC BLOOD PRESSURE: 64 MMHG | SYSTOLIC BLOOD PRESSURE: 126 MMHG | BODY MASS INDEX: 26.24 KG/M2 | HEIGHT: 69 IN

## 2023-04-17 DIAGNOSIS — Z90.49 HISTORY OF WHIPPLE PROCEDURE: ICD-10-CM

## 2023-04-17 DIAGNOSIS — Z85.09 HISTORY OF CHOLANGIOCARCINOMA: ICD-10-CM

## 2023-04-17 DIAGNOSIS — I25.10 CORONARY ARTERY DISEASE INVOLVING NATIVE CORONARY ARTERY OF NATIVE HEART, UNSPECIFIED WHETHER ANGINA PRESENT: ICD-10-CM

## 2023-04-17 DIAGNOSIS — I50.22 CHRONIC SYSTOLIC CONGESTIVE HEART FAILURE (HCC): ICD-10-CM

## 2023-04-17 DIAGNOSIS — R10.9 ABDOMINAL PAIN, UNSPECIFIED ABDOMINAL LOCATION: Primary | ICD-10-CM

## 2023-04-17 DIAGNOSIS — K65.1 INTRA-ABDOMINAL ABSCESS (HCC): ICD-10-CM

## 2023-04-17 DIAGNOSIS — Z90.410 HISTORY OF WHIPPLE PROCEDURE: ICD-10-CM

## 2023-04-17 NOTE — PROGRESS NOTES
Multiple attempts to reach pt and messages left with no return call. Patient went in for HFU appt with PCP on 4/17/23. Encounter closing.

## 2023-04-25 ENCOUNTER — APPOINTMENT (OUTPATIENT)
Dept: GENERAL RADIOLOGY | Facility: HOSPITAL | Age: 73
End: 2023-04-25
Attending: EMERGENCY MEDICINE
Payer: OTHER GOVERNMENT

## 2023-04-25 ENCOUNTER — HOSPITAL ENCOUNTER (INPATIENT)
Facility: HOSPITAL | Age: 73
LOS: 3 days | Discharge: HOME | End: 2023-04-28
Attending: EMERGENCY MEDICINE | Admitting: HOSPITALIST
Payer: OTHER GOVERNMENT

## 2023-04-25 DIAGNOSIS — Z45.02 DEFIBRILLATOR DISCHARGE: Primary | ICD-10-CM

## 2023-04-25 DIAGNOSIS — I47.20 V TACH (HCC): ICD-10-CM

## 2023-04-25 PROBLEM — I50.42 CHRONIC COMBINED SYSTOLIC AND DIASTOLIC CONGESTIVE HEART FAILURE (HCC): Status: ACTIVE | Noted: 2019-07-12

## 2023-04-25 PROBLEM — E87.1 HYPONATREMIA: Status: ACTIVE | Noted: 2023-04-25

## 2023-04-25 PROBLEM — R73.9 HYPERGLYCEMIA: Status: ACTIVE | Noted: 2023-04-25

## 2023-04-25 LAB
ALBUMIN SERPL-MCNC: 3.2 G/DL (ref 3.4–5)
ALBUMIN/GLOB SERPL: 0.7 {RATIO} (ref 1–2)
ALP LIVER SERPL-CCNC: 75 U/L
ALT SERPL-CCNC: 22 U/L
ANION GAP SERPL CALC-SCNC: 9 MMOL/L (ref 0–18)
APTT PPP: 28.2 SECONDS (ref 23.3–35.6)
AST SERPL-CCNC: 25 U/L (ref 15–37)
BASOPHILS # BLD AUTO: 0.02 X10(3) UL (ref 0–0.2)
BASOPHILS NFR BLD AUTO: 0.3 %
BILIRUB SERPL-MCNC: 0.5 MG/DL (ref 0.1–2)
BUN BLD-MCNC: 14 MG/DL (ref 7–18)
CALCIUM BLD-MCNC: 8.9 MG/DL (ref 8.5–10.1)
CHLORIDE SERPL-SCNC: 101 MMOL/L (ref 98–112)
CO2 SERPL-SCNC: 24 MMOL/L (ref 21–32)
CREAT BLD-MCNC: 1.32 MG/DL
EOSINOPHIL # BLD AUTO: 0.14 X10(3) UL (ref 0–0.7)
EOSINOPHIL NFR BLD AUTO: 2.3 %
ERYTHROCYTE [DISTWIDTH] IN BLOOD BY AUTOMATED COUNT: 15 %
GFR SERPLBLD BASED ON 1.73 SQ M-ARVRAT: 57 ML/MIN/1.73M2 (ref 60–?)
GLOBULIN PLAS-MCNC: 4.5 G/DL (ref 2.8–4.4)
GLUCOSE BLD-MCNC: 120 MG/DL (ref 70–99)
HCT VFR BLD AUTO: 35.9 %
HGB BLD-MCNC: 12 G/DL
IMM GRANULOCYTES # BLD AUTO: 0.02 X10(3) UL (ref 0–1)
IMM GRANULOCYTES NFR BLD: 0.3 %
INR BLD: 1.18 (ref 0.85–1.16)
LYMPHOCYTES # BLD AUTO: 2.28 X10(3) UL (ref 1–4)
LYMPHOCYTES NFR BLD AUTO: 37.2 %
MAGNESIUM SERPL-MCNC: 1.6 MG/DL (ref 1.6–2.6)
MCH RBC QN AUTO: 30.4 PG (ref 26–34)
MCHC RBC AUTO-ENTMCNC: 33.4 G/DL (ref 31–37)
MCV RBC AUTO: 90.9 FL
MONOCYTES # BLD AUTO: 1.06 X10(3) UL (ref 0.1–1)
MONOCYTES NFR BLD AUTO: 17.3 %
NEUTROPHILS # BLD AUTO: 2.61 X10 (3) UL (ref 1.5–7.7)
NEUTROPHILS # BLD AUTO: 2.61 X10(3) UL (ref 1.5–7.7)
NEUTROPHILS NFR BLD AUTO: 42.6 %
OSMOLALITY SERPL CALC.SUM OF ELEC: 280 MOSM/KG (ref 275–295)
PLATELET # BLD AUTO: 215 10(3)UL (ref 150–450)
POTASSIUM SERPL-SCNC: 3.8 MMOL/L (ref 3.5–5.1)
PROT SERPL-MCNC: 7.7 G/DL (ref 6.4–8.2)
PROTHROMBIN TIME: 15 SECONDS (ref 11.6–14.8)
RBC # BLD AUTO: 3.95 X10(6)UL
SODIUM SERPL-SCNC: 134 MMOL/L (ref 136–145)
TROPONIN I HIGH SENSITIVITY: 15 NG/L
TSI SER-ACNC: 1.16 MIU/ML (ref 0.36–3.74)
WBC # BLD AUTO: 6.1 X10(3) UL (ref 4–11)

## 2023-04-25 PROCEDURE — 71045 X-RAY EXAM CHEST 1 VIEW: CPT | Performed by: EMERGENCY MEDICINE

## 2023-04-25 PROCEDURE — 99223 1ST HOSP IP/OBS HIGH 75: CPT | Performed by: HOSPITALIST

## 2023-04-25 RX ORDER — SENNOSIDES 8.6 MG
17.2 TABLET ORAL NIGHTLY PRN
Status: DISCONTINUED | OUTPATIENT
Start: 2023-04-25 | End: 2023-04-28

## 2023-04-25 RX ORDER — METOPROLOL SUCCINATE 100 MG/1
100 TABLET, EXTENDED RELEASE ORAL
Status: DISCONTINUED | OUTPATIENT
Start: 2023-04-26 | End: 2023-04-28

## 2023-04-25 RX ORDER — POLYETHYLENE GLYCOL 3350 17 G/17G
17 POWDER, FOR SOLUTION ORAL DAILY PRN
Status: DISCONTINUED | OUTPATIENT
Start: 2023-04-25 | End: 2023-04-28

## 2023-04-25 RX ORDER — ENOXAPARIN SODIUM 100 MG/ML
40 INJECTION SUBCUTANEOUS DAILY
Status: DISCONTINUED | OUTPATIENT
Start: 2023-04-26 | End: 2023-04-26

## 2023-04-25 RX ORDER — SPIRONOLACTONE 25 MG/1
12.5 TABLET ORAL DAILY
Status: DISCONTINUED | OUTPATIENT
Start: 2023-04-26 | End: 2023-04-28

## 2023-04-25 RX ORDER — POTASSIUM CHLORIDE 20 MEQ/1
40 TABLET, EXTENDED RELEASE ORAL ONCE
Status: COMPLETED | OUTPATIENT
Start: 2023-04-25 | End: 2023-04-25

## 2023-04-25 RX ORDER — METRONIDAZOLE 500 MG/1
500 TABLET ORAL ONCE
Status: COMPLETED | OUTPATIENT
Start: 2023-04-25 | End: 2023-04-25

## 2023-04-25 RX ORDER — PANTOPRAZOLE SODIUM 40 MG/1
40 TABLET, DELAYED RELEASE ORAL
Status: DISCONTINUED | OUTPATIENT
Start: 2023-04-26 | End: 2023-04-28

## 2023-04-25 RX ORDER — ASPIRIN 81 MG/1
81 TABLET ORAL DAILY
Status: DISCONTINUED | OUTPATIENT
Start: 2023-04-25 | End: 2023-04-28

## 2023-04-25 RX ORDER — ACETAMINOPHEN 500 MG
500 TABLET ORAL EVERY 4 HOURS PRN
Status: DISCONTINUED | OUTPATIENT
Start: 2023-04-25 | End: 2023-04-28

## 2023-04-25 RX ORDER — SODIUM PHOSPHATE, DIBASIC AND SODIUM PHOSPHATE, MONOBASIC 7; 19 G/133ML; G/133ML
1 ENEMA RECTAL ONCE AS NEEDED
Status: DISCONTINUED | OUTPATIENT
Start: 2023-04-25 | End: 2023-04-28

## 2023-04-25 RX ORDER — MELATONIN
3 NIGHTLY PRN
Status: DISCONTINUED | OUTPATIENT
Start: 2023-04-25 | End: 2023-04-28

## 2023-04-25 RX ORDER — AMOXICILLIN AND CLAVULANATE POTASSIUM 875; 125 MG/1; MG/1
875 TABLET, FILM COATED ORAL ONCE
Status: COMPLETED | OUTPATIENT
Start: 2023-04-25 | End: 2023-04-25

## 2023-04-25 RX ORDER — BISACODYL 10 MG
10 SUPPOSITORY, RECTAL RECTAL
Status: DISCONTINUED | OUTPATIENT
Start: 2023-04-25 | End: 2023-04-28

## 2023-04-25 RX ORDER — ASPIRIN 81 MG/1
324 TABLET, CHEWABLE ORAL ONCE
Status: COMPLETED | OUTPATIENT
Start: 2023-04-25 | End: 2023-04-25

## 2023-04-25 RX ORDER — ATORVASTATIN CALCIUM 20 MG/1
20 TABLET, FILM COATED ORAL DAILY
Status: DISCONTINUED | OUTPATIENT
Start: 2023-04-25 | End: 2023-04-28

## 2023-04-25 RX ORDER — MAGNESIUM SULFATE 1 G/100ML
1 INJECTION INTRAVENOUS ONCE
Status: COMPLETED | OUTPATIENT
Start: 2023-04-25 | End: 2023-04-25

## 2023-04-25 RX ORDER — ONDANSETRON 2 MG/ML
4 INJECTION INTRAMUSCULAR; INTRAVENOUS EVERY 6 HOURS PRN
Status: DISCONTINUED | OUTPATIENT
Start: 2023-04-25 | End: 2023-04-28

## 2023-04-26 ENCOUNTER — TELEPHONE (OUTPATIENT)
Dept: CARDIOLOGY | Age: 73
End: 2023-04-26

## 2023-04-26 ENCOUNTER — APPOINTMENT (OUTPATIENT)
Dept: CV DIAGNOSTICS | Facility: HOSPITAL | Age: 73
End: 2023-04-26
Attending: HOSPITALIST
Payer: OTHER GOVERNMENT

## 2023-04-26 LAB
ANION GAP SERPL CALC-SCNC: 4 MMOL/L (ref 0–18)
APTT PPP: 103.3 SECONDS (ref 23.3–35.6)
APTT PPP: 28.5 SECONDS (ref 23.3–35.6)
ATRIAL RATE: 44 BPM
ATRIAL RATE: 65 BPM
ATRIAL RATE: 67 BPM
BUN BLD-MCNC: 13 MG/DL (ref 7–18)
CALCIUM BLD-MCNC: 8.5 MG/DL (ref 8.5–10.1)
CHLORIDE SERPL-SCNC: 107 MMOL/L (ref 98–112)
CHOLEST SERPL-MCNC: 74 MG/DL (ref ?–200)
CO2 SERPL-SCNC: 24 MMOL/L (ref 21–32)
CREAT BLD-MCNC: 1.08 MG/DL
ERYTHROCYTE [DISTWIDTH] IN BLOOD BY AUTOMATED COUNT: 15 %
GFR SERPLBLD BASED ON 1.73 SQ M-ARVRAT: 73 ML/MIN/1.73M2 (ref 60–?)
GLUCOSE BLD-MCNC: 111 MG/DL (ref 70–99)
HCT VFR BLD AUTO: 33.2 %
HDLC SERPL-MCNC: 42 MG/DL (ref 40–59)
HGB BLD-MCNC: 11.3 G/DL
LDLC SERPL CALC-MCNC: 16 MG/DL (ref ?–100)
MAGNESIUM SERPL-MCNC: 1.9 MG/DL (ref 1.6–2.6)
MCH RBC QN AUTO: 31.4 PG (ref 26–34)
MCHC RBC AUTO-ENTMCNC: 34 G/DL (ref 31–37)
MCV RBC AUTO: 92.2 FL
NONHDLC SERPL-MCNC: 32 MG/DL (ref ?–130)
OSMOLALITY SERPL CALC.SUM OF ELEC: 281 MOSM/KG (ref 275–295)
P AXIS: 39 DEGREES
P AXIS: 7 DEGREES
P-R INTERVAL: 184 MS
P-R INTERVAL: 284 MS
PLATELET # BLD AUTO: 180 10(3)UL (ref 150–450)
POTASSIUM SERPL-SCNC: 4.3 MMOL/L (ref 3.5–5.1)
POTASSIUM SERPL-SCNC: 4.3 MMOL/L (ref 3.5–5.1)
Q-T INTERVAL: 380 MS
Q-T INTERVAL: 392 MS
Q-T INTERVAL: 396 MS
QRS DURATION: 102 MS
QRS DURATION: 108 MS
QRS DURATION: 116 MS
QTC CALCULATION (BEZET): 401 MS
QTC CALCULATION (BEZET): 411 MS
QTC CALCULATION (BEZET): 446 MS
R AXIS: 79 DEGREES
R AXIS: 81 DEGREES
R AXIS: 84 DEGREES
RBC # BLD AUTO: 3.6 X10(6)UL
SODIUM SERPL-SCNC: 135 MMOL/L (ref 136–145)
T AXIS: -54 DEGREES
T AXIS: 13 DEGREES
T AXIS: 3 DEGREES
TRIGL SERPL-MCNC: 73 MG/DL (ref 30–149)
TROPONIN I HIGH SENSITIVITY: 177 NG/L
TROPONIN I HIGH SENSITIVITY: 198 NG/L
VENTRICULAR RATE: 65 BPM
VENTRICULAR RATE: 67 BPM
VENTRICULAR RATE: 78 BPM
VLDLC SERPL CALC-MCNC: 9 MG/DL (ref 0–30)
WBC # BLD AUTO: 5.6 X10(3) UL (ref 4–11)

## 2023-04-26 PROCEDURE — 99233 SBSQ HOSP IP/OBS HIGH 50: CPT | Performed by: HOSPITALIST

## 2023-04-26 PROCEDURE — 99497 ADVNCD CARE PLAN 30 MIN: CPT | Performed by: HOSPITALIST

## 2023-04-26 PROCEDURE — 93306 TTE W/DOPPLER COMPLETE: CPT | Performed by: HOSPITALIST

## 2023-04-26 RX ORDER — METRONIDAZOLE 500 MG/1
500 TABLET ORAL EVERY 8 HOURS SCHEDULED
Status: DISCONTINUED | OUTPATIENT
Start: 2023-04-26 | End: 2023-04-28

## 2023-04-26 RX ORDER — HEPARIN SODIUM AND DEXTROSE 10000; 5 [USP'U]/100ML; G/100ML
INJECTION INTRAVENOUS CONTINUOUS
Status: DISCONTINUED | OUTPATIENT
Start: 2023-04-26 | End: 2023-04-26

## 2023-04-26 RX ORDER — METRONIDAZOLE 500 MG/100ML
500 INJECTION, SOLUTION INTRAVENOUS EVERY 8 HOURS
Status: DISCONTINUED | OUTPATIENT
Start: 2023-04-26 | End: 2023-04-26

## 2023-04-26 RX ORDER — SACUBITRIL AND VALSARTAN 24; 26 MG/1; MG/1
TABLET, FILM COATED ORAL
Qty: 60 TABLET | Refills: 3 | Status: SHIPPED | OUTPATIENT
Start: 2023-04-26 | End: 2023-07-05 | Stop reason: DRUGHIGH

## 2023-04-26 RX ORDER — AMOXICILLIN AND CLAVULANATE POTASSIUM 875; 125 MG/1; MG/1
875 TABLET, FILM COATED ORAL 2 TIMES DAILY
Status: DISCONTINUED | OUTPATIENT
Start: 2023-04-26 | End: 2023-04-28

## 2023-04-26 RX ORDER — MAGNESIUM OXIDE 400 MG/1
400 TABLET ORAL DAILY
Status: DISCONTINUED | OUTPATIENT
Start: 2023-04-26 | End: 2023-04-28

## 2023-04-26 RX ORDER — HEPARIN SODIUM 1000 [USP'U]/ML
60 INJECTION, SOLUTION INTRAVENOUS; SUBCUTANEOUS ONCE
Status: COMPLETED | OUTPATIENT
Start: 2023-04-26 | End: 2023-04-26

## 2023-04-26 RX ORDER — HEPARIN SODIUM AND DEXTROSE 10000; 5 [USP'U]/100ML; G/100ML
12 INJECTION INTRAVENOUS ONCE
Status: COMPLETED | OUTPATIENT
Start: 2023-04-26 | End: 2023-04-26

## 2023-04-26 NOTE — PLAN OF CARE
Pt awake,alert,denies any pain,no sob  2d echo done at bs  No vt noted today  Paced on the monitor  EP to see      Problem: CARDIOVASCULAR - ADULT  Goal: Maintains optimal cardiac output and hemodynamic stability  Description: INTERVENTIONS:  - Monitor vital signs, rhythm, and trends  - Monitor for bleeding, hypotension and signs of decreased cardiac output  - Evaluate effectiveness of vasoactive medications to optimize hemodynamic stability  - Monitor arterial and/or venous puncture sites for bleeding and/or hematoma  - Assess quality of pulses, skin color and temperature  - Assess for signs of decreased coronary artery perfusion - ex.  Angina  - Evaluate fluid balance, assess for edema, trend weights  Outcome: Progressing  Goal: Absence of cardiac arrhythmias or at baseline  Description: INTERVENTIONS:  - Continuous cardiac monitoring, monitor vital signs, obtain 12 lead EKG if indicated  - Evaluate effectiveness of antiarrhythmic and heart rate control medications as ordered  - Initiate emergency measures for life threatening arrhythmias  - Monitor electrolytes and administer replacement therapy as ordered  Outcome: Progressing

## 2023-04-26 NOTE — PROGRESS NOTES
04/25/23 2234 04/25/23 2241 04/25/23 2243   Vital Signs   /66 130/79 127/66   MAP (mmHg) 79 91 80   BP Location Left arm Left arm Left arm   BP Method Automatic Automatic Automatic   Patient Position Lying Sitting Standing     Ortho negative.  Pt denies dizziness

## 2023-04-26 NOTE — ED QUICK NOTES
[employer] called back for results of interrogations, call patched to ERMD Dr Cathie Ng. Per ERMD. defib fired x 4 due to episode of \"Vtach\".  Pt remained on NSR in monitorr denies any complaints

## 2023-04-26 NOTE — ED INITIAL ASSESSMENT (HPI)
CB started half an hour ago after walking    Pt states he \"walked himself out of breath\"  Pt states he felt his defibrillator go off 4 times. C/o left shoulder pain.

## 2023-04-26 NOTE — PLAN OF CARE
Pt declined IV insertion by vascular access this am for IV antibiotics. Dr Samantha Mahan made aware.   continu Heparin gtt per ACS protocol  Will monitor

## 2023-04-26 NOTE — ED QUICK NOTES
Orders for admission, patient is aware of plan and ready to go upstairs. Any questions, please call ED RN Chris Clinton at extension [phone]. Patient Covid vaccination status: Unvaccinated     COVID Test Ordered in ED: None    COVID Suspicion at Admission: N/A    Running Infusions:      Mental Status/LOC at time of transport: ALERT X 4    Other pertinent information: AICD INTERROGATED, ALLSPECIALIST AWARE OF ADMISSION. CURRENTLY ON ORAL ABT FOR \"BILE LEAK\".  DENIES ANY DISCOMFORT  CIWA score: N/A   NIH score:  N/A

## 2023-04-26 NOTE — PLAN OF CARE
Assumed care of patient around 2300. Admission navigator and med rec completed. Pt A/Ox 4. O2 sats maintained on room air. NSR/ A-paced on tele. Last BM 4/25. Voiding without difficulty. Pt reports no pain. Pt up standby assist. Pt updated on plan of care. Care needs met. Bed in lowest position, Call light within reach. Bed alarm on. Pt received Magnesium rider in ED. Replacing Potassium per protocol. POC: echo, trend troponin, monitor electrolytes     0026: Pt had 8 beats of Vtach. [facility] APN notified, no new orders. 0200: Troponin result, 177. [facility] APN notified. Orders placed to start heparin drip and make pt NPO.  Next troponin AM.     Problem: Patient/Family Goals  Goal: Patient/Family Long Term Goal  Description: Patient's Long Term Goal: discharge home    Interventions:  - appropriate consults, replacing electrolytes, echo   - See additional Care Plan goals for specific interventions  Outcome: Progressing  Goal: Patient/Family Short Term Goal  Description: Patient's Short Term Goal: feel better    Interventions:   - appropriate consults, replacing electrolytes, echo   - See additional Care Plan goals for specific interventions  Outcome: Progressing

## 2023-04-26 NOTE — PROGRESS NOTES
Assumed pt care at 1500, pt alert and oriented x4, denies any cardiac pain, pt agreed to have a second IV placed with vascular access team    POC: Order to start pt on an Amiodarone gtt, with bolus given prior, mag 400mg daily added,   Will continue to monitor.

## 2023-04-27 LAB
ANION GAP SERPL CALC-SCNC: 4 MMOL/L (ref 0–18)
BUN BLD-MCNC: 11 MG/DL (ref 7–18)
CALCIUM BLD-MCNC: 8.9 MG/DL (ref 8.5–10.1)
CHLORIDE SERPL-SCNC: 105 MMOL/L (ref 98–112)
CO2 SERPL-SCNC: 23 MMOL/L (ref 21–32)
CREAT BLD-MCNC: 1.07 MG/DL
GFR SERPLBLD BASED ON 1.73 SQ M-ARVRAT: 74 ML/MIN/1.73M2 (ref 60–?)
GLUCOSE BLD-MCNC: 103 MG/DL (ref 70–99)
MAGNESIUM SERPL-MCNC: 1.8 MG/DL (ref 1.6–2.6)
OSMOLALITY SERPL CALC.SUM OF ELEC: 274 MOSM/KG (ref 275–295)
PLATELET # BLD AUTO: 185 10(3)UL (ref 150–450)
POTASSIUM SERPL-SCNC: 4.2 MMOL/L (ref 3.5–5.1)
SODIUM SERPL-SCNC: 132 MMOL/L (ref 136–145)

## 2023-04-27 PROCEDURE — 99232 SBSQ HOSP IP/OBS MODERATE 35: CPT | Performed by: HOSPITALIST

## 2023-04-27 RX ORDER — AMIODARONE HYDROCHLORIDE 200 MG/1
200 TABLET ORAL DAILY
Status: DISCONTINUED | OUTPATIENT
Start: 2023-05-08 | End: 2023-04-27

## 2023-04-27 RX ORDER — AMIODARONE HYDROCHLORIDE 200 MG/1
200 TABLET ORAL 2 TIMES DAILY WITH MEALS
Status: DISCONTINUED | OUTPATIENT
Start: 2023-05-01 | End: 2023-04-27

## 2023-04-27 RX ORDER — HEPARIN SODIUM 5000 [USP'U]/ML
5000 INJECTION, SOLUTION INTRAVENOUS; SUBCUTANEOUS EVERY 12 HOURS SCHEDULED
Status: DISCONTINUED | OUTPATIENT
Start: 2023-04-27 | End: 2023-04-28

## 2023-04-27 RX ORDER — AMIODARONE HYDROCHLORIDE 200 MG/1
400 TABLET ORAL 2 TIMES DAILY WITH MEALS
Status: DISCONTINUED | OUTPATIENT
Start: 2023-04-27 | End: 2023-04-27

## 2023-04-27 RX ORDER — AMIODARONE HYDROCHLORIDE 200 MG/1
400 TABLET ORAL 2 TIMES DAILY WITH MEALS
Status: DISCONTINUED | OUTPATIENT
Start: 2023-04-27 | End: 2023-04-28

## 2023-04-27 NOTE — PLAN OF CARE
Assumed care of patient at 1. Pt A/Ox 4. O2 sats maintained on room air. NSR/ A-paced on tele. Last BM 4/26. Voiding without difficulty. Pt reports no pain. Pt up standby assist. Pt updated on plan of care. Care needs met. Bed in lowest position, Call light within reach. Bed alarm on. IV Amiodarone infusing per MD orders. POC: monitor electrolytes, IV amiodarone    0500: Pt complaining of mild pain that just started at left forearm where Amiodarone infusing through extended IV. Site is not red and does not look infiltrated. IV has blood return. Hot pack applied. Amiodarone infusion switched to other PIV which flushes well and also had blood return. Problem: Patient/Family Goals  Goal: Patient/Family Long Term Goal  Description: Patient's Long Term Goal: discharge home    Interventions:  - appropriate consults, replacing electrolytes, echo   - See additional Care Plan goals for specific interventions  Outcome: Progressing  Goal: Patient/Family Short Term Goal  Description: Patient's Short Term Goal: feel better    Interventions:   - appropriate consults, replacing electrolytes, echo   - See additional Care Plan goals for specific interventions  Outcome: Progressing     Problem: CARDIOVASCULAR - ADULT  Goal: Maintains optimal cardiac output and hemodynamic stability  Description: INTERVENTIONS:  - Monitor vital signs, rhythm, and trends  - Monitor for bleeding, hypotension and signs of decreased cardiac output  - Evaluate effectiveness of vasoactive medications to optimize hemodynamic stability  - Monitor arterial and/or venous puncture sites for bleeding and/or hematoma  - Assess quality of pulses, skin color and temperature  - Assess for signs of decreased coronary artery perfusion - ex.  Angina  - Evaluate fluid balance, assess for edema, trend weights  Outcome: Progressing  Goal: Absence of cardiac arrhythmias or at baseline  Description: INTERVENTIONS:  - Continuous cardiac monitoring, monitor vital signs, obtain 12 lead EKG if indicated  - Evaluate effectiveness of antiarrhythmic and heart rate control medications as ordered  - Initiate emergency measures for life threatening arrhythmias  - Monitor electrolytes and administer replacement therapy as ordered  Outcome: Progressing

## 2023-04-27 NOTE — PLAN OF CARE
Pt c/o pain. Slight redness noted  on the right arm IV site with amiodarone infusing. Pharmacy was called and cardiology APN was notified with new orders. nitroglycerin paste was administered to site and warm compress per pharmacy. On reassessment    Pt declined  for hyaluronidase to be administered, states arm is fine and not hurting. No redness or swelling noted at this time. Pt had 9 beats of VT asymptomatic  Lucie cards Apn notified.

## 2023-04-27 NOTE — PLAN OF CARE
Pt denies c/o pain, malaise, or cardiac symptoms. Amiodorone infusing per MD orders. Plan to transition to PO Amiodorone in prep for discharge. . A&Ox4. Lungs clear bilaterally with equal expansion, on room air. Pt NSR on monitor with regular rates. Abdomen soft and non-tender with active bowel sounds in all 4 quadrants and continent of bowel and bladder. Pt updated with plan of care. Problem: Patient/Family Goals  Goal: Patient/Family Long Term Goal  Description: Patient's Long Term Goal: discharge home    Interventions:  - appropriate consults, replacing electrolytes, echo   - See additional Care Plan goals for specific interventions  Outcome: Progressing  Goal: Patient/Family Short Term Goal  Description: Patient's Short Term Goal: feel better    Interventions:   - appropriate consults, replacing electrolytes, echo   - See additional Care Plan goals for specific interventions  Outcome: Progressing     Problem: CARDIOVASCULAR - ADULT  Goal: Maintains optimal cardiac output and hemodynamic stability  Description: INTERVENTIONS:  - Monitor vital signs, rhythm, and trends  - Monitor for bleeding, hypotension and signs of decreased cardiac output  - Evaluate effectiveness of vasoactive medications to optimize hemodynamic stability  - Monitor arterial and/or venous puncture sites for bleeding and/or hematoma  - Assess quality of pulses, skin color and temperature  - Assess for signs of decreased coronary artery perfusion - ex.  Angina  - Evaluate fluid balance, assess for edema, trend weights  Outcome: Progressing  Goal: Absence of cardiac arrhythmias or at baseline  Description: INTERVENTIONS:  - Continuous cardiac monitoring, monitor vital signs, obtain 12 lead EKG if indicated  - Evaluate effectiveness of antiarrhythmic and heart rate control medications as ordered  - Initiate emergency measures for life threatening arrhythmias  - Monitor electrolytes and administer replacement therapy as ordered  Outcome: Progressing

## 2023-04-28 ENCOUNTER — ANCILLARY PROCEDURE (OUTPATIENT)
Dept: CARDIOLOGY | Age: 73
End: 2023-04-28
Attending: INTERNAL MEDICINE

## 2023-04-28 ENCOUNTER — HOSPITAL ENCOUNTER (EMERGENCY)
Facility: HOSPITAL | Age: 73
Discharge: STILL ADMITTED OR ADMINISTRATIVE | End: 2023-04-28
Attending: EMERGENCY MEDICINE
Payer: OTHER GOVERNMENT

## 2023-04-28 ENCOUNTER — OFFICE VISIT (OUTPATIENT)
Dept: CARDIOLOGY | Age: 73
End: 2023-04-28

## 2023-04-28 ENCOUNTER — TELEPHONE (OUTPATIENT)
Dept: CARDIOLOGY | Age: 73
End: 2023-04-28

## 2023-04-28 ENCOUNTER — APPOINTMENT (OUTPATIENT)
Dept: GENERAL RADIOLOGY | Facility: HOSPITAL | Age: 73
End: 2023-04-28
Attending: EMERGENCY MEDICINE
Payer: OTHER GOVERNMENT

## 2023-04-28 ENCOUNTER — HOSPITAL ENCOUNTER (INPATIENT)
Age: 73
LOS: 4 days | Discharge: HOME | DRG: 287 | End: 2023-05-02
Attending: INTERNAL MEDICINE | Admitting: INTERNAL MEDICINE

## 2023-04-28 VITALS
DIASTOLIC BLOOD PRESSURE: 71 MMHG | HEIGHT: 70.87 IN | WEIGHT: 172 LBS | HEART RATE: 55 BPM | TEMPERATURE: 98 F | RESPIRATION RATE: 15 BRPM | SYSTOLIC BLOOD PRESSURE: 133 MMHG | BODY MASS INDEX: 24.08 KG/M2 | OXYGEN SATURATION: 95 %

## 2023-04-28 VITALS
SYSTOLIC BLOOD PRESSURE: 180 MMHG | HEIGHT: 70 IN | BODY MASS INDEX: 24.85 KG/M2 | WEIGHT: 173.6 LBS | HEART RATE: 48 BPM | DIASTOLIC BLOOD PRESSURE: 88 MMHG

## 2023-04-28 VITALS
HEART RATE: 75 BPM | OXYGEN SATURATION: 97 % | BODY MASS INDEX: 24.72 KG/M2 | RESPIRATION RATE: 18 BRPM | WEIGHT: 166.88 LBS | SYSTOLIC BLOOD PRESSURE: 125 MMHG | TEMPERATURE: 98 F | DIASTOLIC BLOOD PRESSURE: 72 MMHG | HEIGHT: 69 IN

## 2023-04-28 DIAGNOSIS — Z45.02 ICD (IMPLANTABLE CARDIOVERTER-DEFIBRILLATOR) DISCHARGE: Primary | ICD-10-CM

## 2023-04-28 DIAGNOSIS — I25.5 ISCHEMIC CARDIOMYOPATHY: ICD-10-CM

## 2023-04-28 DIAGNOSIS — Z95.810 ICD (IMPLANTABLE CARDIOVERTER-DEFIBRILLATOR) IN PLACE: ICD-10-CM

## 2023-04-28 DIAGNOSIS — I25.5 CARDIOMYOPATHY, ISCHEMIC: ICD-10-CM

## 2023-04-28 DIAGNOSIS — R42 DIZZINESS: Primary | ICD-10-CM

## 2023-04-28 LAB
ALBUMIN SERPL-MCNC: 3.2 G/DL (ref 3.4–5)
ALBUMIN/GLOB SERPL: 0.7 {RATIO} (ref 1–2)
ALP LIVER SERPL-CCNC: 64 U/L
ALT SERPL-CCNC: 20 U/L
ANION GAP SERPL CALC-SCNC: 7 MMOL/L (ref 0–18)
AST SERPL-CCNC: 29 U/L (ref 15–37)
ATRIAL RATE: 71 BPM
ATRIAL RATE: 72 BPM
BASOPHILS # BLD AUTO: 0.02 X10(3) UL (ref 0–0.2)
BASOPHILS NFR BLD AUTO: 0.2 %
BILIRUB SERPL-MCNC: 0.7 MG/DL (ref 0.1–2)
BUN BLD-MCNC: 12 MG/DL (ref 7–18)
CALCIUM BLD-MCNC: 9 MG/DL (ref 8.5–10.1)
CHLORIDE SERPL-SCNC: 99 MMOL/L (ref 98–112)
CO2 SERPL-SCNC: 24 MMOL/L (ref 21–32)
CREAT BLD-MCNC: 1.18 MG/DL
EOSINOPHIL # BLD AUTO: 0.14 X10(3) UL (ref 0–0.7)
EOSINOPHIL NFR BLD AUTO: 1.6 %
ERYTHROCYTE [DISTWIDTH] IN BLOOD BY AUTOMATED COUNT: 15 %
GFR SERPLBLD BASED ON 1.73 SQ M-ARVRAT: 66 ML/MIN/1.73M2 (ref 60–?)
GLOBULIN PLAS-MCNC: 4.3 G/DL (ref 2.8–4.4)
GLUCOSE BLD-MCNC: 119 MG/DL (ref 70–99)
HCT VFR BLD AUTO: 38.6 %
HGB BLD-MCNC: 13.2 G/DL
IMM GRANULOCYTES # BLD AUTO: 0.04 X10(3) UL (ref 0–1)
IMM GRANULOCYTES NFR BLD: 0.4 %
LYMPHOCYTES # BLD AUTO: 1.17 X10(3) UL (ref 1–4)
LYMPHOCYTES NFR BLD AUTO: 13 %
MCH RBC QN AUTO: 31.4 PG (ref 26–34)
MCHC RBC AUTO-ENTMCNC: 34.2 G/DL (ref 31–37)
MCV RBC AUTO: 91.7 FL
MONOCYTES # BLD AUTO: 1.47 X10(3) UL (ref 0.1–1)
MONOCYTES NFR BLD AUTO: 16.3 %
NEUTROPHILS # BLD AUTO: 6.18 X10 (3) UL (ref 1.5–7.7)
NEUTROPHILS # BLD AUTO: 6.18 X10(3) UL (ref 1.5–7.7)
NEUTROPHILS NFR BLD AUTO: 68.5 %
OSMOLALITY SERPL CALC.SUM OF ELEC: 271 MOSM/KG (ref 275–295)
P AXIS: 105 DEGREES
P AXIS: 53 DEGREES
P-R INTERVAL: 262 MS
P-R INTERVAL: 280 MS
PLATELET # BLD AUTO: 182 10(3)UL (ref 150–450)
PLATELET # BLD AUTO: 189 10(3)UL (ref 150–450)
POTASSIUM SERPL-SCNC: 4.7 MMOL/L (ref 3.5–5.1)
PROT SERPL-MCNC: 7.5 G/DL (ref 6.4–8.2)
Q-T INTERVAL: 410 MS
Q-T INTERVAL: 442 MS
QRS DURATION: 114 MS
QRS DURATION: 126 MS
QTC CALCULATION (BEZET): 445 MS
QTC CALCULATION (BEZET): 483 MS
R AXIS: 79 DEGREES
R AXIS: 82 DEGREES
RBC # BLD AUTO: 4.21 X10(6)UL
SODIUM SERPL-SCNC: 130 MMOL/L (ref 136–145)
T AXIS: -12 DEGREES
T AXIS: -47 DEGREES
TROPONIN I HIGH SENSITIVITY: 30 NG/L
VENTRICULAR RATE: 71 BPM
VENTRICULAR RATE: 72 BPM
WBC # BLD AUTO: 9 X10(3) UL (ref 4–11)

## 2023-04-28 PROCEDURE — 93005 ELECTROCARDIOGRAM TRACING: CPT

## 2023-04-28 PROCEDURE — 99239 HOSP IP/OBS DSCHRG MGMT >30: CPT | Performed by: HOSPITALIST

## 2023-04-28 PROCEDURE — 3079F DIAST BP 80-89 MM HG: CPT | Performed by: INTERNAL MEDICINE

## 2023-04-28 PROCEDURE — 84484 ASSAY OF TROPONIN QUANT: CPT | Performed by: EMERGENCY MEDICINE

## 2023-04-28 PROCEDURE — 80053 COMPREHEN METABOLIC PANEL: CPT | Performed by: EMERGENCY MEDICINE

## 2023-04-28 PROCEDURE — 99285 EMERGENCY DEPT VISIT HI MDM: CPT

## 2023-04-28 PROCEDURE — 93005 ELECTROCARDIOGRAM TRACING: CPT | Performed by: INTERNAL MEDICINE

## 2023-04-28 PROCEDURE — 85025 COMPLETE CBC W/AUTO DIFF WBC: CPT | Performed by: EMERGENCY MEDICINE

## 2023-04-28 PROCEDURE — 3077F SYST BP >= 140 MM HG: CPT | Performed by: INTERNAL MEDICINE

## 2023-04-28 PROCEDURE — 93010 ELECTROCARDIOGRAM REPORT: CPT

## 2023-04-28 PROCEDURE — 99215 OFFICE O/P EST HI 40 MIN: CPT | Performed by: INTERNAL MEDICINE

## 2023-04-28 PROCEDURE — 36415 COLL VENOUS BLD VENIPUNCTURE: CPT

## 2023-04-28 PROCEDURE — 71045 X-RAY EXAM CHEST 1 VIEW: CPT | Performed by: EMERGENCY MEDICINE

## 2023-04-28 PROCEDURE — 93010 ELECTROCARDIOGRAM REPORT: CPT | Performed by: INTERNAL MEDICINE

## 2023-04-28 PROCEDURE — G0378 HOSPITAL OBSERVATION PER HR: HCPCS

## 2023-04-28 RX ORDER — AMIODARONE HYDROCHLORIDE 200 MG/1
400 TABLET ORAL 2 TIMES DAILY WITH MEALS
Qty: 90 TABLET | Refills: 3 | Status: SHIPPED | OUTPATIENT
Start: 2023-04-28

## 2023-04-28 RX ORDER — POLYETHYLENE GLYCOL 3350 17 G/17G
17 POWDER, FOR SOLUTION ORAL DAILY PRN
Status: DISCONTINUED | OUTPATIENT
Start: 2023-04-28 | End: 2023-05-02 | Stop reason: HOSPADM

## 2023-04-28 RX ORDER — ONDANSETRON 2 MG/ML
4 INJECTION INTRAMUSCULAR; INTRAVENOUS 2 TIMES DAILY PRN
Status: DISCONTINUED | OUTPATIENT
Start: 2023-04-28 | End: 2023-05-02 | Stop reason: HOSPADM

## 2023-04-28 RX ORDER — HEPARIN SODIUM 5000 [USP'U]/ML
5000 INJECTION, SOLUTION INTRAVENOUS; SUBCUTANEOUS EVERY 8 HOURS SCHEDULED
Status: DISCONTINUED | OUTPATIENT
Start: 2023-04-29 | End: 2023-05-02 | Stop reason: HOSPADM

## 2023-04-28 RX ORDER — 0.9 % SODIUM CHLORIDE 0.9 %
2 VIAL (ML) INJECTION EVERY 12 HOURS SCHEDULED
Status: DISCONTINUED | OUTPATIENT
Start: 2023-04-29 | End: 2023-05-02 | Stop reason: HOSPADM

## 2023-04-28 RX ORDER — AMIODARONE HYDROCHLORIDE 200 MG/1
400 TABLET ORAL 2 TIMES DAILY
Status: ON HOLD | COMMUNITY
Start: 2023-04-28 | End: 2023-05-02 | Stop reason: HOSPADM

## 2023-04-28 RX ORDER — AMOXICILLIN 250 MG
2 CAPSULE ORAL DAILY PRN
Status: DISCONTINUED | OUTPATIENT
Start: 2023-04-28 | End: 2023-05-02 | Stop reason: HOSPADM

## 2023-04-28 RX ORDER — ACETAMINOPHEN 325 MG/1
650 TABLET ORAL EVERY 4 HOURS PRN
Status: DISCONTINUED | OUTPATIENT
Start: 2023-04-28 | End: 2023-05-01 | Stop reason: SDUPTHER

## 2023-04-28 RX ORDER — AMIODARONE HYDROCHLORIDE 200 MG/1
400 TABLET ORAL ONCE
Status: COMPLETED | OUTPATIENT
Start: 2023-04-28 | End: 2023-04-28

## 2023-04-28 RX ORDER — AMIODARONE HYDROCHLORIDE 200 MG/1
200 TABLET ORAL ONCE
Status: DISCONTINUED | OUTPATIENT
Start: 2023-04-28 | End: 2023-04-28

## 2023-04-28 NOTE — PLAN OF CARE
Pt denies c/o pain, malaise, or cardiac symptoms. A&Ox4. Lungs clear bilaterally with equal expansion, on room air. Pt is A-paced, NSR w/frequent PVCs. Abdomen soft and non-tender with active bowel sounds in all 4 quadrants and continent of bowel and bladder. Pt updated with plan of care. Problem: Patient/Family Goals  Goal: Patient/Family Long Term Goal  Description: Patient's Long Term Goal: discharge home    Interventions:  - appropriate consults, replacing electrolytes, echo   - See additional Care Plan goals for specific interventions  Outcome: Progressing  Goal: Patient/Family Short Term Goal  Description: Patient's Short Term Goal: feel better    Interventions:   - appropriate consults, replacing electrolytes, echo   - See additional Care Plan goals for specific interventions  Outcome: Progressing     Problem: CARDIOVASCULAR - ADULT  Goal: Maintains optimal cardiac output and hemodynamic stability  Description: INTERVENTIONS:  - Monitor vital signs, rhythm, and trends  - Monitor for bleeding, hypotension and signs of decreased cardiac output  - Evaluate effectiveness of vasoactive medications to optimize hemodynamic stability  - Monitor arterial and/or venous puncture sites for bleeding and/or hematoma  - Assess quality of pulses, skin color and temperature  - Assess for signs of decreased coronary artery perfusion - ex.  Angina  - Evaluate fluid balance, assess for edema, trend weights  Outcome: Progressing  Goal: Absence of cardiac arrhythmias or at baseline  Description: INTERVENTIONS:  - Continuous cardiac monitoring, monitor vital signs, obtain 12 lead EKG if indicated  - Evaluate effectiveness of antiarrhythmic and heart rate control medications as ordered  - Initiate emergency measures for life threatening arrhythmias  - Monitor electrolytes and administer replacement therapy as ordered  Outcome: Progressing

## 2023-04-28 NOTE — PROGRESS NOTES
04/28/23 1100   Provider Notification   Reason for Communication Med Rec   Provider Name Other (comment)  ([facility])   Method of Communication Page   Response Waiting for response   Notification Time [phone]

## 2023-04-28 NOTE — ED INITIAL ASSESSMENT (HPI)
Pt was brought in by EMS from his cardiologist office for elevated BP. Pt states that he felt suddenly dizzy and lightheaded while in the office. Pt denies symptoms currently. Pt denies CP or SOB.  Pt was recently discharged from the hospital.

## 2023-04-28 NOTE — PLAN OF CARE
Problem: Patient/Family Goals  Goal: Patient/Family Long Term Goal  Description: Patient's Long Term Goal: discharge home    Interventions:  - appropriate consults, replacing electrolytes, echo   - See additional Care Plan goals for specific interventions  4/28/2023 1200 by Joe Krishnamurthy RN  Outcome: Adequate for Discharge  4/28/2023 1041 by Joe Krishnamurthy RN  Outcome: Progressing  Goal: Patient/Family Short Term Goal  Description: Patient's Short Term Goal: feel better    Interventions:   - appropriate consults, replacing electrolytes, echo   - See additional Care Plan goals for specific interventions  4/28/2023 1200 by Joe Krishnamurthy RN  Outcome: Adequate for Discharge  4/28/2023 1041 by Joe Krishnamurthy RN  Outcome: Progressing     Problem: CARDIOVASCULAR - ADULT  Goal: Maintains optimal cardiac output and hemodynamic stability  Description: INTERVENTIONS:  - Monitor vital signs, rhythm, and trends  - Monitor for bleeding, hypotension and signs of decreased cardiac output  - Evaluate effectiveness of vasoactive medications to optimize hemodynamic stability  - Monitor arterial and/or venous puncture sites for bleeding and/or hematoma  - Assess quality of pulses, skin color and temperature  - Assess for signs of decreased coronary artery perfusion - ex.  Angina  - Evaluate fluid balance, assess for edema, trend weights  4/28/2023 1200 by Joe Krishnamurthy RN  Outcome: Adequate for Discharge  4/28/2023 1041 by Joe Krishnamurthy RN  Outcome: Progressing  Goal: Absence of cardiac arrhythmias or at baseline  Description: INTERVENTIONS:  - Continuous cardiac monitoring, monitor vital signs, obtain 12 lead EKG if indicated  - Evaluate effectiveness of antiarrhythmic and heart rate control medications as ordered  - Initiate emergency measures for life threatening arrhythmias  - Monitor electrolytes and administer replacement therapy as ordered  4/28/2023 1200 by Joe Krishnamurthy RN  Outcome: Adequate for Discharge  4/28/2023 1041 by Bryson Williamson RN  Outcome: Progressing

## 2023-04-28 NOTE — PLAN OF CARE
Hard of hearing  Has hearing aids in place    Will give to wife to take home Pt discharged home. IV removed, tele d/c'd and returned to monitor tech. Follow up instructions provided and discussed. Pt and family verbalized understanding. Rx given. Discussed adverse reactions and side effects of all new medications and provided appropriate handouts. Pt and family voiced understanding. Pt wheeled down by staff with all belongings. Pt left denying c/o pain, malaise, or cardiac symptoms. All needs met by staff.

## 2023-04-28 NOTE — PLAN OF CARE
Assumed care of pt at 2300  A/Ox4, up with standby  Room air, lung sounds diminished at bases. A-paced w/ frequent PVCs. No complaints of chest pain. No cardiac symptoms. Pt is continent of bowel and bladder. Last BM 4/27  Skin is warm, dry and intact. Call light in reach. Pt updated on plan of care. Problem: Patient/Family Goals  Goal: Patient/Family Long Term Goal  Description: Patient's Long Term Goal: discharge home    Interventions:  - appropriate consults, replacing electrolytes, echo   - See additional Care Plan goals for specific interventions  Outcome: Progressing  Goal: Patient/Family Short Term Goal  Description: Patient's Short Term Goal: feel better    Interventions:   - appropriate consults, replacing electrolytes, echo   - See additional Care Plan goals for specific interventions  Outcome: Progressing     Problem: CARDIOVASCULAR - ADULT  Goal: Maintains optimal cardiac output and hemodynamic stability  Description: INTERVENTIONS:  - Monitor vital signs, rhythm, and trends  - Monitor for bleeding, hypotension and signs of decreased cardiac output  - Evaluate effectiveness of vasoactive medications to optimize hemodynamic stability  - Monitor arterial and/or venous puncture sites for bleeding and/or hematoma  - Assess quality of pulses, skin color and temperature  - Assess for signs of decreased coronary artery perfusion - ex.  Angina  - Evaluate fluid balance, assess for edema, trend weights  Outcome: Progressing  Goal: Absence of cardiac arrhythmias or at baseline  Description: INTERVENTIONS:  - Continuous cardiac monitoring, monitor vital signs, obtain 12 lead EKG if indicated  - Evaluate effectiveness of antiarrhythmic and heart rate control medications as ordered  - Initiate emergency measures for life threatening arrhythmias  - Monitor electrolytes and administer replacement therapy as ordered  Outcome: Progressing

## 2023-04-29 LAB
ANION GAP SERPL CALC-SCNC: 8 MMOL/L (ref 7–19)
ATRIAL RATE: 73 BPM
BUN SERPL-MCNC: 11 MG/DL (ref 6–20)
BUN/CREAT SERPL: 11 (ref 7–25)
CALCIUM SERPL-MCNC: 9 MG/DL (ref 8.4–10.2)
CHLORIDE SERPL-SCNC: 102 MMOL/L (ref 97–110)
CO2 SERPL-SCNC: 25 MMOL/L (ref 21–32)
CREAT SERPL-MCNC: 0.98 MG/DL (ref 0.67–1.17)
DEPRECATED RDW RBC: 50.9 FL (ref 39–50)
ERYTHROCYTE [DISTWIDTH] IN BLOOD: 15.1 % (ref 11–15)
FASTING DURATION TIME PATIENT: ABNORMAL H
GFR SERPLBLD BASED ON 1.73 SQ M-ARVRAT: 82 ML/MIN
GLUCOSE SERPL-MCNC: 102 MG/DL (ref 70–99)
HCT VFR BLD CALC: 37.4 % (ref 39–51)
HGB BLD-MCNC: 12.7 G/DL (ref 13–17)
MAGNESIUM SERPL-MCNC: 1.7 MG/DL (ref 1.7–2.4)
MCH RBC QN AUTO: 31.4 PG (ref 26–34)
MCHC RBC AUTO-ENTMCNC: 34 G/DL (ref 32–36.5)
MCV RBC AUTO: 92.3 FL (ref 78–100)
NRBC BLD MANUAL-RTO: 0 /100 WBC
P AXIS: -8 DEGREES
P-R INTERVAL: 230 MS
PLATELET # BLD AUTO: 186 K/MCL (ref 140–450)
POTASSIUM SERPL-SCNC: 4.1 MMOL/L (ref 3.4–5.1)
Q-T INTERVAL: 436 MS
QRS DURATION: 120 MS
QTC CALCULATION (BEZET): 480 MS
R AXIS: 75 DEGREES
RBC # BLD: 4.05 MIL/MCL (ref 4.5–5.9)
SODIUM SERPL-SCNC: 131 MMOL/L (ref 135–145)
T AXIS: -49 DEGREES
TROPONIN I SERPL DL<=0.01 NG/ML-MCNC: 37 NG/L
TROPONIN I SERPL DL<=0.01 NG/ML-MCNC: 39 NG/L
VENTRICULAR RATE: 73 BPM
WBC # BLD: 9.8 K/MCL (ref 4.2–11)

## 2023-04-29 PROCEDURE — 85027 COMPLETE CBC AUTOMATED: CPT | Performed by: INTERNAL MEDICINE

## 2023-04-29 PROCEDURE — 10002803 HB RX 637: Performed by: INTERNAL MEDICINE

## 2023-04-29 PROCEDURE — 80048 BASIC METABOLIC PNL TOTAL CA: CPT | Performed by: INTERNAL MEDICINE

## 2023-04-29 PROCEDURE — 10006031 HB ROOM CHARGE TELEMETRY

## 2023-04-29 PROCEDURE — 36415 COLL VENOUS BLD VENIPUNCTURE: CPT | Performed by: INTERNAL MEDICINE

## 2023-04-29 PROCEDURE — 10004651 HB RX, NO CHARGE ITEM: Performed by: INTERNAL MEDICINE

## 2023-04-29 PROCEDURE — 96372 THER/PROPH/DIAG INJ SC/IM: CPT

## 2023-04-29 PROCEDURE — 99223 1ST HOSP IP/OBS HIGH 75: CPT | Performed by: INTERNAL MEDICINE

## 2023-04-29 PROCEDURE — 83735 ASSAY OF MAGNESIUM: CPT | Performed by: INTERNAL MEDICINE

## 2023-04-29 PROCEDURE — 10002800 HB RX 250 W HCPCS: Performed by: INTERNAL MEDICINE

## 2023-04-29 PROCEDURE — G0378 HOSPITAL OBSERVATION PER HR: HCPCS

## 2023-04-29 PROCEDURE — 84484 ASSAY OF TROPONIN QUANT: CPT | Performed by: INTERNAL MEDICINE

## 2023-04-29 RX ORDER — LANOLIN ALCOHOL/MO/W.PET/CERES
400 CREAM (GRAM) TOPICAL ONCE
Status: COMPLETED | OUTPATIENT
Start: 2023-04-29 | End: 2023-04-29

## 2023-04-29 RX ORDER — FUROSEMIDE 40 MG/1
40 TABLET ORAL DAILY
Status: DISCONTINUED | OUTPATIENT
Start: 2023-04-29 | End: 2023-05-02 | Stop reason: HOSPADM

## 2023-04-29 RX ORDER — AMIODARONE HYDROCHLORIDE 200 MG/1
400 TABLET ORAL 2 TIMES DAILY
Status: DISCONTINUED | OUTPATIENT
Start: 2023-04-29 | End: 2023-05-02 | Stop reason: HOSPADM

## 2023-04-29 RX ORDER — ATORVASTATIN CALCIUM 20 MG/1
20 TABLET, FILM COATED ORAL NIGHTLY
Status: DISCONTINUED | OUTPATIENT
Start: 2023-04-29 | End: 2023-05-02 | Stop reason: HOSPADM

## 2023-04-29 RX ORDER — SPIRONOLACTONE 25 MG/1
25 TABLET ORAL DAILY
Status: DISCONTINUED | OUTPATIENT
Start: 2023-04-29 | End: 2023-05-02 | Stop reason: HOSPADM

## 2023-04-29 RX ORDER — FUROSEMIDE 40 MG/1
40 TABLET ORAL SEE ADMIN INSTRUCTIONS
Status: DISCONTINUED | OUTPATIENT
Start: 2023-04-29 | End: 2023-04-29 | Stop reason: ALTCHOICE

## 2023-04-29 RX ORDER — PANTOPRAZOLE SODIUM 40 MG/1
40 TABLET, DELAYED RELEASE ORAL
Status: DISCONTINUED | OUTPATIENT
Start: 2023-04-29 | End: 2023-05-02 | Stop reason: HOSPADM

## 2023-04-29 RX ORDER — METOPROLOL SUCCINATE 100 MG/1
100 TABLET, EXTENDED RELEASE ORAL DAILY
Status: DISCONTINUED | OUTPATIENT
Start: 2023-04-29 | End: 2023-05-02 | Stop reason: HOSPADM

## 2023-04-29 RX ADMIN — SACUBITRIL AND VALSARTAN 1 TABLET: 24; 26 TABLET, FILM COATED ORAL at 21:01

## 2023-04-29 RX ADMIN — PANCRELIPASE 1 CAPSULE: 30000; 6000; 19000 CAPSULE, DELAYED RELEASE PELLETS ORAL at 17:30

## 2023-04-29 RX ADMIN — HEPARIN SODIUM 5000 UNITS: 5000 INJECTION INTRAVENOUS; SUBCUTANEOUS at 21:02

## 2023-04-29 RX ADMIN — METOPROLOL SUCCINATE 100 MG: 100 TABLET, EXTENDED RELEASE ORAL at 11:37

## 2023-04-29 RX ADMIN — SODIUM CHLORIDE, PRESERVATIVE FREE 2 ML: 5 INJECTION INTRAVENOUS at 21:02

## 2023-04-29 RX ADMIN — SODIUM CHLORIDE, PRESERVATIVE FREE 2 ML: 5 INJECTION INTRAVENOUS at 11:39

## 2023-04-29 RX ADMIN — ATORVASTATIN CALCIUM 20 MG: 20 TABLET, FILM COATED ORAL at 21:01

## 2023-04-29 RX ADMIN — Medication 400 MG: at 08:57

## 2023-04-29 RX ADMIN — HEPARIN SODIUM 5000 UNITS: 5000 INJECTION INTRAVENOUS; SUBCUTANEOUS at 13:59

## 2023-04-29 RX ADMIN — ASPIRIN 81 MG: 81 TABLET, COATED ORAL at 11:37

## 2023-04-29 RX ADMIN — AMIODARONE HYDROCHLORIDE 400 MG: 200 TABLET ORAL at 21:01

## 2023-04-29 RX ADMIN — SACUBITRIL AND VALSARTAN 1 TABLET: 24; 26 TABLET, FILM COATED ORAL at 11:37

## 2023-04-29 RX ADMIN — Medication 400 MG: at 17:30

## 2023-04-29 RX ADMIN — AMIODARONE HYDROCHLORIDE 400 MG: 200 TABLET ORAL at 11:37

## 2023-04-29 RX ADMIN — PANTOPRAZOLE SODIUM 40 MG: 40 TABLET, DELAYED RELEASE ORAL at 13:59

## 2023-04-29 RX ADMIN — HEPARIN SODIUM 5000 UNITS: 5000 INJECTION INTRAVENOUS; SUBCUTANEOUS at 06:20

## 2023-04-29 RX ADMIN — FUROSEMIDE 40 MG: 40 TABLET ORAL at 13:59

## 2023-04-29 RX ADMIN — SODIUM CHLORIDE, PRESERVATIVE FREE 2 ML: 5 INJECTION INTRAVENOUS at 00:29

## 2023-04-29 RX ADMIN — SPIRONOLACTONE 25 MG: 25 TABLET ORAL at 11:37

## 2023-04-29 SDOH — ECONOMIC STABILITY: TRANSPORTATION INSECURITY
IN THE PAST 12 MONTHS, HAS THE LACK OF TRANSPORTATION KEPT YOU FROM MEDICAL APPOINTMENTS OR FROM GETTING MEDICATIONS?: NO

## 2023-04-29 SDOH — ECONOMIC STABILITY: FOOD INSECURITY: HOW OFTEN IN THE PAST 12 MONTHS WERE YOU WORRIED OR STRESSED ABOUT HAVING ENOUGH MONEY TO BUY NUTRITIOUS MEALS?: NEVER

## 2023-04-29 SDOH — ECONOMIC STABILITY: GENERAL

## 2023-04-29 SDOH — ECONOMIC STABILITY: HOUSING INSECURITY: ARE YOU WORRIED ABOUT LOSING YOUR HOUSING?: NO

## 2023-04-29 SDOH — HEALTH STABILITY: GENERAL: BECAUSE OF A PHYSICAL, MENTAL, OR EMOTIONAL CONDITION, DO YOU HAVE DIFFICULTY DOING ERRANDS ALONE?: NO

## 2023-04-29 SDOH — ECONOMIC STABILITY: TRANSPORTATION INSECURITY
IN THE PAST 12 MONTHS, HAS LACK OF TRANSPORTATION KEPT YOU FROM MEETINGS, WORK, OR FROM GETTING THINGS NEEDED FOR DAILY LIVING?: NO

## 2023-04-29 SDOH — ECONOMIC STABILITY: HOUSING INSECURITY: WHAT IS YOUR LIVING SITUATION TODAY?: HOUSE

## 2023-04-29 SDOH — ECONOMIC STABILITY: HOUSING INSECURITY: WHAT IS YOUR LIVING SITUATION TODAY?: SPOUSE

## 2023-04-29 SDOH — HEALTH STABILITY: GENERAL
BECAUSE OF A PHYSICAL, MENTAL, OR EMOTIONAL CONDITION, DO YOU HAVE SERIOUS DIFFICULTY CONCENTRATING, REMEMBERING OR MAKING DECISIONS?: NO

## 2023-04-29 SDOH — HEALTH STABILITY: PHYSICAL HEALTH: DO YOU HAVE SERIOUS DIFFICULTY WALKING OR CLIMBING STAIRS?: NO

## 2023-04-29 SDOH — SOCIAL STABILITY: SOCIAL NETWORK: SUPPORT SYSTEMS: SPOUSE

## 2023-04-29 SDOH — SOCIAL STABILITY: SOCIAL NETWORK
HOW OFTEN DO YOU SEE OR TALK TO PEOPLE THAT YOU CARE ABOUT AND FEEL CLOSE TO? (FOR EXAMPLE: TALKING TO FRIENDS ON THE PHONE, VISITING FRIENDS OR FAMILY, GOING TO CHURCH OR CLUB MEETINGS): 5 OR MORE TIMES A WEEK

## 2023-04-29 SDOH — HEALTH STABILITY: PHYSICAL HEALTH: DO YOU HAVE DIFFICULTY DRESSING OR BATHING?: NO

## 2023-04-29 ASSESSMENT — ENCOUNTER SYMPTOMS
GASTROINTESTINAL NEGATIVE: 1
SHORTNESS OF BREATH: 0
PSYCHIATRIC NEGATIVE: 1
FEVER: 0
EYES NEGATIVE: 1
ENDOCRINE NEGATIVE: 1
HEMATOLOGIC/LYMPHATIC NEGATIVE: 1
DIZZINESS: 1
FATIGUE: 0

## 2023-04-29 ASSESSMENT — PAIN SCALES - GENERAL
PAINLEVEL_OUTOF10: 0

## 2023-04-29 ASSESSMENT — LIFESTYLE VARIABLES
HOW OFTEN DO YOU HAVE A DRINK CONTAINING ALCOHOL: NEVER
ALCOHOL_USE_STATUS: NO OR LOW RISK WITH VALIDATED TOOL
HOW OFTEN DO YOU HAVE 6 OR MORE DRINKS ON ONE OCCASION: NEVER
AUDIT-C TOTAL SCORE: 0
HOW MANY STANDARD DRINKS CONTAINING ALCOHOL DO YOU HAVE ON A TYPICAL DAY: 0,1 OR 2

## 2023-04-29 ASSESSMENT — ACTIVITIES OF DAILY LIVING (ADL)
RECENT_DECLINE_ADL: NO
ADL_SHORT_OF_BREATH: NO
ADL_SCORE: 12
ADL_BEFORE_ADMISSION: INDEPENDENT

## 2023-04-29 ASSESSMENT — COLUMBIA-SUICIDE SEVERITY RATING SCALE - C-SSRS
1. WITHIN THE PAST MONTH, HAVE YOU WISHED YOU WERE DEAD OR WISHED YOU COULD GO TO SLEEP AND NOT WAKE UP?: NO
6. HAVE YOU EVER DONE ANYTHING, STARTED TO DO ANYTHING, OR PREPARED TO DO ANYTHING TO END YOUR LIFE?: NO
IS THE PATIENT ABLE TO COMPLETE C-SSRS: YES
2. HAVE YOU ACTUALLY HAD ANY THOUGHTS OF KILLING YOURSELF?: NO

## 2023-04-29 ASSESSMENT — PATIENT HEALTH QUESTIONNAIRE - PHQ9
2. FEELING DOWN, DEPRESSED OR HOPELESS: NOT AT ALL
1. LITTLE INTEREST OR PLEASURE IN DOING THINGS: NOT AT ALL
CLINICAL INTERPRETATION OF PHQ2 SCORE: NO FURTHER SCREENING NEEDED
IS PATIENT ABLE TO COMPLETE PHQ2 OR PHQ9: YES
SUM OF ALL RESPONSES TO PHQ9 QUESTIONS 1 AND 2: 0
SUM OF ALL RESPONSES TO PHQ9 QUESTIONS 1 AND 2: 0

## 2023-04-29 NOTE — ED QUICK NOTES
Nurse-to-nurse report given Wallace May RN at [facility].   Receiving MD is Cristobal. [facility] ambulance will be here for transport @ 2300.

## 2023-04-29 NOTE — ED QUICK NOTES
Received report from [facility]. Pt resting comfortably on cart. Requesting to take his nw prescribed medication at scheduled time. MD notified, orders received.

## 2023-04-30 LAB
ALBUMIN SERPL-MCNC: 2.9 G/DL (ref 3.6–5.1)
ALBUMIN/GLOB SERPL: 0.7 {RATIO} (ref 1–2.4)
ALP SERPL-CCNC: 59 UNITS/L (ref 45–117)
ALT SERPL-CCNC: 17 UNITS/L
ANION GAP SERPL CALC-SCNC: 10 MMOL/L (ref 7–19)
AST SERPL-CCNC: 22 UNITS/L
BILIRUB SERPL-MCNC: 0.7 MG/DL (ref 0.2–1)
BUN SERPL-MCNC: 15 MG/DL (ref 6–20)
BUN/CREAT SERPL: 13 (ref 7–25)
CALCIUM SERPL-MCNC: 9.1 MG/DL (ref 8.4–10.2)
CHLORIDE SERPL-SCNC: 102 MMOL/L (ref 97–110)
CO2 SERPL-SCNC: 23 MMOL/L (ref 21–32)
CREAT SERPL-MCNC: 1.12 MG/DL (ref 0.67–1.17)
FASTING DURATION TIME PATIENT: ABNORMAL H
GFR SERPLBLD BASED ON 1.73 SQ M-ARVRAT: 70 ML/MIN
GLOBULIN SER-MCNC: 4.2 G/DL (ref 2–4)
GLUCOSE SERPL-MCNC: 100 MG/DL (ref 70–99)
MAGNESIUM SERPL-MCNC: 2 MG/DL (ref 1.7–2.4)
POTASSIUM SERPL-SCNC: 4.6 MMOL/L (ref 3.4–5.1)
PROT SERPL-MCNC: 7.1 G/DL (ref 6.4–8.2)
RAINBOW EXTRA TUBES HOLD SPECIMEN: NORMAL
SODIUM SERPL-SCNC: 130 MMOL/L (ref 135–145)

## 2023-04-30 PROCEDURE — 83735 ASSAY OF MAGNESIUM: CPT | Performed by: INTERNAL MEDICINE

## 2023-04-30 PROCEDURE — 10002803 HB RX 637: Performed by: INTERNAL MEDICINE

## 2023-04-30 PROCEDURE — 36415 COLL VENOUS BLD VENIPUNCTURE: CPT | Performed by: INTERNAL MEDICINE

## 2023-04-30 PROCEDURE — 99233 SBSQ HOSP IP/OBS HIGH 50: CPT | Performed by: NURSE PRACTITIONER

## 2023-04-30 PROCEDURE — 10002800 HB RX 250 W HCPCS: Performed by: INTERNAL MEDICINE

## 2023-04-30 PROCEDURE — 80053 COMPREHEN METABOLIC PANEL: CPT | Performed by: NURSE PRACTITIONER

## 2023-04-30 PROCEDURE — 10004651 HB RX, NO CHARGE ITEM: Performed by: INTERNAL MEDICINE

## 2023-04-30 PROCEDURE — 99232 SBSQ HOSP IP/OBS MODERATE 35: CPT | Performed by: INTERNAL MEDICINE

## 2023-04-30 PROCEDURE — 10006031 HB ROOM CHARGE TELEMETRY

## 2023-04-30 RX ORDER — ASPIRIN 325 MG
325 TABLET ORAL ONCE
Status: COMPLETED | OUTPATIENT
Start: 2023-05-01 | End: 2023-05-01

## 2023-04-30 RX ORDER — LORAZEPAM 0.5 MG/1
0.5 TABLET ORAL NIGHTLY PRN
Status: DISCONTINUED | OUTPATIENT
Start: 2023-04-30 | End: 2023-05-02 | Stop reason: HOSPADM

## 2023-04-30 RX ADMIN — AMIODARONE HYDROCHLORIDE 400 MG: 200 TABLET ORAL at 08:42

## 2023-04-30 RX ADMIN — PANTOPRAZOLE SODIUM 40 MG: 40 TABLET, DELAYED RELEASE ORAL at 06:16

## 2023-04-30 RX ADMIN — AMIODARONE HYDROCHLORIDE 400 MG: 200 TABLET ORAL at 20:25

## 2023-04-30 RX ADMIN — SACUBITRIL AND VALSARTAN 1 TABLET: 24; 26 TABLET, FILM COATED ORAL at 08:42

## 2023-04-30 RX ADMIN — ATORVASTATIN CALCIUM 20 MG: 20 TABLET, FILM COATED ORAL at 20:25

## 2023-04-30 RX ADMIN — SODIUM CHLORIDE, PRESERVATIVE FREE 2 ML: 5 INJECTION INTRAVENOUS at 20:25

## 2023-04-30 RX ADMIN — LORAZEPAM 0.5 MG: 0.5 TABLET ORAL at 23:38

## 2023-04-30 RX ADMIN — SPIRONOLACTONE 25 MG: 25 TABLET ORAL at 08:42

## 2023-04-30 RX ADMIN — SACUBITRIL AND VALSARTAN 1 TABLET: 24; 26 TABLET, FILM COATED ORAL at 20:25

## 2023-04-30 RX ADMIN — METOPROLOL SUCCINATE 100 MG: 100 TABLET, EXTENDED RELEASE ORAL at 08:42

## 2023-04-30 RX ADMIN — HEPARIN SODIUM 5000 UNITS: 5000 INJECTION INTRAVENOUS; SUBCUTANEOUS at 21:35

## 2023-04-30 RX ADMIN — HEPARIN SODIUM 5000 UNITS: 5000 INJECTION INTRAVENOUS; SUBCUTANEOUS at 15:19

## 2023-04-30 RX ADMIN — PANCRELIPASE 1 CAPSULE: 30000; 6000; 19000 CAPSULE, DELAYED RELEASE PELLETS ORAL at 13:43

## 2023-04-30 RX ADMIN — HEPARIN SODIUM 5000 UNITS: 5000 INJECTION INTRAVENOUS; SUBCUTANEOUS at 06:16

## 2023-04-30 RX ADMIN — ASPIRIN 81 MG: 81 TABLET, COATED ORAL at 08:42

## 2023-04-30 RX ADMIN — SODIUM CHLORIDE, PRESERVATIVE FREE 2 ML: 5 INJECTION INTRAVENOUS at 08:44

## 2023-04-30 RX ADMIN — PANCRELIPASE 1 CAPSULE: 30000; 6000; 19000 CAPSULE, DELAYED RELEASE PELLETS ORAL at 18:50

## 2023-04-30 ASSESSMENT — PAIN SCALES - GENERAL: PAINLEVEL_OUTOF10: 0

## 2023-05-01 ENCOUNTER — PATIENT OUTREACH (OUTPATIENT)
Dept: CASE MANAGEMENT | Age: 73
End: 2023-05-01

## 2023-05-01 LAB
ANION GAP SERPL CALC-SCNC: 8 MMOL/L (ref 7–19)
BASOPHILS # BLD: 0 K/MCL (ref 0–0.3)
BASOPHILS NFR BLD: 1 %
BUN SERPL-MCNC: 14 MG/DL (ref 6–20)
BUN/CREAT SERPL: 13 (ref 7–25)
CALCIUM SERPL-MCNC: 9 MG/DL (ref 8.4–10.2)
CHLORIDE SERPL-SCNC: 102 MMOL/L (ref 97–110)
CO2 SERPL-SCNC: 24 MMOL/L (ref 21–32)
CREAT SERPL-MCNC: 1.09 MG/DL (ref 0.67–1.17)
DEPRECATED RDW RBC: 49.5 FL (ref 39–50)
EOSINOPHIL # BLD: 0.3 K/MCL (ref 0–0.5)
EOSINOPHIL NFR BLD: 4 %
ERYTHROCYTE [DISTWIDTH] IN BLOOD: 14.8 % (ref 11–15)
FASTING DURATION TIME PATIENT: ABNORMAL H
GFR SERPLBLD BASED ON 1.73 SQ M-ARVRAT: 72 ML/MIN
GLUCOSE SERPL-MCNC: 98 MG/DL (ref 70–99)
HCT VFR BLD CALC: 35.8 % (ref 39–51)
HGB BLD-MCNC: 12.3 G/DL (ref 13–17)
IMM GRANULOCYTES # BLD AUTO: 0 K/MCL (ref 0–0.2)
IMM GRANULOCYTES # BLD: 0 %
LYMPHOCYTES # BLD: 1.5 K/MCL (ref 1–4)
LYMPHOCYTES NFR BLD: 25 %
MCH RBC QN AUTO: 31.2 PG (ref 26–34)
MCHC RBC AUTO-ENTMCNC: 34.4 G/DL (ref 32–36.5)
MCV RBC AUTO: 90.9 FL (ref 78–100)
MONOCYTES # BLD: 1.3 K/MCL (ref 0.3–0.9)
MONOCYTES NFR BLD: 21 %
NEUTROPHILS # BLD: 3 K/MCL (ref 1.8–7.7)
NEUTROPHILS NFR BLD: 49 %
NRBC BLD MANUAL-RTO: 0 /100 WBC
OSMOLALITY UR: 285 MOSM/KG (ref 50–1200)
PLATELET # BLD AUTO: 184 K/MCL (ref 140–450)
POTASSIUM SERPL-SCNC: 3.9 MMOL/L (ref 3.4–5.1)
RBC # BLD: 3.94 MIL/MCL (ref 4.5–5.9)
SODIUM SERPL-SCNC: 130 MMOL/L (ref 135–145)
SODIUM UR-SCNC: 100 MMOL/L
WBC # BLD: 6.1 K/MCL (ref 4.2–11)

## 2023-05-01 PROCEDURE — C1760 CLOSURE DEV, VASC: HCPCS | Performed by: INTERNAL MEDICINE

## 2023-05-01 PROCEDURE — 10002803 HB RX 637: Performed by: INTERNAL MEDICINE

## 2023-05-01 PROCEDURE — 84300 ASSAY OF URINE SODIUM: CPT | Performed by: INTERNAL MEDICINE

## 2023-05-01 PROCEDURE — B2111ZZ FLUOROSCOPY OF MULTIPLE CORONARY ARTERIES USING LOW OSMOLAR CONTRAST: ICD-10-PCS | Performed by: INTERNAL MEDICINE

## 2023-05-01 PROCEDURE — C1894 INTRO/SHEATH, NON-LASER: HCPCS | Performed by: INTERNAL MEDICINE

## 2023-05-01 PROCEDURE — 99153 MOD SED SAME PHYS/QHP EA: CPT | Performed by: INTERNAL MEDICINE

## 2023-05-01 PROCEDURE — 10004651 HB RX, NO CHARGE ITEM: Performed by: INTERNAL MEDICINE

## 2023-05-01 PROCEDURE — 10002800 HB RX 250 W HCPCS: Performed by: INTERNAL MEDICINE

## 2023-05-01 PROCEDURE — 99152 MOD SED SAME PHYS/QHP 5/>YRS: CPT | Performed by: INTERNAL MEDICINE

## 2023-05-01 PROCEDURE — 10002801 HB RX 250 W/O HCPCS: Performed by: INTERNAL MEDICINE

## 2023-05-01 PROCEDURE — 80048 BASIC METABOLIC PNL TOTAL CA: CPT | Performed by: INTERNAL MEDICINE

## 2023-05-01 PROCEDURE — 10006023 HB SUPPLY 272: Performed by: INTERNAL MEDICINE

## 2023-05-01 PROCEDURE — 93459 L HRT ART/GRFT ANGIO: CPT | Performed by: INTERNAL MEDICINE

## 2023-05-01 PROCEDURE — 83935 ASSAY OF URINE OSMOLALITY: CPT | Performed by: INTERNAL MEDICINE

## 2023-05-01 PROCEDURE — 99232 SBSQ HOSP IP/OBS MODERATE 35: CPT | Performed by: INTERNAL MEDICINE

## 2023-05-01 PROCEDURE — 10006027 HB SUPPLY 278: Performed by: INTERNAL MEDICINE

## 2023-05-01 PROCEDURE — 10006031 HB ROOM CHARGE TELEMETRY

## 2023-05-01 PROCEDURE — C1769 GUIDE WIRE: HCPCS | Performed by: INTERNAL MEDICINE

## 2023-05-01 PROCEDURE — 10002805 HB CONTRAST AGENT: Performed by: INTERNAL MEDICINE

## 2023-05-01 PROCEDURE — 85025 COMPLETE CBC W/AUTO DIFF WBC: CPT | Performed by: INTERNAL MEDICINE

## 2023-05-01 PROCEDURE — 99233 SBSQ HOSP IP/OBS HIGH 50: CPT | Performed by: INTERNAL MEDICINE

## 2023-05-01 PROCEDURE — B2131ZZ FLUOROSCOPY OF MULTIPLE CORONARY ARTERY BYPASS GRAFTS USING LOW OSMOLAR CONTRAST: ICD-10-PCS | Performed by: INTERNAL MEDICINE

## 2023-05-01 PROCEDURE — 4A023N7 MEASUREMENT OF CARDIAC SAMPLING AND PRESSURE, LEFT HEART, PERCUTANEOUS APPROACH: ICD-10-PCS | Performed by: INTERNAL MEDICINE

## 2023-05-01 PROCEDURE — 10004651 HB RX, NO CHARGE ITEM: Performed by: NURSE PRACTITIONER

## 2023-05-01 PROCEDURE — B2181ZZ FLUOROSCOPY OF LEFT INTERNAL MAMMARY BYPASS GRAFT USING LOW OSMOLAR CONTRAST: ICD-10-PCS | Performed by: INTERNAL MEDICINE

## 2023-05-01 PROCEDURE — 36415 COLL VENOUS BLD VENIPUNCTURE: CPT | Performed by: INTERNAL MEDICINE

## 2023-05-01 DEVICE — PERCLOSE™ PROSTYLE™ SUTURE-MEDIATED CLOSURE AND REPAIR SYSTEM
Type: IMPLANTABLE DEVICE | Site: FEMORAL ARTERY | Status: FUNCTIONAL
Brand: PERCLOSE™ PROSTYLE™

## 2023-05-01 RX ORDER — LIDOCAINE HYDROCHLORIDE 20 MG/ML
INJECTION, SOLUTION EPIDURAL; INFILTRATION; INTRACAUDAL; PERINEURAL PRN
Status: DISCONTINUED | OUTPATIENT
Start: 2023-05-01 | End: 2023-05-01 | Stop reason: HOSPADM

## 2023-05-01 RX ORDER — ACETAMINOPHEN 325 MG/1
650 TABLET ORAL EVERY 4 HOURS PRN
Status: DISCONTINUED | OUTPATIENT
Start: 2023-05-01 | End: 2023-05-01

## 2023-05-01 RX ORDER — ACETAMINOPHEN 650 MG/1
650 SUPPOSITORY RECTAL EVERY 4 HOURS PRN
Status: DISCONTINUED | OUTPATIENT
Start: 2023-05-01 | End: 2023-05-01

## 2023-05-01 RX ORDER — MIDAZOLAM HYDROCHLORIDE 1 MG/ML
INJECTION, SOLUTION INTRAMUSCULAR; INTRAVENOUS PRN
Status: DISCONTINUED | OUTPATIENT
Start: 2023-05-01 | End: 2023-05-01 | Stop reason: HOSPADM

## 2023-05-01 RX ORDER — ACETAMINOPHEN 325 MG/1
650 TABLET ORAL EVERY 4 HOURS PRN
Status: DISCONTINUED | OUTPATIENT
Start: 2023-05-01 | End: 2023-05-02 | Stop reason: HOSPADM

## 2023-05-01 RX ORDER — ACETAMINOPHEN 650 MG/1
650 SUPPOSITORY RECTAL EVERY 4 HOURS PRN
Status: DISCONTINUED | OUTPATIENT
Start: 2023-05-01 | End: 2023-05-02 | Stop reason: HOSPADM

## 2023-05-01 RX ORDER — NITROGLYCERIN 0.4 MG/1
0.4 TABLET SUBLINGUAL EVERY 5 MIN PRN
Status: ACTIVE | OUTPATIENT
Start: 2023-05-01 | End: 2023-05-02

## 2023-05-01 RX ORDER — HYDRALAZINE HYDROCHLORIDE 20 MG/ML
10 INJECTION INTRAMUSCULAR; INTRAVENOUS EVERY 4 HOURS PRN
Status: ACTIVE | OUTPATIENT
Start: 2023-05-01 | End: 2023-05-02

## 2023-05-01 RX ORDER — POTASSIUM CHLORIDE 20 MEQ/1
40 TABLET, EXTENDED RELEASE ORAL ONCE
Status: COMPLETED | OUTPATIENT
Start: 2023-05-01 | End: 2023-05-01

## 2023-05-01 RX ORDER — CLONIDINE HYDROCHLORIDE 0.1 MG/1
0.1 TABLET ORAL EVERY 4 HOURS PRN
Status: ACTIVE | OUTPATIENT
Start: 2023-05-01 | End: 2023-05-02

## 2023-05-01 RX ORDER — 0.9 % SODIUM CHLORIDE 0.9 %
2 VIAL (ML) INJECTION EVERY 12 HOURS SCHEDULED
Status: DISCONTINUED | OUTPATIENT
Start: 2023-05-01 | End: 2023-05-02 | Stop reason: HOSPADM

## 2023-05-01 RX ADMIN — SACUBITRIL AND VALSARTAN 1 TABLET: 24; 26 TABLET, FILM COATED ORAL at 21:30

## 2023-05-01 RX ADMIN — HEPARIN SODIUM 5000 UNITS: 5000 INJECTION INTRAVENOUS; SUBCUTANEOUS at 14:15

## 2023-05-01 RX ADMIN — SODIUM CHLORIDE, PRESERVATIVE FREE 2 ML: 5 INJECTION INTRAVENOUS at 08:38

## 2023-05-01 RX ADMIN — POTASSIUM CHLORIDE 40 MEQ: 1500 TABLET, EXTENDED RELEASE ORAL at 12:09

## 2023-05-01 RX ADMIN — PANCRELIPASE 1 CAPSULE: 30000; 6000; 19000 CAPSULE, DELAYED RELEASE PELLETS ORAL at 21:30

## 2023-05-01 RX ADMIN — SACUBITRIL AND VALSARTAN 1 TABLET: 24; 26 TABLET, FILM COATED ORAL at 12:08

## 2023-05-01 RX ADMIN — AMIODARONE HYDROCHLORIDE 400 MG: 200 TABLET ORAL at 12:08

## 2023-05-01 RX ADMIN — SPIRONOLACTONE 25 MG: 25 TABLET ORAL at 12:08

## 2023-05-01 RX ADMIN — HEPARIN SODIUM 5000 UNITS: 5000 INJECTION INTRAVENOUS; SUBCUTANEOUS at 21:30

## 2023-05-01 RX ADMIN — ASPIRIN 325 MG ORAL TABLET 325 MG: 325 PILL ORAL at 08:37

## 2023-05-01 RX ADMIN — ATORVASTATIN CALCIUM 20 MG: 20 TABLET, FILM COATED ORAL at 21:30

## 2023-05-01 RX ADMIN — FUROSEMIDE 40 MG: 40 TABLET ORAL at 12:09

## 2023-05-01 RX ADMIN — METOPROLOL SUCCINATE 100 MG: 100 TABLET, EXTENDED RELEASE ORAL at 12:08

## 2023-05-01 RX ADMIN — PANCRELIPASE 1 CAPSULE: 30000; 6000; 19000 CAPSULE, DELAYED RELEASE PELLETS ORAL at 12:08

## 2023-05-01 RX ADMIN — SODIUM CHLORIDE, PRESERVATIVE FREE 2 ML: 5 INJECTION INTRAVENOUS at 21:32

## 2023-05-01 RX ADMIN — ASPIRIN 81 MG: 81 TABLET, COATED ORAL at 12:08

## 2023-05-01 RX ADMIN — SODIUM CHLORIDE, PRESERVATIVE FREE 2 ML: 5 INJECTION INTRAVENOUS at 21:30

## 2023-05-01 RX ADMIN — AMIODARONE HYDROCHLORIDE 400 MG: 200 TABLET ORAL at 21:30

## 2023-05-01 ASSESSMENT — PAIN SCALES - GENERAL
PAINLEVEL_OUTOF10: 0

## 2023-05-01 NOTE — PROGRESS NOTES
Patent was discharged and then presented to the ER on 4/28/23. From the ER patient was transferred for admission at [address] [place] [address] under his cardiologist.  No TCM, encounter closing.

## 2023-05-02 VITALS
TEMPERATURE: 97.5 F | WEIGHT: 164.02 LBS | HEART RATE: 73 BPM | DIASTOLIC BLOOD PRESSURE: 67 MMHG | BODY MASS INDEX: 24.29 KG/M2 | OXYGEN SATURATION: 97 % | HEIGHT: 69 IN | SYSTOLIC BLOOD PRESSURE: 109 MMHG | RESPIRATION RATE: 17 BRPM

## 2023-05-02 LAB
ANION GAP SERPL CALC-SCNC: 9 MMOL/L (ref 7–19)
BASOPHILS # BLD: 0.1 K/MCL (ref 0–0.3)
BASOPHILS NFR BLD: 1 %
BUN SERPL-MCNC: 23 MG/DL (ref 6–20)
BUN/CREAT SERPL: 15 (ref 7–25)
CALCIUM SERPL-MCNC: 9.3 MG/DL (ref 8.4–10.2)
CHLORIDE SERPL-SCNC: 98 MMOL/L (ref 97–110)
CO2 SERPL-SCNC: 26 MMOL/L (ref 21–32)
CREAT SERPL-MCNC: 1.56 MG/DL (ref 0.67–1.17)
DEPRECATED RDW RBC: 51 FL (ref 39–50)
EOSINOPHIL # BLD: 0.3 K/MCL (ref 0–0.5)
EOSINOPHIL NFR BLD: 4 %
ERYTHROCYTE [DISTWIDTH] IN BLOOD: 15.2 % (ref 11–15)
FASTING DURATION TIME PATIENT: ABNORMAL H
GFR SERPLBLD BASED ON 1.73 SQ M-ARVRAT: 47 ML/MIN
GLUCOSE BLDC GLUCOMTR-MCNC: 79 MG/DL (ref 70–99)
GLUCOSE BLDC GLUCOMTR-MCNC: 97 MG/DL (ref 70–99)
GLUCOSE BLDC GLUCOMTR-MCNC: 99 MG/DL (ref 70–99)
GLUCOSE SERPL-MCNC: 152 MG/DL (ref 70–99)
HCT VFR BLD CALC: 38.3 % (ref 39–51)
HGB BLD-MCNC: 13.1 G/DL (ref 13–17)
IMM GRANULOCYTES # BLD AUTO: 0 K/MCL (ref 0–0.2)
IMM GRANULOCYTES # BLD: 0 %
LYMPHOCYTES # BLD: 1 K/MCL (ref 1–4)
LYMPHOCYTES NFR BLD: 14 %
MCH RBC QN AUTO: 31.4 PG (ref 26–34)
MCHC RBC AUTO-ENTMCNC: 34.2 G/DL (ref 32–36.5)
MCV RBC AUTO: 91.8 FL (ref 78–100)
MONOCYTES # BLD: 1.2 K/MCL (ref 0.3–0.9)
MONOCYTES NFR BLD: 17 %
NEUTROPHILS # BLD: 4.6 K/MCL (ref 1.8–7.7)
NEUTROPHILS NFR BLD: 64 %
NRBC BLD MANUAL-RTO: 0 /100 WBC
PLATELET # BLD AUTO: 190 K/MCL (ref 140–450)
POTASSIUM SERPL-SCNC: 4.3 MMOL/L (ref 3.4–5.1)
POTASSIUM SERPL-SCNC: 4.6 MMOL/L (ref 3.4–5.1)
RAINBOW EXTRA TUBES HOLD SPECIMEN: NORMAL
RBC # BLD: 4.17 MIL/MCL (ref 4.5–5.9)
SODIUM SERPL-SCNC: 128 MMOL/L (ref 135–145)
SODIUM SERPL-SCNC: 131 MMOL/L (ref 135–145)
WBC # BLD: 7.2 K/MCL (ref 4.2–11)

## 2023-05-02 PROCEDURE — 85025 COMPLETE CBC W/AUTO DIFF WBC: CPT | Performed by: INTERNAL MEDICINE

## 2023-05-02 PROCEDURE — 10004651 HB RX, NO CHARGE ITEM: Performed by: INTERNAL MEDICINE

## 2023-05-02 PROCEDURE — 10002803 HB RX 637: Performed by: INTERNAL MEDICINE

## 2023-05-02 PROCEDURE — 84295 ASSAY OF SERUM SODIUM: CPT | Performed by: INTERNAL MEDICINE

## 2023-05-02 PROCEDURE — 99233 SBSQ HOSP IP/OBS HIGH 50: CPT | Performed by: INTERNAL MEDICINE

## 2023-05-02 PROCEDURE — 10002800 HB RX 250 W HCPCS: Performed by: INTERNAL MEDICINE

## 2023-05-02 PROCEDURE — 84132 ASSAY OF SERUM POTASSIUM: CPT | Performed by: INTERNAL MEDICINE

## 2023-05-02 PROCEDURE — 99239 HOSP IP/OBS DSCHRG MGMT >30: CPT | Performed by: INTERNAL MEDICINE

## 2023-05-02 PROCEDURE — 36415 COLL VENOUS BLD VENIPUNCTURE: CPT | Performed by: INTERNAL MEDICINE

## 2023-05-02 PROCEDURE — 80048 BASIC METABOLIC PNL TOTAL CA: CPT | Performed by: INTERNAL MEDICINE

## 2023-05-02 RX ORDER — TOLVAPTAN 15 MG/1
15 TABLET ORAL ONCE
Status: COMPLETED | OUTPATIENT
Start: 2023-05-02 | End: 2023-05-02

## 2023-05-02 RX ORDER — FUROSEMIDE 40 MG/1
40 TABLET ORAL DAILY
Qty: 30 TABLET | Refills: 1 | Status: SHIPPED | OUTPATIENT
Start: 2023-05-03 | End: 2023-05-09 | Stop reason: ALTCHOICE

## 2023-05-02 RX ORDER — AMIODARONE HYDROCHLORIDE 200 MG/1
400 TABLET ORAL 2 TIMES DAILY
Qty: 28 TABLET | Refills: 0 | Status: SHIPPED | OUTPATIENT
Start: 2023-05-02 | End: 2023-05-02 | Stop reason: HOSPADM

## 2023-05-02 RX ORDER — AMIODARONE HYDROCHLORIDE 200 MG/1
TABLET ORAL
Qty: 58 TABLET | Refills: 1 | Status: SHIPPED | OUTPATIENT
Start: 2023-05-02 | End: 2023-10-26

## 2023-05-02 RX ORDER — AMIODARONE HYDROCHLORIDE 200 MG/1
200 TABLET ORAL DAILY
Qty: 30 TABLET | Refills: 5 | Status: SHIPPED | OUTPATIENT
Start: 2023-05-09 | End: 2023-05-02 | Stop reason: HOSPADM

## 2023-05-02 RX ADMIN — PANCRELIPASE 1 CAPSULE: 30000; 6000; 19000 CAPSULE, DELAYED RELEASE PELLETS ORAL at 09:00

## 2023-05-02 RX ADMIN — PANCRELIPASE 1 CAPSULE: 30000; 6000; 19000 CAPSULE, DELAYED RELEASE PELLETS ORAL at 12:30

## 2023-05-02 RX ADMIN — METOPROLOL SUCCINATE 100 MG: 100 TABLET, EXTENDED RELEASE ORAL at 08:57

## 2023-05-02 RX ADMIN — SACUBITRIL AND VALSARTAN 1 TABLET: 24; 26 TABLET, FILM COATED ORAL at 08:57

## 2023-05-02 RX ADMIN — AMIODARONE HYDROCHLORIDE 400 MG: 200 TABLET ORAL at 08:57

## 2023-05-02 RX ADMIN — FUROSEMIDE 40 MG: 40 TABLET ORAL at 08:57

## 2023-05-02 RX ADMIN — SPIRONOLACTONE 25 MG: 25 TABLET ORAL at 08:57

## 2023-05-02 RX ADMIN — TOLVAPTAN 15 MG: 15 TABLET ORAL at 10:40

## 2023-05-02 RX ADMIN — HEPARIN SODIUM 5000 UNITS: 5000 INJECTION INTRAVENOUS; SUBCUTANEOUS at 06:44

## 2023-05-02 RX ADMIN — ASPIRIN 81 MG: 81 TABLET, COATED ORAL at 08:57

## 2023-05-02 RX ADMIN — SODIUM CHLORIDE, PRESERVATIVE FREE 2 ML: 5 INJECTION INTRAVENOUS at 09:00

## 2023-05-02 RX ADMIN — PANTOPRAZOLE SODIUM 40 MG: 40 TABLET, DELAYED RELEASE ORAL at 06:44

## 2023-05-02 RX ADMIN — SODIUM CHLORIDE, PRESERVATIVE FREE 2 ML: 5 INJECTION INTRAVENOUS at 08:59

## 2023-05-02 RX ADMIN — HEPARIN SODIUM 5000 UNITS: 5000 INJECTION INTRAVENOUS; SUBCUTANEOUS at 14:37

## 2023-05-02 ASSESSMENT — PAIN SCALES - GENERAL: PAINLEVEL_OUTOF10: 0

## 2023-05-03 LAB
MDC_IDC_EPISODE_DTM: NORMAL
MDC_IDC_EPISODE_DURATION: 0 S
MDC_IDC_EPISODE_DURATION: 0 S
MDC_IDC_EPISODE_DURATION: 272 S
MDC_IDC_EPISODE_DURATION: 3 S
MDC_IDC_EPISODE_DURATION: 347 S
MDC_IDC_EPISODE_DURATION: 5 S
MDC_IDC_EPISODE_DURATION: 74 S
MDC_IDC_EPISODE_ID: 10
MDC_IDC_EPISODE_ID: 11
MDC_IDC_EPISODE_ID: 5
MDC_IDC_EPISODE_ID: 6
MDC_IDC_EPISODE_ID: 7
MDC_IDC_EPISODE_ID: 8
MDC_IDC_EPISODE_ID: 9
MDC_IDC_EPISODE_TYPE: NORMAL
MDC_IDC_EPISODE_VENDOR_TYPE: NORMAL
MDC_IDC_LEAD_IMPLANT_DT: NORMAL
MDC_IDC_LEAD_IMPLANT_DT: NORMAL
MDC_IDC_LEAD_LOCATION: NORMAL
MDC_IDC_LEAD_LOCATION: NORMAL
MDC_IDC_LEAD_LOCATION_DETAIL_1: NORMAL
MDC_IDC_LEAD_LOCATION_DETAIL_1: NORMAL
MDC_IDC_LEAD_MFG: NORMAL
MDC_IDC_LEAD_MFG: NORMAL
MDC_IDC_LEAD_MODEL: NORMAL
MDC_IDC_LEAD_MODEL: NORMAL
MDC_IDC_LEAD_POLARITY_TYPE: NORMAL
MDC_IDC_LEAD_POLARITY_TYPE: NORMAL
MDC_IDC_LEAD_SERIAL: NORMAL
MDC_IDC_LEAD_SERIAL: NORMAL
MDC_IDC_MSMT_BATTERY_DTM: NORMAL
MDC_IDC_MSMT_BATTERY_REMAINING_LONGEVITY: 81 MO
MDC_IDC_MSMT_BATTERY_RRT_TRIGGER: 2.73
MDC_IDC_MSMT_BATTERY_STATUS: NORMAL
MDC_IDC_MSMT_BATTERY_VOLTAGE: 2.94 V
MDC_IDC_MSMT_CAP_CHARGE_DTM: NORMAL
MDC_IDC_MSMT_CAP_CHARGE_DTM: NORMAL
MDC_IDC_MSMT_CAP_CHARGE_ENERGY: 18 J
MDC_IDC_MSMT_CAP_CHARGE_ENERGY: 35 J
MDC_IDC_MSMT_CAP_CHARGE_TIME: 3.74
MDC_IDC_MSMT_CAP_CHARGE_TIME: 7.61
MDC_IDC_MSMT_CAP_CHARGE_TYPE: NORMAL
MDC_IDC_MSMT_CAP_CHARGE_TYPE: NORMAL
MDC_IDC_MSMT_LEADCHNL_RA_IMPEDANCE_VALUE: 437 OHM
MDC_IDC_MSMT_LEADCHNL_RA_PACING_THRESHOLD_AMPLITUDE: 0.5 V
MDC_IDC_MSMT_LEADCHNL_RA_PACING_THRESHOLD_AMPLITUDE: 0.5 V
MDC_IDC_MSMT_LEADCHNL_RA_PACING_THRESHOLD_PULSEWIDTH: 0.4 MS
MDC_IDC_MSMT_LEADCHNL_RA_PACING_THRESHOLD_PULSEWIDTH: 0.4 MS
MDC_IDC_MSMT_LEADCHNL_RA_SENSING_INTR_AMPL: 2.25 MV
MDC_IDC_MSMT_LEADCHNL_RA_SENSING_INTR_AMPL: 2.5 MV
MDC_IDC_MSMT_LEADCHNL_RV_IMPEDANCE_VALUE: 323 OHM
MDC_IDC_MSMT_LEADCHNL_RV_IMPEDANCE_VALUE: 380 OHM
MDC_IDC_MSMT_LEADCHNL_RV_PACING_THRESHOLD_AMPLITUDE: 1 V
MDC_IDC_MSMT_LEADCHNL_RV_PACING_THRESHOLD_AMPLITUDE: 1.25 V
MDC_IDC_MSMT_LEADCHNL_RV_PACING_THRESHOLD_PULSEWIDTH: 0.4 MS
MDC_IDC_MSMT_LEADCHNL_RV_PACING_THRESHOLD_PULSEWIDTH: 0.4 MS
MDC_IDC_MSMT_LEADCHNL_RV_SENSING_INTR_AMPL: 7.12 MV
MDC_IDC_MSMT_LEADCHNL_RV_SENSING_INTR_AMPL: 8.38 MV
MDC_IDC_PG_IMPLANT_DTM: NORMAL
MDC_IDC_PG_MFG: NORMAL
MDC_IDC_PG_MODEL: NORMAL
MDC_IDC_PG_SERIAL: NORMAL
MDC_IDC_PG_TYPE: NORMAL
MDC_IDC_SESS_CLINIC_NAME: NORMAL
MDC_IDC_SESS_DTM: NORMAL
MDC_IDC_SESS_TYPE: NORMAL
MDC_IDC_SET_BRADY_AT_MODE_SWITCH_RATE: 171 {BEATS}/MIN
MDC_IDC_SET_BRADY_HYSTRATE: NORMAL
MDC_IDC_SET_BRADY_LOWRATE: 70 {BEATS}/MIN
MDC_IDC_SET_BRADY_MAX_SENSOR_RATE: 120 {BEATS}/MIN
MDC_IDC_SET_BRADY_MAX_TRACKING_RATE: 130 {BEATS}/MIN
MDC_IDC_SET_BRADY_MODE: NORMAL
MDC_IDC_SET_BRADY_PAV_DELAY_LOW: 180 MS
MDC_IDC_SET_BRADY_SAV_DELAY_LOW: 150 MS
MDC_IDC_SET_LEADCHNL_RA_PACING_AMPLITUDE: 1.5 V
MDC_IDC_SET_LEADCHNL_RA_PACING_ANODE_ELECTRODE_1: NORMAL
MDC_IDC_SET_LEADCHNL_RA_PACING_ANODE_LOCATION_1: NORMAL
MDC_IDC_SET_LEADCHNL_RA_PACING_CAPTURE_MODE: NORMAL
MDC_IDC_SET_LEADCHNL_RA_PACING_CATHODE_ELECTRODE_1: NORMAL
MDC_IDC_SET_LEADCHNL_RA_PACING_CATHODE_LOCATION_1: NORMAL
MDC_IDC_SET_LEADCHNL_RA_PACING_POLARITY: NORMAL
MDC_IDC_SET_LEADCHNL_RA_PACING_PULSEWIDTH: 0.4 MS
MDC_IDC_SET_LEADCHNL_RA_SENSING_ANODE_ELECTRODE_1: NORMAL
MDC_IDC_SET_LEADCHNL_RA_SENSING_ANODE_LOCATION_1: NORMAL
MDC_IDC_SET_LEADCHNL_RA_SENSING_CATHODE_ELECTRODE_1: NORMAL
MDC_IDC_SET_LEADCHNL_RA_SENSING_CATHODE_LOCATION_1: NORMAL
MDC_IDC_SET_LEADCHNL_RA_SENSING_POLARITY: NORMAL
MDC_IDC_SET_LEADCHNL_RA_SENSING_SENSITIVITY: 0.3 MV
MDC_IDC_SET_LEADCHNL_RV_PACING_AMPLITUDE: 2.5 V
MDC_IDC_SET_LEADCHNL_RV_PACING_ANODE_ELECTRODE_1: NORMAL
MDC_IDC_SET_LEADCHNL_RV_PACING_ANODE_LOCATION_1: NORMAL
MDC_IDC_SET_LEADCHNL_RV_PACING_CAPTURE_MODE: NORMAL
MDC_IDC_SET_LEADCHNL_RV_PACING_CATHODE_ELECTRODE_1: NORMAL
MDC_IDC_SET_LEADCHNL_RV_PACING_CATHODE_LOCATION_1: NORMAL
MDC_IDC_SET_LEADCHNL_RV_PACING_POLARITY: NORMAL
MDC_IDC_SET_LEADCHNL_RV_PACING_PULSEWIDTH: 0.4 MS
MDC_IDC_SET_LEADCHNL_RV_SENSING_ANODE_ELECTRODE_1: NORMAL
MDC_IDC_SET_LEADCHNL_RV_SENSING_ANODE_LOCATION_1: NORMAL
MDC_IDC_SET_LEADCHNL_RV_SENSING_CATHODE_ELECTRODE_1: NORMAL
MDC_IDC_SET_LEADCHNL_RV_SENSING_CATHODE_LOCATION_1: NORMAL
MDC_IDC_SET_LEADCHNL_RV_SENSING_POLARITY: NORMAL
MDC_IDC_SET_LEADCHNL_RV_SENSING_SENSITIVITY: 0.3 MV
MDC_IDC_SET_ZONE_DETECTION_BEATS_DENOMINATOR: 40 {BEATS}
MDC_IDC_SET_ZONE_DETECTION_BEATS_NUMERATOR: 30 {BEATS}
MDC_IDC_SET_ZONE_DETECTION_INTERVAL: 320 MS
MDC_IDC_SET_ZONE_DETECTION_INTERVAL: 350 MS
MDC_IDC_SET_ZONE_DETECTION_INTERVAL: 360 MS
MDC_IDC_SET_ZONE_DETECTION_INTERVAL: 450 MS
MDC_IDC_SET_ZONE_DETECTION_INTERVAL: NORMAL
MDC_IDC_SET_ZONE_TYPE: NORMAL
MDC_IDC_SET_ZONE_VENDOR_TYPE: NORMAL
MDC_IDC_STAT_AT_BURDEN_PERCENT: 0 %
MDC_IDC_STAT_AT_DTM_END: NORMAL
MDC_IDC_STAT_AT_DTM_START: NORMAL
MDC_IDC_STAT_BRADY_AP_VP_PERCENT: 0.76 %
MDC_IDC_STAT_BRADY_AP_VS_PERCENT: 84.33 %
MDC_IDC_STAT_BRADY_AS_VP_PERCENT: 0.03 %
MDC_IDC_STAT_BRADY_AS_VS_PERCENT: 14.87 %
MDC_IDC_STAT_BRADY_DTM_END: NORMAL
MDC_IDC_STAT_BRADY_DTM_START: NORMAL
MDC_IDC_STAT_BRADY_RA_PERCENT_PACED: 78.78 %
MDC_IDC_STAT_BRADY_RV_PERCENT_PACED: 1.16 %
MDC_IDC_STAT_EPISODE_RECENT_COUNT: 0
MDC_IDC_STAT_EPISODE_RECENT_COUNT_DTM_END: NORMAL
MDC_IDC_STAT_EPISODE_RECENT_COUNT_DTM_START: NORMAL
MDC_IDC_STAT_EPISODE_TOTAL_COUNT: 0
MDC_IDC_STAT_EPISODE_TOTAL_COUNT: 1
MDC_IDC_STAT_EPISODE_TOTAL_COUNT: 2
MDC_IDC_STAT_EPISODE_TOTAL_COUNT: 7
MDC_IDC_STAT_EPISODE_TOTAL_COUNT_DTM_END: NORMAL
MDC_IDC_STAT_EPISODE_TOTAL_COUNT_DTM_START: NORMAL
MDC_IDC_STAT_EPISODE_TYPE: NORMAL
MDC_IDC_STAT_TACHYTHERAPY_ATP_DELIVERED_RECENT: 0
MDC_IDC_STAT_TACHYTHERAPY_ATP_DELIVERED_TOTAL: 1
MDC_IDC_STAT_TACHYTHERAPY_RECENT_DTM_END: NORMAL
MDC_IDC_STAT_TACHYTHERAPY_RECENT_DTM_START: NORMAL
MDC_IDC_STAT_TACHYTHERAPY_SHOCKS_ABORTED_RECENT: 0
MDC_IDC_STAT_TACHYTHERAPY_SHOCKS_ABORTED_TOTAL: 0
MDC_IDC_STAT_TACHYTHERAPY_SHOCKS_DELIVERED_RECENT: 0
MDC_IDC_STAT_TACHYTHERAPY_SHOCKS_DELIVERED_TOTAL: 4
MDC_IDC_STAT_TACHYTHERAPY_TOTAL_DTM_END: NORMAL
MDC_IDC_STAT_TACHYTHERAPY_TOTAL_DTM_START: NORMAL

## 2023-05-08 ENCOUNTER — TELEPHONE (OUTPATIENT)
Dept: CARDIOLOGY | Age: 73
End: 2023-05-08

## 2023-05-09 ENCOUNTER — OFFICE VISIT (OUTPATIENT)
Dept: CARDIOLOGY | Age: 73
End: 2023-05-09
Attending: NURSE PRACTITIONER

## 2023-05-09 VITALS
SYSTOLIC BLOOD PRESSURE: 106 MMHG | BODY MASS INDEX: 25.2 KG/M2 | DIASTOLIC BLOOD PRESSURE: 62 MMHG | HEART RATE: 66 BPM | WEIGHT: 170.64 LBS

## 2023-05-09 DIAGNOSIS — I25.5 ISCHEMIC CARDIOMYOPATHY: ICD-10-CM

## 2023-05-09 DIAGNOSIS — R60.9 EDEMA, UNSPECIFIED TYPE: ICD-10-CM

## 2023-05-09 DIAGNOSIS — Z95.1 S/P CABG (CORONARY ARTERY BYPASS GRAFT): ICD-10-CM

## 2023-05-09 DIAGNOSIS — I50.23 ACUTE ON CHRONIC SYSTOLIC HEART FAILURE (CMD): Primary | ICD-10-CM

## 2023-05-09 DIAGNOSIS — I25.5 CARDIOMYOPATHY, ISCHEMIC: ICD-10-CM

## 2023-05-09 DIAGNOSIS — E87.1 HYPONATREMIA: ICD-10-CM

## 2023-05-09 DIAGNOSIS — R06.00 DYSPNEA, UNSPECIFIED TYPE: ICD-10-CM

## 2023-05-09 LAB
ANION GAP SERPL CALC-SCNC: 9 MMOL/L (ref 7–19)
BUN SERPL-MCNC: 23 MG/DL (ref 6–20)
BUN/CREAT SERPL: 19 (ref 7–25)
CALCIUM SERPL-MCNC: 8.7 MG/DL (ref 8.4–10.2)
CHLORIDE SERPL-SCNC: 100 MMOL/L (ref 97–110)
CO2 SERPL-SCNC: 22 MMOL/L (ref 21–32)
CREAT SERPL-MCNC: 1.24 MG/DL (ref 0.67–1.17)
FASTING DURATION TIME PATIENT: ABNORMAL H
GFR SERPLBLD BASED ON 1.73 SQ M-ARVRAT: 62 ML/MIN
GLUCOSE SERPL-MCNC: 107 MG/DL (ref 70–99)
NT-PROBNP SERPL-MCNC: 812 PG/ML
POTASSIUM SERPL-SCNC: 4.8 MMOL/L (ref 3.4–5.1)
SODIUM SERPL-SCNC: 126 MMOL/L (ref 135–145)

## 2023-05-09 PROCEDURE — 3074F SYST BP LT 130 MM HG: CPT | Performed by: NURSE PRACTITIONER

## 2023-05-09 PROCEDURE — 99214 OFFICE O/P EST MOD 30 MIN: CPT | Performed by: NURSE PRACTITIONER

## 2023-05-09 PROCEDURE — 83880 ASSAY OF NATRIURETIC PEPTIDE: CPT | Performed by: NURSE PRACTITIONER

## 2023-05-09 PROCEDURE — 10002803 HB RX 637: Performed by: NURSE PRACTITIONER

## 2023-05-09 PROCEDURE — 3078F DIAST BP <80 MM HG: CPT | Performed by: NURSE PRACTITIONER

## 2023-05-09 PROCEDURE — 99211 OFF/OP EST MAY X REQ PHY/QHP: CPT

## 2023-05-09 PROCEDURE — 36415 COLL VENOUS BLD VENIPUNCTURE: CPT | Performed by: NURSE PRACTITIONER

## 2023-05-09 PROCEDURE — 80048 BASIC METABOLIC PNL TOTAL CA: CPT | Performed by: NURSE PRACTITIONER

## 2023-05-09 RX ORDER — FUROSEMIDE 40 MG/1
20 TABLET ORAL DAILY
Qty: 30 TABLET | Refills: 3 | Status: SHIPPED | OUTPATIENT
Start: 2023-05-09 | End: 2023-08-09

## 2023-05-09 RX ORDER — ATORVASTATIN CALCIUM 20 MG/1
TABLET, FILM COATED ORAL
Qty: 90 TABLET | Refills: 3 | Status: SHIPPED | OUTPATIENT
Start: 2023-05-09

## 2023-05-09 RX ORDER — TOLVAPTAN 15 MG/1
15 TABLET ORAL ONCE
Status: COMPLETED | OUTPATIENT
Start: 2023-05-09 | End: 2023-05-09

## 2023-05-09 RX ADMIN — TOLVAPTAN 15 MG: 15 TABLET ORAL at 15:32

## 2023-05-11 ENCOUNTER — LAB ENCOUNTER (OUTPATIENT)
Dept: LAB | Age: 73
End: 2023-05-11
Attending: INTERNAL MEDICINE
Payer: MEDICARE

## 2023-05-11 DIAGNOSIS — E87.1 HYPONATREMIA: Primary | ICD-10-CM

## 2023-05-11 LAB
ALBUMIN SERPL-MCNC: 3.6 G/DL (ref 3.4–5)
ALBUMIN/GLOB SERPL: 0.8 {RATIO} (ref 1–2)
ALP LIVER SERPL-CCNC: 89 U/L
ALT SERPL-CCNC: 37 U/L
ANION GAP SERPL CALC-SCNC: 5 MMOL/L (ref 0–18)
AST SERPL-CCNC: 39 U/L (ref 15–37)
BILIRUB SERPL-MCNC: 0.4 MG/DL (ref 0.1–2)
BUN BLD-MCNC: 22 MG/DL (ref 7–18)
CALCIUM BLD-MCNC: 9.7 MG/DL (ref 8.5–10.1)
CHLORIDE SERPL-SCNC: 99 MMOL/L (ref 98–112)
CO2 SERPL-SCNC: 25 MMOL/L (ref 21–32)
CREAT BLD-MCNC: 1.34 MG/DL
FASTING STATUS PATIENT QL REPORTED: NO
GFR SERPLBLD BASED ON 1.73 SQ M-ARVRAT: 56 ML/MIN/1.73M2 (ref 60–?)
GLOBULIN PLAS-MCNC: 4.7 G/DL (ref 2.8–4.4)
GLUCOSE BLD-MCNC: 88 MG/DL (ref 70–99)
OSMOLALITY SERPL CALC.SUM OF ELEC: 271 MOSM/KG (ref 275–295)
POTASSIUM SERPL-SCNC: 5.2 MMOL/L (ref 3.5–5.1)
PROT SERPL-MCNC: 8.3 G/DL (ref 6.4–8.2)
SODIUM SERPL-SCNC: 129 MMOL/L (ref 136–145)

## 2023-05-11 PROCEDURE — 80053 COMPREHEN METABOLIC PANEL: CPT

## 2023-05-11 PROCEDURE — 36415 COLL VENOUS BLD VENIPUNCTURE: CPT

## 2023-05-12 ENCOUNTER — HOSPITAL ENCOUNTER (OUTPATIENT)
Dept: CT IMAGING | Facility: HOSPITAL | Age: 73
Discharge: HOME | End: 2023-05-12
Attending: INTERNAL MEDICINE
Payer: MEDICARE

## 2023-05-12 DIAGNOSIS — R93.3 ABNORMAL FINDING ON GI TRACT IMAGING: ICD-10-CM

## 2023-05-12 PROCEDURE — 74177 CT ABD & PELVIS W/CONTRAST: CPT | Performed by: INTERNAL MEDICINE

## 2023-05-15 ENCOUNTER — TELEPHONE (OUTPATIENT)
Dept: CARDIOLOGY | Age: 73
End: 2023-05-15

## 2023-05-15 RX ORDER — PNV NO.95/FERROUS FUM/FOLIC AC 28MG-0.8MG
200 TABLET ORAL DAILY
Qty: 30 TABLET | Refills: 0 | Status: SHIPPED
Start: 2023-05-15 | End: 2023-07-05 | Stop reason: SDUPTHER

## 2023-05-19 ENCOUNTER — APPOINTMENT (OUTPATIENT)
Dept: CARDIOLOGY | Age: 73
End: 2023-05-19

## 2023-05-23 ENCOUNTER — OFFICE VISIT (OUTPATIENT)
Dept: CARDIOLOGY | Age: 73
End: 2023-05-23

## 2023-05-23 VITALS
SYSTOLIC BLOOD PRESSURE: 103 MMHG | BODY MASS INDEX: 25.77 KG/M2 | WEIGHT: 174.49 LBS | DIASTOLIC BLOOD PRESSURE: 65 MMHG | HEART RATE: 84 BPM

## 2023-05-23 DIAGNOSIS — Z95.810 ICD (IMPLANTABLE CARDIOVERTER-DEFIBRILLATOR) IN PLACE: Primary | ICD-10-CM

## 2023-05-23 DIAGNOSIS — I47.20 VT (VENTRICULAR TACHYCARDIA) (CMD): ICD-10-CM

## 2023-05-23 PROCEDURE — 3078F DIAST BP <80 MM HG: CPT | Performed by: INTERNAL MEDICINE

## 2023-05-23 PROCEDURE — 3074F SYST BP LT 130 MM HG: CPT | Performed by: INTERNAL MEDICINE

## 2023-05-23 PROCEDURE — 99215 OFFICE O/P EST HI 40 MIN: CPT | Performed by: INTERNAL MEDICINE

## 2023-05-24 DIAGNOSIS — I47.20 VT (VENTRICULAR TACHYCARDIA) (CMD): ICD-10-CM

## 2023-05-24 PROCEDURE — 93000 ELECTROCARDIOGRAM COMPLETE: CPT | Performed by: INTERNAL MEDICINE

## 2023-05-26 ENCOUNTER — OFFICE VISIT (OUTPATIENT)
Dept: CARDIOLOGY | Age: 73
End: 2023-05-26

## 2023-05-26 VITALS
BODY MASS INDEX: 25.2 KG/M2 | WEIGHT: 176 LBS | HEART RATE: 86 BPM | DIASTOLIC BLOOD PRESSURE: 72 MMHG | HEIGHT: 70 IN | SYSTOLIC BLOOD PRESSURE: 106 MMHG

## 2023-05-26 DIAGNOSIS — I48.0 PAROXYSMAL ATRIAL FIBRILLATION (CMD): ICD-10-CM

## 2023-05-26 DIAGNOSIS — I50.22 CHRONIC SYSTOLIC CONGESTIVE HEART FAILURE (CMD): ICD-10-CM

## 2023-05-26 DIAGNOSIS — I10 BENIGN ESSENTIAL HTN: ICD-10-CM

## 2023-05-26 DIAGNOSIS — I25.5 CARDIOMYOPATHY, ISCHEMIC: ICD-10-CM

## 2023-05-26 DIAGNOSIS — Z95.1 S/P CABG (CORONARY ARTERY BYPASS GRAFT): Primary | ICD-10-CM

## 2023-05-26 DIAGNOSIS — I65.23 CAROTID STENOSIS, ASYMPTOMATIC, BILATERAL: ICD-10-CM

## 2023-05-26 DIAGNOSIS — E78.00 PURE HYPERCHOLESTEROLEMIA: ICD-10-CM

## 2023-05-26 DIAGNOSIS — I49.3 PVCS (PREMATURE VENTRICULAR CONTRACTIONS): ICD-10-CM

## 2023-05-26 PROCEDURE — 99214 OFFICE O/P EST MOD 30 MIN: CPT | Performed by: INTERNAL MEDICINE

## 2023-05-26 PROCEDURE — 3078F DIAST BP <80 MM HG: CPT | Performed by: INTERNAL MEDICINE

## 2023-05-26 PROCEDURE — 3074F SYST BP LT 130 MM HG: CPT | Performed by: INTERNAL MEDICINE

## 2023-06-13 ENCOUNTER — TELEPHONE (OUTPATIENT)
Dept: INTERNAL MEDICINE CLINIC | Facility: CLINIC | Age: 73
End: 2023-06-13

## 2023-06-13 NOTE — TELEPHONE ENCOUNTER
Informed must fast no call back required. Orders to [facility].     Future Appointments   Date Time Provider Tanika Monica   6/26/2023  1:40 PM Ayleen Hinojosa MD [facility]

## 2023-06-19 ENCOUNTER — HOSPITAL ENCOUNTER (OUTPATIENT)
Dept: LAB | Facility: HOSPITAL | Age: 73
Discharge: HOME | End: 2023-06-19
Attending: INTERNAL MEDICINE
Payer: OTHER GOVERNMENT

## 2023-06-19 DIAGNOSIS — N18.30 CKD (CHRONIC KIDNEY DISEASE) STAGE 3, GFR 30-59 ML/MIN (HCC): Primary | Chronic | ICD-10-CM

## 2023-06-19 LAB
ALBUMIN SERPL-MCNC: 3.9 G/DL (ref 3.4–5)
ANION GAP SERPL CALC-SCNC: 5 MMOL/L (ref 0–18)
BILIRUB UR QL STRIP.AUTO: NEGATIVE
BUN BLD-MCNC: 17 MG/DL (ref 7–18)
CALCIUM BLD-MCNC: 9.1 MG/DL (ref 8.5–10.1)
CHLORIDE SERPL-SCNC: 107 MMOL/L (ref 98–112)
CLARITY UR REFRACT.AUTO: CLEAR
CO2 SERPL-SCNC: 25 MMOL/L (ref 21–32)
COLOR UR AUTO: YELLOW
CREAT BLD-MCNC: 1.41 MG/DL
CREAT UR-SCNC: 93.3 MG/DL
GFR SERPLBLD BASED ON 1.73 SQ M-ARVRAT: 53 ML/MIN/1.73M2 (ref 60–?)
GLUCOSE BLD-MCNC: 96 MG/DL (ref 70–99)
GLUCOSE UR STRIP.AUTO-MCNC: NEGATIVE MG/DL
KETONES UR STRIP.AUTO-MCNC: NEGATIVE MG/DL
LEUKOCYTE ESTERASE UR QL STRIP.AUTO: NEGATIVE
NITRITE UR QL STRIP.AUTO: NEGATIVE
OSMOLALITY SERPL CALC.SUM OF ELEC: 285 MOSM/KG (ref 275–295)
PH UR STRIP.AUTO: 5 [PH] (ref 5–8)
PHOSPHATE SERPL-MCNC: 3.7 MG/DL (ref 2.5–4.9)
POTASSIUM SERPL-SCNC: 4.2 MMOL/L (ref 3.5–5.1)
PROT UR STRIP.AUTO-MCNC: NEGATIVE MG/DL
PROT UR-MCNC: 9.9 MG/DL
PROT/CREAT UR-RTO: 0.11
RBC UR QL AUTO: NEGATIVE
SODIUM SERPL-SCNC: 137 MMOL/L (ref 136–145)
SP GR UR STRIP.AUTO: 1.01 (ref 1–1.03)
URATE SERPL-MCNC: 9.2 MG/DL
UROBILINOGEN UR STRIP.AUTO-MCNC: <2 MG/DL

## 2023-06-19 PROCEDURE — 84550 ASSAY OF BLOOD/URIC ACID: CPT

## 2023-06-19 PROCEDURE — 80069 RENAL FUNCTION PANEL: CPT

## 2023-06-19 PROCEDURE — 81001 URINALYSIS AUTO W/SCOPE: CPT

## 2023-06-19 PROCEDURE — 82570 ASSAY OF URINE CREATININE: CPT

## 2023-06-19 PROCEDURE — 36415 COLL VENOUS BLD VENIPUNCTURE: CPT

## 2023-06-19 PROCEDURE — 84156 ASSAY OF PROTEIN URINE: CPT

## 2023-06-26 ENCOUNTER — OFFICE VISIT (OUTPATIENT)
Dept: INTERNAL MEDICINE CLINIC | Facility: CLINIC | Age: 73
End: 2023-06-26
Payer: MEDICARE

## 2023-06-26 VITALS
TEMPERATURE: 98 F | BODY MASS INDEX: 25.92 KG/M2 | HEART RATE: 70 BPM | HEIGHT: 69 IN | DIASTOLIC BLOOD PRESSURE: 62 MMHG | SYSTOLIC BLOOD PRESSURE: 102 MMHG | WEIGHT: 175 LBS

## 2023-06-26 DIAGNOSIS — I47.20 V TACH (HCC): ICD-10-CM

## 2023-06-26 DIAGNOSIS — I49.3 PVCS (PREMATURE VENTRICULAR CONTRACTIONS): ICD-10-CM

## 2023-06-26 DIAGNOSIS — D17.79 MYELOLIPOMA OF LEFT ADRENAL GLAND: ICD-10-CM

## 2023-06-26 DIAGNOSIS — I49.5 SSS (SICK SINUS SYNDROME) (HCC): ICD-10-CM

## 2023-06-26 DIAGNOSIS — K43.9 VENTRAL HERNIA WITHOUT OBSTRUCTION OR GANGRENE: ICD-10-CM

## 2023-06-26 DIAGNOSIS — I48.0 PAROXYSMAL ATRIAL FIBRILLATION (HCC): ICD-10-CM

## 2023-06-26 DIAGNOSIS — Z95.0 PACEMAKER: ICD-10-CM

## 2023-06-26 DIAGNOSIS — R91.8 PULMONARY NODULES: ICD-10-CM

## 2023-06-26 DIAGNOSIS — Z00.00 ENCOUNTER FOR ANNUAL HEALTH EXAMINATION: Primary | ICD-10-CM

## 2023-06-26 DIAGNOSIS — K65.1 INTRA-ABDOMINAL ABSCESS (HCC): ICD-10-CM

## 2023-06-26 DIAGNOSIS — I10 BENIGN ESSENTIAL HTN: ICD-10-CM

## 2023-06-26 DIAGNOSIS — J44.9 ASTHMA WITH COPD (CHRONIC OBSTRUCTIVE PULMONARY DISEASE) (HCC): Chronic | ICD-10-CM

## 2023-06-26 DIAGNOSIS — Z86.018 HISTORY OF PHEOCHROMOCYTOMA: ICD-10-CM

## 2023-06-26 DIAGNOSIS — I25.5 CARDIOMYOPATHY, ISCHEMIC: ICD-10-CM

## 2023-06-26 DIAGNOSIS — G47.33 OSA ON CPAP: ICD-10-CM

## 2023-06-26 DIAGNOSIS — N18.31 STAGE 3A CHRONIC KIDNEY DISEASE (HCC): Chronic | ICD-10-CM

## 2023-06-26 DIAGNOSIS — D69.6 THROMBOCYTOPENIA (HCC): ICD-10-CM

## 2023-06-26 DIAGNOSIS — I50.42 CHRONIC COMBINED SYSTOLIC AND DIASTOLIC CONGESTIVE HEART FAILURE (HCC): ICD-10-CM

## 2023-06-26 DIAGNOSIS — Z99.89 OSA ON CPAP: ICD-10-CM

## 2023-06-26 DIAGNOSIS — I25.10 CORONARY ARTERY DISEASE INVOLVING NATIVE CORONARY ARTERY OF NATIVE HEART, UNSPECIFIED WHETHER ANGINA PRESENT: ICD-10-CM

## 2023-06-26 DIAGNOSIS — D63.8 ANEMIA OF CHRONIC DISEASE: ICD-10-CM

## 2023-06-26 DIAGNOSIS — M81.8 OTHER OSTEOPOROSIS WITHOUT CURRENT PATHOLOGICAL FRACTURE: ICD-10-CM

## 2023-06-26 DIAGNOSIS — K86.81 EXOCRINE PANCREATIC INSUFFICIENCY: ICD-10-CM

## 2023-06-26 DIAGNOSIS — Z86.73 HISTORY OF TRANSIENT ISCHEMIC ATTACK (TIA): ICD-10-CM

## 2023-06-26 DIAGNOSIS — M47.816 ARTHRITIS OF FACET JOINT OF LUMBAR SPINE: ICD-10-CM

## 2023-06-26 DIAGNOSIS — M46.96 UNSPECIFIED INFLAMMATORY SPONDYLOPATHY, LUMBAR REGION (HCC): ICD-10-CM

## 2023-06-26 DIAGNOSIS — Z85.09 HISTORY OF CHOLANGIOCARCINOMA: ICD-10-CM

## 2023-06-26 DIAGNOSIS — Z95.1 S/P CABG (CORONARY ARTERY BYPASS GRAFT): ICD-10-CM

## 2023-06-26 DIAGNOSIS — I70.0 AORTIC ATHEROSCLEROSIS (HCC): ICD-10-CM

## 2023-06-26 PROBLEM — K86.89 DILATED PANCREATIC DUCT: Status: RESOLVED | Noted: 2022-11-09 | Resolved: 2023-06-26

## 2023-06-26 PROBLEM — I25.83 CORONARY ARTERY DISEASE DUE TO LIPID RICH PLAQUE: Status: RESOLVED | Noted: 2020-11-07 | Resolved: 2023-06-26

## 2023-06-26 PROBLEM — E87.1 HYPONATREMIA: Status: RESOLVED | Noted: 2023-04-25 | Resolved: 2023-06-26

## 2023-06-26 PROBLEM — R16.0 LIVER MASS: Status: RESOLVED | Noted: 2017-04-01 | Resolved: 2023-06-26

## 2023-06-26 PROBLEM — R73.9 HYPERGLYCEMIA: Status: RESOLVED | Noted: 2023-04-25 | Resolved: 2023-06-26

## 2023-06-26 PROBLEM — H53.8 BLURRED VISION: Status: RESOLVED | Noted: 2020-10-27 | Resolved: 2023-06-26

## 2023-06-26 PROBLEM — E27.40: Status: RESOLVED | Noted: 2022-06-09 | Resolved: 2023-06-26

## 2023-06-26 PROBLEM — Z45.02 DEFIBRILLATOR DISCHARGE: Status: RESOLVED | Noted: 2023-04-25 | Resolved: 2023-06-26

## 2023-06-26 PROBLEM — K86.89 DILATED PANCREATIC DUCT (HCC): Status: RESOLVED | Noted: 2022-11-09 | Resolved: 2023-06-26

## 2023-06-26 PROBLEM — I67.9 INTRACRANIAL VASCULAR STENOSIS: Status: RESOLVED | Noted: 2020-05-18 | Resolved: 2023-06-26

## 2023-06-26 PROBLEM — E05.90 HYPERTHYROIDISM: Status: RESOLVED | Noted: 2020-05-19 | Resolved: 2023-06-26

## 2023-07-05 ENCOUNTER — OFFICE VISIT (OUTPATIENT)
Dept: CARDIOLOGY | Age: 73
End: 2023-07-05
Attending: NURSE PRACTITIONER

## 2023-07-05 VITALS
HEART RATE: 73 BPM | BODY MASS INDEX: 25.85 KG/M2 | SYSTOLIC BLOOD PRESSURE: 112 MMHG | WEIGHT: 177.58 LBS | DIASTOLIC BLOOD PRESSURE: 62 MMHG

## 2023-07-05 DIAGNOSIS — I65.23 CAROTID STENOSIS, ASYMPTOMATIC, BILATERAL: ICD-10-CM

## 2023-07-05 DIAGNOSIS — I10 BENIGN ESSENTIAL HTN: ICD-10-CM

## 2023-07-05 DIAGNOSIS — Z95.1 S/P CABG (CORONARY ARTERY BYPASS GRAFT): ICD-10-CM

## 2023-07-05 DIAGNOSIS — I50.22 CHRONIC SYSTOLIC CONGESTIVE HEART FAILURE (CMD): Primary | ICD-10-CM

## 2023-07-05 DIAGNOSIS — I25.10 CORONARY ARTERY DISEASE INVOLVING NATIVE CORONARY ARTERY OF NATIVE HEART WITHOUT ANGINA PECTORIS: ICD-10-CM

## 2023-07-05 DIAGNOSIS — I48.0 PAROXYSMAL ATRIAL FIBRILLATION (CMD): ICD-10-CM

## 2023-07-05 DIAGNOSIS — I25.5 CARDIOMYOPATHY, ISCHEMIC: ICD-10-CM

## 2023-07-05 DIAGNOSIS — E78.00 PURE HYPERCHOLESTEROLEMIA: ICD-10-CM

## 2023-07-05 LAB
ALBUMIN SERPL-MCNC: 3.8 G/DL (ref 3.6–5.1)
ALBUMIN/GLOB SERPL: 1 {RATIO} (ref 1–2.4)
ALP SERPL-CCNC: 94 UNITS/L (ref 45–117)
ALT SERPL-CCNC: 32 UNITS/L
ANION GAP SERPL CALC-SCNC: 7 MMOL/L (ref 7–19)
AST SERPL-CCNC: 22 UNITS/L
BILIRUB SERPL-MCNC: 0.5 MG/DL (ref 0.2–1)
BUN SERPL-MCNC: 20 MG/DL (ref 6–20)
BUN/CREAT SERPL: 16 (ref 7–25)
CALCIUM SERPL-MCNC: 8.7 MG/DL (ref 8.4–10.2)
CHLORIDE SERPL-SCNC: 106 MMOL/L (ref 97–110)
CO2 SERPL-SCNC: 27 MMOL/L (ref 21–32)
CREAT SERPL-MCNC: 1.25 MG/DL (ref 0.67–1.17)
FASTING DURATION TIME PATIENT: ABNORMAL H
GFR SERPLBLD BASED ON 1.73 SQ M-ARVRAT: 61 ML/MIN
GLOBULIN SER-MCNC: 3.9 G/DL (ref 2–4)
GLUCOSE SERPL-MCNC: 127 MG/DL (ref 70–99)
NT-PROBNP SERPL-MCNC: 939 PG/ML
POTASSIUM SERPL-SCNC: 4.3 MMOL/L (ref 3.4–5.1)
PROT SERPL-MCNC: 7.7 G/DL (ref 6.4–8.2)
SODIUM SERPL-SCNC: 136 MMOL/L (ref 135–145)

## 2023-07-05 PROCEDURE — 80053 COMPREHEN METABOLIC PANEL: CPT | Performed by: NURSE PRACTITIONER

## 2023-07-05 PROCEDURE — 83880 ASSAY OF NATRIURETIC PEPTIDE: CPT | Performed by: NURSE PRACTITIONER

## 2023-07-05 PROCEDURE — 99211 OFF/OP EST MAY X REQ PHY/QHP: CPT

## 2023-07-05 PROCEDURE — 3074F SYST BP LT 130 MM HG: CPT | Performed by: NURSE PRACTITIONER

## 2023-07-05 PROCEDURE — 99215 OFFICE O/P EST HI 40 MIN: CPT | Performed by: NURSE PRACTITIONER

## 2023-07-05 PROCEDURE — 3078F DIAST BP <80 MM HG: CPT | Performed by: NURSE PRACTITIONER

## 2023-07-05 PROCEDURE — 36415 COLL VENOUS BLD VENIPUNCTURE: CPT | Performed by: NURSE PRACTITIONER

## 2023-07-05 RX ORDER — SACUBITRIL AND VALSARTAN 49; 51 MG/1; MG/1
1 TABLET, FILM COATED ORAL 2 TIMES DAILY
Qty: 60 TABLET | Refills: 3 | Status: SHIPPED | OUTPATIENT
Start: 2023-07-05 | End: 2023-07-19 | Stop reason: DRUGHIGH

## 2023-07-12 ENCOUNTER — TELEPHONE (OUTPATIENT)
Dept: CARDIOLOGY | Age: 73
End: 2023-07-12

## 2023-07-19 ENCOUNTER — OFFICE VISIT (OUTPATIENT)
Dept: CARDIOLOGY | Age: 73
End: 2023-07-19
Attending: NURSE PRACTITIONER

## 2023-07-19 VITALS
WEIGHT: 181 LBS | DIASTOLIC BLOOD PRESSURE: 56 MMHG | BODY MASS INDEX: 26.35 KG/M2 | HEART RATE: 74 BPM | SYSTOLIC BLOOD PRESSURE: 98 MMHG

## 2023-07-19 DIAGNOSIS — E78.00 PURE HYPERCHOLESTEROLEMIA: ICD-10-CM

## 2023-07-19 DIAGNOSIS — I65.23 CAROTID STENOSIS, ASYMPTOMATIC, BILATERAL: ICD-10-CM

## 2023-07-19 DIAGNOSIS — I25.10 CORONARY ARTERY DISEASE INVOLVING NATIVE CORONARY ARTERY OF NATIVE HEART WITHOUT ANGINA PECTORIS: ICD-10-CM

## 2023-07-19 DIAGNOSIS — Z86.69 HISTORY OF SLEEP APNEA: Chronic | ICD-10-CM

## 2023-07-19 DIAGNOSIS — I48.0 PAROXYSMAL ATRIAL FIBRILLATION (CMD): ICD-10-CM

## 2023-07-19 DIAGNOSIS — I25.5 CARDIOMYOPATHY, ISCHEMIC: ICD-10-CM

## 2023-07-19 DIAGNOSIS — I50.22 CHRONIC SYSTOLIC CONGESTIVE HEART FAILURE (CMD): Primary | ICD-10-CM

## 2023-07-19 DIAGNOSIS — Z95.1 S/P CABG (CORONARY ARTERY BYPASS GRAFT): ICD-10-CM

## 2023-07-19 DIAGNOSIS — I10 BENIGN ESSENTIAL HTN: ICD-10-CM

## 2023-07-19 DIAGNOSIS — I49.3 PVCS (PREMATURE VENTRICULAR CONTRACTIONS): ICD-10-CM

## 2023-07-19 LAB
ANION GAP SERPL CALC-SCNC: 10 MMOL/L (ref 7–19)
BUN SERPL-MCNC: 23 MG/DL (ref 6–20)
BUN/CREAT SERPL: 17 (ref 7–25)
CALCIUM SERPL-MCNC: 8.9 MG/DL (ref 8.4–10.2)
CHLORIDE SERPL-SCNC: 104 MMOL/L (ref 97–110)
CO2 SERPL-SCNC: 26 MMOL/L (ref 21–32)
CREAT SERPL-MCNC: 1.36 MG/DL (ref 0.67–1.17)
FASTING DURATION TIME PATIENT: ABNORMAL H
GFR SERPLBLD BASED ON 1.73 SQ M-ARVRAT: 55 ML/MIN
GLUCOSE SERPL-MCNC: 81 MG/DL (ref 70–99)
NT-PROBNP SERPL-MCNC: 1008 PG/ML
POTASSIUM SERPL-SCNC: 4.4 MMOL/L (ref 3.4–5.1)
SODIUM SERPL-SCNC: 136 MMOL/L (ref 135–145)

## 2023-07-19 PROCEDURE — 3074F SYST BP LT 130 MM HG: CPT | Performed by: NURSE PRACTITIONER

## 2023-07-19 PROCEDURE — 36415 COLL VENOUS BLD VENIPUNCTURE: CPT | Performed by: NURSE PRACTITIONER

## 2023-07-19 PROCEDURE — 83880 ASSAY OF NATRIURETIC PEPTIDE: CPT | Performed by: NURSE PRACTITIONER

## 2023-07-19 PROCEDURE — 80048 BASIC METABOLIC PNL TOTAL CA: CPT | Performed by: NURSE PRACTITIONER

## 2023-07-19 PROCEDURE — 3078F DIAST BP <80 MM HG: CPT | Performed by: NURSE PRACTITIONER

## 2023-07-19 PROCEDURE — 99211 OFF/OP EST MAY X REQ PHY/QHP: CPT

## 2023-07-19 PROCEDURE — 99215 OFFICE O/P EST HI 40 MIN: CPT | Performed by: NURSE PRACTITIONER

## 2023-07-19 RX ORDER — SACUBITRIL AND VALSARTAN 24; 26 MG/1; MG/1
1 TABLET, FILM COATED ORAL 2 TIMES DAILY
Qty: 60 TABLET | Refills: 3 | Status: SHIPPED | OUTPATIENT
Start: 2023-07-19 | End: 2023-11-14

## 2023-08-09 ENCOUNTER — OFFICE VISIT (OUTPATIENT)
Dept: CARDIOLOGY | Age: 73
End: 2023-08-09
Attending: NURSE PRACTITIONER

## 2023-08-09 VITALS
DIASTOLIC BLOOD PRESSURE: 70 MMHG | SYSTOLIC BLOOD PRESSURE: 120 MMHG | HEART RATE: 78 BPM | WEIGHT: 182.98 LBS | BODY MASS INDEX: 26.63 KG/M2

## 2023-08-09 DIAGNOSIS — E78.00 PURE HYPERCHOLESTEROLEMIA: ICD-10-CM

## 2023-08-09 DIAGNOSIS — I25.10 CORONARY ARTERY DISEASE INVOLVING NATIVE CORONARY ARTERY OF NATIVE HEART WITHOUT ANGINA PECTORIS: ICD-10-CM

## 2023-08-09 DIAGNOSIS — I25.5 CARDIOMYOPATHY, ISCHEMIC: ICD-10-CM

## 2023-08-09 DIAGNOSIS — I48.0 PAROXYSMAL ATRIAL FIBRILLATION (CMD): ICD-10-CM

## 2023-08-09 DIAGNOSIS — I50.22 CHRONIC SYSTOLIC CONGESTIVE HEART FAILURE (CMD): Primary | ICD-10-CM

## 2023-08-09 DIAGNOSIS — Z86.69 HISTORY OF SLEEP APNEA: Chronic | ICD-10-CM

## 2023-08-09 DIAGNOSIS — Z95.1 S/P CABG (CORONARY ARTERY BYPASS GRAFT): ICD-10-CM

## 2023-08-09 DIAGNOSIS — I10 BENIGN ESSENTIAL HTN: ICD-10-CM

## 2023-08-09 DIAGNOSIS — I49.3 PVCS (PREMATURE VENTRICULAR CONTRACTIONS): ICD-10-CM

## 2023-08-09 DIAGNOSIS — I65.23 CAROTID STENOSIS, ASYMPTOMATIC, BILATERAL: ICD-10-CM

## 2023-08-09 LAB
ANION GAP SERPL CALC-SCNC: 11 MMOL/L (ref 7–19)
BUN SERPL-MCNC: 15 MG/DL (ref 6–20)
BUN/CREAT SERPL: 13 (ref 7–25)
CALCIUM SERPL-MCNC: 8.6 MG/DL (ref 8.4–10.2)
CHLORIDE SERPL-SCNC: 101 MMOL/L (ref 97–110)
CO2 SERPL-SCNC: 25 MMOL/L (ref 21–32)
CREAT SERPL-MCNC: 1.14 MG/DL (ref 0.67–1.17)
FASTING DURATION TIME PATIENT: ABNORMAL H
GFR SERPLBLD BASED ON 1.73 SQ M-ARVRAT: 68 ML/MIN
GLUCOSE SERPL-MCNC: 104 MG/DL (ref 70–99)
NT-PROBNP SERPL-MCNC: 1764 PG/ML
POTASSIUM SERPL-SCNC: 4.4 MMOL/L (ref 3.4–5.1)
SODIUM SERPL-SCNC: 133 MMOL/L (ref 135–145)

## 2023-08-09 PROCEDURE — 36415 COLL VENOUS BLD VENIPUNCTURE: CPT | Performed by: NURSE PRACTITIONER

## 2023-08-09 PROCEDURE — 83880 ASSAY OF NATRIURETIC PEPTIDE: CPT | Performed by: NURSE PRACTITIONER

## 2023-08-09 PROCEDURE — 99215 OFFICE O/P EST HI 40 MIN: CPT | Performed by: NURSE PRACTITIONER

## 2023-08-09 PROCEDURE — 3078F DIAST BP <80 MM HG: CPT | Performed by: NURSE PRACTITIONER

## 2023-08-09 PROCEDURE — 99211 OFF/OP EST MAY X REQ PHY/QHP: CPT

## 2023-08-09 PROCEDURE — 80048 BASIC METABOLIC PNL TOTAL CA: CPT | Performed by: NURSE PRACTITIONER

## 2023-08-09 PROCEDURE — 3074F SYST BP LT 130 MM HG: CPT | Performed by: NURSE PRACTITIONER

## 2023-08-09 RX ORDER — FUROSEMIDE 40 MG/1
20 TABLET ORAL EVERY OTHER DAY
COMMUNITY

## 2023-08-21 ENCOUNTER — ANCILLARY ORDERS (OUTPATIENT)
Dept: CARDIOLOGY | Age: 73
End: 2023-08-21

## 2023-08-21 ENCOUNTER — ANCILLARY PROCEDURE (OUTPATIENT)
Dept: CARDIOLOGY | Age: 73
End: 2023-08-21
Attending: INTERNAL MEDICINE

## 2023-08-21 DIAGNOSIS — Z95.810 ICD (IMPLANTABLE CARDIOVERTER-DEFIBRILLATOR) IN PLACE: ICD-10-CM

## 2023-08-21 PROCEDURE — 93296 REM INTERROG EVL PM/IDS: CPT | Performed by: INTERNAL MEDICINE

## 2023-08-21 PROCEDURE — 93295 DEV INTERROG REMOTE 1/2/MLT: CPT | Performed by: INTERNAL MEDICINE

## 2023-08-23 ENCOUNTER — LAB REQUISITION (OUTPATIENT)
Dept: LAB | Age: 73
End: 2023-08-23

## 2023-08-23 DIAGNOSIS — E55.9 VITAMIN D DEFICIENCY, UNSPECIFIED: ICD-10-CM

## 2023-08-23 DIAGNOSIS — N18.31 CHRONIC KIDNEY DISEASE, STAGE 3A (CMD): ICD-10-CM

## 2023-09-20 NOTE — ED NOTES
Pt and his wife requesting pt to eat and drink. Explained to them he needs to wait until at least after his CT. Both extremely demanding of time frame and plan of care. I spent an extensive amount of time explaining pt lab results and plan for CT scan.  I e Hemostasis: Electrocautery

## 2023-09-22 ENCOUNTER — LAB ENCOUNTER (OUTPATIENT)
Dept: LAB | Facility: HOSPITAL | Age: 73
End: 2023-09-22
Attending: INTERNAL MEDICINE
Payer: MEDICARE

## 2023-09-22 DIAGNOSIS — N18.31 CHRONIC KIDNEY DISEASE, STAGE 3A (HCC): ICD-10-CM

## 2023-09-22 DIAGNOSIS — E55.9 VITAMIN D DEFICIENCY: Primary | ICD-10-CM

## 2023-09-22 LAB
ALBUMIN SERPL-MCNC: 3.8 G/DL (ref 3.4–5)
ANION GAP SERPL CALC-SCNC: 6 MMOL/L (ref 0–18)
BASOPHILS # BLD AUTO: 0.04 X10(3) UL (ref 0–0.2)
BASOPHILS NFR BLD AUTO: 0.6 %
BILIRUB UR QL STRIP.AUTO: NEGATIVE
BUN BLD-MCNC: 19 MG/DL (ref 7–18)
CALCIUM BLD-MCNC: 8.6 MG/DL (ref 8.5–10.1)
CHLORIDE SERPL-SCNC: 99 MMOL/L (ref 98–112)
CLARITY UR REFRACT.AUTO: CLEAR
CO2 SERPL-SCNC: 26 MMOL/L (ref 21–32)
COLOR UR AUTO: YELLOW
CREAT BLD-MCNC: 1.48 MG/DL
CREAT UR-SCNC: 49.2 MG/DL
EGFRCR SERPLBLD CKD-EPI 2021: 50 ML/MIN/1.73M2 (ref 60–?)
EOSINOPHIL # BLD AUTO: 0.19 X10(3) UL (ref 0–0.7)
EOSINOPHIL NFR BLD AUTO: 2.9 %
ERYTHROCYTE [DISTWIDTH] IN BLOOD BY AUTOMATED COUNT: 14 %
GLUCOSE BLD-MCNC: 81 MG/DL (ref 70–99)
GLUCOSE UR STRIP.AUTO-MCNC: NEGATIVE MG/DL
HCT VFR BLD AUTO: 40.7 %
HGB BLD-MCNC: 13.4 G/DL
IMM GRANULOCYTES # BLD AUTO: 0.03 X10(3) UL (ref 0–1)
IMM GRANULOCYTES NFR BLD: 0.5 %
KETONES UR STRIP.AUTO-MCNC: NEGATIVE MG/DL
LEUKOCYTE ESTERASE UR QL STRIP.AUTO: NEGATIVE
LYMPHOCYTES # BLD AUTO: 1.25 X10(3) UL (ref 1–4)
LYMPHOCYTES NFR BLD AUTO: 19.4 %
MCH RBC QN AUTO: 30.8 PG (ref 26–34)
MCHC RBC AUTO-ENTMCNC: 32.9 G/DL (ref 31–37)
MCV RBC AUTO: 93.6 FL
MONOCYTES # BLD AUTO: 1.02 X10(3) UL (ref 0.1–1)
MONOCYTES NFR BLD AUTO: 15.8 %
NEUTROPHILS # BLD AUTO: 3.92 X10 (3) UL (ref 1.5–7.7)
NEUTROPHILS # BLD AUTO: 3.92 X10(3) UL (ref 1.5–7.7)
NEUTROPHILS NFR BLD AUTO: 60.8 %
NITRITE UR QL STRIP.AUTO: NEGATIVE
OSMOLALITY SERPL CALC.SUM OF ELEC: 273 MOSM/KG (ref 275–295)
PH UR STRIP.AUTO: 6 [PH] (ref 5–8)
PHOSPHATE SERPL-MCNC: 3.1 MG/DL (ref 2.5–4.9)
PLATELET # BLD AUTO: 215 10(3)UL (ref 150–450)
POTASSIUM SERPL-SCNC: 4.8 MMOL/L (ref 3.5–5.1)
PROT UR STRIP.AUTO-MCNC: NEGATIVE MG/DL
PROT UR-MCNC: 5.5 MG/DL
PROT/CREAT UR-RTO: 0.11
PTH-INTACT SERPL-MCNC: 158.6 PG/ML (ref 18.5–88)
RBC # BLD AUTO: 4.35 X10(6)UL
SODIUM SERPL-SCNC: 131 MMOL/L (ref 136–145)
SP GR UR STRIP.AUTO: 1.01 (ref 1–1.03)
URATE SERPL-MCNC: 8.4 MG/DL
UROBILINOGEN UR STRIP.AUTO-MCNC: 0.2 MG/DL
VIT D+METAB SERPL-MCNC: 31.4 NG/ML (ref 30–100)
WBC # BLD AUTO: 6.5 X10(3) UL (ref 4–11)

## 2023-09-22 PROCEDURE — 81001 URINALYSIS AUTO W/SCOPE: CPT

## 2023-09-22 PROCEDURE — 36415 COLL VENOUS BLD VENIPUNCTURE: CPT

## 2023-09-22 PROCEDURE — 82570 ASSAY OF URINE CREATININE: CPT

## 2023-09-22 PROCEDURE — 83970 ASSAY OF PARATHORMONE: CPT

## 2023-09-22 PROCEDURE — 85025 COMPLETE CBC W/AUTO DIFF WBC: CPT

## 2023-09-22 PROCEDURE — 82306 VITAMIN D 25 HYDROXY: CPT

## 2023-09-22 PROCEDURE — 84156 ASSAY OF PROTEIN URINE: CPT

## 2023-09-22 PROCEDURE — 80069 RENAL FUNCTION PANEL: CPT

## 2023-09-22 PROCEDURE — 84550 ASSAY OF BLOOD/URIC ACID: CPT

## 2023-10-03 ENCOUNTER — HOSPITAL ENCOUNTER (INPATIENT)
Facility: HOSPITAL | Age: 73
LOS: 1 days | Discharge: HOME | End: 2023-10-04
Attending: EMERGENCY MEDICINE | Admitting: HOSPITALIST
Payer: OTHER GOVERNMENT

## 2023-10-03 ENCOUNTER — APPOINTMENT (OUTPATIENT)
Dept: CT IMAGING | Facility: HOSPITAL | Age: 73
End: 2023-10-03
Attending: EMERGENCY MEDICINE
Payer: OTHER GOVERNMENT

## 2023-10-03 ENCOUNTER — APPOINTMENT (OUTPATIENT)
Dept: CV DIAGNOSTICS | Facility: HOSPITAL | Age: 73
End: 2023-10-03
Attending: HOSPITALIST
Payer: OTHER GOVERNMENT

## 2023-10-03 ENCOUNTER — APPOINTMENT (OUTPATIENT)
Dept: GENERAL RADIOLOGY | Facility: HOSPITAL | Age: 73
End: 2023-10-03
Attending: EMERGENCY MEDICINE
Payer: OTHER GOVERNMENT

## 2023-10-03 ENCOUNTER — TELEPHONE (OUTPATIENT)
Dept: CARDIOLOGY | Age: 73
End: 2023-10-03

## 2023-10-03 DIAGNOSIS — R07.9 ACUTE CHEST PAIN: ICD-10-CM

## 2023-10-03 DIAGNOSIS — R55 SYNCOPE, NEAR: Primary | ICD-10-CM

## 2023-10-03 DIAGNOSIS — R06.00 DYSPNEA, UNSPECIFIED TYPE: ICD-10-CM

## 2023-10-03 LAB
ALBUMIN SERPL-MCNC: 3.4 G/DL (ref 3.4–5)
ALBUMIN/GLOB SERPL: 0.8 {RATIO} (ref 1–2)
ALP LIVER SERPL-CCNC: 90 U/L
ALT SERPL-CCNC: 29 U/L
ANION GAP SERPL CALC-SCNC: 6 MMOL/L (ref 0–18)
AST SERPL-CCNC: 25 U/L (ref 15–37)
ATRIAL RATE: 72 BPM
BASOPHILS # BLD AUTO: 0.03 X10(3) UL (ref 0–0.2)
BASOPHILS NFR BLD AUTO: 0.5 %
BILIRUB SERPL-MCNC: 1 MG/DL (ref 0.1–2)
BUN BLD-MCNC: 21 MG/DL (ref 7–18)
CALCIUM BLD-MCNC: 8.8 MG/DL (ref 8.5–10.1)
CHLORIDE SERPL-SCNC: 103 MMOL/L (ref 98–112)
CO2 SERPL-SCNC: 25 MMOL/L (ref 21–32)
CREAT BLD-MCNC: 1.59 MG/DL
D DIMER PPP FEU-MCNC: 0.5 UG/ML FEU (ref ?–0.72)
EGFRCR SERPLBLD CKD-EPI 2021: 46 ML/MIN/1.73M2 (ref 60–?)
EOSINOPHIL # BLD AUTO: 0.15 X10(3) UL (ref 0–0.7)
EOSINOPHIL NFR BLD AUTO: 2.5 %
ERYTHROCYTE [DISTWIDTH] IN BLOOD BY AUTOMATED COUNT: 14.6 %
GLOBULIN PLAS-MCNC: 4.2 G/DL (ref 2.8–4.4)
GLUCOSE BLD-MCNC: 91 MG/DL (ref 70–99)
GLUCOSE BLD-MCNC: 98 MG/DL (ref 70–99)
HCT VFR BLD AUTO: 37.3 %
HGB BLD-MCNC: 13 G/DL
IMM GRANULOCYTES # BLD AUTO: 0.02 X10(3) UL (ref 0–1)
IMM GRANULOCYTES NFR BLD: 0.3 %
LYMPHOCYTES # BLD AUTO: 1.45 X10(3) UL (ref 1–4)
LYMPHOCYTES NFR BLD AUTO: 24.5 %
MCH RBC QN AUTO: 31.4 PG (ref 26–34)
MCHC RBC AUTO-ENTMCNC: 34.9 G/DL (ref 31–37)
MCV RBC AUTO: 90.1 FL
MONOCYTES # BLD AUTO: 1.26 X10(3) UL (ref 0.1–1)
MONOCYTES NFR BLD AUTO: 21.3 %
NEUTROPHILS # BLD AUTO: 3 X10 (3) UL (ref 1.5–7.7)
NEUTROPHILS # BLD AUTO: 3 X10(3) UL (ref 1.5–7.7)
NEUTROPHILS NFR BLD AUTO: 50.9 %
OSMOLALITY SERPL CALC.SUM OF ELEC: 281 MOSM/KG (ref 275–295)
P AXIS: 6 DEGREES
P-R INTERVAL: 268 MS
PLATELET # BLD AUTO: 181 10(3)UL (ref 150–450)
POTASSIUM SERPL-SCNC: 3.9 MMOL/L (ref 3.5–5.1)
PROT SERPL-MCNC: 7.6 G/DL (ref 6.4–8.2)
Q-T INTERVAL: 426 MS
QRS DURATION: 126 MS
QTC CALCULATION (BEZET): 466 MS
R AXIS: 86 DEGREES
RBC # BLD AUTO: 4.14 X10(6)UL
SODIUM SERPL-SCNC: 134 MMOL/L (ref 136–145)
T AXIS: -34 DEGREES
TROPONIN I HIGH SENSITIVITY: 14 NG/L
VENTRICULAR RATE: 72 BPM
WBC # BLD AUTO: 5.9 X10(3) UL (ref 4–11)

## 2023-10-03 PROCEDURE — 70450 CT HEAD/BRAIN W/O DYE: CPT | Performed by: EMERGENCY MEDICINE

## 2023-10-03 PROCEDURE — 99223 1ST HOSP IP/OBS HIGH 75: CPT | Performed by: HOSPITALIST

## 2023-10-03 PROCEDURE — 93306 TTE W/DOPPLER COMPLETE: CPT | Performed by: HOSPITALIST

## 2023-10-03 PROCEDURE — 71045 X-RAY EXAM CHEST 1 VIEW: CPT | Performed by: EMERGENCY MEDICINE

## 2023-10-03 RX ORDER — ATORVASTATIN CALCIUM 20 MG/1
20 TABLET, FILM COATED ORAL NIGHTLY
Status: DISCONTINUED | OUTPATIENT
Start: 2023-10-03 | End: 2023-10-04

## 2023-10-03 RX ORDER — METOCLOPRAMIDE HYDROCHLORIDE 5 MG/ML
10 INJECTION INTRAMUSCULAR; INTRAVENOUS EVERY 8 HOURS PRN
Status: DISCONTINUED | OUTPATIENT
Start: 2023-10-03 | End: 2023-10-04

## 2023-10-03 RX ORDER — METOPROLOL SUCCINATE 100 MG/1
100 TABLET, EXTENDED RELEASE ORAL DAILY
Status: DISCONTINUED | OUTPATIENT
Start: 2023-10-04 | End: 2023-10-04

## 2023-10-03 RX ORDER — MULTIVITAMIN WITH IRON
250 TABLET ORAL DAILY
COMMUNITY

## 2023-10-03 RX ORDER — ENOXAPARIN SODIUM 100 MG/ML
40 INJECTION SUBCUTANEOUS DAILY
Status: DISCONTINUED | OUTPATIENT
Start: 2023-10-03 | End: 2023-10-04

## 2023-10-03 RX ORDER — FUROSEMIDE 20 MG/1
20 TABLET ORAL EVERY OTHER DAY
Status: DISCONTINUED | OUTPATIENT
Start: 2023-10-05 | End: 2023-10-04

## 2023-10-03 RX ORDER — AMIODARONE HYDROCHLORIDE 200 MG/1
200 TABLET ORAL DAILY
Status: DISCONTINUED | OUTPATIENT
Start: 2023-10-04 | End: 2023-10-04

## 2023-10-03 RX ORDER — ONDANSETRON 2 MG/ML
4 INJECTION INTRAMUSCULAR; INTRAVENOUS EVERY 6 HOURS PRN
Status: DISCONTINUED | OUTPATIENT
Start: 2023-10-03 | End: 2023-10-04

## 2023-10-03 RX ORDER — MEXILETINE HYDROCHLORIDE 150 MG/1
150 CAPSULE ORAL EVERY 12 HOURS SCHEDULED
Status: DISCONTINUED | OUTPATIENT
Start: 2023-10-03 | End: 2023-10-04

## 2023-10-03 RX ORDER — ASPIRIN 81 MG/1
81 TABLET ORAL DAILY
Status: DISCONTINUED | OUTPATIENT
Start: 2023-10-04 | End: 2023-10-04

## 2023-10-03 RX ORDER — FUROSEMIDE 20 MG/1
20 TABLET ORAL EVERY OTHER DAY
COMMUNITY

## 2023-10-03 RX ORDER — ACETAMINOPHEN 500 MG
500 TABLET ORAL EVERY 4 HOURS PRN
Status: DISCONTINUED | OUTPATIENT
Start: 2023-10-03 | End: 2023-10-04

## 2023-10-03 NOTE — ED QUICK NOTES
Orders for admission, patient is aware of plan and ready to go upstairs. Any questions, please call ED RN [phone] at [phone].      Patient Covid vaccination status: Unvaccinated     COVID Test Ordered in ED: None    COVID Suspicion at Admission: N/A    Running Infusions:  None    Mental Status/LOC at time of transport: AOx4    Other pertinent information:   CIWA score: N/A   NIH score:  0

## 2023-10-03 NOTE — PROGRESS NOTES
10/03/23 1451 10/03/23 1455 10/03/23 1457   Vital Signs   Pulse 76 73 71   /63 128/65 126/56   MAP (mmHg) 76 80  --    Patient Position Lying Sitting Standing         Admission orthostatic vitals

## 2023-10-03 NOTE — ED INITIAL ASSESSMENT (HPI)
[facility]. States peeling onion at 1000 this AM and sudden SOB with dizziness and lightheaded. Denies CP, states nausea.

## 2023-10-04 ENCOUNTER — APPOINTMENT (OUTPATIENT)
Dept: ULTRASOUND IMAGING | Facility: HOSPITAL | Age: 73
End: 2023-10-04
Attending: INTERNAL MEDICINE
Payer: OTHER GOVERNMENT

## 2023-10-04 VITALS
RESPIRATION RATE: 18 BRPM | DIASTOLIC BLOOD PRESSURE: 71 MMHG | WEIGHT: 173.19 LBS | TEMPERATURE: 98 F | SYSTOLIC BLOOD PRESSURE: 131 MMHG | HEART RATE: 78 BPM | OXYGEN SATURATION: 95 % | BODY MASS INDEX: 26 KG/M2

## 2023-10-04 PROBLEM — I49.3 PVC'S (PREMATURE VENTRICULAR CONTRACTIONS): Status: ACTIVE | Noted: 2023-10-04

## 2023-10-04 PROBLEM — I25.5 ISCHEMIC CARDIOMYOPATHY: Status: ACTIVE | Noted: 2023-10-04

## 2023-10-04 LAB
ANION GAP SERPL CALC-SCNC: 2 MMOL/L (ref 0–18)
ATRIAL RATE: 72 BPM
BUN BLD-MCNC: 21 MG/DL (ref 7–18)
CALCIUM BLD-MCNC: 8.5 MG/DL (ref 8.5–10.1)
CHLORIDE SERPL-SCNC: 106 MMOL/L (ref 98–112)
CO2 SERPL-SCNC: 27 MMOL/L (ref 21–32)
CREAT BLD-MCNC: 1.34 MG/DL
EGFRCR SERPLBLD CKD-EPI 2021: 56 ML/MIN/1.73M2 (ref 60–?)
GLUCOSE BLD-MCNC: 102 MG/DL (ref 70–99)
MAGNESIUM SERPL-MCNC: 1.9 MG/DL (ref 1.6–2.6)
OSMOLALITY SERPL CALC.SUM OF ELEC: 283 MOSM/KG (ref 275–295)
P AXIS: 21 DEGREES
P-R INTERVAL: 320 MS
PHOSPHATE SERPL-MCNC: 3.2 MG/DL (ref 2.5–4.9)
POTASSIUM SERPL-SCNC: 4.1 MMOL/L (ref 3.5–5.1)
Q-T INTERVAL: 392 MS
QRS DURATION: 134 MS
QTC CALCULATION (BEZET): 429 MS
R AXIS: 83 DEGREES
SODIUM SERPL-SCNC: 135 MMOL/L (ref 136–145)
T AXIS: -60 DEGREES
TSI SER-ACNC: 1.69 MIU/ML (ref 0.36–3.74)
VENTRICULAR RATE: 72 BPM

## 2023-10-04 PROCEDURE — 99239 HOSP IP/OBS DSCHRG MGMT >30: CPT | Performed by: INTERNAL MEDICINE

## 2023-10-04 PROCEDURE — 93880 EXTRACRANIAL BILAT STUDY: CPT | Performed by: INTERNAL MEDICINE

## 2023-10-04 RX ORDER — AMIODARONE HYDROCHLORIDE 200 MG/1
100 TABLET ORAL DAILY
Status: SHIPPED | COMMUNITY
Start: 2023-10-04

## 2023-10-04 RX ORDER — MEXILETINE HYDROCHLORIDE 150 MG/1
150 CAPSULE ORAL EVERY 12 HOURS SCHEDULED
Qty: 60 CAPSULE | Refills: 3 | Status: SHIPPED | OUTPATIENT
Start: 2023-10-04

## 2023-10-04 RX ORDER — MELATONIN
3 NIGHTLY PRN
Status: DISCONTINUED | OUTPATIENT
Start: 2023-10-04 | End: 2023-10-04

## 2023-10-04 NOTE — PLAN OF CARE
Assumed pt care @ 1930   Pt A/Ox4, A-paced w/ freq PVCs, & on RA   Denies SOB/dizziness at this time, however, states its intermittent  Comfort & safety measures in place  Pt updated on plan of care

## 2023-10-04 NOTE — PLAN OF CARE
NURSING DISCHARGE NOTE    Discharged Home via Ambulatory. Accompanied by Family member and Support staff  Belongings Taken by patient/family. Pt AxO4  -dizziness  RA  A-paced  No pain at this time  1x assist/standby  -PT c/s  -OT c/s    US carotid completed  Monitor/manage HR/rhythm level  -EKG completed  Pt informed of POC, all questions answered  -pt family updated    Pt given and informed of discharge instructions, new and home medications, f/u dates, and further education handouts related to admission. Pt verbally understand discharge instructions, all questions answered. Problem: Patient/Family Goals  Goal: Patient/Family Long Term Goal  Description: Patient's Long Term Goal: discharge in stable condition    Interventions:  - pain control  - manage HR control  - See additional Care Plan goals for specific interventions  Outcome: Adequate for Discharge  Goal: Patient/Family Short Term Goal  Description: Patient's Short Term Goal: pain control    Interventions:   - follow medication  - PRNs as needed  - See additional Care Plan goals for specific interventions  Outcome: Adequate for Discharge     Problem: CARDIOVASCULAR - ADULT  Goal: Maintains optimal cardiac output and hemodynamic stability  Description: INTERVENTIONS:  - Monitor vital signs, rhythm, and trends  - Monitor for bleeding, hypotension and signs of decreased cardiac output  - Evaluate effectiveness of vasoactive medications to optimize hemodynamic stability  - Monitor arterial and/or venous puncture sites for bleeding and/or hematoma  - Assess quality of pulses, skin color and temperature  - Assess for signs of decreased coronary artery perfusion - ex.  Angina  - Evaluate fluid balance, assess for edema, trend weights  Outcome: Adequate for Discharge  Goal: Absence of cardiac arrhythmias or at baseline  Description: INTERVENTIONS:  - Continuous cardiac monitoring, monitor vital signs, obtain 12 lead EKG if indicated  - Evaluate effectiveness of antiarrhythmic and heart rate control medications as ordered  - Initiate emergency measures for life threatening arrhythmias  - Monitor electrolytes and administer replacement therapy as ordered  Outcome: Adequate for Discharge     Problem: Impaired Functional Mobility  Goal: Achieve highest/safest level of mobility/gait  Description: Interventions:  - Assess patient's functional ability and stability  - Promote increasing activity/tolerance for mobility and gait  - Educate and engage patient/family in tolerated activity level and precautions  - Recommend use of chair position in bed 3 times per day  Outcome: Adequate for Discharge     Problem: SAFETY ADULT - FALL  Goal: Free from fall injury  Description: INTERVENTIONS:  - Assess pt frequently for physical needs  - Identify cognitive and physical deficits and behaviors that affect risk of falls.   - [place] fall precautions as indicated by assessment.  - Educate pt/family on patient safety including physical limitations  - Instruct pt to call for assistance with activity based on assessment  - Modify environment to reduce risk of injury  - Provide assistive devices as appropriate  - Consider OT/PT consult to assist with strengthening/mobility  - Encourage toileting schedule  Outcome: Adequate for Discharge

## 2023-10-04 NOTE — PHYSICAL THERAPY NOTE
PT orders received and chart reviewed. Per discussion with RN, pt is declining both PT and OT, stating he has no therapy needs. Will discontinue orders. Please re-order if new functional mobility needs present.

## 2023-10-04 NOTE — OCCUPATIONAL THERAPY NOTE
OT orders received and chart reviewed. Per discussion with RN, pt is declining both PT and OT, stating he has no therapy needs. Will discontinue orders. Please re-order if new functional mobility needs present.       Thank you for allowing me to care for this patient,   Oliver Welch OTR/L   Occupational Therapist   [facility]

## 2023-10-05 ENCOUNTER — PATIENT OUTREACH (OUTPATIENT)
Dept: CASE MANAGEMENT | Age: 73
End: 2023-10-05

## 2023-10-05 NOTE — PROGRESS NOTES
NCM spoke with patient states he just woke up, he prefers call back at a later time. NCM will try calling back later.

## 2023-10-13 ENCOUNTER — ANCILLARY PROCEDURE (OUTPATIENT)
Dept: CARDIOLOGY | Age: 73
End: 2023-10-13
Attending: INTERNAL MEDICINE

## 2023-10-13 ENCOUNTER — OFFICE VISIT (OUTPATIENT)
Dept: CARDIOLOGY | Age: 73
End: 2023-10-13

## 2023-10-13 VITALS
WEIGHT: 181 LBS | DIASTOLIC BLOOD PRESSURE: 84 MMHG | HEIGHT: 69 IN | HEART RATE: 39 BPM | BODY MASS INDEX: 26.81 KG/M2 | SYSTOLIC BLOOD PRESSURE: 138 MMHG

## 2023-10-13 DIAGNOSIS — Z95.810 ICD (IMPLANTABLE CARDIOVERTER-DEFIBRILLATOR) IN PLACE: ICD-10-CM

## 2023-10-13 DIAGNOSIS — I48.0 PAROXYSMAL ATRIAL FIBRILLATION (CMD): ICD-10-CM

## 2023-10-13 DIAGNOSIS — I50.22 CHRONIC SYSTOLIC CONGESTIVE HEART FAILURE (CMD): ICD-10-CM

## 2023-10-13 DIAGNOSIS — I10 BENIGN ESSENTIAL HTN: ICD-10-CM

## 2023-10-13 DIAGNOSIS — I49.3 PVCS (PREMATURE VENTRICULAR CONTRACTIONS): ICD-10-CM

## 2023-10-13 DIAGNOSIS — I25.5 CARDIOMYOPATHY, ISCHEMIC: ICD-10-CM

## 2023-10-13 DIAGNOSIS — I25.10 CORONARY ARTERY DISEASE INVOLVING NATIVE CORONARY ARTERY OF NATIVE HEART WITHOUT ANGINA PECTORIS: ICD-10-CM

## 2023-10-13 DIAGNOSIS — Z95.1 S/P CABG (CORONARY ARTERY BYPASS GRAFT): Primary | ICD-10-CM

## 2023-10-13 LAB
MDC_IDC_LEAD_IMPLANT_DT: NORMAL
MDC_IDC_LEAD_IMPLANT_DT: NORMAL
MDC_IDC_LEAD_LOCATION: NORMAL
MDC_IDC_LEAD_LOCATION: NORMAL
MDC_IDC_LEAD_LOCATION_DETAIL_1: NORMAL
MDC_IDC_LEAD_LOCATION_DETAIL_1: NORMAL
MDC_IDC_LEAD_MFG: NORMAL
MDC_IDC_LEAD_MFG: NORMAL
MDC_IDC_LEAD_MODEL: NORMAL
MDC_IDC_LEAD_MODEL: NORMAL
MDC_IDC_LEAD_POLARITY_TYPE: NORMAL
MDC_IDC_LEAD_POLARITY_TYPE: NORMAL
MDC_IDC_LEAD_SERIAL: NORMAL
MDC_IDC_LEAD_SERIAL: NORMAL
MDC_IDC_MSMT_BATTERY_DTM: NORMAL
MDC_IDC_MSMT_BATTERY_REMAINING_LONGEVITY: 69 MO
MDC_IDC_MSMT_BATTERY_RRT_TRIGGER: 2.73
MDC_IDC_MSMT_BATTERY_STATUS: NORMAL
MDC_IDC_MSMT_BATTERY_VOLTAGE: 2.98 V
MDC_IDC_MSMT_CAP_CHARGE_DTM: NORMAL
MDC_IDC_MSMT_CAP_CHARGE_DTM: NORMAL
MDC_IDC_MSMT_CAP_CHARGE_ENERGY: 18 J
MDC_IDC_MSMT_CAP_CHARGE_ENERGY: 35 J
MDC_IDC_MSMT_CAP_CHARGE_TIME: 3.74
MDC_IDC_MSMT_CAP_CHARGE_TIME: 7.61
MDC_IDC_MSMT_CAP_CHARGE_TYPE: NORMAL
MDC_IDC_MSMT_CAP_CHARGE_TYPE: NORMAL
MDC_IDC_MSMT_LEADCHNL_RA_IMPEDANCE_VALUE: 437 OHM
MDC_IDC_MSMT_LEADCHNL_RA_PACING_THRESHOLD_AMPLITUDE: 0.5 V
MDC_IDC_MSMT_LEADCHNL_RA_PACING_THRESHOLD_AMPLITUDE: 0.62 V
MDC_IDC_MSMT_LEADCHNL_RA_PACING_THRESHOLD_PULSEWIDTH: 0.4 MS
MDC_IDC_MSMT_LEADCHNL_RA_PACING_THRESHOLD_PULSEWIDTH: 0.4 MS
MDC_IDC_MSMT_LEADCHNL_RA_SENSING_INTR_AMPL: 3.12 MV
MDC_IDC_MSMT_LEADCHNL_RA_SENSING_INTR_AMPL: 3.12 MV
MDC_IDC_MSMT_LEADCHNL_RV_IMPEDANCE_VALUE: 266 OHM
MDC_IDC_MSMT_LEADCHNL_RV_IMPEDANCE_VALUE: 380 OHM
MDC_IDC_MSMT_LEADCHNL_RV_PACING_THRESHOLD_AMPLITUDE: 1 V
MDC_IDC_MSMT_LEADCHNL_RV_PACING_THRESHOLD_AMPLITUDE: 1.25 V
MDC_IDC_MSMT_LEADCHNL_RV_PACING_THRESHOLD_PULSEWIDTH: 0.4 MS
MDC_IDC_MSMT_LEADCHNL_RV_PACING_THRESHOLD_PULSEWIDTH: 0.4 MS
MDC_IDC_MSMT_LEADCHNL_RV_SENSING_INTR_AMPL: 8.25 MV
MDC_IDC_MSMT_LEADCHNL_RV_SENSING_INTR_AMPL: 9.75 MV
MDC_IDC_PG_IMPLANT_DTM: NORMAL
MDC_IDC_PG_MFG: NORMAL
MDC_IDC_PG_MODEL: NORMAL
MDC_IDC_PG_SERIAL: NORMAL
MDC_IDC_PG_TYPE: NORMAL
MDC_IDC_SESS_CLINIC_NAME: NORMAL
MDC_IDC_SESS_DTM: NORMAL
MDC_IDC_SESS_TYPE: NORMAL
MDC_IDC_SET_BRADY_AT_MODE_SWITCH_RATE: 171 {BEATS}/MIN
MDC_IDC_SET_BRADY_HYSTRATE: NORMAL
MDC_IDC_SET_BRADY_LOWRATE: 70 {BEATS}/MIN
MDC_IDC_SET_BRADY_MAX_SENSOR_RATE: 120 {BEATS}/MIN
MDC_IDC_SET_BRADY_MAX_TRACKING_RATE: 130 {BEATS}/MIN
MDC_IDC_SET_BRADY_MODE: NORMAL
MDC_IDC_SET_BRADY_PAV_DELAY_LOW: 180 MS
MDC_IDC_SET_BRADY_SAV_DELAY_LOW: 150 MS
MDC_IDC_SET_LEADCHNL_RA_PACING_AMPLITUDE: 1.5 V
MDC_IDC_SET_LEADCHNL_RA_PACING_ANODE_ELECTRODE_1: NORMAL
MDC_IDC_SET_LEADCHNL_RA_PACING_ANODE_LOCATION_1: NORMAL
MDC_IDC_SET_LEADCHNL_RA_PACING_CAPTURE_MODE: NORMAL
MDC_IDC_SET_LEADCHNL_RA_PACING_CATHODE_ELECTRODE_1: NORMAL
MDC_IDC_SET_LEADCHNL_RA_PACING_CATHODE_LOCATION_1: NORMAL
MDC_IDC_SET_LEADCHNL_RA_PACING_POLARITY: NORMAL
MDC_IDC_SET_LEADCHNL_RA_PACING_PULSEWIDTH: 0.4 MS
MDC_IDC_SET_LEADCHNL_RA_SENSING_ANODE_ELECTRODE_1: NORMAL
MDC_IDC_SET_LEADCHNL_RA_SENSING_ANODE_LOCATION_1: NORMAL
MDC_IDC_SET_LEADCHNL_RA_SENSING_CATHODE_ELECTRODE_1: NORMAL
MDC_IDC_SET_LEADCHNL_RA_SENSING_CATHODE_LOCATION_1: NORMAL
MDC_IDC_SET_LEADCHNL_RA_SENSING_POLARITY: NORMAL
MDC_IDC_SET_LEADCHNL_RA_SENSING_SENSITIVITY: 0.3 MV
MDC_IDC_SET_LEADCHNL_RV_PACING_AMPLITUDE: 2.5 V
MDC_IDC_SET_LEADCHNL_RV_PACING_ANODE_ELECTRODE_1: NORMAL
MDC_IDC_SET_LEADCHNL_RV_PACING_ANODE_LOCATION_1: NORMAL
MDC_IDC_SET_LEADCHNL_RV_PACING_CAPTURE_MODE: NORMAL
MDC_IDC_SET_LEADCHNL_RV_PACING_CATHODE_ELECTRODE_1: NORMAL
MDC_IDC_SET_LEADCHNL_RV_PACING_CATHODE_LOCATION_1: NORMAL
MDC_IDC_SET_LEADCHNL_RV_PACING_POLARITY: NORMAL
MDC_IDC_SET_LEADCHNL_RV_PACING_PULSEWIDTH: 0.4 MS
MDC_IDC_SET_LEADCHNL_RV_SENSING_ANODE_ELECTRODE_1: NORMAL
MDC_IDC_SET_LEADCHNL_RV_SENSING_ANODE_LOCATION_1: NORMAL
MDC_IDC_SET_LEADCHNL_RV_SENSING_CATHODE_ELECTRODE_1: NORMAL
MDC_IDC_SET_LEADCHNL_RV_SENSING_CATHODE_LOCATION_1: NORMAL
MDC_IDC_SET_LEADCHNL_RV_SENSING_POLARITY: NORMAL
MDC_IDC_SET_LEADCHNL_RV_SENSING_SENSITIVITY: 0.3 MV
MDC_IDC_SET_ZONE_DETECTION_BEATS_DENOMINATOR: 40 {BEATS}
MDC_IDC_SET_ZONE_DETECTION_BEATS_NUMERATOR: 30 {BEATS}
MDC_IDC_SET_ZONE_DETECTION_INTERVAL: 320 MS
MDC_IDC_SET_ZONE_DETECTION_INTERVAL: 350 MS
MDC_IDC_SET_ZONE_DETECTION_INTERVAL: 360 MS
MDC_IDC_SET_ZONE_DETECTION_INTERVAL: 450 MS
MDC_IDC_SET_ZONE_DETECTION_INTERVAL: NORMAL
MDC_IDC_SET_ZONE_TYPE: NORMAL
MDC_IDC_SET_ZONE_VENDOR_TYPE: NORMAL
MDC_IDC_STAT_AT_BURDEN_PERCENT: 0 %
MDC_IDC_STAT_AT_DTM_END: NORMAL
MDC_IDC_STAT_AT_DTM_START: NORMAL
MDC_IDC_STAT_BRADY_AP_VP_PERCENT: 0.34 %
MDC_IDC_STAT_BRADY_AP_VS_PERCENT: 99.44 %
MDC_IDC_STAT_BRADY_AS_VP_PERCENT: 0 %
MDC_IDC_STAT_BRADY_AS_VS_PERCENT: 0.21 %
MDC_IDC_STAT_BRADY_DTM_END: NORMAL
MDC_IDC_STAT_BRADY_DTM_START: NORMAL
MDC_IDC_STAT_BRADY_RA_PERCENT_PACED: 99.75 %
MDC_IDC_STAT_BRADY_RV_PERCENT_PACED: 0.4 %
MDC_IDC_STAT_EPISODE_RECENT_COUNT: 0
MDC_IDC_STAT_EPISODE_RECENT_COUNT_DTM_END: NORMAL
MDC_IDC_STAT_EPISODE_RECENT_COUNT_DTM_START: NORMAL
MDC_IDC_STAT_EPISODE_TOTAL_COUNT: 0
MDC_IDC_STAT_EPISODE_TOTAL_COUNT: 1
MDC_IDC_STAT_EPISODE_TOTAL_COUNT: 2
MDC_IDC_STAT_EPISODE_TOTAL_COUNT: 7
MDC_IDC_STAT_EPISODE_TOTAL_COUNT_DTM_END: NORMAL
MDC_IDC_STAT_EPISODE_TOTAL_COUNT_DTM_START: NORMAL
MDC_IDC_STAT_EPISODE_TYPE: NORMAL
MDC_IDC_STAT_TACHYTHERAPY_ATP_DELIVERED_RECENT: 0
MDC_IDC_STAT_TACHYTHERAPY_ATP_DELIVERED_TOTAL: 1
MDC_IDC_STAT_TACHYTHERAPY_RECENT_DTM_END: NORMAL
MDC_IDC_STAT_TACHYTHERAPY_RECENT_DTM_START: NORMAL
MDC_IDC_STAT_TACHYTHERAPY_SHOCKS_ABORTED_RECENT: 0
MDC_IDC_STAT_TACHYTHERAPY_SHOCKS_ABORTED_TOTAL: 0
MDC_IDC_STAT_TACHYTHERAPY_SHOCKS_DELIVERED_RECENT: 0
MDC_IDC_STAT_TACHYTHERAPY_SHOCKS_DELIVERED_TOTAL: 4
MDC_IDC_STAT_TACHYTHERAPY_TOTAL_DTM_END: NORMAL
MDC_IDC_STAT_TACHYTHERAPY_TOTAL_DTM_START: NORMAL

## 2023-10-13 PROCEDURE — 3079F DIAST BP 80-89 MM HG: CPT | Performed by: INTERNAL MEDICINE

## 2023-10-13 PROCEDURE — 93283 PRGRMG EVAL IMPLANTABLE DFB: CPT | Performed by: INTERNAL MEDICINE

## 2023-10-13 PROCEDURE — 3075F SYST BP GE 130 - 139MM HG: CPT | Performed by: INTERNAL MEDICINE

## 2023-10-13 PROCEDURE — 99214 OFFICE O/P EST MOD 30 MIN: CPT | Performed by: INTERNAL MEDICINE

## 2023-10-13 PROCEDURE — 93000 ELECTROCARDIOGRAM COMPLETE: CPT | Performed by: INTERNAL MEDICINE

## 2023-10-13 RX ORDER — MEXILETINE HYDROCHLORIDE 150 MG/1
CAPSULE ORAL 2 TIMES DAILY
COMMUNITY
Start: 2023-10-05 | End: 2023-10-27

## 2023-10-23 ENCOUNTER — TELEPHONE (OUTPATIENT)
Dept: CARDIOLOGY | Age: 73
End: 2023-10-23

## 2023-10-26 RX ORDER — AMIODARONE HYDROCHLORIDE 200 MG/1
200 TABLET ORAL DAILY
Qty: 90 TABLET | Refills: 1 | Status: SHIPPED | OUTPATIENT
Start: 2023-10-26 | End: 2023-12-28 | Stop reason: SDUPTHER

## 2023-10-27 ENCOUNTER — OFFICE VISIT (OUTPATIENT)
Dept: CARDIOLOGY | Age: 73
End: 2023-10-27

## 2023-10-27 VITALS
SYSTOLIC BLOOD PRESSURE: 126 MMHG | DIASTOLIC BLOOD PRESSURE: 79 MMHG | HEIGHT: 69 IN | BODY MASS INDEX: 26.96 KG/M2 | WEIGHT: 182 LBS

## 2023-10-27 DIAGNOSIS — Z95.810 ICD (IMPLANTABLE CARDIOVERTER-DEFIBRILLATOR) IN PLACE: ICD-10-CM

## 2023-10-27 DIAGNOSIS — I25.5 ISCHEMIC CARDIOMYOPATHY: ICD-10-CM

## 2023-10-27 DIAGNOSIS — I49.3 PVC (PREMATURE VENTRICULAR CONTRACTION): Primary | ICD-10-CM

## 2023-10-27 PROCEDURE — 99215 OFFICE O/P EST HI 40 MIN: CPT | Performed by: INTERNAL MEDICINE

## 2023-10-27 PROCEDURE — 3078F DIAST BP <80 MM HG: CPT | Performed by: INTERNAL MEDICINE

## 2023-10-27 PROCEDURE — 93000 ELECTROCARDIOGRAM COMPLETE: CPT | Performed by: INTERNAL MEDICINE

## 2023-10-27 PROCEDURE — 3074F SYST BP LT 130 MM HG: CPT | Performed by: INTERNAL MEDICINE

## 2023-10-27 SDOH — HEALTH STABILITY: PHYSICAL HEALTH: ON AVERAGE, HOW MANY MINUTES DO YOU ENGAGE IN EXERCISE AT THIS LEVEL?: 0 MIN

## 2023-10-27 SDOH — HEALTH STABILITY: PHYSICAL HEALTH: ON AVERAGE, HOW MANY DAYS PER WEEK DO YOU ENGAGE IN MODERATE TO STRENUOUS EXERCISE (LIKE A BRISK WALK)?: 0 DAYS

## 2023-10-27 ASSESSMENT — PATIENT HEALTH QUESTIONNAIRE - PHQ9
1. LITTLE INTEREST OR PLEASURE IN DOING THINGS: NOT AT ALL
SUM OF ALL RESPONSES TO PHQ9 QUESTIONS 1 AND 2: 0
2. FEELING DOWN, DEPRESSED OR HOPELESS: NOT AT ALL
SUM OF ALL RESPONSES TO PHQ9 QUESTIONS 1 AND 2: 0
CLINICAL INTERPRETATION OF PHQ2 SCORE: NO FURTHER SCREENING NEEDED

## 2023-10-31 ENCOUNTER — ANCILLARY PROCEDURE (OUTPATIENT)
Dept: CARDIOLOGY | Age: 73
End: 2023-10-31
Attending: INTERNAL MEDICINE

## 2023-10-31 DIAGNOSIS — I25.5 ISCHEMIC CARDIOMYOPATHY: ICD-10-CM

## 2023-10-31 DIAGNOSIS — Z95.810 ICD (IMPLANTABLE CARDIOVERTER-DEFIBRILLATOR) IN PLACE: ICD-10-CM

## 2023-10-31 DIAGNOSIS — I49.3 PVC (PREMATURE VENTRICULAR CONTRACTION): ICD-10-CM

## 2023-11-01 ENCOUNTER — APPOINTMENT (OUTPATIENT)
Dept: CARDIOLOGY | Age: 73
End: 2023-11-01
Attending: INTERNAL MEDICINE

## 2023-11-06 PROCEDURE — 93227 XTRNL ECG REC<48 HR R&I: CPT | Performed by: INTERNAL MEDICINE

## 2023-11-14 RX ORDER — SACUBITRIL AND VALSARTAN 24; 26 MG/1; MG/1
1 TABLET, FILM COATED ORAL 2 TIMES DAILY
Qty: 60 TABLET | Refills: 3 | Status: SHIPPED | OUTPATIENT
Start: 2023-11-14

## 2023-11-20 ENCOUNTER — OFFICE VISIT (OUTPATIENT)
Dept: INTERNAL MEDICINE CLINIC | Facility: CLINIC | Age: 73
End: 2023-11-20
Payer: MEDICARE

## 2023-11-20 VITALS
OXYGEN SATURATION: 96 % | HEART RATE: 41 BPM | RESPIRATION RATE: 20 BRPM | SYSTOLIC BLOOD PRESSURE: 108 MMHG | BODY MASS INDEX: 26.34 KG/M2 | DIASTOLIC BLOOD PRESSURE: 60 MMHG | HEIGHT: 70 IN | WEIGHT: 184 LBS | TEMPERATURE: 99 F

## 2023-11-20 DIAGNOSIS — Z90.49 HISTORY OF WHIPPLE PROCEDURE: ICD-10-CM

## 2023-11-20 DIAGNOSIS — G89.29 CHRONIC NECK PAIN: ICD-10-CM

## 2023-11-20 DIAGNOSIS — I49.3 PVC'S (PREMATURE VENTRICULAR CONTRACTIONS): ICD-10-CM

## 2023-11-20 DIAGNOSIS — M54.2 CHRONIC NECK PAIN: ICD-10-CM

## 2023-11-20 DIAGNOSIS — Z85.09 HISTORY OF CHOLANGIOCARCINOMA: ICD-10-CM

## 2023-11-20 DIAGNOSIS — Z95.810 HISTORY OF IMPLANTABLE CARDIOVERTER-DEFIBRILLATOR (ICD) PLACEMENT: ICD-10-CM

## 2023-11-20 DIAGNOSIS — Z90.410 HISTORY OF WHIPPLE PROCEDURE: ICD-10-CM

## 2023-11-20 DIAGNOSIS — I25.5 ISCHEMIC CARDIOMYOPATHY: Primary | ICD-10-CM

## 2023-11-20 PROCEDURE — 1170F FXNL STATUS ASSESSED: CPT | Performed by: FAMILY MEDICINE

## 2023-11-20 PROCEDURE — 99214 OFFICE O/P EST MOD 30 MIN: CPT | Performed by: FAMILY MEDICINE

## 2023-11-20 PROCEDURE — 3008F BODY MASS INDEX DOCD: CPT | Performed by: FAMILY MEDICINE

## 2023-11-20 PROCEDURE — 1160F RVW MEDS BY RX/DR IN RCRD: CPT | Performed by: FAMILY MEDICINE

## 2023-11-20 PROCEDURE — 3078F DIAST BP <80 MM HG: CPT | Performed by: FAMILY MEDICINE

## 2023-11-20 PROCEDURE — 1159F MED LIST DOCD IN RCRD: CPT | Performed by: FAMILY MEDICINE

## 2023-11-20 PROCEDURE — 3074F SYST BP LT 130 MM HG: CPT | Performed by: FAMILY MEDICINE

## 2023-11-28 ENCOUNTER — ANCILLARY PROCEDURE (OUTPATIENT)
Dept: CARDIOLOGY | Age: 73
End: 2023-11-28
Attending: INTERNAL MEDICINE

## 2023-11-28 ENCOUNTER — ANCILLARY ORDERS (OUTPATIENT)
Dept: CARDIOLOGY | Age: 73
End: 2023-11-28

## 2023-11-28 ENCOUNTER — HOSPITAL ENCOUNTER (OUTPATIENT)
Dept: BONE DENSITY | Age: 73
Discharge: HOME | End: 2023-11-28
Attending: PHYSICIAN ASSISTANT
Payer: MEDICARE

## 2023-11-28 ENCOUNTER — TELEPHONE (OUTPATIENT)
Dept: CARDIOLOGY | Age: 73
End: 2023-11-28

## 2023-11-28 DIAGNOSIS — Z95.810 ICD (IMPLANTABLE CARDIOVERTER-DEFIBRILLATOR) IN PLACE: ICD-10-CM

## 2023-11-28 DIAGNOSIS — M81.8 OTHER OSTEOPOROSIS WITHOUT CURRENT PATHOLOGICAL FRACTURE: ICD-10-CM

## 2023-11-28 PROCEDURE — 77080 DXA BONE DENSITY AXIAL: CPT | Performed by: PHYSICIAN ASSISTANT

## 2023-11-28 PROCEDURE — 93295 DEV INTERROG REMOTE 1/2/MLT: CPT | Performed by: INTERNAL MEDICINE

## 2023-11-29 ENCOUNTER — ANCILLARY PROCEDURE (OUTPATIENT)
Dept: CARDIOLOGY | Age: 73
End: 2023-11-29
Attending: INTERNAL MEDICINE

## 2023-11-29 DIAGNOSIS — Z95.810 ICD (IMPLANTABLE CARDIOVERTER-DEFIBRILLATOR) IN PLACE: ICD-10-CM

## 2023-12-11 RX ORDER — METOPROLOL SUCCINATE 100 MG/1
100 TABLET, EXTENDED RELEASE ORAL DAILY
Qty: 90 TABLET | Refills: 0 | Status: SHIPPED | OUTPATIENT
Start: 2023-12-11

## 2023-12-28 RX ORDER — AMIODARONE HYDROCHLORIDE 200 MG/1
200 TABLET ORAL DAILY
Qty: 90 TABLET | Refills: 1 | Status: SHIPPED | OUTPATIENT
Start: 2023-12-28

## 2023-12-29 ENCOUNTER — LAB REQUISITION (OUTPATIENT)
Dept: LAB | Age: 73
End: 2023-12-29

## 2023-12-29 DIAGNOSIS — N18.31 CHRONIC KIDNEY DISEASE, STAGE 3A (CMD): ICD-10-CM

## 2023-12-29 DIAGNOSIS — E55.9 VITAMIN D DEFICIENCY, UNSPECIFIED: ICD-10-CM

## 2024-01-22 ENCOUNTER — TELEPHONE (OUTPATIENT)
Dept: CARDIOLOGY | Age: 74
End: 2024-01-22

## 2024-01-22 RX ORDER — SACUBITRIL AND VALSARTAN 24; 26 MG/1; MG/1
1 TABLET, FILM COATED ORAL 2 TIMES DAILY
Qty: 60 TABLET | Refills: 3 | Status: SHIPPED | OUTPATIENT
Start: 2024-01-22

## 2024-01-25 NOTE — PROGRESS NOTES
Valdemar Layton is a 76year old male who presents today to establish for diabetes management. Primary care physician: Selam Mora MD     History of cholangiocarcinoma  -  whipple procedure.  [id number]   6-2019 Cabg x 3   Referred by PCP due to increasing No.

## 2024-03-04 ENCOUNTER — ANCILLARY PROCEDURE (OUTPATIENT)
Dept: CARDIOLOGY | Age: 74
End: 2024-03-04
Attending: INTERNAL MEDICINE

## 2024-03-04 ENCOUNTER — ANCILLARY ORDERS (OUTPATIENT)
Dept: CARDIOLOGY | Age: 74
End: 2024-03-04

## 2024-03-04 DIAGNOSIS — Z95.810 ICD (IMPLANTABLE CARDIOVERTER-DEFIBRILLATOR) IN PLACE: Primary | ICD-10-CM

## 2024-03-04 DIAGNOSIS — Z95.810 ICD (IMPLANTABLE CARDIOVERTER-DEFIBRILLATOR) IN PLACE: ICD-10-CM

## 2024-03-04 PROCEDURE — 93296 REM INTERROG EVL PM/IDS: CPT | Performed by: INTERNAL MEDICINE

## 2024-03-04 PROCEDURE — 93295 DEV INTERROG REMOTE 1/2/MLT: CPT | Performed by: INTERNAL MEDICINE

## 2024-03-08 NOTE — TELEPHONE ENCOUNTER
Called patient and gave message by dr Griffin Hardin, patient verbalized understanding and willhave repeat labs in 2-3 weeks The patient is Stable - Low risk of patient condition declining or worsening    Shift Goals  Clinical Goals: wean O2, hemodynamic stability  Patient Goals: Rest  Family Goals: LEX    Progress made toward(s) clinical / shift goals:    Problem: Knowledge Deficit - Standard  Goal: Patient and family/care givers will demonstrate understanding of plan of care, disease process/condition, diagnostic tests and medications  Description: Target End Date:  1-3 days or as soon as patient condition allows    Document in Patient Education    1.  Patient and family/caregiver oriented to unit, equipment, visitation policy and means for communicating concern  2.  Complete/review Learning Assessment  3.  Assess knowledge level of disease process/condition, treatment plan, diagnostic tests and medications  4.  Explain disease process/condition, treatment plan, diagnostic tests and medications  Outcome: Progressing  Note: Pt updated on POC, all questions answered at this time.     Problem: Care Map:  Day 5 Optimal Outcome for the Heart Failure Patient  Goal: Day 5:  Optimal Care of the heart failure patient  Description: Target End Date:  end of day 5  Outcome: Progressing  Note: Pt Is and Os being continuously monitored. Pt had 1.9L taken off during dialysis on 3/7. Pt is anuric. Pt edema assessed for q shift. No edema noted. Breath sounds are clear in upper lobes and RML. Breath sounds are diminished in lower lobes. Medication regimen assessed for appropriateness. Pending CT surgeon consult.

## 2024-04-01 NOTE — PLAN OF CARE
Rec pt @ 0730. Awake, alert, & approp. Denies c/o pain, nausea, or sob. R Fem site stable, no bleeding or hematoma. Plan of care for CABG today. Questions and concerns addressed. Heparin gtt remains. No 42

## 2024-04-09 RX ORDER — METOPROLOL SUCCINATE 100 MG/1
100 TABLET, EXTENDED RELEASE ORAL DAILY
Qty: 90 TABLET | Refills: 0 | Status: SHIPPED | OUTPATIENT
Start: 2024-04-09

## 2024-04-16 ENCOUNTER — ANCILLARY PROCEDURE (OUTPATIENT)
Dept: CARDIOLOGY | Age: 74
End: 2024-04-16
Attending: INTERNAL MEDICINE

## 2024-04-16 ENCOUNTER — OFFICE VISIT (OUTPATIENT)
Dept: CARDIOLOGY | Age: 74
End: 2024-04-16

## 2024-04-16 VITALS
WEIGHT: 172.8 LBS | SYSTOLIC BLOOD PRESSURE: 106 MMHG | HEART RATE: 90 BPM | HEIGHT: 69 IN | BODY MASS INDEX: 25.59 KG/M2 | DIASTOLIC BLOOD PRESSURE: 66 MMHG

## 2024-04-16 DIAGNOSIS — Z95.810 ICD (IMPLANTABLE CARDIOVERTER-DEFIBRILLATOR) IN PLACE: ICD-10-CM

## 2024-04-16 DIAGNOSIS — I47.20 VT (VENTRICULAR TACHYCARDIA) (CMD): ICD-10-CM

## 2024-04-16 DIAGNOSIS — Z95.810 ICD (IMPLANTABLE CARDIOVERTER-DEFIBRILLATOR) IN PLACE: Primary | ICD-10-CM

## 2024-04-16 LAB
MDC_IDC_LEAD_IMPLANT_DT: NORMAL
MDC_IDC_LEAD_IMPLANT_DT: NORMAL
MDC_IDC_LEAD_LOCATION: NORMAL
MDC_IDC_LEAD_LOCATION: NORMAL
MDC_IDC_LEAD_LOCATION_DETAIL_1: NORMAL
MDC_IDC_LEAD_LOCATION_DETAIL_1: NORMAL
MDC_IDC_LEAD_MFG: NORMAL
MDC_IDC_LEAD_MFG: NORMAL
MDC_IDC_LEAD_MODEL: NORMAL
MDC_IDC_LEAD_MODEL: NORMAL
MDC_IDC_LEAD_POLARITY_TYPE: NORMAL
MDC_IDC_LEAD_POLARITY_TYPE: NORMAL
MDC_IDC_LEAD_SERIAL: NORMAL
MDC_IDC_LEAD_SERIAL: NORMAL
MDC_IDC_MSMT_BATTERY_DTM: NORMAL
MDC_IDC_MSMT_BATTERY_REMAINING_LONGEVITY: 56 MO
MDC_IDC_MSMT_BATTERY_RRT_TRIGGER: 2.73
MDC_IDC_MSMT_BATTERY_STATUS: NORMAL
MDC_IDC_MSMT_BATTERY_VOLTAGE: 2.97 V
MDC_IDC_MSMT_CAP_CHARGE_DTM: NORMAL
MDC_IDC_MSMT_CAP_CHARGE_ENERGY: 18 J
MDC_IDC_MSMT_CAP_CHARGE_TIME: 3.77
MDC_IDC_MSMT_CAP_CHARGE_TYPE: NORMAL
MDC_IDC_MSMT_LEADCHNL_RA_IMPEDANCE_VALUE: 437 OHM
MDC_IDC_MSMT_LEADCHNL_RA_PACING_THRESHOLD_AMPLITUDE: 0.62 V
MDC_IDC_MSMT_LEADCHNL_RA_PACING_THRESHOLD_AMPLITUDE: 0.75 V
MDC_IDC_MSMT_LEADCHNL_RA_PACING_THRESHOLD_PULSEWIDTH: 0.4 MS
MDC_IDC_MSMT_LEADCHNL_RA_PACING_THRESHOLD_PULSEWIDTH: 0.4 MS
MDC_IDC_MSMT_LEADCHNL_RA_SENSING_INTR_AMPL: 2.5 MV
MDC_IDC_MSMT_LEADCHNL_RA_SENSING_INTR_AMPL: 2.5 MV
MDC_IDC_MSMT_LEADCHNL_RV_IMPEDANCE_VALUE: 266 OHM
MDC_IDC_MSMT_LEADCHNL_RV_IMPEDANCE_VALUE: 342 OHM
MDC_IDC_MSMT_LEADCHNL_RV_PACING_THRESHOLD_AMPLITUDE: 1.25 V
MDC_IDC_MSMT_LEADCHNL_RV_PACING_THRESHOLD_AMPLITUDE: 1.25 V
MDC_IDC_MSMT_LEADCHNL_RV_PACING_THRESHOLD_PULSEWIDTH: 0.4 MS
MDC_IDC_MSMT_LEADCHNL_RV_PACING_THRESHOLD_PULSEWIDTH: 0.4 MS
MDC_IDC_MSMT_LEADCHNL_RV_SENSING_INTR_AMPL: 8.88 MV
MDC_IDC_MSMT_LEADCHNL_RV_SENSING_INTR_AMPL: 9.12 MV
MDC_IDC_PG_IMPLANT_DTM: NORMAL
MDC_IDC_PG_MFG: NORMAL
MDC_IDC_PG_MODEL: NORMAL
MDC_IDC_PG_SERIAL: NORMAL
MDC_IDC_PG_TYPE: NORMAL
MDC_IDC_SESS_CLINIC_NAME: NORMAL
MDC_IDC_SESS_DTM: NORMAL
MDC_IDC_SESS_TYPE: NORMAL
MDC_IDC_SET_BRADY_AT_MODE_SWITCH_RATE: 171 {BEATS}/MIN
MDC_IDC_SET_BRADY_HYSTRATE: NORMAL
MDC_IDC_SET_BRADY_LOWRATE: 70 {BEATS}/MIN
MDC_IDC_SET_BRADY_MAX_SENSOR_RATE: 120 {BEATS}/MIN
MDC_IDC_SET_BRADY_MAX_TRACKING_RATE: 130 {BEATS}/MIN
MDC_IDC_SET_BRADY_MODE: NORMAL
MDC_IDC_SET_BRADY_PAV_DELAY_LOW: 180 MS
MDC_IDC_SET_BRADY_SAV_DELAY_LOW: 150 MS
MDC_IDC_SET_LEADCHNL_RA_PACING_AMPLITUDE: 1.5 V
MDC_IDC_SET_LEADCHNL_RA_PACING_ANODE_ELECTRODE_1: NORMAL
MDC_IDC_SET_LEADCHNL_RA_PACING_ANODE_LOCATION_1: NORMAL
MDC_IDC_SET_LEADCHNL_RA_PACING_CAPTURE_MODE: NORMAL
MDC_IDC_SET_LEADCHNL_RA_PACING_CATHODE_ELECTRODE_1: NORMAL
MDC_IDC_SET_LEADCHNL_RA_PACING_CATHODE_LOCATION_1: NORMAL
MDC_IDC_SET_LEADCHNL_RA_PACING_POLARITY: NORMAL
MDC_IDC_SET_LEADCHNL_RA_PACING_PULSEWIDTH: 0.4 MS
MDC_IDC_SET_LEADCHNL_RA_SENSING_ANODE_ELECTRODE_1: NORMAL
MDC_IDC_SET_LEADCHNL_RA_SENSING_ANODE_LOCATION_1: NORMAL
MDC_IDC_SET_LEADCHNL_RA_SENSING_CATHODE_ELECTRODE_1: NORMAL
MDC_IDC_SET_LEADCHNL_RA_SENSING_CATHODE_LOCATION_1: NORMAL
MDC_IDC_SET_LEADCHNL_RA_SENSING_POLARITY: NORMAL
MDC_IDC_SET_LEADCHNL_RA_SENSING_SENSITIVITY: 0.3 MV
MDC_IDC_SET_LEADCHNL_RV_PACING_AMPLITUDE: 2.75 V
MDC_IDC_SET_LEADCHNL_RV_PACING_ANODE_ELECTRODE_1: NORMAL
MDC_IDC_SET_LEADCHNL_RV_PACING_ANODE_LOCATION_1: NORMAL
MDC_IDC_SET_LEADCHNL_RV_PACING_CAPTURE_MODE: NORMAL
MDC_IDC_SET_LEADCHNL_RV_PACING_CATHODE_ELECTRODE_1: NORMAL
MDC_IDC_SET_LEADCHNL_RV_PACING_CATHODE_LOCATION_1: NORMAL
MDC_IDC_SET_LEADCHNL_RV_PACING_POLARITY: NORMAL
MDC_IDC_SET_LEADCHNL_RV_PACING_PULSEWIDTH: 0.4 MS
MDC_IDC_SET_LEADCHNL_RV_SENSING_ANODE_ELECTRODE_1: NORMAL
MDC_IDC_SET_LEADCHNL_RV_SENSING_ANODE_LOCATION_1: NORMAL
MDC_IDC_SET_LEADCHNL_RV_SENSING_CATHODE_ELECTRODE_1: NORMAL
MDC_IDC_SET_LEADCHNL_RV_SENSING_CATHODE_LOCATION_1: NORMAL
MDC_IDC_SET_LEADCHNL_RV_SENSING_POLARITY: NORMAL
MDC_IDC_SET_LEADCHNL_RV_SENSING_SENSITIVITY: 0.3 MV
MDC_IDC_SET_ZONE_DETECTION_BEATS_DENOMINATOR: 40 {BEATS}
MDC_IDC_SET_ZONE_DETECTION_BEATS_NUMERATOR: 30 {BEATS}
MDC_IDC_SET_ZONE_DETECTION_INTERVAL: 320 MS
MDC_IDC_SET_ZONE_DETECTION_INTERVAL: 350 MS
MDC_IDC_SET_ZONE_DETECTION_INTERVAL: 360 MS
MDC_IDC_SET_ZONE_DETECTION_INTERVAL: 450 MS
MDC_IDC_SET_ZONE_DETECTION_INTERVAL: NORMAL
MDC_IDC_SET_ZONE_TYPE: NORMAL
MDC_IDC_SET_ZONE_VENDOR_TYPE: NORMAL
MDC_IDC_STAT_AT_BURDEN_PERCENT: 0 %
MDC_IDC_STAT_AT_DTM_END: NORMAL
MDC_IDC_STAT_AT_DTM_START: NORMAL
MDC_IDC_STAT_BRADY_AP_VP_PERCENT: 0.3 %
MDC_IDC_STAT_BRADY_AP_VS_PERCENT: 99.25 %
MDC_IDC_STAT_BRADY_AS_VP_PERCENT: 0 %
MDC_IDC_STAT_BRADY_AS_VS_PERCENT: 0.45 %
MDC_IDC_STAT_BRADY_DTM_END: NORMAL
MDC_IDC_STAT_BRADY_DTM_START: NORMAL
MDC_IDC_STAT_BRADY_RA_PERCENT_PACED: 99.44 %
MDC_IDC_STAT_BRADY_RV_PERCENT_PACED: 0.33 %
MDC_IDC_STAT_EPISODE_RECENT_COUNT: 0
MDC_IDC_STAT_EPISODE_RECENT_COUNT_DTM_END: NORMAL
MDC_IDC_STAT_EPISODE_RECENT_COUNT_DTM_START: NORMAL
MDC_IDC_STAT_EPISODE_TOTAL_COUNT: 0
MDC_IDC_STAT_EPISODE_TOTAL_COUNT: 1
MDC_IDC_STAT_EPISODE_TOTAL_COUNT: 2
MDC_IDC_STAT_EPISODE_TOTAL_COUNT: 7
MDC_IDC_STAT_EPISODE_TOTAL_COUNT_DTM_END: NORMAL
MDC_IDC_STAT_EPISODE_TOTAL_COUNT_DTM_START: NORMAL
MDC_IDC_STAT_EPISODE_TYPE: NORMAL
MDC_IDC_STAT_TACHYTHERAPY_ATP_DELIVERED_RECENT: 0
MDC_IDC_STAT_TACHYTHERAPY_ATP_DELIVERED_TOTAL: 1
MDC_IDC_STAT_TACHYTHERAPY_RECENT_DTM_END: NORMAL
MDC_IDC_STAT_TACHYTHERAPY_RECENT_DTM_START: NORMAL
MDC_IDC_STAT_TACHYTHERAPY_SHOCKS_ABORTED_RECENT: 0
MDC_IDC_STAT_TACHYTHERAPY_SHOCKS_ABORTED_TOTAL: 0
MDC_IDC_STAT_TACHYTHERAPY_SHOCKS_DELIVERED_RECENT: 0
MDC_IDC_STAT_TACHYTHERAPY_SHOCKS_DELIVERED_TOTAL: 4
MDC_IDC_STAT_TACHYTHERAPY_TOTAL_DTM_END: NORMAL
MDC_IDC_STAT_TACHYTHERAPY_TOTAL_DTM_START: NORMAL

## 2024-04-16 SDOH — HEALTH STABILITY: PHYSICAL HEALTH: ON AVERAGE, HOW MANY DAYS PER WEEK DO YOU ENGAGE IN MODERATE TO STRENUOUS EXERCISE (LIKE A BRISK WALK)?: 0 DAYS

## 2024-04-16 SDOH — HEALTH STABILITY: PHYSICAL HEALTH: ON AVERAGE, HOW MANY MINUTES DO YOU ENGAGE IN EXERCISE AT THIS LEVEL?: 0 MIN

## 2024-04-16 ASSESSMENT — PATIENT HEALTH QUESTIONNAIRE - PHQ9
1. LITTLE INTEREST OR PLEASURE IN DOING THINGS: NOT AT ALL
SUM OF ALL RESPONSES TO PHQ9 QUESTIONS 1 AND 2: 0
SUM OF ALL RESPONSES TO PHQ9 QUESTIONS 1 AND 2: 0
CLINICAL INTERPRETATION OF PHQ2 SCORE: NO FURTHER SCREENING NEEDED
2. FEELING DOWN, DEPRESSED OR HOPELESS: NOT AT ALL

## 2024-04-17 LAB
ATRIAL RATE (BPM): 73
P AXIS (DEGREES): 17
PR-INTERVAL (MSEC): 188
QRS-INTERVAL (MSEC): 128
QT-INTERVAL (MSEC): 422
QTC: 465
R AXIS (DEGREES): 89
REPORT TEXT: NORMAL
T AXIS (DEGREES): -53
VENTRICULAR RATE EKG/MIN (BPM): 73

## 2024-04-19 ENCOUNTER — LAB REQUISITION (OUTPATIENT)
Dept: LAB | Age: 74
End: 2024-04-19

## 2024-04-19 DIAGNOSIS — E55.9 VITAMIN D DEFICIENCY, UNSPECIFIED: ICD-10-CM

## 2024-04-19 DIAGNOSIS — N18.31 CHRONIC KIDNEY DISEASE, STAGE 3A (CMD): ICD-10-CM

## 2024-04-20 ENCOUNTER — LAB REQUISITION (OUTPATIENT)
Dept: LAB | Age: 74
End: 2024-04-20

## 2024-04-20 ENCOUNTER — LAB ENCOUNTER (OUTPATIENT)
Dept: LAB | Facility: HOSPITAL | Age: 74
End: 2024-04-20
Attending: INTERNAL MEDICINE
Payer: OTHER GOVERNMENT

## 2024-04-20 DIAGNOSIS — B55.9 LEISHMANIASIS, UNSPECIFIED: ICD-10-CM

## 2024-04-20 DIAGNOSIS — N18.31 CHRONIC KIDNEY DISEASE (CKD) STAGE G3A/A1, MODERATELY DECREASED GLOMERULAR FILTRATION RATE (GFR) BETWEEN 45-59 ML/MIN/1.73 SQUARE METER AND ALBUMINURIA CREATININE RATIO LESS THAN 30 MG/G (HCC): Primary | ICD-10-CM

## 2024-04-20 DIAGNOSIS — E55.9 VITAMIN D DEFICIENCY, UNSPECIFIED: ICD-10-CM

## 2024-04-20 DIAGNOSIS — N18.31 CHRONIC KIDNEY DISEASE, STAGE 3A (CMD): ICD-10-CM

## 2024-04-20 LAB
BASOPHILS # BLD AUTO: 0.06 X10(3) UL (ref 0–0.2)
BASOPHILS NFR BLD AUTO: 1 %
EOSINOPHIL # BLD AUTO: 0.17 X10(3) UL (ref 0–0.7)
EOSINOPHIL NFR BLD AUTO: 2.7 %
ERYTHROCYTE [DISTWIDTH] IN BLOOD BY AUTOMATED COUNT: 15.1 %
HCT VFR BLD AUTO: 40.9 %
HGB BLD-MCNC: 14.2 G/DL
IMM GRANULOCYTES # BLD AUTO: 0.02 X10(3) UL (ref 0–1)
IMM GRANULOCYTES NFR BLD: 0.3 %
LYMPHOCYTES # BLD AUTO: 1.26 X10(3) UL (ref 1–4)
LYMPHOCYTES NFR BLD AUTO: 20.2 %
MCH RBC QN AUTO: 31.3 PG (ref 26–34)
MCHC RBC AUTO-ENTMCNC: 34.7 G/DL (ref 31–37)
MCV RBC AUTO: 90.3 FL
MONOCYTES # BLD AUTO: 0.95 X10(3) UL (ref 0.1–1)
MONOCYTES NFR BLD AUTO: 15.2 %
NEUTROPHILS # BLD AUTO: 3.78 X10 (3) UL (ref 1.5–7.7)
NEUTROPHILS # BLD AUTO: 3.78 X10(3) UL (ref 1.5–7.7)
NEUTROPHILS NFR BLD AUTO: 60.6 %
PLATELET # BLD AUTO: 208 10(3)UL (ref 150–450)
RBC # BLD AUTO: 4.53 X10(6)UL
WBC # BLD AUTO: 6.2 X10(3) UL (ref 4–11)

## 2024-04-20 PROCEDURE — 36415 COLL VENOUS BLD VENIPUNCTURE: CPT

## 2024-04-20 PROCEDURE — 85025 COMPLETE CBC W/AUTO DIFF WBC: CPT

## 2024-04-22 ENCOUNTER — APPOINTMENT (OUTPATIENT)
Dept: GENERAL RADIOLOGY | Facility: HOSPITAL | Age: 74
End: 2024-04-22
Attending: EMERGENCY MEDICINE
Payer: OTHER GOVERNMENT

## 2024-04-22 ENCOUNTER — HOSPITAL ENCOUNTER (EMERGENCY)
Facility: HOSPITAL | Age: 74
Discharge: HOME | End: 2024-04-22
Attending: EMERGENCY MEDICINE
Payer: OTHER GOVERNMENT

## 2024-04-22 VITALS
WEIGHT: 170 LBS | SYSTOLIC BLOOD PRESSURE: 129 MMHG | BODY MASS INDEX: 25.18 KG/M2 | HEART RATE: 76 BPM | DIASTOLIC BLOOD PRESSURE: 75 MMHG | OXYGEN SATURATION: 95 % | TEMPERATURE: 98 F | HEIGHT: 69 IN | RESPIRATION RATE: 13 BRPM

## 2024-04-22 DIAGNOSIS — E16.2 HYPOGLYCEMIA: Primary | ICD-10-CM

## 2024-04-22 DIAGNOSIS — R53.1 WEAKNESS: ICD-10-CM

## 2024-04-22 LAB
ALBUMIN SERPL-MCNC: 3.6 G/DL (ref 3.4–5)
ALBUMIN/GLOB SERPL: 0.9 {RATIO} (ref 1–2)
ALP LIVER SERPL-CCNC: 96 U/L
ALT SERPL-CCNC: 24 U/L
ANION GAP SERPL CALC-SCNC: 7 MMOL/L (ref 0–18)
AST SERPL-CCNC: 26 U/L (ref 15–37)
ATRIAL RATE: 71 BPM
BASOPHILS # BLD AUTO: 0.03 X10(3) UL (ref 0–0.2)
BASOPHILS NFR BLD AUTO: 0.6 %
BILIRUB SERPL-MCNC: 0.8 MG/DL (ref 0.1–2)
BUN BLD-MCNC: 23 MG/DL (ref 9–23)
CALCIUM BLD-MCNC: 8.8 MG/DL (ref 8.5–10.1)
CHLORIDE SERPL-SCNC: 101 MMOL/L (ref 98–112)
CO2 SERPL-SCNC: 24 MMOL/L (ref 21–32)
CREAT BLD-MCNC: 1.45 MG/DL
EGFRCR SERPLBLD CKD-EPI 2021: 51 ML/MIN/1.73M2 (ref 60–?)
EOSINOPHIL # BLD AUTO: 0.12 X10(3) UL (ref 0–0.7)
EOSINOPHIL NFR BLD AUTO: 2.3 %
ERYTHROCYTE [DISTWIDTH] IN BLOOD BY AUTOMATED COUNT: 14.9 %
GLOBULIN PLAS-MCNC: 4 G/DL (ref 2.8–4.4)
GLUCOSE BLD-MCNC: 91 MG/DL (ref 70–99)
GLUCOSE BLD-MCNC: 96 MG/DL (ref 70–99)
HCT VFR BLD AUTO: 40.6 %
HGB BLD-MCNC: 13.4 G/DL
IMM GRANULOCYTES # BLD AUTO: 0.02 X10(3) UL (ref 0–1)
IMM GRANULOCYTES NFR BLD: 0.4 %
LYMPHOCYTES # BLD AUTO: 1.13 X10(3) UL (ref 1–4)
LYMPHOCYTES NFR BLD AUTO: 21.4 %
MCH RBC QN AUTO: 30.7 PG (ref 26–34)
MCHC RBC AUTO-ENTMCNC: 33 G/DL (ref 31–37)
MCV RBC AUTO: 92.9 FL
MONOCYTES # BLD AUTO: 0.86 X10(3) UL (ref 0.1–1)
MONOCYTES NFR BLD AUTO: 16.3 %
NEUTROPHILS # BLD AUTO: 3.12 X10 (3) UL (ref 1.5–7.7)
NEUTROPHILS # BLD AUTO: 3.12 X10(3) UL (ref 1.5–7.7)
NEUTROPHILS NFR BLD AUTO: 59 %
OSMOLALITY SERPL CALC.SUM OF ELEC: 278 MOSM/KG (ref 275–295)
P AXIS: -29 DEGREES
P-R INTERVAL: 338 MS
PLATELET # BLD AUTO: 208 10(3)UL (ref 150–450)
POTASSIUM SERPL-SCNC: 4.3 MMOL/L (ref 3.5–5.1)
PROT SERPL-MCNC: 7.6 G/DL (ref 6.4–8.2)
Q-T INTERVAL: 404 MS
QRS DURATION: 124 MS
QTC CALCULATION (BEZET): 439 MS
R AXIS: 86 DEGREES
RBC # BLD AUTO: 4.37 X10(6)UL
SODIUM SERPL-SCNC: 132 MMOL/L (ref 136–145)
T AXIS: 47 DEGREES
TROPONIN I SERPL HS-MCNC: 11 NG/L
VENTRICULAR RATE: 71 BPM
WBC # BLD AUTO: 5.3 X10(3) UL (ref 4–11)

## 2024-04-22 PROCEDURE — 85025 COMPLETE CBC W/AUTO DIFF WBC: CPT | Performed by: EMERGENCY MEDICINE

## 2024-04-22 PROCEDURE — 71045 X-RAY EXAM CHEST 1 VIEW: CPT | Performed by: EMERGENCY MEDICINE

## 2024-04-22 PROCEDURE — 84484 ASSAY OF TROPONIN QUANT: CPT

## 2024-04-22 PROCEDURE — 93005 ELECTROCARDIOGRAM TRACING: CPT

## 2024-04-22 PROCEDURE — 85025 COMPLETE CBC W/AUTO DIFF WBC: CPT

## 2024-04-22 PROCEDURE — 80053 COMPREHEN METABOLIC PANEL: CPT

## 2024-04-22 PROCEDURE — 36415 COLL VENOUS BLD VENIPUNCTURE: CPT

## 2024-04-22 PROCEDURE — 82962 GLUCOSE BLOOD TEST: CPT

## 2024-04-22 PROCEDURE — 80053 COMPREHEN METABOLIC PANEL: CPT | Performed by: EMERGENCY MEDICINE

## 2024-04-22 PROCEDURE — 99285 EMERGENCY DEPT VISIT HI MDM: CPT

## 2024-04-22 PROCEDURE — 99284 EMERGENCY DEPT VISIT MOD MDM: CPT

## 2024-04-22 PROCEDURE — 93010 ELECTROCARDIOGRAM REPORT: CPT

## 2024-04-22 PROCEDURE — 84484 ASSAY OF TROPONIN QUANT: CPT | Performed by: EMERGENCY MEDICINE

## 2024-04-22 NOTE — ED PROVIDER NOTES
Patient Seen in: [facility] Emergency Department      History     Chief Complaint   Patient presents with    Dizziness    Weakness     Stated Complaint: dizziness    Subjective:   HPI    Patient comes to emergency department after he had an acute onset of generalized weakness which he states is similar to episodes of hypoglycemia that he has experienced in the past.  He noted that his blood sugar was 60 and he ingested glucose with resolution of his symptoms.  He states that he has felt similar hypoglycemic symptoms with a level of 60 in the past.  The patient states that his blood sugars are quite labile because of his partial pancreatectomy.  He does frequently run high, but has not been started on any diabetic medications yet.  He states that he has been able to moderate his hyperglycemia with physical activity and exercise.  However, the patient also states that he does occasionally become hypoglycemic when he does so, sometimes dropping into the 40s.  It should be noted that patient is not on any oral hypoglycemic medications.    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 Had fever and Ha    Decorative tattoo    Diverticulosis of large intestine    Dizziness    Enteritis    Exposure to radiation    completed 2/17/17    Fatigue    Flash pulmonary edema  (HCC)    Flatulence/gas pain/belching    Frequent urination    Hearing impairment    hearing loss in right    Hearing loss    Heart attack (HCC)    NONSTEMI    Hemorrhoids    High blood pressure    High cholesterol    Hyperkalemia    Hypokalemia    Hyponatremia    Ileus (HCC)    Jaundice    Nausea    Night sweats    NSTEMI (non-ST elevated myocardial infarction) (HCC)    Osteoporosis    Pacemaker    Pancreatic cancer (HCC)    chloangiocarcinoma    Personal history of antineoplastic chemotherapy    Completed chemo 2/17/17    Pheochromocytoma, left    Dx in 6/2016: 1.7 cm mass near inferior adrenal gland    Pneumoperitoneum    Rash    Shortness of breath    Sleep apnea    CPAP    Syncope, near    Tobacco abuse    Uncomfortable fullness after meals    Visual impairment    glasses    Weight gain    Weight loss              Past Surgical History:   Procedure Laterality Date    Angiogram  06/24/2019    Bowel resection      Bypass surgery  06/27/2019    CABG x 3    Cabg      Cardiac defibrillator placement      Cardiac pacemaker placement      medtronic    Cath drug eluting stent      Cholecystectomy  08/16/2016    Colonoscopy  12/13/2011    Dr. Hu repeat 5 yrs    Colonoscopy  overdue    Hernia surgery      over 35 yrs ago    Needle biopsy liver  08/16/2016    Other      right index finger surgery    Other surgical history  06/23/2016    EUS/EGD/FNA    Placement, bile duct stent      Removal gallbladder      Whipple  08/16/2016    Pancreaticoduodenectomy with regional lymphadenectomy, Left adrenalectomy with resection of retroperitoneal periadrenal tumor, Omental pedicle flap, Splenectomy, and Wedge resection segment 4b liver mass.                Social History     Socioeconomic History    Marital status:     Number of children: 2   Occupational History    Occupation: retired   Tobacco Use    Smoking status: Former     Current packs/day: 0.00     Types: Cigarettes     Quit date: 6/15/1979     Years since  quittin.8    Smokeless tobacco: Never    Tobacco comments:     Stopped   Vaping Use    Vaping status: Never Used   Substance and Sexual Activity    Alcohol use: No     Alcohol/week: 0.0 standard drinks of alcohol    Drug use: Yes     Frequency: 7.0 times per week     Types: Cannabis   Other Topics Concern     Service Yes     Comment: agent orange.     Caffeine Concern No     Comment: coffee a couple of cups-decaf    Occupational Exposure Yes    Exercise Yes     Comment: daily   Social History Narrative    Here with his wife.     Hobby of gun dealer.     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     Live close to [employer].     Lives with his wife.      Social Determinants of Health     Financial Resource Strain: Low Risk  (2023)    Received from [facility], [facility]    Financial Resource Strain     In the past year, have you or any family members you live with been unable to get any of the following when it was really needed? Check all that apply.: None   Food Insecurity: No Food Insecurity (10/3/2023)    Food Insecurity     Food Insecurity: Never true   Transportation Needs: No Transportation Needs (10/3/2023)    Transportation Needs     Lack of Transportation: No   Physical Activity: High Risk (2024)    Received from [facility] [place]    Exercise Vital Sign     On average, how many days per week do you engage in moderate to strenuous exercise (like a brisk walk)?: 0 days     On average, how many minutes do you engage in exercise at this level?: 0 min   Social Connections: Low Risk  (2023)    Received from [facility] [place], [facility]    Social Connections     How often do you see or talk to people that you care about and feel close to? (For example: talking to friends on the phone, visiting friends or family, going to Mosque or club meetings): 5 or more times a week   Housing Stability: Low Risk  (10/3/2023)    Housing Stability     Housing Instability: No    Recent Concern: Housing Stability - At Risk (8/16/2023)    Received from [facility], Counts include 234 beds at the [facility]              Review of Systems    Positive for stated complaint: dizziness  Other systems are as noted in HPI.  Constitutional and vital signs reviewed.      All other systems reviewed and negative except as noted above.    Physical Exam     ED Triage Vitals [04/22/24 1157]   /75   Pulse 76   Resp 13   Temp 98 °F (36.7 °C)   Temp src Temporal   SpO2 95 %   O2 Device None (Room air)       Current:/75   Pulse 76   Temp 98 °F (36.7 °C) (Temporal)   Resp 13   Ht 175.3 cm (5' 9\")   Wt 77.1 kg   SpO2 95%   BMI 25.10 kg/m²         Physical Exam  Vitals and nursing note reviewed.   Constitutional:       Appearance: He is well-developed.   HENT:      Head: Normocephalic.   Cardiovascular:      Rate and Rhythm: Normal rate and regular rhythm.      Heart sounds: Normal heart sounds. No murmur heard.  Pulmonary:      Effort: Pulmonary effort is normal.      Breath sounds: Normal breath sounds.   Abdominal:      General: Bowel sounds are normal.      Palpations: Abdomen is soft.      Tenderness: There is no abdominal tenderness. There is no guarding.   Musculoskeletal:         General: No tenderness. Normal range of motion.      Cervical back: Normal range of motion and neck supple.   Lymphadenopathy:      Cervical: No cervical adenopathy.   Skin:     General: Skin is warm and dry.      Findings: No rash.   Neurological:      Mental Status: He is alert and oriented to person, place, and time.      Sensory: No sensory deficit.              ED Course     Labs Reviewed   COMP METABOLIC PANEL (14) - Abnormal; Notable for the following components:       Result Value    Sodium 132 (*)     Creatinine 1.45 (*)     eGFR-Cr 51 (*)     A/G Ratio 0.9 (*)     All other components within normal limits   TROPONIN I HIGH SENSITIVITY - Normal   POCT GLUCOSE - Normal   CBC WITH DIFFERENTIAL WITH PLATELET     Narrative:     The following orders were created for panel order CBC With Differential With Platelet.  Procedure                               Abnormality         Status                     ---------                               -----------         ------                     CBC W/ DIFFERENTIAL[[id number]]                              Final result                 Please view results for these tests on the individual orders.   RAINBOW DRAW LAVENDER   RAINBOW DRAW LIGHT GREEN   RAINBOW DRAW BLUE   CBC W/ DIFFERENTIAL     EKG    Rate, intervals and axes as noted on EKG Report.  Rate: 71  Rhythm: Sinus Rhythm  Reading: Atrial paced without any acute QRS or ST morphology segment changes compared to EKG performed in October 2023.              ED Course as of 04/22/24 1623  ------------------------------------------------------------  Time: 04/22 1252  Value: Troponin I (High Sensitivity): 11  Comment: (Reviewed)  ------------------------------------------------------------  Time: 04/22 1421  Comment: Images were independently viewed by me and no infiltrate was noted.  Cardiac and mediastinal silhouette was unremarkable.     Wells' Criteria for Pulmonary Embolism from [employer]  on 4/22/2024    RESULT SUMMARY:  0.0 points  Low risk group: 1.3% chance of PE in an ED population.     Another study assigned scores ? 4 as “PE Unlikely” and had a 3% incidence of PE.      INPUTS:  Clinical signs and symptoms of DVT --> 0 = No  PE is #1 diagnosis OR equally likely --> 0 = No  Heart rate > 100 --> 0 = No  Immobilization at least 3 days OR surgery in the previous 4 weeks --> 0 = No  Previous, objectively diagnosed PE or DVT --> 0 = No  Hemoptysis --> 0 = No  Malignancy w/ treatment within 6 months or palliative --> 0 = No      I discussed the possibility of observing the patient, particular given the patient's glucose was not severely low and there is some diagnostic uncertainty.  However, given the patient's previous history, and  the fact that the patient explicitly states that his symptoms are identical to what he is experienced in the past with the same serum glucose levels, patient was inclined to be discharged home, with acknowledgment of the noted diagnostic uncertainty.         MDM      Patient comes to the emergency department after having had weakness and a blood sugar of 60 at home.  Differential diagnosis includes hypoglycemia, but also includes other more occult pathology such as acute cardiac event and pulmonary embolus.  However, patient had distinct improvement of his symptoms immediately after ingesting oral glucose and states that he has had similar episodes in the past despite not being on specific oral hypoglycemic medications.  Given this, with shared decision making, the patient was discharged home with instructions to follow-up closely with primary care physician and to return immediately for any acute change or worsening symptoms.                                   Medical Decision Making      Disposition and Plan     Clinical Impression:  1. Hypoglycemia    2. Weakness         Disposition:  There is no disposition on file for this visit.  4/22/2024  1:08 pm    Follow-up:  Elton Flores MD  [address] [place] [id number]  [phone]    Call in 1 day(s)            Medications Prescribed:  Current Discharge Medication List

## 2024-04-22 NOTE — ED INITIAL ASSESSMENT (HPI)
Pt states he felt dizzy and lightheaded.  Pt states he checked his sugar and it was mid 60's.  Pt states taking 2 glucose tabs and BS per EMS was mid 70's.  Pt states dizziness resolved.

## 2024-04-25 ENCOUNTER — PATIENT OUTREACH (OUTPATIENT)
Dept: CASE MANAGEMENT | Age: 74
End: 2024-04-25

## 2024-04-26 NOTE — PROGRESS NOTES
IBAN completed patients handicapped form and put it in the second floor locked drawer for him to  today. No

## 2024-05-08 ENCOUNTER — APPOINTMENT (OUTPATIENT)
Dept: CARDIOLOGY | Age: 74
End: 2024-05-08

## 2024-06-10 ENCOUNTER — ANCILLARY ORDERS (OUTPATIENT)
Dept: CARDIOLOGY | Age: 74
End: 2024-06-10

## 2024-06-10 ENCOUNTER — ANCILLARY PROCEDURE (OUTPATIENT)
Dept: CARDIOLOGY | Age: 74
End: 2024-06-10
Attending: INTERNAL MEDICINE

## 2024-06-10 DIAGNOSIS — Z95.810 ICD (IMPLANTABLE CARDIOVERTER-DEFIBRILLATOR) IN PLACE: Primary | ICD-10-CM

## 2024-06-10 DIAGNOSIS — Z95.810 ICD (IMPLANTABLE CARDIOVERTER-DEFIBRILLATOR) IN PLACE: ICD-10-CM

## 2024-06-10 LAB
MDC_IDC_LEAD_CONNECTION_STATUS: NORMAL
MDC_IDC_LEAD_CONNECTION_STATUS: NORMAL
MDC_IDC_LEAD_IMPLANT_DT: NORMAL
MDC_IDC_LEAD_IMPLANT_DT: NORMAL
MDC_IDC_LEAD_LOCATION: NORMAL
MDC_IDC_LEAD_LOCATION: NORMAL
MDC_IDC_LEAD_LOCATION_DETAIL_1: NORMAL
MDC_IDC_LEAD_LOCATION_DETAIL_1: NORMAL
MDC_IDC_LEAD_MFG: NORMAL
MDC_IDC_LEAD_MFG: NORMAL
MDC_IDC_LEAD_MODEL: NORMAL
MDC_IDC_LEAD_MODEL: NORMAL
MDC_IDC_LEAD_POLARITY_TYPE: NORMAL
MDC_IDC_LEAD_POLARITY_TYPE: NORMAL
MDC_IDC_LEAD_SERIAL: NORMAL
MDC_IDC_LEAD_SERIAL: NORMAL
MDC_IDC_PG_IMPLANT_DTM: NORMAL
MDC_IDC_PG_MFG: NORMAL
MDC_IDC_PG_MODEL: NORMAL
MDC_IDC_PG_SERIAL: NORMAL
MDC_IDC_PG_TYPE: NORMAL
MDC_IDC_SESS_CLINIC_NAME: NORMAL
MDC_IDC_SESS_TYPE: NORMAL

## 2024-06-10 PROCEDURE — 93296 REM INTERROG EVL PM/IDS: CPT | Performed by: INTERNAL MEDICINE

## 2024-06-10 PROCEDURE — 93295 DEV INTERROG REMOTE 1/2/MLT: CPT | Performed by: INTERNAL MEDICINE

## 2024-06-24 ENCOUNTER — TELEPHONE (OUTPATIENT)
Dept: INTERNAL MEDICINE CLINIC | Facility: CLINIC | Age: 74
End: 2024-06-24

## 2024-06-24 DIAGNOSIS — Z00.00 ENCOUNTER FOR ANNUAL HEALTH EXAMINATION: Primary | ICD-10-CM

## 2024-06-25 ENCOUNTER — LAB ENCOUNTER (OUTPATIENT)
Dept: LAB | Facility: HOSPITAL | Age: 74
End: 2024-06-25
Attending: FAMILY MEDICINE

## 2024-06-25 DIAGNOSIS — Z00.00 ENCOUNTER FOR ANNUAL HEALTH EXAMINATION: ICD-10-CM

## 2024-06-25 LAB
ALBUMIN SERPL-MCNC: 3.8 G/DL (ref 3.4–5)
ALBUMIN/GLOB SERPL: 1 {RATIO} (ref 1–2)
ALP LIVER SERPL-CCNC: 85 U/L
ALT SERPL-CCNC: 36 U/L
ANION GAP SERPL CALC-SCNC: 8 MMOL/L (ref 0–18)
AST SERPL-CCNC: 24 U/L (ref 15–37)
BASOPHILS # BLD AUTO: 0.05 X10(3) UL (ref 0–0.2)
BASOPHILS NFR BLD AUTO: 0.7 %
BILIRUB SERPL-MCNC: 0.9 MG/DL (ref 0.1–2)
BUN BLD-MCNC: 21 MG/DL (ref 9–23)
CALCIUM BLD-MCNC: 8.5 MG/DL (ref 8.5–10.1)
CHLORIDE SERPL-SCNC: 104 MMOL/L (ref 98–112)
CHOLEST SERPL-MCNC: 106 MG/DL (ref ?–200)
CO2 SERPL-SCNC: 23 MMOL/L (ref 21–32)
CREAT BLD-MCNC: 1.53 MG/DL
EGFRCR SERPLBLD CKD-EPI 2021: 48 ML/MIN/1.73M2 (ref 60–?)
EOSINOPHIL # BLD AUTO: 0.2 X10(3) UL (ref 0–0.7)
EOSINOPHIL NFR BLD AUTO: 2.9 %
ERYTHROCYTE [DISTWIDTH] IN BLOOD BY AUTOMATED COUNT: 14.6 %
FASTING PATIENT LIPID ANSWER: YES
FASTING STATUS PATIENT QL REPORTED: YES
GLOBULIN PLAS-MCNC: 3.7 G/DL (ref 2.8–4.4)
GLUCOSE BLD-MCNC: 90 MG/DL (ref 70–99)
HCT VFR BLD AUTO: 39.1 %
HDLC SERPL-MCNC: 56 MG/DL (ref 40–59)
HGB BLD-MCNC: 13.4 G/DL
IMM GRANULOCYTES # BLD AUTO: 0.03 X10(3) UL (ref 0–1)
IMM GRANULOCYTES NFR BLD: 0.4 %
LDLC SERPL CALC-MCNC: 35 MG/DL (ref ?–100)
LYMPHOCYTES # BLD AUTO: 1.78 X10(3) UL (ref 1–4)
LYMPHOCYTES NFR BLD AUTO: 25.8 %
MCH RBC QN AUTO: 31.2 PG (ref 26–34)
MCHC RBC AUTO-ENTMCNC: 34.3 G/DL (ref 31–37)
MCV RBC AUTO: 91.1 FL
MONOCYTES # BLD AUTO: 1.13 X10(3) UL (ref 0.1–1)
MONOCYTES NFR BLD AUTO: 16.4 %
NEUTROPHILS # BLD AUTO: 3.7 X10 (3) UL (ref 1.5–7.7)
NEUTROPHILS # BLD AUTO: 3.7 X10(3) UL (ref 1.5–7.7)
NEUTROPHILS NFR BLD AUTO: 53.8 %
NONHDLC SERPL-MCNC: 50 MG/DL (ref ?–130)
OSMOLALITY SERPL CALC.SUM OF ELEC: 283 MOSM/KG (ref 275–295)
PLATELET # BLD AUTO: 189 10(3)UL (ref 150–450)
POTASSIUM SERPL-SCNC: 4.5 MMOL/L (ref 3.5–5.1)
PROT SERPL-MCNC: 7.5 G/DL (ref 6.4–8.2)
RBC # BLD AUTO: 4.29 X10(6)UL
SODIUM SERPL-SCNC: 135 MMOL/L (ref 136–145)
TRIGL SERPL-MCNC: 71 MG/DL (ref 30–149)
TSI SER-ACNC: 1.22 MIU/ML (ref 0.36–3.74)
VLDLC SERPL CALC-MCNC: 10 MG/DL (ref 0–30)
WBC # BLD AUTO: 6.9 X10(3) UL (ref 4–11)

## 2024-06-25 PROCEDURE — 36415 COLL VENOUS BLD VENIPUNCTURE: CPT

## 2024-06-25 PROCEDURE — 80053 COMPREHEN METABOLIC PANEL: CPT

## 2024-06-25 PROCEDURE — 84443 ASSAY THYROID STIM HORMONE: CPT

## 2024-06-25 PROCEDURE — 85025 COMPLETE CBC W/AUTO DIFF WBC: CPT

## 2024-06-25 PROCEDURE — 80061 LIPID PANEL: CPT

## 2024-06-26 RX ORDER — AMIODARONE HYDROCHLORIDE 200 MG/1
200 TABLET ORAL DAILY
Qty: 90 TABLET | Refills: 1 | Status: SHIPPED | OUTPATIENT
Start: 2024-06-26

## 2024-06-27 ENCOUNTER — OFFICE VISIT (OUTPATIENT)
Dept: INTERNAL MEDICINE CLINIC | Facility: CLINIC | Age: 74
End: 2024-06-27

## 2024-06-27 ENCOUNTER — HOSPITAL ENCOUNTER (OUTPATIENT)
Dept: GENERAL RADIOLOGY | Age: 74
Discharge: HOME | End: 2024-06-27
Attending: FAMILY MEDICINE

## 2024-06-27 VITALS
HEART RATE: 69 BPM | BODY MASS INDEX: 26 KG/M2 | SYSTOLIC BLOOD PRESSURE: 126 MMHG | OXYGEN SATURATION: 96 % | DIASTOLIC BLOOD PRESSURE: 60 MMHG | WEIGHT: 176.63 LBS | TEMPERATURE: 97 F

## 2024-06-27 DIAGNOSIS — Z86.018 HISTORY OF PHEOCHROMOCYTOMA: ICD-10-CM

## 2024-06-27 DIAGNOSIS — D69.6 THROMBOCYTOPENIA (HCC): ICD-10-CM

## 2024-06-27 DIAGNOSIS — M25.512 CHRONIC LEFT SHOULDER PAIN: ICD-10-CM

## 2024-06-27 DIAGNOSIS — I25.10 CORONARY ARTERY DISEASE INVOLVING NATIVE CORONARY ARTERY OF NATIVE HEART WITHOUT ANGINA PECTORIS: ICD-10-CM

## 2024-06-27 DIAGNOSIS — Z87.898 HISTORY OF SYNCOPE: ICD-10-CM

## 2024-06-27 DIAGNOSIS — K86.81 EXOCRINE PANCREATIC INSUFFICIENCY (HCC): ICD-10-CM

## 2024-06-27 DIAGNOSIS — G89.29 CHRONIC LEFT SHOULDER PAIN: ICD-10-CM

## 2024-06-27 DIAGNOSIS — M81.8 OTHER OSTEOPOROSIS WITHOUT CURRENT PATHOLOGICAL FRACTURE: ICD-10-CM

## 2024-06-27 DIAGNOSIS — G47.33 OSA ON CPAP: ICD-10-CM

## 2024-06-27 DIAGNOSIS — I70.0 AORTIC ATHEROSCLEROSIS (HCC): ICD-10-CM

## 2024-06-27 DIAGNOSIS — D63.8 ANEMIA OF CHRONIC DISEASE: ICD-10-CM

## 2024-06-27 DIAGNOSIS — Z00.00 ENCOUNTER FOR ANNUAL HEALTH EXAMINATION: Primary | ICD-10-CM

## 2024-06-27 DIAGNOSIS — Z85.09 HISTORY OF CHOLANGIOCARCINOMA: ICD-10-CM

## 2024-06-27 DIAGNOSIS — Z95.1 S/P CABG (CORONARY ARTERY BYPASS GRAFT): ICD-10-CM

## 2024-06-27 DIAGNOSIS — I25.5 ISCHEMIC CARDIOMYOPATHY: ICD-10-CM

## 2024-06-27 DIAGNOSIS — Z86.79 HISTORY OF VENTRICULAR TACHYCARDIA: ICD-10-CM

## 2024-06-27 DIAGNOSIS — I49.5 SSS (SICK SINUS SYNDROME) (HCC): ICD-10-CM

## 2024-06-27 DIAGNOSIS — Z86.73 HISTORY OF TRANSIENT ISCHEMIC ATTACK (TIA): ICD-10-CM

## 2024-06-27 DIAGNOSIS — K65.1 INTRA-ABDOMINAL ABSCESS (HCC): ICD-10-CM

## 2024-06-27 DIAGNOSIS — N18.31 STAGE 3A CHRONIC KIDNEY DISEASE (HCC): Chronic | ICD-10-CM

## 2024-06-27 DIAGNOSIS — D17.79 MYELOLIPOMA OF LEFT ADRENAL GLAND: ICD-10-CM

## 2024-06-27 DIAGNOSIS — I50.42 CHRONIC COMBINED SYSTOLIC AND DIASTOLIC CONGESTIVE HEART FAILURE (HCC): ICD-10-CM

## 2024-06-27 DIAGNOSIS — I10 BENIGN ESSENTIAL HTN: ICD-10-CM

## 2024-06-27 DIAGNOSIS — I49.3 PVCS (PREMATURE VENTRICULAR CONTRACTIONS): ICD-10-CM

## 2024-06-27 DIAGNOSIS — I48.0 PAROXYSMAL ATRIAL FIBRILLATION (HCC): ICD-10-CM

## 2024-06-27 DIAGNOSIS — Z95.0 PACEMAKER: ICD-10-CM

## 2024-06-27 DIAGNOSIS — J43.2 CENTRILOBULAR EMPHYSEMA (HCC): ICD-10-CM

## 2024-06-27 DIAGNOSIS — M47.816 ARTHRITIS OF FACET JOINT OF LUMBAR SPINE: ICD-10-CM

## 2024-06-27 PROCEDURE — 96160 PT-FOCUSED HLTH RISK ASSMT: CPT | Performed by: FAMILY MEDICINE

## 2024-06-27 PROCEDURE — 3074F SYST BP LT 130 MM HG: CPT | Performed by: FAMILY MEDICINE

## 2024-06-27 PROCEDURE — 99499 UNLISTED E&M SERVICE: CPT | Performed by: FAMILY MEDICINE

## 2024-06-27 PROCEDURE — 99214 OFFICE O/P EST MOD 30 MIN: CPT | Performed by: FAMILY MEDICINE

## 2024-06-27 PROCEDURE — 3008F BODY MASS INDEX DOCD: CPT | Performed by: FAMILY MEDICINE

## 2024-06-27 PROCEDURE — G0439 PPPS, SUBSEQ VISIT: HCPCS | Performed by: FAMILY MEDICINE

## 2024-06-27 PROCEDURE — 73030 X-RAY EXAM OF SHOULDER: CPT | Performed by: FAMILY MEDICINE

## 2024-06-27 PROCEDURE — 3078F DIAST BP <80 MM HG: CPT | Performed by: FAMILY MEDICINE

## 2024-06-29 PROBLEM — M46.96 UNSPECIFIED INFLAMMATORY SPONDYLOPATHY, LUMBAR REGION (HCC): Status: RESOLVED | Noted: 2022-06-09 | Resolved: 2024-06-29

## 2024-06-29 PROBLEM — I47.20 V TACH (HCC): Status: RESOLVED | Noted: 2023-04-25 | Resolved: 2024-06-29

## 2024-06-29 PROBLEM — J43.2 CENTRILOBULAR EMPHYSEMA (HCC): Status: ACTIVE | Noted: 2024-06-29

## 2024-06-29 PROBLEM — M46.96 UNSPECIFIED INFLAMMATORY SPONDYLOPATHY, LUMBAR REGION: Status: RESOLVED | Noted: 2022-06-09 | Resolved: 2024-06-29

## 2024-06-29 PROBLEM — Z86.79 HISTORY OF VENTRICULAR TACHYCARDIA: Status: ACTIVE | Noted: 2024-06-29

## 2024-06-29 PROBLEM — R55 SYNCOPE, NEAR: Status: RESOLVED | Noted: 2023-10-03 | Resolved: 2024-06-29

## 2024-06-29 PROBLEM — R07.9 ACUTE CHEST PAIN: Status: RESOLVED | Noted: 2023-10-03 | Resolved: 2024-06-29

## 2024-06-29 PROBLEM — I49.3 PVC'S (PREMATURE VENTRICULAR CONTRACTIONS): Status: RESOLVED | Noted: 2023-10-04 | Resolved: 2024-06-29

## 2024-06-29 PROBLEM — R06.00 DYSPNEA, UNSPECIFIED TYPE: Status: RESOLVED | Noted: 2023-10-03 | Resolved: 2024-06-29

## 2024-06-29 PROBLEM — J44.89 ASTHMA WITH COPD (CHRONIC OBSTRUCTIVE PULMONARY DISEASE) (HCC): Chronic | Status: RESOLVED | Noted: 2020-05-11 | Resolved: 2024-06-29

## 2024-06-29 PROBLEM — Z87.898 HISTORY OF SYNCOPE: Status: ACTIVE | Noted: 2024-06-29

## 2024-06-29 NOTE — PATIENT INSTRUCTIONS
[facility]'s SCREENING SCHEDULE   Tests on this list are recommended by your physician but may not be covered, or covered at this frequency, by your insurer.   Please check with your insurance carrier before scheduling to verify coverage.   PREVENTATIVE SERVICES FREQUENCY &  COVERAGE DETAILS LAST COMPLETION DATE   Diabetes Screening    Fasting Blood Sugar / Glucose    One screening every 12 months if never tested or if previously tested but not diagnosed with pre-diabetes   One screening every 6 months if diagnosed with pre-diabetes Lab Results   Component Value Date    GLU 90 06/25/2024        Cardiovascular Disease Screening    Lipid Panel  Cholesterol  Lipoprotein (HDL)  Triglycerides Covered every 5 years for all Medicare beneficiaries without apparent signs or symptoms of cardiovascular disease Lab Results   Component Value Date    CHOLEST 106 06/25/2024    HDL 56 06/25/2024    LDL 35 06/25/2024    TRIG 71 06/25/2024         Electrocardiogram (EKG)   Covered if needed at Welcome to Medicare, and non-screening if indicated for medical reasons 04/22/2024      Ultrasound Screening for Abdominal Aortic Aneurysm (AAA) Covered once in a lifetime for one of the following risk factors   • Men who are 65-75 years old and have ever smoked   • Anyone with a family history -     Colorectal Cancer Screening  Covered for ages 50-85; only need ONE of the following:    Colonoscopy   Covered every 10 years    Covered every 2 years if patient is at high risk or previous colonoscopy was abnormal 03/11/2019    Health Maintenance   Topic Date Due   • Colorectal Cancer Screening  04/04/2025 (Originally 11/14/1950)       Flexible Sigmoidoscopy   Covered every 4 years -    Fecal Occult Blood Test Covered annually -   Prostate Cancer Screening    Prostate-Specific Antigen (PSA) Annually Lab Results   Component Value Date    PSA 1.88 10/10/2019     Health Maintenance   Topic Date Due   • PSA  06/11/2023      Immunizations     Influenza Covered once per flu season  Please get every year -  No recommendations at this time    Pneumococcal Each vaccine ([id number] & [id number]) covered once after 65 Prevnar 13: 10/13/2017    [id number]: 09/03/2020     No recommendations at this time    Hepatitis B One screening covered for patients with certain risk factors   -  No recommendations at this time    Tetanus Toxoid Not covered by Medicare Part B unless medically necessary (cut with metal); may be covered with your pharmacy prescription benefits 10/18/2007    Tetanus, Diptheria and Pertusis TD and TDaP Not covered by Medicare Part B -  No recommendations at this time    Zoster Not covered by Medicare Part B; may be covered with your pharmacy  prescription benefits -  Zoster Vaccines(1 of 2) Never done     Annual Monitoring of Persistent Medications (ACE/ARB, digoxin diuretics, anticonvulsants)    Potassium Annually Lab Results   Component Value Date    K 4.5 06/25/2024         Creatinine   Annually Lab Results   Component Value Date    CREATSERUM 1.53 (H) 06/25/2024         BUN Annually Lab Results   Component Value Date    BUN 21 06/25/2024       Drug Serum Conc Annually No results found for: \"DIGOXIN\", \"DIG\", \"VALP\"

## 2024-06-29 NOTE — PROGRESS NOTES
Subjective:   Mark Anthony Bryan is a 73 year old male who presents for a MA (Medicare Advantage) Supervisit (Once per calendar year) and scheduled follow up of multiple significant but stable problems. Overall doing well. States he feels great other than progressive lefts shoulder pain that he notes with reaching and lifting.     History/Other:   Fall Risk Assessment:   He has been screened for Falls and is low risk.      Cognitive Assessment:   He had a completely normal cognitive assessment - see flowsheet entries     Functional Ability/Status:   Mark Anthony Bryan has a completely normal functional assessment. See flowsheet for details.        Depression Screening (PHQ-2/PHQ-9): PHQ-2 SCORE: 0  , done 6/27/2024        <5 minutes spent screening and counseling for depression    Advanced Directives:   He does have a Living Will but we do NOT have it on file in Epic.    He has a Power of  for Health Care on file in [employer].  Not discussed      Patient Active Problem List   Diagnosis    Anemia of chronic disease    Myelolipoma of left adrenal gland    Thrombocytopenia (HCC)    Osteoporosis    Aortic atherosclerosis (HCC)    Exocrine pancreatic insufficiency (HCC)    Benign essential HTN    History of cholangiocarcinoma    History of pheochromocytoma    Coronary artery disease involving native coronary artery of native heart    Pulmonary nodules    S/P CABG (coronary artery bypass graft)    Arthritis of facet joint of lumbar spine    SSS (sick sinus syndrome) ([facility])    PVCs (premature ventricular contractions)    Paroxysmal atrial fibrillation (HCC)    Chronic combined systolic and diastolic congestive heart failure (HCC)    Asthma with COPD (chronic obstructive pulmonary disease) ([facility])    Pacemaker    History of transient ischemic attack (TIA)    LONG on CPAP    CKD (chronic kidney disease) stage 3, GFR 30-59 ml/min (HCC)    Ventral hernia without obstruction or gangrene    Intra-abdominal abscess (HCC)    Dyspnea,  unspecified type    Ischemic cardiomyopathy    History of syncope    History of ventricular tachycardia     Allergies:  He has No Known Allergies.    Current Medications:  Outpatient Medications Marked as Taking for the 6/27/24 encounter (Office Visit) with Elton Flores MD   Medication Sig    pancrelipase, Lip-Prot-Amyl, (ZENPEP) 06811-48080 units Oral Cap DR Particles TAKE 1 CAPSULE BY MOUTH 6 TIMES DAILY WITH MEALS Strength: 20,000-63,000 Units    Omeprazole 40 MG Oral Capsule Delayed Release Take 1 capsule (40 mg total) by mouth daily.    sacubitril-valsartan 24-26 MG Oral Tab Take 1 tablet by mouth 2 (two) times daily.    amiodarone 200 MG Oral Tab Take 0.5 tablets (100 mg total) by mouth daily.    furosemide 20 MG Oral Tab Take 1 tablet (20 mg total) by mouth every other day.    magnesium 250 MG Oral Tab Take 1 tablet (250 mg total) by mouth daily.    Metoprolol Succinate  MG Oral Tablet 24 Hr Take 1 tablet (100 mg total) by mouth daily.    atorvastatin 20 MG Oral Tab Take 1 tablet (20 mg total) by mouth daily. (Patient taking differently: Take 1 tablet (20 mg total) by mouth nightly.)    aspirin 81 MG Oral Tab EC Take 1 tablet (81 mg total) by mouth daily.    Denosumab 60 MG/ML Subcutaneous Solution Prefilled Syringe Inject 1 mL (60 mg total) into the skin every 6 (six) months.       Medical History:  He  has a past medical history of Abdominal distention, Abdominal hernia, Abdominal pain (frequent), Abnormal finding on GI tract imaging (11/4/2020), Acute respiratory failure, unspecified whether with hypoxia or hypercapnia ([facility]) (6/24/2019), MATT (acute kidney injury) ([facility]), Anemia, Atherosclerosis of coronary artery (06/24/2019), Back pain, Back problem, Belching (alot), Bloating (always), Blurred vision (meds), Bradycardia (7/7/2019), Cancer ([facility]) (08/2016), Cardiac defibrillator in place, Cardiomyopathy ([facility]), Cataract, Cholangiocarcinoma ([facility]) (10/1/2016), Closed compression fracture of L1 vertebra  (HCC) (10/13/2017), Coronary atherosclerosis, COVID (08/2022), Decorative tattoo (long time ago), Diverticulosis of large intestine, Dizziness (lately....meds?), Enteritis, Exposure to radiation, Fatigue (Sometimes), Flash pulmonary edema (HCC), Flatulence/gas pain/belching (usually), Frequent urination (drink 5 bottles of water daily), Hearing impairment, Hearing loss (10% ), Heart attack (HCC) (06/24/2019), Hemorrhoids (teenager), High blood pressure, High cholesterol, History of syncope (6/29/2024), Hyperkalemia, Hypokalemia (4/26/2019), Hyponatremia (11/7/2016), Ileus (HCC), Jaundice, Nausea (Sometimes), Night sweats, NSTEMI (non-ST elevated myocardial infarction) ([facility]), Osteoporosis, Pacemaker (With defib), Pancreatic cancer ([facility]), Personal history of antineoplastic chemotherapy, Pheochromocytoma, left, Pneumoperitoneum (7/7/2019), Rash (after prolia shot), Shortness of breath (upon onset of spasms), Sleep apnea, Syncope, near (11/18/2019), Tobacco abuse, Uncomfortable fullness after meals (always), Visual impairment, Weight gain, and Weight loss.  Surgical History:  He  has a past surgical history that includes hernia surgery; placement, bile duct stent; other surgical history (06/23/2016); other; removal gallbladder; whipple (08/16/2016); colonoscopy (12/13/2011); cabg; angiogram (06/24/2019); bypass surgery (06/27/2019); cholecystectomy (08/16/2016); colonoscopy (overdue); needle biopsy liver (08/16/2016); Cardiac pacemaker placement; Cardiac defibrillator placement; bowel resection; and cath drug eluting stent.   Family History:  His family history includes Cancer in his mother; Colon Cancer in his mother; Diabetes in his father, mother, and sister; Hypertension in his mother; Other in his father and another family member.  Social History:  He  reports that he quit smoking about 45 years ago. His smoking use included cigarettes. He has never used smokeless tobacco. He reports current drug use.  Frequency: 7.00 times per week. Drug: Cannabis. He reports that he does not drink alcohol.    Tobacco:  He smoked tobacco in the past but quit greater than 12 months ago.  Social History     Tobacco Use   Smoking Status Former    Current packs/day: 0.00    Types: Cigarettes    Quit date: 6/15/1979    Years since quittin.0   Smokeless Tobacco Never   Tobacco Comments    Stopped          CAGE Alcohol Screen:   CAGE screening score of 0 on 2024, showing low risk of alcohol abuse.      Patient Care Team:  Elton Flores MD as PCP - General (Family Medicine)  Lalo Hess MD (GASTROENTEROLOGY)  Tae Rapp MD (Surgical Oncology)  Amor Hill MD (Radiation Oncology)  Komal Boykin (General Practice)  Lalo Hess MD (GASTROENTEROLOGY)  Patrick Espino MD (NEPHROLOGY)  Natalia Carballo DO as Consulting Physician (NEUROLOGY)  Elton Flores MD (Family Medicine)  Daniel Johnson MD (CARDIOLOGY)  Giovanni Toney MD (Internal Medicine)    Review of Systems     Negative except as in HPI    Objective:   Physical Exam  /60 (BP Location: Right arm, Patient Position: Sitting, Cuff Size: adult)   Pulse 69   Temp 97 °F (36.1 °C) (Temporal)   Wt 176 lb 9.6 oz (80.1 kg)   SpO2 96%   BMI 26.08 kg/m²  Estimated body mass index is 26.08 kg/m² as calculated from the following:    Height as of 24: 5' 9\" (1.753 m).    Weight as of this encounter: 176 lb 9.6 oz (80.1 kg).    GENERAL: no acute distress  SKIN: No rashes,no suspicious lesions  EYES: PERRLA, EOMI, conjunctiva are clear  HEENT: atraumatic, normocephalic  NECK: supple,no adenopathy,no bruits  LUNGS: clear, no crackles or wheezing  CARDIO: regular rate  ABD: soft, NT  MSK: Generally preserved L shoulder ROM. Pain with impingement testing, resisted rotator cuff testing and Speeds.     Medicare Hearing Assessment:   Hearing Screening    Time taken: 2024  2:45 PM  Entry User: Angela Pastor  Screening Method: Finger  Rub  Finger Rub Result: Pass         Visual Acuity:   Right Eye Visual Acuity: Uncorrected Right Eye Chart Acuity: 20/30   Left Eye Visual Acuity: Uncorrected Left Eye Chart Acuity: 20/25   Both Eyes Visual Acuity: Uncorrected Both Eyes Chart Acuity: 20/25   Able To Tolerate Visual Acuity: Yes        Assessment & Plan:   Mark Anthony Bryan is a 73 year old male who presents for a Medicare Assessment.     Encounter for annual health examination    Chronic left shoulder pain  Suspect RTC syndrome and underlying OA. Will begin evaluation with radiographs today.   - XR Shoulder left complete (Min 2 views) - [facility] Orth[facility] Consult Only; Future    SSS (sick sinus syndrome) (HCC)  History of ventricular tachycardia  Paroxysmal atrial fibrillation (HCC)  PVCs (premature ventricular contractions)  History of syncope  Pacemaker  S/P pacemaker placement. Following with cardiology. No recurrent syncopal episodes.     CAD S/P CABG (coronary artery bypass graft)  Ischemic cardiomyopathy  Chronic combined systolic and diastolic congestive heart failure (HCC)  Aortic atherosclerosis (HCC)  Compensated cardiac status. Continue GDMT and management per cardiology.     History of transient ischemic attack (TIA)  Continue ASA and statin    Emphysema  Overall stable with no new symptoms. CTM    Benign essential HTN  Stage 3a chronic kidney disease (HCC)  BP controlled. Renal function stable.     History of cholangiocarcinoma  Intra-abdominal abscess (HCC)  S/P Whipple. Overall stable, no new symptoms.     Exocrine pancreatic insufficiency (HCC)  Stable on ZenPep    Anemia of chronic disease  Thrombocytopenia (HCC)  Counts stable.     History of pheochromocytoma  Myelolipoma of left adrenal gland  S/P left adrenalectomy     Other osteoporosis without current pathological fracture  Following with osteoporosis clinic, on Prolia    Arthritis of facet joint of lumbar spine  Stable. CTM    LONG on CPAP  Stable.     The patient indicates understanding of  these issues and agrees to the plan.  Reinforced healthy diet, lifestyle, and exercise.      Return in 6 months (on 12/27/2024).     Elton Flores MD, 6/29/2024     Supplementary Documentation:   General Health:  In the past six months, have you lost more than 10 pounds without trying?: 2 - No  Has your appetite been poor?: No  Type of Diet: Low Salt  How does the patient maintain a good energy level?: Daily Walks  How would you describe your daily physical activity?: Moderate  How would you describe your current health state?: Good  How do you maintain positive mental well-being?: Visiting Friends;Social Interaction;Visiting Family  On a scale of 0 to 10, with 0 being no pain and 10 being severe pain, what is your pain level?: 0 - (None)  In the past six months, have you experienced urine leakage?: 0-No  At any time do you feel concerned for the safety/well-being of yourself and/or your children, in your home or elsewhere?: No  Have you had any immunizations at another office such as Influenza, Hepatitis B, Tetanus, or Pneumococcal?: No        Mark Anthony Bryan's SCREENING SCHEDULE   Tests on this list are recommended by your physician but may not be covered, or covered at this frequency, by your insurer.   Please check with your insurance carrier before scheduling to verify coverage.   PREVENTATIVE SERVICES FREQUENCY &  COVERAGE DETAILS LAST COMPLETION DATE   Diabetes Screening    Fasting Blood Sugar / Glucose    One screening every 12 months if never tested or if previously tested but not diagnosed with pre-diabetes   One screening every 6 months if diagnosed with pre-diabetes Lab Results   Component Value Date    GLU 90 06/25/2024        Cardiovascular Disease Screening    Lipid Panel  Cholesterol  Lipoprotein (HDL)  Triglycerides Covered every 5 years for all Medicare beneficiaries without apparent signs or symptoms of cardiovascular disease Lab Results   Component Value Date    CHOLEST 106 06/25/2024    HDL 56  06/25/2024    LDL 35 06/25/2024    TRIG 71 06/25/2024         Electrocardiogram (EKG)   Covered if needed at Welcome to Medicare, and non-screening if indicated for medical reasons 04/22/2024      Ultrasound Screening for Abdominal Aortic Aneurysm (AAA) Covered once in a lifetime for one of the following risk factors    Men who are 65-75 years old and have ever smoked    Anyone with a family history -     Colorectal Cancer Screening  Covered for ages 50-85; only need ONE of the following:    Colonoscopy   Covered every 10 years    Covered every 2 years if patient is at high risk or previous colonoscopy was abnormal 03/11/2019    Health Maintenance   Topic Date Due    Colorectal Cancer Screening  04/04/2025 (Originally 11/14/1950)       Flexible Sigmoidoscopy   Covered every 4 years -    Fecal Occult Blood Test Covered annually -   Prostate Cancer Screening    Prostate-Specific Antigen (PSA) Annually Lab Results   Component Value Date    PSA 1.88 10/10/2019     Health Maintenance   Topic Date Due    PSA  06/11/2023      Immunizations    Influenza Covered once per flu season  Please get every year -  No recommendations at this time    Pneumococcal Each vaccine ([id number] & [id number]) covered once after 65 Prevnar 13: 10/13/2017    [id number]: 09/03/2020     No recommendations at this time    Hepatitis B One screening covered for patients with certain risk factors   -  No recommendations at this time    Tetanus Toxoid Not covered by Medicare Part B unless medically necessary (cut with metal); may be covered with your pharmacy prescription benefits 10/18/2007    Tetanus, Diptheria and Pertusis TD and TDaP Not covered by Medicare Part B -  No recommendations at this time    Zoster Not covered by Medicare Part B; may be covered with your pharmacy  prescription benefits -  Zoster Vaccines(1 of 2) Never done     Annual Monitoring of Persistent Medications (ACE/ARB, digoxin diuretics, anticonvulsants)    Potassium  Annually Lab Results   Component Value Date    K 4.5 06/25/2024         Creatinine   Annually Lab Results   Component Value Date    CREATSERUM 1.53 (H) 06/25/2024         BUN Annually Lab Results   Component Value Date    BUN 21 06/25/2024       Drug Serum Conc Annually No results found for: \"DIGOXIN\", \"DIG\", \"VALP\"

## 2024-07-02 ENCOUNTER — LAB REQUISITION (OUTPATIENT)
Dept: LAB | Age: 74
End: 2024-07-02

## 2024-07-02 DIAGNOSIS — E55.9 VITAMIN D DEFICIENCY, UNSPECIFIED: ICD-10-CM

## 2024-07-02 DIAGNOSIS — N18.31 CHRONIC KIDNEY DISEASE, STAGE 3A  (CMD): ICD-10-CM

## 2024-07-08 ENCOUNTER — TELEPHONE (OUTPATIENT)
Dept: CARDIOLOGY | Age: 74
End: 2024-07-08

## 2024-07-08 RX ORDER — ATORVASTATIN CALCIUM 20 MG/1
20 TABLET, FILM COATED ORAL DAILY
Qty: 90 TABLET | Refills: 1 | Status: SHIPPED | OUTPATIENT
Start: 2024-07-08

## 2024-07-10 ENCOUNTER — TELEPHONE (OUTPATIENT)
Dept: CARDIOLOGY | Age: 74
End: 2024-07-10

## 2024-07-26 ENCOUNTER — APPOINTMENT (OUTPATIENT)
Dept: CARDIOLOGY | Age: 74
End: 2024-07-26

## 2024-07-26 VITALS
HEIGHT: 69 IN | SYSTOLIC BLOOD PRESSURE: 105 MMHG | BODY MASS INDEX: 25.77 KG/M2 | DIASTOLIC BLOOD PRESSURE: 66 MMHG | HEART RATE: 80 BPM | WEIGHT: 174 LBS

## 2024-07-26 DIAGNOSIS — I48.0 PAROXYSMAL ATRIAL FIBRILLATION  (CMD): ICD-10-CM

## 2024-07-26 DIAGNOSIS — Z95.1 S/P CABG (CORONARY ARTERY BYPASS GRAFT): ICD-10-CM

## 2024-07-26 DIAGNOSIS — I50.22 CHRONIC SYSTOLIC CONGESTIVE HEART FAILURE  (CMD): ICD-10-CM

## 2024-07-26 DIAGNOSIS — I10 BENIGN ESSENTIAL HTN: Primary | ICD-10-CM

## 2024-07-26 DIAGNOSIS — I25.5 CARDIOMYOPATHY, ISCHEMIC: ICD-10-CM

## 2024-07-26 DIAGNOSIS — I49.3 PVCS (PREMATURE VENTRICULAR CONTRACTIONS): ICD-10-CM

## 2024-07-26 DIAGNOSIS — I25.10 CORONARY ARTERY DISEASE INVOLVING NATIVE CORONARY ARTERY OF NATIVE HEART WITHOUT ANGINA PECTORIS: ICD-10-CM

## 2024-07-26 DIAGNOSIS — I65.23 CAROTID STENOSIS, ASYMPTOMATIC, BILATERAL: ICD-10-CM

## 2024-07-26 DIAGNOSIS — E78.00 PURE HYPERCHOLESTEROLEMIA: ICD-10-CM

## 2024-07-29 ENCOUNTER — APPOINTMENT (OUTPATIENT)
Dept: CARDIOLOGY | Age: 74
End: 2024-07-29
Attending: INTERNAL MEDICINE

## 2024-07-29 DIAGNOSIS — E78.00 PURE HYPERCHOLESTEROLEMIA: ICD-10-CM

## 2024-07-29 DIAGNOSIS — I48.0 PAROXYSMAL ATRIAL FIBRILLATION  (CMD): ICD-10-CM

## 2024-07-29 DIAGNOSIS — I25.10 CORONARY ARTERY DISEASE INVOLVING NATIVE CORONARY ARTERY OF NATIVE HEART WITHOUT ANGINA PECTORIS: ICD-10-CM

## 2024-07-29 DIAGNOSIS — I49.3 PVCS (PREMATURE VENTRICULAR CONTRACTIONS): ICD-10-CM

## 2024-07-29 DIAGNOSIS — Z95.1 S/P CABG (CORONARY ARTERY BYPASS GRAFT): ICD-10-CM

## 2024-07-29 DIAGNOSIS — I65.23 CAROTID STENOSIS, ASYMPTOMATIC, BILATERAL: ICD-10-CM

## 2024-07-29 DIAGNOSIS — I25.5 CARDIOMYOPATHY, ISCHEMIC: ICD-10-CM

## 2024-07-29 DIAGNOSIS — I50.22 CHRONIC SYSTOLIC CONGESTIVE HEART FAILURE  (CMD): ICD-10-CM

## 2024-07-29 DIAGNOSIS — I10 BENIGN ESSENTIAL HTN: ICD-10-CM

## 2024-07-29 LAB
AORTIC VALVE AREA (AVA): 0.51
ASCENDING AORTA (AAD): 3
AV PEAK GRADIENT (AVPG): 7
AV PEAK VELOCITY (AVPV): 1.28
AV STENOSIS SEVERITY TEXT: NORMAL
AVI LVOT PEAK GRADIENT (LVOTMG): 1
E WAVE DECELARATION TIME (MDT): 7.63
INTERVENTRICULAR SEPTUM IN END DIASTOLE (IVSD): 1.46
LEFT INTERNAL DIMENSION IN SYSTOLE (LVSD): 1.1
LEFT VENTRICULAR INTERNAL DIMENSION IN DIASTOLE (LVDD): 4.7
LEFT VENTRICULAR POSTERIOR WALL IN END DIASTOLE (LVPW): 5.5
LV EF: NORMAL %
LVOT VTI (LVOTVTI): 0.65
MV E TISSUE VEL MED (MESV): 9.25
MV E WAVE VEL/E TISSUE VEL MED(MSR): 6.74
MV PEAK A VELOCITY (MVPAV): 130
MV PEAK E VELOCITY (MVPEV): 0.7
RV END SYSTOLIC LONGITUDINAL STRAIN FREE WALL (RVGS): 2.5
TRICUSPID VALVE ANNULAR PEAK VELOCITY (TVAPV): 29
TRICUSPID VALVE PEAK REGURGITATION VELOCITY (TRPV): 3
TV ESTIMATED RIGHT ARTERIAL PRESSURE (RAP): 7.62

## 2024-07-29 PROCEDURE — 93306 TTE W/DOPPLER COMPLETE: CPT | Performed by: INTERNAL MEDICINE

## 2024-07-29 PROCEDURE — 76376 3D RENDER W/INTRP POSTPROCES: CPT | Performed by: INTERNAL MEDICINE

## 2024-07-30 ENCOUNTER — LAB ENCOUNTER (OUTPATIENT)
Dept: LAB | Facility: HOSPITAL | Age: 74
End: 2024-07-30
Attending: INTERNAL MEDICINE
Payer: OTHER GOVERNMENT

## 2024-07-30 DIAGNOSIS — E55.9 VITAMIN D DEFICIENCY: ICD-10-CM

## 2024-07-30 DIAGNOSIS — N18.31 STAGE 3A CHRONIC KIDNEY DISEASE (HCC): Primary | ICD-10-CM

## 2024-07-30 LAB
BASOPHILS # BLD AUTO: 0.03 X10(3) UL (ref 0–0.2)
BASOPHILS NFR BLD AUTO: 0.5 %
EOSINOPHIL # BLD AUTO: 0.12 X10(3) UL (ref 0–0.7)
EOSINOPHIL NFR BLD AUTO: 1.9 %
ERYTHROCYTE [DISTWIDTH] IN BLOOD BY AUTOMATED COUNT: 14.6 %
HCT VFR BLD AUTO: 37.5 %
HGB BLD-MCNC: 13 G/DL
IMM GRANULOCYTES # BLD AUTO: 0.03 X10(3) UL (ref 0–1)
IMM GRANULOCYTES NFR BLD: 0.5 %
LYMPHOCYTES # BLD AUTO: 1.34 X10(3) UL (ref 1–4)
LYMPHOCYTES NFR BLD AUTO: 21.2 %
MCH RBC QN AUTO: 31.3 PG (ref 26–34)
MCHC RBC AUTO-ENTMCNC: 34.7 G/DL (ref 31–37)
MCV RBC AUTO: 90.1 FL
MONOCYTES # BLD AUTO: 1.08 X10(3) UL (ref 0.1–1)
MONOCYTES NFR BLD AUTO: 17.1 %
NEUTROPHILS # BLD AUTO: 3.71 X10 (3) UL (ref 1.5–7.7)
NEUTROPHILS # BLD AUTO: 3.71 X10(3) UL (ref 1.5–7.7)
NEUTROPHILS NFR BLD AUTO: 58.8 %
PLATELET # BLD AUTO: 184 10(3)UL (ref 150–450)
RBC # BLD AUTO: 4.16 X10(6)UL
WBC # BLD AUTO: 6.3 X10(3) UL (ref 4–11)

## 2024-07-30 PROCEDURE — 36415 COLL VENOUS BLD VENIPUNCTURE: CPT

## 2024-07-30 PROCEDURE — 85025 COMPLETE CBC W/AUTO DIFF WBC: CPT

## 2024-08-01 PROBLEM — K83.1 OBSTRUCTION OF BILE DUCT (HCC): Status: ACTIVE | Noted: 2024-08-01

## 2024-08-01 PROBLEM — E78.5 HYPERLIPIDEMIA: Status: ACTIVE | Noted: 2024-08-01

## 2024-08-01 PROBLEM — Z95.810 PRESENCE OF COMBINATION INTERNAL CARDIAC DEFIBRILLATOR (ICD) AND PACEMAKER: Status: ACTIVE | Noted: 2019-06-01

## 2024-08-01 PROBLEM — F43.21 ADJUSTMENT DISORDER WITH DEPRESSED MOOD: Status: ACTIVE | Noted: 2024-08-01

## 2024-08-01 PROBLEM — K26.3 ACUTE DUODENAL ULCER: Status: ACTIVE | Noted: 2019-04-01

## 2024-08-06 ENCOUNTER — LAB ENCOUNTER (OUTPATIENT)
Dept: LAB | Facility: HOSPITAL | Age: 74
End: 2024-08-06
Attending: INTERNAL MEDICINE
Payer: MEDICARE

## 2024-08-06 DIAGNOSIS — N18.31 CHRONIC KIDNEY DISEASE (CKD) STAGE G3A/A1, MODERATELY DECREASED GLOMERULAR FILTRATION RATE (GFR) BETWEEN 45-59 ML/MIN/1.73 SQUARE METER AND ALBUMINURIA CREATININE RATIO LESS THAN 30 MG/G (HCC): Primary | ICD-10-CM

## 2024-08-06 DIAGNOSIS — E55.9 AVITAMINOSIS D: ICD-10-CM

## 2024-08-06 LAB
ALBUMIN SERPL-MCNC: 4.5 G/DL (ref 3.2–4.8)
ANION GAP SERPL CALC-SCNC: 0 MMOL/L (ref 0–18)
BASOPHILS # BLD AUTO: 0.04 X10(3) UL (ref 0–0.2)
BASOPHILS NFR BLD AUTO: 0.6 %
BILIRUB UR QL STRIP.AUTO: NEGATIVE
BUN BLD-MCNC: 20 MG/DL (ref 9–23)
CALCIUM BLD-MCNC: 10 MG/DL (ref 8.7–10.4)
CHLORIDE SERPL-SCNC: 102 MMOL/L (ref 98–112)
CLARITY UR REFRACT.AUTO: CLEAR
CO2 SERPL-SCNC: 27 MMOL/L (ref 21–32)
CREAT BLD-MCNC: 1.25 MG/DL
CREAT UR-SCNC: 55.7 MG/DL
EGFRCR SERPLBLD CKD-EPI 2021: 61 ML/MIN/1.73M2 (ref 60–?)
EOSINOPHIL # BLD AUTO: 0.18 X10(3) UL (ref 0–0.7)
EOSINOPHIL NFR BLD AUTO: 2.8 %
ERYTHROCYTE [DISTWIDTH] IN BLOOD BY AUTOMATED COUNT: 14.7 %
GLUCOSE BLD-MCNC: 85 MG/DL (ref 70–99)
GLUCOSE UR STRIP.AUTO-MCNC: NORMAL MG/DL
HCT VFR BLD AUTO: 38.2 %
HGB BLD-MCNC: 13.3 G/DL
IMM GRANULOCYTES # BLD AUTO: 0.02 X10(3) UL (ref 0–1)
IMM GRANULOCYTES NFR BLD: 0.3 %
KETONES UR STRIP.AUTO-MCNC: NEGATIVE MG/DL
LEUKOCYTE ESTERASE UR QL STRIP.AUTO: NEGATIVE
LYMPHOCYTES # BLD AUTO: 1.27 X10(3) UL (ref 1–4)
LYMPHOCYTES NFR BLD AUTO: 19.4 %
MCH RBC QN AUTO: 31.2 PG (ref 26–34)
MCHC RBC AUTO-ENTMCNC: 34.8 G/DL (ref 31–37)
MCV RBC AUTO: 89.7 FL
MONOCYTES # BLD AUTO: 1.01 X10(3) UL (ref 0.1–1)
MONOCYTES NFR BLD AUTO: 15.4 %
NEUTROPHILS # BLD AUTO: 4.02 X10 (3) UL (ref 1.5–7.7)
NEUTROPHILS # BLD AUTO: 4.02 X10(3) UL (ref 1.5–7.7)
NEUTROPHILS NFR BLD AUTO: 61.5 %
NITRITE UR QL STRIP.AUTO: NEGATIVE
OSMOLALITY SERPL CALC.SUM OF ELEC: 270 MOSM/KG (ref 275–295)
PH UR STRIP.AUTO: 5.5 [PH] (ref 5–8)
PHOSPHATE SERPL-MCNC: 3.3 MG/DL (ref 2.4–5.1)
PLATELET # BLD AUTO: 196 10(3)UL (ref 150–450)
POTASSIUM SERPL-SCNC: 4.9 MMOL/L (ref 3.5–5.1)
PROT UR STRIP.AUTO-MCNC: NEGATIVE MG/DL
PROT UR-MCNC: <6 MG/DL (ref ?–14)
PTH-INTACT SERPL-MCNC: 77.2 PG/ML (ref 18.5–88)
RBC # BLD AUTO: 4.26 X10(6)UL
RBC UR QL AUTO: NEGATIVE
SODIUM SERPL-SCNC: 129 MMOL/L (ref 136–145)
SP GR UR STRIP.AUTO: 1.01 (ref 1–1.03)
URATE SERPL-MCNC: 6.4 MG/DL
UROBILINOGEN UR STRIP.AUTO-MCNC: NORMAL MG/DL
VIT D+METAB SERPL-MCNC: 42.6 NG/ML (ref 30–100)
WBC # BLD AUTO: 6.5 X10(3) UL (ref 4–11)

## 2024-08-06 PROCEDURE — 84550 ASSAY OF BLOOD/URIC ACID: CPT

## 2024-08-06 PROCEDURE — 84156 ASSAY OF PROTEIN URINE: CPT

## 2024-08-06 PROCEDURE — 82570 ASSAY OF URINE CREATININE: CPT

## 2024-08-06 PROCEDURE — 85025 COMPLETE CBC W/AUTO DIFF WBC: CPT

## 2024-08-06 PROCEDURE — 83970 ASSAY OF PARATHORMONE: CPT

## 2024-08-06 PROCEDURE — 36415 COLL VENOUS BLD VENIPUNCTURE: CPT

## 2024-08-06 PROCEDURE — 82306 VITAMIN D 25 HYDROXY: CPT

## 2024-08-06 PROCEDURE — 81003 URINALYSIS AUTO W/O SCOPE: CPT

## 2024-08-06 PROCEDURE — 80069 RENAL FUNCTION PANEL: CPT

## 2024-08-07 DIAGNOSIS — I10 BENIGN ESSENTIAL HTN: Primary | ICD-10-CM

## 2024-08-08 RX ORDER — SACUBITRIL AND VALSARTAN 24; 26 MG/1; MG/1
1 TABLET, FILM COATED ORAL 2 TIMES DAILY
Qty: 180 TABLET | Refills: 3 | Status: SHIPPED | OUTPATIENT
Start: 2024-08-08

## 2024-08-26 ENCOUNTER — LAB REQUISITION (OUTPATIENT)
Dept: LAB | Age: 74
End: 2024-08-26

## 2024-08-26 DIAGNOSIS — E22.2 SYNDROME OF INAPPROPRIATE SECRETION OF ANTIDIURETIC HORMONE  (CMD): ICD-10-CM

## 2024-08-26 DIAGNOSIS — N18.31 CHRONIC KIDNEY DISEASE, STAGE 3A  (CMD): ICD-10-CM

## 2024-09-08 ENCOUNTER — APPOINTMENT (OUTPATIENT)
Dept: CT IMAGING | Facility: HOSPITAL | Age: 74
End: 2024-09-08
Attending: EMERGENCY MEDICINE
Payer: OTHER GOVERNMENT

## 2024-09-08 ENCOUNTER — HOSPITAL ENCOUNTER (INPATIENT)
Facility: HOSPITAL | Age: 74
LOS: 1 days | Discharge: HOME HEALTH | End: 2024-09-10
Attending: EMERGENCY MEDICINE | Admitting: HOSPITALIST
Payer: OTHER GOVERNMENT

## 2024-09-08 DIAGNOSIS — E86.0 DEHYDRATION: ICD-10-CM

## 2024-09-08 DIAGNOSIS — R10.9 ABDOMINAL PAIN, ACUTE: Primary | ICD-10-CM

## 2024-09-08 DIAGNOSIS — R11.2 NAUSEA AND VOMITING IN ADULT: ICD-10-CM

## 2024-09-08 DIAGNOSIS — E87.1 HYPONATREMIA: ICD-10-CM

## 2024-09-08 LAB
ALBUMIN SERPL-MCNC: 4.2 G/DL (ref 3.2–4.8)
ALBUMIN/GLOB SERPL: 1.4 {RATIO} (ref 1–2)
ALP LIVER SERPL-CCNC: 96 U/L
ALT SERPL-CCNC: 23 U/L
ANION GAP SERPL CALC-SCNC: 7 MMOL/L (ref 0–18)
AST SERPL-CCNC: 21 U/L (ref ?–34)
BASOPHILS # BLD AUTO: 0.04 X10(3) UL (ref 0–0.2)
BASOPHILS NFR BLD AUTO: 0.6 %
BILIRUB SERPL-MCNC: 0.6 MG/DL (ref 0.2–1.1)
BILIRUB UR QL STRIP.AUTO: NEGATIVE
BUN BLD-MCNC: 20 MG/DL (ref 9–23)
CALCIUM BLD-MCNC: 9.8 MG/DL (ref 8.7–10.4)
CHLORIDE SERPL-SCNC: 102 MMOL/L (ref 98–112)
CLARITY UR REFRACT.AUTO: CLEAR
CO2 SERPL-SCNC: 24 MMOL/L (ref 21–32)
CREAT BLD-MCNC: 1.32 MG/DL
EGFRCR SERPLBLD CKD-EPI 2021: 57 ML/MIN/1.73M2 (ref 60–?)
EOSINOPHIL # BLD AUTO: 0.2 X10(3) UL (ref 0–0.7)
EOSINOPHIL NFR BLD AUTO: 3.2 %
ERYTHROCYTE [DISTWIDTH] IN BLOOD BY AUTOMATED COUNT: 14.7 %
GLOBULIN PLAS-MCNC: 3.1 G/DL (ref 2–3.5)
GLUCOSE BLD-MCNC: 107 MG/DL (ref 70–99)
GLUCOSE UR STRIP.AUTO-MCNC: NORMAL MG/DL
HCT VFR BLD AUTO: 37.7 %
HGB BLD-MCNC: 12.9 G/DL
IMM GRANULOCYTES # BLD AUTO: 0.02 X10(3) UL (ref 0–1)
IMM GRANULOCYTES NFR BLD: 0.3 %
KETONES UR STRIP.AUTO-MCNC: NEGATIVE MG/DL
LEUKOCYTE ESTERASE UR QL STRIP.AUTO: NEGATIVE
LIPASE SERPL-CCNC: 18 U/L (ref 12–53)
LYMPHOCYTES # BLD AUTO: 1.3 X10(3) UL (ref 1–4)
LYMPHOCYTES NFR BLD AUTO: 21.1 %
MCH RBC QN AUTO: 31.3 PG (ref 26–34)
MCHC RBC AUTO-ENTMCNC: 34.2 G/DL (ref 31–37)
MCV RBC AUTO: 91.5 FL
MONOCYTES # BLD AUTO: 1.38 X10(3) UL (ref 0.1–1)
MONOCYTES NFR BLD AUTO: 22.4 %
NEUTROPHILS # BLD AUTO: 3.22 X10 (3) UL (ref 1.5–7.7)
NEUTROPHILS # BLD AUTO: 3.22 X10(3) UL (ref 1.5–7.7)
NEUTROPHILS NFR BLD AUTO: 52.4 %
NITRITE UR QL STRIP.AUTO: NEGATIVE
OSMOLALITY SERPL CALC.SUM OF ELEC: 279 MOSM/KG (ref 275–295)
PH UR STRIP.AUTO: 5.5 [PH] (ref 5–8)
PLATELET # BLD AUTO: 190 10(3)UL (ref 150–450)
POTASSIUM SERPL-SCNC: 4 MMOL/L (ref 3.5–5.1)
PROT SERPL-MCNC: 7.3 G/DL (ref 5.7–8.2)
PROT UR STRIP.AUTO-MCNC: NEGATIVE MG/DL
RBC # BLD AUTO: 4.12 X10(6)UL
RBC UR QL AUTO: NEGATIVE
SODIUM SERPL-SCNC: 133 MMOL/L (ref 136–145)
SP GR UR STRIP.AUTO: 1.01 (ref 1–1.03)
UROBILINOGEN UR STRIP.AUTO-MCNC: NORMAL MG/DL
WBC # BLD AUTO: 6.2 X10(3) UL (ref 4–11)

## 2024-09-08 PROCEDURE — 74177 CT ABD & PELVIS W/CONTRAST: CPT | Performed by: EMERGENCY MEDICINE

## 2024-09-09 ENCOUNTER — ANESTHESIA EVENT (OUTPATIENT)
Dept: ENDOSCOPY | Facility: HOSPITAL | Age: 74
End: 2024-09-09
Payer: OTHER GOVERNMENT

## 2024-09-09 PROBLEM — E87.1 HYPONATREMIA: Status: ACTIVE | Noted: 2024-09-09

## 2024-09-09 PROBLEM — R11.2 NAUSEA AND VOMITING IN ADULT: Status: ACTIVE | Noted: 2024-09-09

## 2024-09-09 PROBLEM — E86.0 DEHYDRATION: Status: ACTIVE | Noted: 2024-09-09

## 2024-09-09 LAB
ANION GAP SERPL CALC-SCNC: 8 MMOL/L (ref 0–18)
ANION GAP SERPL CALC-SCNC: 9 MMOL/L (ref 0–18)
BASOPHILS # BLD AUTO: 0.06 X10(3) UL (ref 0–0.2)
BASOPHILS NFR BLD AUTO: 0.8 %
BUN BLD-MCNC: 14 MG/DL (ref 9–23)
BUN BLD-MCNC: 15 MG/DL (ref 9–23)
CALCIUM BLD-MCNC: 9.4 MG/DL (ref 8.7–10.4)
CALCIUM BLD-MCNC: 9.6 MG/DL (ref 8.7–10.4)
CHLORIDE SERPL-SCNC: 103 MMOL/L (ref 98–112)
CHLORIDE SERPL-SCNC: 104 MMOL/L (ref 98–112)
CO2 SERPL-SCNC: 22 MMOL/L (ref 21–32)
CO2 SERPL-SCNC: 23 MMOL/L (ref 21–32)
CREAT BLD-MCNC: 1.2 MG/DL
CREAT BLD-MCNC: 1.23 MG/DL
EGFRCR SERPLBLD CKD-EPI 2021: 62 ML/MIN/1.73M2 (ref 60–?)
EGFRCR SERPLBLD CKD-EPI 2021: 64 ML/MIN/1.73M2 (ref 60–?)
EOSINOPHIL # BLD AUTO: 0.26 X10(3) UL (ref 0–0.7)
EOSINOPHIL NFR BLD AUTO: 3.6 %
ERYTHROCYTE [DISTWIDTH] IN BLOOD BY AUTOMATED COUNT: 14.6 %
GLUCOSE BLD-MCNC: 114 MG/DL (ref 70–99)
GLUCOSE BLD-MCNC: 88 MG/DL (ref 70–99)
HCT VFR BLD AUTO: 37.1 %
HGB BLD-MCNC: 12.9 G/DL
IMM GRANULOCYTES # BLD AUTO: 0.03 X10(3) UL (ref 0–1)
IMM GRANULOCYTES NFR BLD: 0.4 %
LACTATE SERPL-SCNC: 0.9 MMOL/L (ref 0.5–2)
LYMPHOCYTES # BLD AUTO: 1.46 X10(3) UL (ref 1–4)
LYMPHOCYTES NFR BLD AUTO: 20.4 %
MCH RBC QN AUTO: 31.3 PG (ref 26–34)
MCHC RBC AUTO-ENTMCNC: 34.8 G/DL (ref 31–37)
MCV RBC AUTO: 90 FL
MONOCYTES # BLD AUTO: 1.18 X10(3) UL (ref 0.1–1)
MONOCYTES NFR BLD AUTO: 16.5 %
NEUTROPHILS # BLD AUTO: 4.15 X10 (3) UL (ref 1.5–7.7)
NEUTROPHILS # BLD AUTO: 4.15 X10(3) UL (ref 1.5–7.7)
NEUTROPHILS NFR BLD AUTO: 58.3 %
OSMOLALITY SERPL CALC.SUM OF ELEC: 279 MOSM/KG (ref 275–295)
OSMOLALITY SERPL CALC.SUM OF ELEC: 280 MOSM/KG (ref 275–295)
PLATELET # BLD AUTO: 190 10(3)UL (ref 150–450)
POTASSIUM SERPL-SCNC: 3.9 MMOL/L (ref 3.5–5.1)
POTASSIUM SERPL-SCNC: 4.1 MMOL/L (ref 3.5–5.1)
RBC # BLD AUTO: 4.12 X10(6)UL
SODIUM SERPL-SCNC: 134 MMOL/L (ref 136–145)
SODIUM SERPL-SCNC: 135 MMOL/L (ref 136–145)
WBC # BLD AUTO: 7.1 X10(3) UL (ref 4–11)

## 2024-09-09 PROCEDURE — 99223 1ST HOSP IP/OBS HIGH 75: CPT | Performed by: STUDENT IN AN ORGANIZED HEALTH CARE EDUCATION/TRAINING PROGRAM

## 2024-09-09 RX ORDER — PANTOPRAZOLE SODIUM 40 MG/1
40 TABLET, DELAYED RELEASE ORAL
Status: DISCONTINUED | OUTPATIENT
Start: 2024-09-09 | End: 2024-09-09

## 2024-09-09 RX ORDER — HYDROMORPHONE HYDROCHLORIDE 1 MG/ML
0.4 INJECTION, SOLUTION INTRAMUSCULAR; INTRAVENOUS; SUBCUTANEOUS EVERY 4 HOURS PRN
Status: DISCONTINUED | OUTPATIENT
Start: 2024-09-09 | End: 2024-09-10

## 2024-09-09 RX ORDER — ASPIRIN 81 MG/1
81 TABLET ORAL DAILY
Status: DISCONTINUED | OUTPATIENT
Start: 2024-09-09 | End: 2024-09-10

## 2024-09-09 RX ORDER — ATORVASTATIN CALCIUM 20 MG/1
20 TABLET, FILM COATED ORAL NIGHTLY
Status: DISCONTINUED | OUTPATIENT
Start: 2024-09-09 | End: 2024-09-10

## 2024-09-09 RX ORDER — HYDROMORPHONE HYDROCHLORIDE 1 MG/ML
0.8 INJECTION, SOLUTION INTRAMUSCULAR; INTRAVENOUS; SUBCUTANEOUS EVERY 4 HOURS PRN
Status: DISCONTINUED | OUTPATIENT
Start: 2024-09-09 | End: 2024-09-10

## 2024-09-09 RX ORDER — HYDROMORPHONE HYDROCHLORIDE 1 MG/ML
0.2 INJECTION, SOLUTION INTRAMUSCULAR; INTRAVENOUS; SUBCUTANEOUS EVERY 4 HOURS PRN
Status: DISCONTINUED | OUTPATIENT
Start: 2024-09-09 | End: 2024-09-10

## 2024-09-09 RX ORDER — AMIODARONE HYDROCHLORIDE 200 MG/1
200 TABLET ORAL DAILY
COMMUNITY

## 2024-09-09 RX ORDER — ACETAMINOPHEN 500 MG
500 TABLET ORAL EVERY 4 HOURS PRN
Status: DISCONTINUED | OUTPATIENT
Start: 2024-09-09 | End: 2024-09-10

## 2024-09-09 RX ORDER — BENZONATATE 200 MG/1
200 CAPSULE ORAL 3 TIMES DAILY PRN
Status: DISCONTINUED | OUTPATIENT
Start: 2024-09-09 | End: 2024-09-10

## 2024-09-09 RX ORDER — ONDANSETRON 2 MG/ML
4 INJECTION INTRAMUSCULAR; INTRAVENOUS EVERY 6 HOURS PRN
Status: DISCONTINUED | OUTPATIENT
Start: 2024-09-09 | End: 2024-09-10

## 2024-09-09 RX ORDER — METOPROLOL SUCCINATE 100 MG/1
100 TABLET, EXTENDED RELEASE ORAL
Status: DISCONTINUED | OUTPATIENT
Start: 2024-09-09 | End: 2024-09-10

## 2024-09-09 RX ORDER — FUROSEMIDE 20 MG
20 TABLET ORAL EVERY OTHER DAY
Status: DISCONTINUED | OUTPATIENT
Start: 2024-09-09 | End: 2024-09-10

## 2024-09-09 RX ORDER — METOCLOPRAMIDE HYDROCHLORIDE 5 MG/ML
5 INJECTION INTRAMUSCULAR; INTRAVENOUS EVERY 8 HOURS PRN
Status: DISCONTINUED | OUTPATIENT
Start: 2024-09-09 | End: 2024-09-10

## 2024-09-09 RX ORDER — SODIUM CHLORIDE 9 MG/ML
INJECTION, SOLUTION INTRAVENOUS CONTINUOUS
Status: ACTIVE | OUTPATIENT
Start: 2024-09-09 | End: 2024-09-09

## 2024-09-09 RX ORDER — ATORVASTATIN CALCIUM 20 MG/1
20 TABLET, FILM COATED ORAL NIGHTLY
COMMUNITY

## 2024-09-09 RX ORDER — CIPROFLOXACIN 2 MG/ML
400 INJECTION, SOLUTION INTRAVENOUS EVERY 12 HOURS
Status: DISCONTINUED | OUTPATIENT
Start: 2024-09-09 | End: 2024-09-10

## 2024-09-09 RX ORDER — MELATONIN
3 NIGHTLY PRN
Status: DISCONTINUED | OUTPATIENT
Start: 2024-09-09 | End: 2024-09-10

## 2024-09-09 RX ORDER — ECHINACEA PURPUREA EXTRACT 125 MG
1 TABLET ORAL
Status: DISCONTINUED | OUTPATIENT
Start: 2024-09-09 | End: 2024-09-10

## 2024-09-09 RX ORDER — METRONIDAZOLE 500 MG/1
500 TABLET ORAL EVERY 12 HOURS SCHEDULED
Status: DISCONTINUED | OUTPATIENT
Start: 2024-09-09 | End: 2024-09-10

## 2024-09-09 RX ORDER — ENOXAPARIN SODIUM 100 MG/ML
40 INJECTION SUBCUTANEOUS DAILY
Status: DISCONTINUED | OUTPATIENT
Start: 2024-09-09 | End: 2024-09-10

## 2024-09-09 NOTE — ED INITIAL ASSESSMENT (HPI)
Abdominal pain for a few days. Alternating constipation and diarrhea.  Nauseous, no vomiting. Denies urinary sx.

## 2024-09-09 NOTE — CONSULTS
[facility]                       Gastroenterology Consultation-[facility]    Mark Anthony Bryan Patient Status:  Inpatient    1950 MRN [id number]   Location [facility] 4NW-A Attending Edmund Blanc MD   Hosp Day # 0 PCP Elton Flores MD     Reason for consultation: Abd pain   HPI: This is a 73 yr-old male with extensive PMhx that includes CM s/p ICD, dyslipidemia, CAD s/p CABG, HTN, AF, Budd-Chiari syndrome, and cholangiocarcinoma s/p Whipple with recurrent incidental pheochromocytoma () who was noted to have large 2 cm clean based anastomotic ulcer--bx H Pylori + (2020; Dr Hess) who presented to ER yesterday with abd pain. Pt reports developing upper abd pain x 1 week--worse after PO intake therefore he has been limiting food and reports unintentional 10 lb wt loss/2 weeks. No nausea, vomiting, diarrhea, melena, hematochezia, or fevers/chills. No NSAIDs, anticoagulants, or antiplatelet agents; reports compliance with daily PPI.   CT a/p IV suggests fullness of pancreatic head with mild dilation of the PD (5 mm), diffuse thickening throughout the bypass jejunal loops; labs with normal lipase, normal lactic acid (0.9), normal LFTs, normal CBC  Pt currently reports no abd pain which he attributes to fasting and has remained hemodynamically stable   PMHx:   Past Medical History:    Abdominal distention    Abdominal hernia    Abdominal pain    constant    Abnormal finding on GI tract imaging    Acute respiratory failure, unspecified whether with hypoxia or hypercapnia (HCC)    Agent orange exposure    MATT (acute kidney injury) (HCC)    Anemia    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 Had fever and Ha    Decorative tattoo    Diverticulosis of large intestine    Dizziness    Enteritis    Exposure to radiation    completed 2/17/17    Fatigue    Flash pulmonary edema (HCC)    Flatulence/gas pain/belching    Frequent urination    Hearing impairment    hearing loss in right    Hearing loss    Heart attack (HCC)    NONSTEMI    Hemorrhoids    High blood pressure    High cholesterol    History of syncope    Hyperkalemia    Hypokalemia    Hyponatremia    Ileus (HCC)    Jaundice    Nausea    Nausea and vomiting in adult    Night sweats    NSTEMI (non-ST elevated myocardial infarction) (HCC)    Osteoporosis    Pacemaker    Pancreatic cancer (HCC)    chloangiocarcinoma    Personal history of antineoplastic chemotherapy    Completed chemo 2/17/17    Pheochromocytoma, left    Dx in 6/2016: 1.7 cm mass near inferior adrenal gland    Pneumoperitoneum    Rash    Renal disorder    Shortness of breath    Sleep apnea    CPAP    Syncope, near    Tobacco abuse    Uncomfortable fullness after meals    Visual impairment    glasses    Weight gain    Weight loss                PSHx:   Past Surgical History:   Procedure Laterality Date    Angiogram  06/24/2019    Bowel resection      Bypass surgery  06/27/2019    CABG x 3    Cabg      Cardiac defibrillator placement      Cardiac pacemaker placement      medtronic    Cath drug eluting stent      Cholecystectomy  08/16/2016    Colonoscopy  12/13/2011    Dr. Hu repeat 5 yrs    Colonoscopy  overdue    Hernia surgery      over 35 yrs ago    Needle biopsy liver  08/16/2016    Other      right index finger surgery    Other surgical history  06/23/2016    EUS/EGD/FNA    Placement, bile duct stent      Removal gallbladder      Whipple  08/16/2016    Pancreaticoduodenectomy with regional lymphadenectomy, Left adrenalectomy with resection of retroperitoneal  periadrenal tumor, Omental pedicle flap, Splenectomy, and Wedge resection segment 4b liver mass.     Medications:    [] sodium chloride 0.9% infusion   Intravenous Continuous    enoxaparin (Lovenox) 40 MG/0.4ML SUBQ injection 40 mg  40 mg Subcutaneous Daily    acetaminophen (Tylenol Extra Strength) tab 500 mg  500 mg Oral Q4H PRN    HYDROmorphone (Dilaudid) 1 MG/ML injection 0.2 mg  0.2 mg Intravenous Q4H PRN    Or    HYDROmorphone (Dilaudid) 1 MG/ML injection 0.4 mg  0.4 mg Intravenous Q4H PRN    Or    HYDROmorphone (Dilaudid) 1 MG/ML injection 0.8 mg  0.8 mg Intravenous Q4H PRN    melatonin tab 3 mg  3 mg Oral Nightly PRN    ondansetron (Zofran) 4 MG/2ML injection 4 mg  4 mg Intravenous Q6H PRN    metoclopramide (Reglan) 5 mg/mL injection 5 mg  5 mg Intravenous Q8H PRN    benzonatate (Tessalon) cap 200 mg  200 mg Oral TID PRN    glycerin-hypromellose- (Artificial Tears) 0.2-0.2-1 % ophthalmic solution 1 drop  1 drop Both Eyes QID PRN    sodium chloride (Saline Mist) 0.65 % nasal solution 1 spray  1 spray Each Nare Q3H PRN    ciprofloxacin in dextrose 5% (Cipro) 400 mg/200mL IVPB premix 400 mg  400 mg Intravenous Q12H    metRONIDAZOLE (Flagyl) tab 500 mg  500 mg Oral Q12H    aspirin DR tab 81 mg  81 mg Oral Daily    atorvastatin (Lipitor) tab 20 mg  20 mg Oral Nightly    furosemide (Lasix) tab 20 mg  20 mg Oral QOD    metoprolol succinate ER (Toprol XL) 24 hr tab 100 mg  100 mg Oral Daily Beta Blocker    pantoprazole (Protonix) DR tab 40 mg  40 mg Oral QAM AC    pancrelipase (Lip-Prot-Amyl) (Zenpep) DR particles cap 20,000 Units  20,000 Units Oral TID CC    sacubitril-valsartan (Entresto) 24-26 MG per tab 1 tablet  1 tablet Oral BID    [COMPLETED] sodium chloride 0.9 % IV bolus 1,000 mL  1,000 mL Intravenous Once    [COMPLETED] iopamidol 76% (ISOVUE-370) injection for power injector  100 mL Intravenous ONCE PRN     Allergies: No Known Allergies  Social HX:   Social History     Socioeconomic History     Marital status:     Number of children: 2   Occupational History    Occupation: retired   Tobacco Use    Smoking status: Former     Current packs/day: 0.00     Types: Cigarettes     Quit date: 6/15/1979     Years since quittin.2    Smokeless tobacco: Never    Tobacco comments:     Stopped   Vaping Use    Vaping status: Never Used   Substance and Sexual Activity    Alcohol use: No    Drug use: Yes     Frequency: 7.0 times per week     Types: Cannabis   Other Topics Concern     Service Yes     Comment: agent orange.     Caffeine Concern No     Comment: coffee a couple of cups-decaf    Occupational Exposure Yes    Exercise Yes     Comment: daily   Social History Narrative    Here with his wife.     Hobby of gun dealer.     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     Live close to [place].     Lives with his wife.      Social Determinants of Health     Financial Resource Strain: Low Risk  (2023)    Received from [facility], [facility]    Financial Resource Strain     In the past year, have you or any family members you live with been unable to get any of the following when it was really needed? Check all that apply.: None   Food Insecurity: No Food Insecurity (2024)    Food Insecurity     Food Insecurity: Never true   Transportation Needs: No Transportation Needs (2024)    Transportation Needs     Lack of Transportation: No   Physical Activity: High Risk (2024)    Received from [facility]    Exercise Vital Sign     On average, how many days per week do you engage in moderate to strenuous exercise (like a brisk walk)?: 0 days     On average, how many minutes do you engage in exercise at this level?: 0 min   Social Connections: Low Risk  (2023)    Received from [facility], [facility]    Social Connections     How often do you see or talk to people that you care about and feel close to? (For example: talking to friends on the phone,  visiting friends or family, going to Shinto or club meetings): 5 or more times a week   Housing Stability: Low Risk  (9/9/2024)    Housing Stability     Housing Instability: No      FamHx: The patient has no family history of colon cancer or other gastrointestinal malignancies;  No family history of ulcer disease, or inflammatory bowel disease  ROS:  In addition to the pertinent positives described above:            Infectious Disease:  No chronic infections or recent fevers prior to the acute illness            Cardiovascular: + CM, SSS s/p PPM, dyslipidemia, CAD s/p CABG, HTN, AF            Respiratory: + LONG            Hematologic: + Budd Chiari syndrome;  + anemia            Dermatologic: The patient reports no recent rashes or chronic skin disorders            Rheumatologic: The patient reports no history of chronic arthritis, myalgias, arthralgias            Genitourinary:  The patient reports no history of recurrent urinary tract infections, hematuria, dysuria, or nephrolithiasis           Psychiatric: The patient reports no history of depression, anxiety, suicidal ideation, or homicidal ideation           Oncologic: + cholangiocarcinoma s/p Whipple (2016)           ENT: The patient reports no hoarseness of voice, hearing loss, sinus congestion, tinnitus           Neurologic: The patient reports no history of seizure, stroke, or frequent headaches  PE: /71 (BP Location: Left arm)   Pulse 72   Temp 97.4 °F (36.3 °C) (Oral)   Resp 18   Wt 176 lb 5.9 oz (80 kg)   SpO2 91%   BMI 26.05 kg/m²   Gen: AAO x 3, able to speak in complete sentences  HENT: EOMI, PERRL, oropharynx is clear with moist mucosal membranes  Eyes: Sclerae are anicteric  Neck:  Supple without nuchal rigidity  CV: Regular rate and rhythm, with normal S1 and S2  Resp: Clear to auscultation bilaterally without wheezes; rubs, rhonchi, or rales  Abdomen: Soft, upper abd tenderness, non-distended with the presence of bowel sounds; No  hepatosplenomegaly; no rebound or guarding; No ascites is clinically apparent; no tympany to percussion  Ext: No peripheral edema or cyanosis  Skin: Warm and dry  Psychiatric: Flat affect   Labs:   Lab Results   Component Value Date    WBC 7.1 09/09/2024    HGB 12.9 09/09/2024    HCT 37.1 09/09/2024    .0 09/09/2024    CREATSERUM 1.20 09/09/2024    BUN 14 09/09/2024     09/09/2024    K 3.9 09/09/2024     09/09/2024    CO2 22.0 09/09/2024     09/09/2024    CA 9.4 09/09/2024    ALB 4.2 09/08/2024    ALKPHO 96 09/08/2024    BILT 0.6 09/08/2024    AST 21 09/08/2024    ALT 23 09/08/2024    LIP 18 09/08/2024     Recent Labs   Lab 09/08/24 2054 09/09/24  0645   * 114*   BUN 20 14   CREATSERUM 1.32* 1.20   CA 9.8 9.4   * 134*   K 4.0 3.9    103   CO2 24.0 22.0     Recent Labs   Lab 09/09/24  0645   RBC 4.12   HGB 12.9*   HCT 37.1*   MCV 90.0   MCH 31.3   MCHC 34.8   RDW 14.6   NEPRELIM 4.15   WBC 7.1   .0       Recent Labs   Lab 09/08/24 2054   ALT 23   AST 21       Imaging:   PROCEDURE:  CT ABDOMEN+PELVIS (CONTRAST ONLY) (CPT=74177)     COMPARISON:  [facility] , CT, CT ABDOMEN PELVIS IV CONTRAST, NO ORAL (ER), 4/06/2023, 5:26 PM.  [facility] , CT, CT ABDOMEN+PELVIS(CONTRAST ONLY)(CPT=74177), 5/12/2023, 12:45 PM.     INDICATIONS:  left upper abdominal and umbilical abdominal pain, patient reports multiple abdominal surgeries.     TECHNIQUE:  CT scanning was performed from the dome of the diaphragm to the pubic symphysis with non-ionic intravenous contrast material. Post contrast coronal MPR imaging was performed.  Dose reduction techniques were used. Dose information is  transmitted to the ACR (American College of Radiology) NRDR (National Radiology Data Registry) which includes the Dose Index Registry.     PATIENT STATED HISTORY:(As transcribed by Technologist)  Patient reports general abdominal pain and minor nausea after eating, beginning yesterday. Patient denies  vomiting/urinary symptoms      CONTRAST USED:  100cc of Isovue 370     FINDINGS:    LIVER:  No enlargement, atrophy, abnormal density, or significant focal lesion.    BILIARY:  Sequelae of cholecystectomy.  PANCREAS:  Fullness of the pancreatic head is noted.  Mild dilatation of the pancreatic duct measuring 5 mm.  SPLEEN:  Sequelae of splenectomy.  KIDNEYS:  No mass, obstruction, or calcification.    ADRENALS:  No mass or enlargement.    AORTA/VASCULAR:  Abdominal aortic aneurysm measures 3 cm.  Vascular calcifications are noted.    RETROPERITONEUM:  No mass or adenopathy.    BOWEL/MESENTERY:  Sequelae of gastric bypass is noted.  Diffuse thickening throughout the bypass initial jejunal loops.  Diverticulosis of the colon is noted.  The appendix is normal.  ABDOMINAL WALL:  Tiny fat containing left inguinal hernia.  URINARY BLADDER:  Stable left lateral bladder diverticulum measures 2.5 x 1.3 cm.  PELVIC NODES:  No adenopathy.    PELVIC ORGANS:  No visible mass.  Pelvic organs appropriate for patient age.    BONES:  No bony lesion or fracture.    LUNG BASES:  No visible pulmonary or pleural disease.    OTHER:  Negative.        Impression   CONCLUSION:       1. Sequelae of gastric bypass is noted.  Thickening of multiple proximal jejunal loops may be sequelae of infectious or inflammatory etiology.  Ischemia cannot be entirely excluded.     2. The appendix is unremarkable.     3. There is diverticulosis of the colon without evidence of acute diverticulitis.          LOCATION:  [facility]        Dictated by (CST): Juan Wilson MD on 9/08/2024 at 11:22 PM      Finalized by (CST): Juan Wilson MD on 9/08/2024 at 11:30 PM      Impression: 73 yr-old male with extensive hx that includes CM s/p ICD, dyslipidemia, CAD s/p CABG, HTN, AF, Budd-Chiari syndrome, and cholangiocarcinoma s/p Whipple with recurrent incidental pheochromocytoma (2016) who was noted to have large 2 cm clean based anastomotic ulcer--bx H Pylori  + (11/2020; Dr Hess) admitted yesterday with recurrent upper abd pain, worse with PO intake, and has led to unintentional wt loss.   CT a/p IV suggests fullness of pancreatic head with mild dilation of the PD (5 mm), diffuse thickening throughout the bypass jejunal loops; labs with normal lipase, normal lactic acid (0.9), normal LFTs, normal CBC. Will plan for EGD in AM to assess for ulcerations, treat with PPI IV BID, if diarrhea develops obtain stool studies to r/o infection, and consider additional pancreatic imaging pending clinical course.    The risks, benefits, alternatives of the procedure including the risks of anesthesia, bleeding, perforation, missed lesions, need for surgery, and infection were discussed with the patient. He expressed understanding of the risks and was agreeable to proceed.    Recommendations:     Plan for EGD in AM under MAC with Dr Brizuela   Clear liquid diet today; NPO after midnight with sips of water for necessary medications   Protonix 40 mg IV BID   If diarrhea develops, obtain stool for C Diff + GI PCR panel  Pain management per Hospitalist recommendations; antiemetics as needed    Consider CT a/p pancreatic protocol given pancreas findings on CT and inability to complete EUS d/t Whipple anatomy and inability to complete MRCP as pt with ICD   CBC, CMP in AM correcting electrolytes as needed per Hospitalist recommendations     Thank you for the consultation, we will follow the patient with you.  Attending addendum (Dr Brizuela) to follow later today and provide formal, final recommendations at that time   JEWEL Valentin  9:31 AM  9/9/2024  [facility]  [phone]    Attending physician addendum:    I personally saw and examined the patient. I agree with the above comprehensive history, exam, assessment and plan.     Mr. Johnny Bryan is a 73 year-old male being seen in consultation for abdominal pain. His abdomen is soft and non tender. He is non-toxic  appearing and in no acute distress. Labs and imaging were reviewed. He has a history of pancreatic head cancer s/p Whipple in 2016 and anastomotic marginal ulcer. Plan for EGD for further investigation assess for marginal ulcer. CT otherwise re-assuring.     KATIE Brizuela  Gastroenterology/Advanced Endoscopy  [facility]

## 2024-09-09 NOTE — H&P (VIEW-ONLY)
[facility]                       Gastroenterology Consultation-[facility]    Mark Anthony Bryan Patient Status:  Inpatient    1950 MRN [id number]   Location [facility] 4NW-A Attending Edmund Blanc MD   Hosp Day # 0 PCP Elton Flores MD     Reason for consultation: Abd pain   HPI: This is a 73 yr-old male with extensive PMhx that includes CM s/p ICD, dyslipidemia, CAD s/p CABG, HTN, AF, Budd-Chiari syndrome, and cholangiocarcinoma s/p Whipple with recurrent incidental pheochromocytoma () who was noted to have large 2 cm clean based anastomotic ulcer--bx H Pylori + (2020; Dr Hess) who presented to ER yesterday with abd pain. Pt reports developing upper abd pain x 1 week--worse after PO intake therefore he has been limiting food and reports unintentional 10 lb wt loss/2 weeks. No nausea, vomiting, diarrhea, melena, hematochezia, or fevers/chills. No NSAIDs, anticoagulants, or antiplatelet agents; reports compliance with daily PPI.   CT a/p IV suggests fullness of pancreatic head with mild dilation of the PD (5 mm), diffuse thickening throughout the bypass jejunal loops; labs with normal lipase, normal lactic acid (0.9), normal LFTs, normal CBC  Pt currently reports no abd pain which he attributes to fasting and has remained hemodynamically stable   PMHx:   Past Medical History:    Abdominal distention    Abdominal hernia    Abdominal pain    constant    Abnormal finding on GI tract imaging    Acute respiratory failure, unspecified whether with hypoxia or hypercapnia (HCC)    Agent orange exposure    MATT (acute kidney injury) (HCC)    Anemia    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 Had fever and Ha    Decorative tattoo    Diverticulosis of large intestine    Dizziness    Enteritis    Exposure to radiation    completed 2/17/17    Fatigue    Flash pulmonary edema (HCC)    Flatulence/gas pain/belching    Frequent urination    Hearing impairment    hearing loss in right    Hearing loss    Heart attack (HCC)    NONSTEMI    Hemorrhoids    High blood pressure    High cholesterol    History of syncope    Hyperkalemia    Hypokalemia    Hyponatremia    Ileus (HCC)    Jaundice    Nausea    Nausea and vomiting in adult    Night sweats    NSTEMI (non-ST elevated myocardial infarction) (HCC)    Osteoporosis    Pacemaker    Pancreatic cancer (HCC)    chloangiocarcinoma    Personal history of antineoplastic chemotherapy    Completed chemo 2/17/17    Pheochromocytoma, left    Dx in 6/2016: 1.7 cm mass near inferior adrenal gland    Pneumoperitoneum    Rash    Renal disorder    Shortness of breath    Sleep apnea    CPAP    Syncope, near    Tobacco abuse    Uncomfortable fullness after meals    Visual impairment    glasses    Weight gain    Weight loss                PSHx:   Past Surgical History:   Procedure Laterality Date    Angiogram  06/24/2019    Bowel resection      Bypass surgery  06/27/2019    CABG x 3    Cabg      Cardiac defibrillator placement      Cardiac pacemaker placement      medtronic    Cath drug eluting stent      Cholecystectomy  08/16/2016    Colonoscopy  12/13/2011    Dr. Hu repeat 5 yrs    Colonoscopy  overdue    Hernia surgery      over 35 yrs ago    Needle biopsy liver  08/16/2016    Other      right index finger surgery    Other surgical history  06/23/2016    EUS/EGD/FNA    Placement, bile duct stent      Removal gallbladder      Whipple  08/16/2016    Pancreaticoduodenectomy with regional lymphadenectomy, Left adrenalectomy with resection of retroperitoneal  periadrenal tumor, Omental pedicle flap, Splenectomy, and Wedge resection segment 4b liver mass.     Medications:    [] sodium chloride 0.9% infusion   Intravenous Continuous    enoxaparin (Lovenox) 40 MG/0.4ML SUBQ injection 40 mg  40 mg Subcutaneous Daily    acetaminophen (Tylenol Extra Strength) tab 500 mg  500 mg Oral Q4H PRN    HYDROmorphone (Dilaudid) 1 MG/ML injection 0.2 mg  0.2 mg Intravenous Q4H PRN    Or    HYDROmorphone (Dilaudid) 1 MG/ML injection 0.4 mg  0.4 mg Intravenous Q4H PRN    Or    HYDROmorphone (Dilaudid) 1 MG/ML injection 0.8 mg  0.8 mg Intravenous Q4H PRN    melatonin tab 3 mg  3 mg Oral Nightly PRN    ondansetron (Zofran) 4 MG/2ML injection 4 mg  4 mg Intravenous Q6H PRN    metoclopramide (Reglan) 5 mg/mL injection 5 mg  5 mg Intravenous Q8H PRN    benzonatate (Tessalon) cap 200 mg  200 mg Oral TID PRN    glycerin-hypromellose- (Artificial Tears) 0.2-0.2-1 % ophthalmic solution 1 drop  1 drop Both Eyes QID PRN    sodium chloride (Saline Mist) 0.65 % nasal solution 1 spray  1 spray Each Nare Q3H PRN    ciprofloxacin in dextrose 5% (Cipro) 400 mg/200mL IVPB premix 400 mg  400 mg Intravenous Q12H    metRONIDAZOLE (Flagyl) tab 500 mg  500 mg Oral Q12H    aspirin DR tab 81 mg  81 mg Oral Daily    atorvastatin (Lipitor) tab 20 mg  20 mg Oral Nightly    furosemide (Lasix) tab 20 mg  20 mg Oral QOD    metoprolol succinate ER (Toprol XL) 24 hr tab 100 mg  100 mg Oral Daily Beta Blocker    pantoprazole (Protonix) DR tab 40 mg  40 mg Oral QAM AC    pancrelipase (Lip-Prot-Amyl) (Zenpep) DR particles cap 20,000 Units  20,000 Units Oral TID CC    sacubitril-valsartan (Entresto) 24-26 MG per tab 1 tablet  1 tablet Oral BID    [COMPLETED] sodium chloride 0.9 % IV bolus 1,000 mL  1,000 mL Intravenous Once    [COMPLETED] iopamidol 76% (ISOVUE-370) injection for power injector  100 mL Intravenous ONCE PRN     Allergies: No Known Allergies  Social HX:   Social History     Socioeconomic History     Marital status:     Number of children: 2   Occupational History    Occupation: retired   Tobacco Use    Smoking status: Former     Current packs/day: 0.00     Types: Cigarettes     Quit date: 6/15/1979     Years since quittin.2    Smokeless tobacco: Never    Tobacco comments:     Stopped   Vaping Use    Vaping status: Never Used   Substance and Sexual Activity    Alcohol use: No    Drug use: Yes     Frequency: 7.0 times per week     Types: Cannabis   Other Topics Concern     Service Yes     Comment: agent orange.     Caffeine Concern No     Comment: coffee a couple of cups-decaf    Occupational Exposure Yes    Exercise Yes     Comment: daily   Social History Narrative    Here with his wife.     Hobby of gun dealer.     Builds [employer]     Live close to [place].     Lives with his wife.      Social Determinants of Health     Financial Resource Strain: Low Risk  (2023)    Received from [facility]    Financial Resource Strain     In the past year, have you or any family members you live with been unable to get any of the following when it was really needed? Check all that apply.: None   Food Insecurity: No Food Insecurity (2024)    Food Insecurity     Food Insecurity: Never true   Transportation Needs: No Transportation Needs (2024)    Transportation Needs     Lack of Transportation: No   Physical Activity: High Risk (2024)    Received from [facility]    Exercise Vital Sign     On average, how many days per week do you engage in moderate to strenuous exercise (like a brisk walk)?: 0 days     On average, how many minutes do you engage in exercise at this level?: 0 min   Social Connections: Low Risk  (2023)    Received from [facility], [facility]    Social Connections     How often do you see or talk to people that you care about and feel close to? (For example: talking to friends on the phone,  visiting friends or family, going to Catholic or club meetings): 5 or more times a week   Housing Stability: Low Risk  (9/9/2024)    Housing Stability     Housing Instability: No      FamHx: The patient has no family history of colon cancer or other gastrointestinal malignancies;  No family history of ulcer disease, or inflammatory bowel disease  ROS:  In addition to the pertinent positives described above:            Infectious Disease:  No chronic infections or recent fevers prior to the acute illness            Cardiovascular: + CM, SSS s/p PPM, dyslipidemia, CAD s/p CABG, HTN, AF            Respiratory: + LONG            Hematologic: + Budd Chiari syndrome;  + anemia            Dermatologic: The patient reports no recent rashes or chronic skin disorders            Rheumatologic: The patient reports no history of chronic arthritis, myalgias, arthralgias            Genitourinary:  The patient reports no history of recurrent urinary tract infections, hematuria, dysuria, or nephrolithiasis           Psychiatric: The patient reports no history of depression, anxiety, suicidal ideation, or homicidal ideation           Oncologic: + cholangiocarcinoma s/p Whipple (2016)           ENT: The patient reports no hoarseness of voice, hearing loss, sinus congestion, tinnitus           Neurologic: The patient reports no history of seizure, stroke, or frequent headaches  PE: /71 (BP Location: Left arm)   Pulse 72   Temp 97.4 °F (36.3 °C) (Oral)   Resp 18   Wt 176 lb 5.9 oz (80 kg)   SpO2 91%   BMI 26.05 kg/m²   Gen: AAO x 3, able to speak in complete sentences  HENT: EOMI, PERRL, oropharynx is clear with moist mucosal membranes  Eyes: Sclerae are anicteric  Neck:  Supple without nuchal rigidity  CV: Regular rate and rhythm, with normal S1 and S2  Resp: Clear to auscultation bilaterally without wheezes; rubs, rhonchi, or rales  Abdomen: Soft, upper abd tenderness, non-distended with the presence of bowel sounds; No  hepatosplenomegaly; no rebound or guarding; No ascites is clinically apparent; no tympany to percussion  Ext: No peripheral edema or cyanosis  Skin: Warm and dry  Psychiatric: Flat affect   Labs:   Lab Results   Component Value Date    WBC 7.1 09/09/2024    HGB 12.9 09/09/2024    HCT 37.1 09/09/2024    .0 09/09/2024    CREATSERUM 1.20 09/09/2024    BUN 14 09/09/2024     09/09/2024    K 3.9 09/09/2024     09/09/2024    CO2 22.0 09/09/2024     09/09/2024    CA 9.4 09/09/2024    ALB 4.2 09/08/2024    ALKPHO 96 09/08/2024    BILT 0.6 09/08/2024    AST 21 09/08/2024    ALT 23 09/08/2024    LIP 18 09/08/2024     Recent Labs   Lab 09/08/24 2054 09/09/24  0645   * 114*   BUN 20 14   CREATSERUM 1.32* 1.20   CA 9.8 9.4   * 134*   K 4.0 3.9    103   CO2 24.0 22.0     Recent Labs   Lab 09/09/24  0645   RBC 4.12   HGB 12.9*   HCT 37.1*   MCV 90.0   MCH 31.3   MCHC 34.8   RDW 14.6   NEPRELIM 4.15   WBC 7.1   .0       Recent Labs   Lab 09/08/24 2054   ALT 23   AST 21       Imaging:   PROCEDURE:  CT ABDOMEN+PELVIS (CONTRAST ONLY) (CPT=74177)     COMPARISON:  [facility] , CT, CT ABDOMEN PELVIS IV CONTRAST, NO ORAL (ER), 4/06/2023, 5:26 PM.  [facility] , CT, CT ABDOMEN+PELVIS(CONTRAST ONLY)(CPT=74177), 5/12/2023, 12:45 PM.     INDICATIONS:  left upper abdominal and umbilical abdominal pain, patient reports multiple abdominal surgeries.     TECHNIQUE:  CT scanning was performed from the dome of the diaphragm to the pubic symphysis with non-ionic intravenous contrast material. Post contrast coronal MPR imaging was performed.  Dose reduction techniques were used. Dose information is  transmitted to the ACR (American College of Radiology) NRDR (National Radiology Data Registry) which includes the Dose Index Registry.     PATIENT STATED HISTORY:(As transcribed by Technologist)  Patient reports general abdominal pain and minor nausea after eating, beginning yesterday. Patient denies  vomiting/urinary symptoms      CONTRAST USED:  100cc of Isovue 370     FINDINGS:    LIVER:  No enlargement, atrophy, abnormal density, or significant focal lesion.    BILIARY:  Sequelae of cholecystectomy.  PANCREAS:  Fullness of the pancreatic head is noted.  Mild dilatation of the pancreatic duct measuring 5 mm.  SPLEEN:  Sequelae of splenectomy.  KIDNEYS:  No mass, obstruction, or calcification.    ADRENALS:  No mass or enlargement.    AORTA/VASCULAR:  Abdominal aortic aneurysm measures 3 cm.  Vascular calcifications are noted.    RETROPERITONEUM:  No mass or adenopathy.    BOWEL/MESENTERY:  Sequelae of gastric bypass is noted.  Diffuse thickening throughout the bypass initial jejunal loops.  Diverticulosis of the colon is noted.  The appendix is normal.  ABDOMINAL WALL:  Tiny fat containing left inguinal hernia.  URINARY BLADDER:  Stable left lateral bladder diverticulum measures 2.5 x 1.3 cm.  PELVIC NODES:  No adenopathy.    PELVIC ORGANS:  No visible mass.  Pelvic organs appropriate for patient age.    BONES:  No bony lesion or fracture.    LUNG BASES:  No visible pulmonary or pleural disease.    OTHER:  Negative.        Impression   CONCLUSION:       1. Sequelae of gastric bypass is noted.  Thickening of multiple proximal jejunal loops may be sequelae of infectious or inflammatory etiology.  Ischemia cannot be entirely excluded.     2. The appendix is unremarkable.     3. There is diverticulosis of the colon without evidence of acute diverticulitis.          LOCATION:  [facility]        Dictated by (CST): Juan Wilson MD on 9/08/2024 at 11:22 PM      Finalized by (CST): Juan Wilson MD on 9/08/2024 at 11:30 PM      Impression: 73 yr-old male with extensive hx that includes CM s/p ICD, dyslipidemia, CAD s/p CABG, HTN, AF, Budd-Chiari syndrome, and cholangiocarcinoma s/p Whipple with recurrent incidental pheochromocytoma (2016) who was noted to have large 2 cm clean based anastomotic ulcer--bx H Pylori  + (11/2020; Dr Hess) admitted yesterday with recurrent upper abd pain, worse with PO intake, and has led to unintentional wt loss.   CT a/p IV suggests fullness of pancreatic head with mild dilation of the PD (5 mm), diffuse thickening throughout the bypass jejunal loops; labs with normal lipase, normal lactic acid (0.9), normal LFTs, normal CBC. Will plan for EGD in AM to assess for ulcerations, treat with PPI IV BID, if diarrhea develops obtain stool studies to r/o infection, and consider additional pancreatic imaging pending clinical course.    The risks, benefits, alternatives of the procedure including the risks of anesthesia, bleeding, perforation, missed lesions, need for surgery, and infection were discussed with the patient. He expressed understanding of the risks and was agreeable to proceed.    Recommendations:     Plan for EGD in AM under MAC with Dr Brizuela   Clear liquid diet today; NPO after midnight with sips of water for necessary medications   Protonix 40 mg IV BID   If diarrhea develops, obtain stool for C Diff + GI PCR panel  Pain management per Hospitalist recommendations; antiemetics as needed    Consider CT a/p pancreatic protocol given pancreas findings on CT and inability to complete EUS d/t Whipple anatomy and inability to complete MRCP as pt with ICD   CBC, CMP in AM correcting electrolytes as needed per Hospitalist recommendations     Thank you for the consultation, we will follow the patient with you.  Attending addendum (Dr Brizuela) to follow later today and provide formal, final recommendations at that time   JEWEL Valentin  9:31 AM  9/9/2024  [facility]  [phone]    Attending physician addendum:    I personally saw and examined the patient. I agree with the above comprehensive history, exam, assessment and plan.     Mr. Johnny Bryan is a 73 year-old male being seen in consultation for abdominal pain. His abdomen is soft and non tender. He is non-toxic  appearing and in no acute distress. Labs and imaging were reviewed. He has a history of pancreatic head cancer s/p Whipple in 2016 and anastomotic marginal ulcer. Plan for EGD for further investigation assess for marginal ulcer. CT otherwise re-assuring.     KATIE Brizuela  Gastroenterology/Advanced Endoscopy  [facility]

## 2024-09-09 NOTE — CDS QUERY
Dear Dr. Balbuena  Please clarify heart failure:   (  ) Chronic Systolic Heart Failure  (  ) Other (:please specify):       CLINICAL INFORMATION FROM THE MEDICAL RECORD      __ed impression: abd pain; dehydration; hyponatremia     __h&p: abd pain 2/2 enteritis vs ischemia; hyponatremia 2/2 siadh; ckd3; normocytic anemia; afib; hld; hfref; gerd;      Treatment: metoprolol, po Lasix     If you have any questions, please contact Clinical  Nina Enriquez RN, CDS  at #[phone]. Thank You!    THIS FORM IS A PERMANENT PART OF THE MEDICAL RECORD

## 2024-09-09 NOTE — PROGRESS NOTES
For egd tomorrow w/ , Consent was signed,Denies any pain or discomfort,On iv and po antibiotic.Has fair appetite,Ambulatory,Attended needs.Will be npo after midnight.

## 2024-09-09 NOTE — H&P
[facility]     Mark Anthony Bryan Patient Status:  Emergency    1950 MRN [id number]   Location [facility] EMERGENCY DEPARTMENT Attending Heladio Ellis,    Hosp Day # 0 PCP Elton Flores MD     Chief Complaint: abdominal pain    Subjective:    History of Present Illness:     Mark Anthony Bryan is a 73 year old male with PMHx cholangiocarcinoma (s/p Whipple , chemo/ rads)/ Budd Chiari syndrome/ HTN/ CAD (s/p CABG)/ A-fib/ LONG/ ischemic cardiomyopathy/ HLD/ SSS (s/p PPM) who presented to the hospital for abdominal pain. For the past week he noticed abdominal pain in his epigastric region which was intermittent and cramping rated 6/10. It was worse with meals and he has not been eating much as a result with weight loss. He denied any alleviating factors. He denied nausea, emesis, diarrhea, constipation, bloody stools.    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 Had fever and Ha    Decorative tattoo    Diverticulosis of large intestine    Dizziness    Enteritis    Exposure to radiation    completed 17    Fatigue    Flash pulmonary edema (HCC)    Flatulence/gas pain/belching    Frequent urination    Hearing impairment    hearing loss in right    Hearing loss    Heart  attack (HCC)    NONSTEMI    Hemorrhoids    High blood pressure    High cholesterol    History of syncope    Hyperkalemia    Hypokalemia    Hyponatremia    Ileus (HCC)    Jaundice    Nausea    Night sweats    NSTEMI (non-ST elevated myocardial infarction) (HCC)    Osteoporosis    Pacemaker    Pancreatic cancer (HCC)    chloangiocarcinoma    Personal history of antineoplastic chemotherapy    Completed chemo 2/17/17    Pheochromocytoma, left    Dx in 6/2016: 1.7 cm mass near inferior adrenal gland    Pneumoperitoneum    Rash    Shortness of breath    Sleep apnea    CPAP    Syncope, near    Tobacco abuse    Uncomfortable fullness after meals    Visual impairment    glasses    Weight gain    Weight loss     Past Surgical History:   Past Surgical History:   Procedure Laterality Date    Angiogram  06/24/2019    Bowel resection      Bypass surgery  06/27/2019    CABG x 3    Cabg      Cardiac defibrillator placement      Cardiac pacemaker placement      medtronic    Cath drug eluting stent      Cholecystectomy  08/16/2016    Colonoscopy  12/13/2011    Dr. Hu repeat 5 yrs    Colonoscopy  overdue    Hernia surgery      over 35 yrs ago    Needle biopsy liver  08/16/2016    Other      right index finger surgery    Other surgical history  06/23/2016    EUS/EGD/FNA    Placement, bile duct stent      Removal gallbladder      Whipple  08/16/2016    Pancreaticoduodenectomy with regional lymphadenectomy, Left adrenalectomy with resection of retroperitoneal periadrenal tumor, Omental pedicle flap, Splenectomy, and Wedge resection segment 4b liver mass.      Family History:   Family History   Problem Relation Age of Onset    Diabetes Father         83 yrs old    Other (Other) Father         Heart Failure    Cancer Mother         Rectal Cancer    Diabetes Mother         82 yrs old    Hypertension Mother         ?    Colon Cancer Mother         83    Other (Other) Other         No adrenal issues    Diabetes Sister     Thyroid disease  Neg     Thyroid Disorder Neg      Social History:    reports that he quit smoking about 45 years ago. His smoking use included cigarettes. He has never used smokeless tobacco. He reports current drug use. Frequency: 7.00 times per week. Drug: Cannabis. He reports that he does not drink alcohol.     Allergies: No Known Allergies    Medications:    No current facility-administered medications on file prior to encounter.     Current Outpatient Medications on File Prior to Encounter   Medication Sig Dispense Refill    Omeprazole 40 MG Oral Capsule Delayed Release Take 1 capsule (40 mg total) by mouth daily. 90 capsule 3    pancrelipase, Lip-Prot-Amyl, (ZENPEP) 41441-80760 units Oral Cap DR Particles TAKE 1 CAPSULE BY MOUTH 6 TIMES DAILY WITH MEALS 180 capsule 3    sacubitril-valsartan 24-26 MG Oral Tab Take 1 tablet by mouth 2 (two) times daily.      amiodarone 200 MG Oral Tab Take 0.5 tablets (100 mg total) by mouth daily.      furosemide 20 MG Oral Tab Take 1 tablet (20 mg total) by mouth every other day.      magnesium 250 MG Oral Tab Take 1 tablet (250 mg total) by mouth daily.      Metoprolol Succinate  MG Oral Tablet 24 Hr Take 1 tablet (100 mg total) by mouth daily.      atorvastatin 20 MG Oral Tab Take 1 tablet (20 mg total) by mouth daily. (Patient taking differently: Take 1 tablet (20 mg total) by mouth nightly.) 90 tablet 0    aspirin 81 MG Oral Tab EC Take 1 tablet (81 mg total) by mouth daily.      Denosumab 60 MG/ML Subcutaneous Solution Prefilled Syringe Inject 1 mL (60 mg total) into the skin every 6 (six) months.         Review of Systems:   A comprehensive review of systems was completed.    Pertinent positives and negatives noted in the HPI.    Objective:   Physical Exam:    /71   Pulse 70   Temp 98.2 °F (36.8 °C) (Temporal)   Resp 18   Wt 176 lb 5.9 oz (80 kg)   SpO2 98%   BMI 26.05 kg/m²   General: No acute distress, Alert  Respiratory: No rhonchi, no wheezes, on room  air  Cardiovascular: S1, S2. Regular rate and rhythm  Abdomen: Soft, Non-tender, non-distended, positive bowel sounds  Neuro: No new focal deficits  Extremities: No edema    Results:    Labs:      Labs Last 24 Hours:    Recent Labs   Lab 09/08/24 2054   RBC 4.12   HGB 12.9*   HCT 37.7*   MCV 91.5   MCH 31.3   MCHC 34.2   RDW 14.7   NEPRELIM 3.22   WBC 6.2   .0       Recent Labs   Lab 09/08/24 2054   *   BUN 20   CREATSERUM 1.32*   EGFRCR 57*   CA 9.8   ALB 4.2   *   K 4.0      CO2 24.0   ALKPHO 96   AST 21   ALT 23   BILT 0.6   TP 7.3       Lab Results   Component Value Date    INR 1.18 (H) 04/25/2023    INR 1.10 10/03/2020    INR 1.50 (H) 06/27/2019       No results for input(s): \"TROP\", \"TROPHS\", \"CK\" in the last 168 hours.    No results for input(s): \"TROP\", \"PBNP\" in the last 168 hours.    No results for input(s): \"PCT\" in the last 168 hours.    Imaging: Imaging data reviewed in Epic.    Assessment & Plan:      #Abdominal pain 2/2 enteritis vs ischemia  -LFT WNL  -CT showing thickened proximal jejunal loops infectious vs inflammatory, cannot r//o ischemia  -abdomen is soft and not painful out of proportion making ischemia less likely  -check lactate  -pain control: dilaudid prn  -cover with cipro and flagyl for now  -GI c/s in ED    #hyponatremia  -Na 123  -likely 2/2 SIADH from pain as well as chronic diuretic use and poor salt intake  -can allow to correct to normal over 24 hrs  -cont to monitor BMP    #CKD 3a  -Cr within baseline range    #normocytic anemia  -hgb 12.9: baseline 13.3  -no signs acute bleeding  -cont to monitor CBC  -consider iron studies, retic count if worsening    #A-fib  -cont home amiodarone, metoprolol    #HLD  -cont home statin    #HFrEF  -cont home entresto, furosemide    #GERD  -cont home PPI        Plan of care discussed with ED physician    Rosalba Yousif DO    Supplementary Documentation:     The 21st Century Cures Act makes medical notes like these  available to patients in the interest of transparency. Please be advised this is a medical document. Medical documents are intended to carry relevant information, facts as evident, and the clinical opinion of the practitioner. The medical note is intended as peer to peer communication and may appear blunt or direct. It is written in medical language and may contain abbreviations or verbiage that are unfamiliar.

## 2024-09-09 NOTE — PLAN OF CARE
Problem: CARDIOVASCULAR - ADULT  Goal: Maintains optimal cardiac output and hemodynamic stability  Description: INTERVENTIONS:  - Monitor vital signs, rhythm, and trends  - Monitor for bleeding, hypotension and signs of decreased cardiac output  - Evaluate effectiveness of vasoactive medications to optimize hemodynamic stability  - Monitor arterial and/or venous puncture sites for bleeding and/or hematoma  - Assess quality of pulses, skin color and temperature  - Assess for signs of decreased coronary artery perfusion - ex. Angina  - Evaluate fluid balance, assess for edema, trend weights  Outcome: Progressing  Goal: Absence of cardiac arrhythmias or at baseline  Description: INTERVENTIONS:  - Continuous cardiac monitoring, monitor vital signs, obtain 12 lead EKG if indicated  - Evaluate effectiveness of antiarrhythmic and heart rate control medications as ordered  - Initiate emergency measures for life threatening arrhythmias  - Monitor electrolytes and administer replacement therapy as ordered  Outcome: Progressing     Problem: GASTROINTESTINAL - ADULT  Goal: Minimal or absence of nausea and vomiting  Description: INTERVENTIONS:  - Maintain adequate hydration with IV or PO as ordered and tolerated  - Nasogastric tube to low intermittent suction as ordered  - Evaluate effectiveness of ordered antiemetic medications  - Provide nonpharmacologic comfort measures as appropriate  - Advance diet as tolerated, if ordered  - Obtain nutritional consult as needed  - Evaluate fluid balance  Outcome: Progressing  Goal: Maintains or returns to baseline bowel function  Description: INTERVENTIONS:  - Assess bowel function  - Maintain adequate hydration with IV or PO as ordered and tolerated  - Evaluate effectiveness of GI medications  - Encourage mobilization and activity  - Obtain nutritional consult as needed  - Establish a toileting routine/schedule  - Consider collaborating with pharmacy to review patient's medication  profile  Outcome: Progressing     Problem: METABOLIC/FLUID AND ELECTROLYTES - ADULT  Goal: Glucose maintained within prescribed range  Description: INTERVENTIONS:  - Monitor Blood Glucose as ordered  - Assess for signs and symptoms of hyperglycemia and hypoglycemia  - Administer ordered medications to maintain glucose within target range  - Assess barriers to adequate nutritional intake and initiate nutrition consult as needed  - Instruct patient on self management of diabetes  Outcome: Progressing  Goal: Electrolytes maintained within normal limits  Description: INTERVENTIONS:  - Monitor labs and rhythm and assess patient for signs and symptoms of electrolyte imbalances  - Administer electrolyte replacement as ordered  - Monitor response to electrolyte replacements, including rhythm and repeat lab results as appropriate  - Fluid restriction as ordered  - Instruct patient on fluid and nutrition restrictions as appropriate  Outcome: Progressing  Goal: Hemodynamic stability and optimal renal function maintained  Description: INTERVENTIONS:  - Monitor labs and assess for signs and symptoms of volume excess or deficit  - Monitor intake, output and patient weight  - Monitor urine specific gravity, serum osmolarity and serum sodium as indicated or ordered  - Monitor response to interventions for patient's volume status, including labs, urine output, blood pressure (other measures as available)  - Encourage oral intake as appropriate  - Instruct patient on fluid and nutrition restrictions as appropriate  Outcome: Progressing   Admitted with abd pain, nausea, bloating.  Had some constipation but resolved it at home.  Has an extensive GI and cardiac history.  On clear liquid diet. GI to see in AM

## 2024-09-09 NOTE — ED PROVIDER NOTES
Patient Seen in: [facility]      History     Chief Complaint   Patient presents with    Abdomen/Flank Pain     Stated Complaint: left upper abdominal and umbilical abdominal pain, patient reports multiple abd*    Subjective:   HPI    73-year-old male with multiple medical problems presents emergency room for evaluation of abdominal pain with nausea vomiting, states he has been having also alternating constipation and diarrhea, has been having abdominal pain mostly in the mid upper abdomen.  Patient states he has been losing weight and feeling increasing weakness.  Denies melena or hematochezia.  Denies any fevers or chills.  Denies chest pain or shortness of breath.  Denies urinary symptoms.    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 Had fever and Ha    Decorative tattoo    Diverticulosis of large intestine    Dizziness    Enteritis    Exposure to radiation    completed 2/17/17    Fatigue    Flash pulmonary edema (HCC)    Flatulence/gas pain/belching    Frequent urination    Hearing impairment    hearing loss in right    Hearing loss    Heart attack (HCC)    NONSTEMI    Hemorrhoids    High blood pressure    High cholesterol    History of syncope    Hyperkalemia    Hypokalemia     Hyponatremia    Ileus (HCC)    Jaundice    Nausea    Nausea and vomiting in adult    Night sweats    NSTEMI (non-ST elevated myocardial infarction) (HCC)    Osteoporosis    Pacemaker    Pancreatic cancer (HCC)    chloangiocarcinoma    Personal history of antineoplastic chemotherapy    Completed chemo 17    Pheochromocytoma, left    Dx in 2016: 1.7 cm mass near inferior adrenal gland    Pneumoperitoneum    Rash    Renal disorder    Shortness of breath    Sleep apnea    CPAP    Syncope, near    Tobacco abuse    Uncomfortable fullness after meals    Visual impairment    glasses    Weight gain    Weight loss              Past Surgical History:   Procedure Laterality Date    Angiogram  2019    Bowel resection      Bypass surgery  2019    CABG x 3    Cabg      Cardiac defibrillator placement      Cardiac pacemaker placement      medtronic    Cath drug eluting stent      Cholecystectomy  2016    Colonoscopy  2011    Dr. Hu repeat 5 yrs    Colonoscopy  overdue    Hernia surgery      over 35 yrs ago    Needle biopsy liver  2016    Other      right index finger surgery    Other surgical history  2016    EUS/EGD/FNA    Placement, bile duct stent      Removal gallbladder      Whipple  2016    Pancreaticoduodenectomy with regional lymphadenectomy, Left adrenalectomy with resection of retroperitoneal periadrenal tumor, Omental pedicle flap, Splenectomy, and Wedge resection segment 4b liver mass.                Social History     Socioeconomic History    Marital status:     Number of children: 2   Occupational History    Occupation: retired   Tobacco Use    Smoking status: Former     Current packs/day: 0.00     Types: Cigarettes     Quit date: 6/15/1979     Years since quittin.2    Smokeless tobacco: Never    Tobacco comments:     Stopped   Vaping Use    Vaping status: Never Used   Substance and Sexual Activity    Alcohol use: No    Drug use: Yes     Frequency: 7.0  times per week     Types: Cannabis   Other Topics Concern     Service Yes     Comment: agent orange.     Caffeine Concern No     Comment: coffee a couple of cups-decaf    Occupational Exposure Yes    Exercise Yes     Comment: daily   Social History Narrative    Here with his wife.     Hobby of gun dealer.     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     Live close to [place].     Lives with his wife.      Social Determinants of Health     Financial Resource Strain: Low Risk  (4/29/2023)    Received from [facility], [facility]    Financial Resource Strain     In the past year, have you or any family members you live with been unable to get any of the following when it was really needed? Check all that apply.: None   Food Insecurity: No Food Insecurity (9/9/2024)    Food Insecurity     Food Insecurity: Never true   Transportation Needs: No Transportation Needs (9/9/2024)    Transportation Needs     Lack of Transportation: No   Physical Activity: High Risk (4/16/2024)    Received from [facility]    Exercise Vital Sign     On average, how many days per week do you engage in moderate to strenuous exercise (like a brisk walk)?: 0 days     On average, how many minutes do you engage in exercise at this level?: 0 min   Social Connections: Low Risk  (4/29/2023)    [facility], [facility]    Social Connections     How often do you see or talk to people that you care about and feel close to? (For example: talking to friends on the phone, visiting friends or family, going to [employer] or club meetings): 5 or more times a week   Housing Stability: Low Risk  (9/9/2024)    Housing Stability     Housing Instability: No              Review of Systems    Positive for stated Chief Complaint: Abdomen/Flank Pain    Other systems are as noted in HPI.  Constitutional and vital signs reviewed.      All other systems reviewed and negative except as noted above.    Physical Exam     ED  Triage Vitals [09/08/24 2015]   /77   Pulse 70   Resp 18   Temp 98.2 °F (36.8 °C)   Temp src Temporal   SpO2 94 %   O2 Device None (Room air)       Current Vitals:   Vital Signs  BP: 118/74  Pulse: 72  Resp: 16  Temp: 98.2 °F (36.8 °C)  Temp src: Temporal  MAP (mmHg): 86    Oxygen Therapy  SpO2: 97 %  O2 Device: None (Room air)            Physical Exam    GENERAL: Patient is awake, alert,  in no acute distress.  HEENT:  no scleral icterus.  Mucous membranes are moderately dry   HEART: Regular rate and rhythm, no murmurs.  LUNGS: Clear to auscultation bilaterally.  No Rales, no rhonchi, no wheezing, no stridor.  ABDOMEN: Soft, nondistended, mid to upper abdominal tender, bowel sounds are present, no rebound, no rigidity, no guarding.no pulsatile masses. No CVA tenderness  EXTREMITIES: No peripheral edema, no calf tenderness    ED Course     Labs Reviewed   COMP METABOLIC PANEL (14) - Abnormal; Notable for the following components:       Result Value    Glucose 107 (*)     Sodium 133 (*)     Creatinine 1.32 (*)     eGFR-Cr 57 (*)     All other components within normal limits   CBC WITH DIFFERENTIAL WITH PLATELET - Abnormal; Notable for the following components:    HGB 12.9 (*)     HCT 37.7 (*)     Monocyte Absolute 1.38 (*)     All other components within normal limits   LIPASE - Normal   URINALYSIS WITH CULTURE REFLEX   LACTIC ACID, PLASMA   BASIC METABOLIC PANEL (8)   CBC WITH DIFFERENTIAL WITH PLATELET   RAINBOW DRAW LAVENDER   RAINBOW DRAW LIGHT GREEN                MDM        Differential diagnosis before testing includes but not limited to pancreatitis, bowel obstruction, perforated viscus, electrolyte abnormality, acute kidney injury, which is a medical condition that poses a threat to life/function    Past Medical History/comorbidities-coronary artery disease, history of cholangiocarcinoma, status post Whipple,    Radiographic images  I personally reviewed the radiographs and my individual interpretation  shows CT abdomen no free air  I also reviewed the official reports that showed CT thickening of multiple proximal jejunal loops possibly due to infectious or inflammatory etiology ischemia cannot be entirely excluded.  Appendix unremarkable.  Diverticulosis without diverticulitis.    Discussion of management (consult/physicians, social work, pharmacy,ect) [facility] [facility]      Course of Events during Emergency Room Visit include IV established blood work obtained.  Patient given IV fluid hydration and antiemetic.  CBC white count 6.2 hemoglobin 12.9 platelet 190.  Chemistry sodium 133 potassium 4.0 BUN 20 creatinine 1.32 glucose 1 7.  Lipase 18.  Urinalysis unremarkable.  CT performed, discussed results with the patient, patient reports has been having difficulty with eating/drinking and losing weight, will admit to the hospitalist for further evaluation and GI consult.  Patient agrees with plan.    Shared decision making was utilized           Disposition:    Admission  I have discussed with the patient the results of test, differential diagnosis, and treatment plan. They expressed clear understanding of these instructions and agrees to the plan provided.     Note to patient: The 21st Century Cures Act makes medical notes like these available to patients in the interest of transparency. However, this is a medical document intended as peer to peer communication. It is written in medical language and may contain abbreviations or verbiage that are unfamiliar. It may appear blunt or direct. Medical documents are intended to carry relevant information, facts as evident, and the clinical opinion of the practitioner.           Admission disposition: 9/8/2024 11:54 PM                                        Medical Decision Making      Disposition and Plan     Clinical Impression:  1. Abdominal pain, acute    2. Dehydration    3. Nausea and vomiting in adult    4. Hyponatremia          Disposition:  Admit  9/8/2024 11:54 pm    Follow-up:  No follow-up provider specified.        Medications Prescribed:  Current Discharge Medication List                            Hospital Problems       Present on Admission  Date Reviewed: 8/1/2024            ICD-10-CM Noted POA    * (Principal) Abdominal pain, acute R10.9 9/8/2024 Unknown    Dehydration E86.0 9/9/2024 Unknown    Hyponatremia E87.1 9/9/2024 Unknown    Nausea and vomiting in adult R11.2 9/9/2024 Unknown

## 2024-09-09 NOTE — ED QUICK NOTES
Orders for admission, patient is aware of plan and ready to go upstairs. Any questions, please call ED RN Donita at extension [phone]    Vaccinated? N/a  Type of COVID test sent:  COVID Suspicion level:       Titratable drug(s) infusing:   Rate: N/a    LOC at time of transport: [facility]    Other pertinent information:     CIWA score=  NIH score=

## 2024-09-09 NOTE — ANESTHESIA PREPROCEDURE EVALUATION
PRE-OP EVALUATION    Patient Name: Mark Anthony Bryan    Admit Diagnosis: Dehydration [E86.0]  Hyponatremia [E87.1]  Abdominal pain, acute [R10.9]  Nausea and vomiting in adult [R11.2]    Pre-op Diagnosis: inpt    ESOPHAGOGASTRODUODENOSCOPY (EGD)    Anesthesia Procedure: ESOPHAGOGASTRODUODENOSCOPY (EGD)    Surgeons and Role:     * WaldokiaTen, DO - Primary    Pre-op vitals reviewed.  Temp: 98.8 °F (37.1 °C)  Pulse: 55  Resp: 20  BP: 117/54  SpO2: 95 %  Body mass index is 26.05 kg/m².    Current medications reviewed.  Hospital Medications:   [] sodium chloride 0.9% infusion   Intravenous Continuous    enoxaparin (Lovenox) 40 MG/0.4ML SUBQ injection 40 mg  40 mg Subcutaneous Daily    acetaminophen (Tylenol Extra Strength) tab 500 mg  500 mg Oral Q4H PRN    HYDROmorphone (Dilaudid) 1 MG/ML injection 0.2 mg  0.2 mg Intravenous Q4H PRN    Or    HYDROmorphone (Dilaudid) 1 MG/ML injection 0.4 mg  0.4 mg Intravenous Q4H PRN    Or    HYDROmorphone (Dilaudid) 1 MG/ML injection 0.8 mg  0.8 mg Intravenous Q4H PRN    melatonin tab 3 mg  3 mg Oral Nightly PRN    ondansetron (Zofran) 4 MG/2ML injection 4 mg  4 mg Intravenous Q6H PRN    metoclopramide (Reglan) 5 mg/mL injection 5 mg  5 mg Intravenous Q8H PRN    benzonatate (Tessalon) cap 200 mg  200 mg Oral TID PRN    glycerin-hypromellose- (Artificial Tears) 0.2-0.2-1 % ophthalmic solution 1 drop  1 drop Both Eyes QID PRN    sodium chloride (Saline Mist) 0.65 % nasal solution 1 spray  1 spray Each Nare Q3H PRN    ciprofloxacin in dextrose 5% (Cipro) 400 mg/200mL IVPB premix 400 mg  400 mg Intravenous Q12H    metRONIDAZOLE (Flagyl) tab 500 mg  500 mg Oral Q12H    aspirin DR tab 81 mg  81 mg Oral Daily    atorvastatin (Lipitor) tab 20 mg  20 mg Oral Nightly    furosemide (Lasix) tab 20 mg  20 mg Oral QOD    metoprolol succinate ER (Toprol XL) 24 hr tab 100 mg  100 mg Oral Daily Beta Blocker    pancrelipase (Lip-Prot-Amyl) (Zenpep) DR particles cap 20,000 Units  20,000  Units Oral TID CC    sacubitril-valsartan (Entresto) 24-26 MG per tab 1 tablet  1 tablet Oral BID    pantoprazole (Protonix) 40 mg in sodium chloride 0.9% PF 10 mL IV push  40 mg Intravenous Q12H    [COMPLETED] sodium chloride 0.9 % IV bolus 1,000 mL  1,000 mL Intravenous Once    [COMPLETED] iopamidol 76% (ISOVUE-370) injection for power injector  100 mL Intravenous ONCE PRN       Outpatient Medications:     Medications Prior to Admission   Medication Sig Dispense Refill Last Dose    amiodarone 200 MG Oral Tab Take 1 tablet (200 mg total) by mouth daily.       atorvastatin 20 MG Oral Tab Take 1 tablet (20 mg total) by mouth nightly.       Omeprazole 40 MG Oral Capsule Delayed Release Take 1 capsule (40 mg total) by mouth daily. 90 capsule 3     pancrelipase, Lip-Prot-Amyl, (ZENPEP) 14031-05307 units Oral Cap DR Particles TAKE 1 CAPSULE BY MOUTH 6 TIMES DAILY WITH MEALS 180 capsule 3     sacubitril-valsartan 24-26 MG Oral Tab Take 1 tablet by mouth 2 (two) times daily.       furosemide 20 MG Oral Tab Take 1 tablet (20 mg total) by mouth every other day.       magnesium 250 MG Oral Tab Take 1 tablet (250 mg total) by mouth daily.       Metoprolol Succinate  MG Oral Tablet 24 Hr Take 1 tablet (100 mg total) by mouth daily.       aspirin 81 MG Oral Tab EC Take 1 tablet (81 mg total) by mouth daily.       Denosumab 60 MG/ML Subcutaneous Solution Prefilled Syringe Inject 1 mL (60 mg total) into the skin every 6 (six) months.       amiodarone 200 MG Oral Tab Take 0.5 tablets (100 mg total) by mouth daily.       atorvastatin 20 MG Oral Tab Take 1 tablet (20 mg total) by mouth daily. (Patient taking differently: Take 1 tablet (20 mg total) by mouth nightly.) 90 tablet 0        Allergies: Patient has no known allergies.      Anesthesia Evaluation        Anesthetic Complications  (-) history of anesthetic complications         GI/Hepatic/Renal  Comment: Lionel  Hx Cholangiocarcinoma s/p whipple                                Cardiovascular  Comment: 10/3/23 TTE    Left ventricle: The cavity size was increased. Wall thickness was normal.      Systolic function was mildly to moderately reduced. The estimated      ejection fraction was 35-40%, by visual assessment. Unable to assess LV      diastolic function due to heart rhythm.   3. Left ventricle: There is akinesis of the apical inferior wall and the      apex. There is hypokinesis of the basal-mid inferior wall.   4. Right ventricle: The cavity size was normal. Pacer C/W ICD noted in the      right ventricle. Systolic function was mildly reduced.   5. Left atrium: The atrium was mildly to moderately dilated.   6. Right atrium: The atrium was mildly dilated. Pacer C/W ICD noted in right      atrium.   7. Mitral valve: There was moderate to severe regurgitation.   8. Pulmonary arteries: Systolic pressure was at the upper limits of normal      to, at most, mildly increased; in the range of 30 mm Hg to 35 mm Hg,      estimated to be 35mm Hg. The peak systolic pressure is 35mm Hg.   9. Pericardium, extracardiac: There was no pericardial effusion. There was a      right pleural effusion.         Exercise tolerance: poor     MET: <=4      (+) hypertension     (+) CAD    (+) CABG/stent  (+) pacemaker/AICD       (+) dysrhythmias and atrial fibrillation  (+) CHF                Endo/Other                                  Pulmonary        (+) COPD       (+) shortness of breath     (+) sleep apnea       Neuro/Psych                                      Past Surgical History:   Procedure Laterality Date    Angiogram  06/24/2019    Bowel resection      Bypass surgery  06/27/2019    CABG x 3    Cabg      Cardiac defibrillator placement      Cardiac pacemaker placement      medtronic    Cath drug eluting stent      Cholecystectomy  08/16/2016    Colonoscopy  12/13/2011    Dr. Hu repeat 5 yrs    Colonoscopy  overdue    Hernia surgery      over 35 yrs ago    Needle biopsy liver   2016    Other      right index finger surgery    Other surgical history  2016    EUS/EGD/FNA    Placement, bile duct stent      Removal gallbladder      Whipple  2016    Pancreaticoduodenectomy with regional lymphadenectomy, Left adrenalectomy with resection of retroperitoneal periadrenal tumor, Omental pedicle flap, Splenectomy, and Wedge resection segment 4b liver mass.     Social History     Socioeconomic History    Marital status:     Number of children: 2   Occupational History    Occupation: retired   Tobacco Use    Smoking status: Former     Current packs/day: 0.00     Types: Cigarettes     Quit date: 6/15/1979     Years since quittin.2    Smokeless tobacco: Never    Tobacco comments:     Stopped   Vaping Use    Vaping status: Never Used   Substance and Sexual Activity    Alcohol use: No    Drug use: Yes     Frequency: 7.0 times per week     Types: Cannabis   Other Topics Concern     Service Yes     Comment: agent orange.     Caffeine Concern No     Comment: coffee a couple of cups-decaf    Occupational Exposure Yes    Exercise Yes     Comment: daily     History   Drug Use    Frequency: 7.0 times per week    Types: Cannabis     Available pre-op labs reviewed.  Lab Results   Component Value Date    WBC 7.1 2024    RBC 4.12 2024    HGB 12.9 (L) 2024    HCT 37.1 (L) 2024    MCV 90.0 2024    MCH 31.3 2024    MCHC 34.8 2024    RDW 14.6 2024    .0 2024     Lab Results   Component Value Date     (L) 2024    K 4.1 2024     2024    CO2 23.0 2024    BUN 15 2024    CREATSERUM 1.23 2024    GLU 88 2024    CA 9.6 2024            Airway      Mallampati: II  Mouth opening: 3 FB  TM distance: 4 - 6 cm  Neck ROM: full Cardiovascular      Rhythm: regular  Rate: normal     Dental             Pulmonary      Breath sounds clear to auscultation bilaterally.                Other findings              ASA: 3   Plan: MAC  NPO status verified and patient meets guidelines.  Patient has not taken beta blockers in last 24 hours.  Post-procedure pain management plan discussed with surgeon and patient.    Comment: Chart review and anesthesia consent obtained on 9/9/24  Plan/risks discussed with: patient                Present on Admission:  **None**

## 2024-09-10 ENCOUNTER — ANESTHESIA (OUTPATIENT)
Dept: ENDOSCOPY | Facility: HOSPITAL | Age: 74
End: 2024-09-10
Payer: OTHER GOVERNMENT

## 2024-09-10 VITALS
TEMPERATURE: 98 F | OXYGEN SATURATION: 94 % | SYSTOLIC BLOOD PRESSURE: 126 MMHG | HEART RATE: 71 BPM | RESPIRATION RATE: 16 BRPM | WEIGHT: 176 LBS | DIASTOLIC BLOOD PRESSURE: 78 MMHG | BODY MASS INDEX: 26 KG/M2

## 2024-09-10 LAB
ALBUMIN SERPL-MCNC: 3.7 G/DL (ref 3.2–4.8)
ALBUMIN/GLOB SERPL: 1.3 {RATIO} (ref 1–2)
ALP LIVER SERPL-CCNC: 77 U/L
ALT SERPL-CCNC: 20 U/L
ANION GAP SERPL CALC-SCNC: 6 MMOL/L (ref 0–18)
AST SERPL-CCNC: 19 U/L (ref ?–34)
BASOPHILS # BLD AUTO: 0.07 X10(3) UL (ref 0–0.2)
BASOPHILS NFR BLD AUTO: 0.9 %
BILIRUB SERPL-MCNC: 0.9 MG/DL (ref 0.2–1.1)
BUN BLD-MCNC: 12 MG/DL (ref 9–23)
CALCIUM BLD-MCNC: 9.3 MG/DL (ref 8.7–10.4)
CHLORIDE SERPL-SCNC: 106 MMOL/L (ref 98–112)
CO2 SERPL-SCNC: 22 MMOL/L (ref 21–32)
CREAT BLD-MCNC: 1.2 MG/DL
EGFRCR SERPLBLD CKD-EPI 2021: 64 ML/MIN/1.73M2 (ref 60–?)
EOSINOPHIL # BLD AUTO: 0.31 X10(3) UL (ref 0–0.7)
EOSINOPHIL NFR BLD AUTO: 3.9 %
ERYTHROCYTE [DISTWIDTH] IN BLOOD BY AUTOMATED COUNT: 14.9 %
GLOBULIN PLAS-MCNC: 2.9 G/DL (ref 2–3.5)
GLUCOSE BLD-MCNC: 91 MG/DL (ref 70–99)
HCT VFR BLD AUTO: 38 %
HGB BLD-MCNC: 13 G/DL
IMM GRANULOCYTES # BLD AUTO: 0.02 X10(3) UL (ref 0–1)
IMM GRANULOCYTES NFR BLD: 0.3 %
LYMPHOCYTES # BLD AUTO: 1.18 X10(3) UL (ref 1–4)
LYMPHOCYTES NFR BLD AUTO: 14.9 %
MAGNESIUM SERPL-MCNC: 1.7 MG/DL (ref 1.6–2.6)
MCH RBC QN AUTO: 31.3 PG (ref 26–34)
MCHC RBC AUTO-ENTMCNC: 34.2 G/DL (ref 31–37)
MCV RBC AUTO: 91.6 FL
MONOCYTES # BLD AUTO: 1.4 X10(3) UL (ref 0.1–1)
MONOCYTES NFR BLD AUTO: 17.7 %
NEUTROPHILS # BLD AUTO: 4.95 X10 (3) UL (ref 1.5–7.7)
NEUTROPHILS # BLD AUTO: 4.95 X10(3) UL (ref 1.5–7.7)
NEUTROPHILS NFR BLD AUTO: 62.3 %
OSMOLALITY SERPL CALC.SUM OF ELEC: 277 MOSM/KG (ref 275–295)
PLATELET # BLD AUTO: 199 10(3)UL (ref 150–450)
POTASSIUM SERPL-SCNC: 4 MMOL/L (ref 3.5–5.1)
PROT SERPL-MCNC: 6.6 G/DL (ref 5.7–8.2)
RBC # BLD AUTO: 4.15 X10(6)UL
SODIUM SERPL-SCNC: 134 MMOL/L (ref 136–145)
WBC # BLD AUTO: 7.9 X10(3) UL (ref 4–11)

## 2024-09-10 PROCEDURE — 0DB68ZX EXCISION OF STOMACH, VIA NATURAL OR ARTIFICIAL OPENING ENDOSCOPIC, DIAGNOSTIC: ICD-10-PCS | Performed by: INTERNAL MEDICINE

## 2024-09-10 PROCEDURE — 99239 HOSP IP/OBS DSCHRG MGMT >30: CPT | Performed by: INTERNAL MEDICINE

## 2024-09-10 RX ORDER — SODIUM CHLORIDE, SODIUM LACTATE, POTASSIUM CHLORIDE, CALCIUM CHLORIDE 600; 310; 30; 20 MG/100ML; MG/100ML; MG/100ML; MG/100ML
INJECTION, SOLUTION INTRAVENOUS CONTINUOUS PRN
Status: DISCONTINUED | OUTPATIENT
Start: 2024-09-10 | End: 2024-09-10 | Stop reason: SURG

## 2024-09-10 RX ORDER — NALOXONE HYDROCHLORIDE 0.4 MG/ML
0.08 INJECTION, SOLUTION INTRAMUSCULAR; INTRAVENOUS; SUBCUTANEOUS ONCE AS NEEDED
Status: DISCONTINUED | OUTPATIENT
Start: 2024-09-10 | End: 2024-09-10 | Stop reason: HOSPADM

## 2024-09-10 RX ORDER — SODIUM CHLORIDE, SODIUM LACTATE, POTASSIUM CHLORIDE, CALCIUM CHLORIDE 600; 310; 30; 20 MG/100ML; MG/100ML; MG/100ML; MG/100ML
INJECTION, SOLUTION INTRAVENOUS CONTINUOUS
Status: DISCONTINUED | OUTPATIENT
Start: 2024-09-10 | End: 2024-09-10

## 2024-09-10 RX ORDER — LIDOCAINE HYDROCHLORIDE 10 MG/ML
INJECTION, SOLUTION EPIDURAL; INFILTRATION; INTRACAUDAL; PERINEURAL AS NEEDED
Status: DISCONTINUED | OUTPATIENT
Start: 2024-09-10 | End: 2024-09-10 | Stop reason: SURG

## 2024-09-10 RX ORDER — MAGNESIUM OXIDE 400 MG/1
400 TABLET ORAL ONCE
Status: COMPLETED | OUTPATIENT
Start: 2024-09-10 | End: 2024-09-10

## 2024-09-10 RX ADMIN — SODIUM CHLORIDE, SODIUM LACTATE, POTASSIUM CHLORIDE, CALCIUM CHLORIDE: 600; 310; 30; 20 INJECTION, SOLUTION INTRAVENOUS at 12:46:00

## 2024-09-10 RX ADMIN — LIDOCAINE HYDROCHLORIDE 50 MG: 10 INJECTION, SOLUTION EPIDURAL; INFILTRATION; INTRACAUDAL; PERINEURAL at 12:46:00

## 2024-09-10 NOTE — PLAN OF CARE
Received pt alert and orientated x4. VSS. No sob on RA. Afebrile. No c/o pain. NPO for EGD.     1330: Pt back from EDG. Tolerating diet. Up and using the bathroom. All meds given per MAR. Walking in the halls. Wife at the bedside.    1730: Pt cleared for DC. IV removed. DC paperwork provided, pt and wife verbalized understanding. All pt belongings gathered and sent with the pt.     NURSING DISCHARGE NOTE    Discharged Home via Wheelchair.  Accompanied by RN  Belongings Taken by patient/family.      Problem: GASTROINTESTINAL - ADULT  Goal: Minimal or absence of nausea and vomiting  Description: INTERVENTIONS:  - Maintain adequate hydration with IV or PO as ordered and tolerated  - Nasogastric tube to low intermittent suction as ordered  - Evaluate effectiveness of ordered antiemetic medications  - Provide nonpharmacologic comfort measures as appropriate  - Advance diet as tolerated, if ordered  - Obtain nutritional consult as needed  - Evaluate fluid balance  Outcome: Progressing  Goal: Maintains or returns to baseline bowel function  Description: INTERVENTIONS:  - Assess bowel function  - Maintain adequate hydration with IV or PO as ordered and tolerated  - Evaluate effectiveness of GI medications  - Encourage mobilization and activity  - Obtain nutritional consult as needed  - Establish a toileting routine/schedule  - Consider collaborating with pharmacy to review patient's medication profile  Outcome: Progressing     Problem: METABOLIC/FLUID AND ELECTROLYTES - ADULT  Goal: Glucose maintained within prescribed range  Description: INTERVENTIONS:  - Monitor Blood Glucose as ordered  - Assess for signs and symptoms of hyperglycemia and hypoglycemia  - Administer ordered medications to maintain glucose within target range  - Assess barriers to adequate nutritional intake and initiate nutrition consult as needed  - Instruct patient on self management of diabetes  Outcome: Progressing  Goal: Electrolytes maintained  within normal limits  Description: INTERVENTIONS:  - Monitor labs and rhythm and assess patient for signs and symptoms of electrolyte imbalances  - Administer electrolyte replacement as ordered  - Monitor response to electrolyte replacements, including rhythm and repeat lab results as appropriate  - Fluid restriction as ordered  - Instruct patient on fluid and nutrition restrictions as appropriate  Outcome: Progressing  Goal: Hemodynamic stability and optimal renal function maintained  Description: INTERVENTIONS:  - Monitor labs and assess for signs and symptoms of volume excess or deficit  - Monitor intake, output and patient weight  - Monitor urine specific gravity, serum osmolarity and serum sodium as indicated or ordered  - Monitor response to interventions for patient's volume status, including labs, urine output, blood pressure (other measures as available)  - Encourage oral intake as appropriate  - Instruct patient on fluid and nutrition restrictions as appropriate  Outcome: Progressing

## 2024-09-10 NOTE — PAYOR COMM NOTE
--------------  ADMISSION REVIEW     Payor: [employer]  Subscriber #:  [id number]  Authorization Number: [id number]    Admit date: 9/9/24  Admit time: 12:51 AM       History   HPI  73-year-old male with multiple medical problems presents emergency room for evaluation of abdominal pain with nausea vomiting, states he has been having also alternating constipation and diarrhea, has been having abdominal pain mostly in the mid upper abdomen.  Patient states he has been losing weight and feeling increasing weakness.  Denies melena or hematochezia.  Denies any fevers or chills.  Denies chest pain or shortness of breath.  Denies urinary symptoms.  ED Triage Vitals [09/08/24 2015]   /77   Pulse 70   Resp 18   Temp 98.2 °F (36.8 °C)   Temp src Temporal   SpO2 94 %   O2 Device None (Room air)   HEENT:  no scleral icterus.  Mucous membranes are moderately dry   HEART: Regular rate and rhythm, no murmurs.  LUNGS: Clear to auscultation bilaterally.  No Rales, no rhonchi, no wheezing, no stridor.  ABDOMEN: Soft, nondistended, mid to upper abdominal tender, bowel sounds are present, no rebound, no rigidity, no guarding.no pulsatile masses. No CVA tenderness  EXTREMITIES: No peripheral edema, no calf tenderness  Labs Reviewed   COMP METABOLIC PANEL (14) - Abnormal; Notable for the following components:       Result Value    Glucose 107 (*)     Sodium 133 (*)     Creatinine 1.32 (*)     eGFR-Cr 57 (*)     All other components within normal limits   CBC WITH DIFFERENTIAL WITH PLATELET - Abnormal; Notable for the following components:    HGB 12.9 (*)     HCT 37.7 (*)     Monocyte Absolute 1.38 (*)    Admission disposition: 9/8/2024 11:54 PM    Disposition and Plan   Clinical Impression:  1. Abdominal pain, acute    2. Dehydration    3. Nausea and vomiting in adult    4. Hyponatremia       Disposition:  Admit  9/8/2024 11:54 pm    History and Physical   History of Present Illness:    Mark Anthony Bryan is a 73 year old male  with PMHx cholangiocarcinoma (s/p Whipple 2016, chemo/ rads)/ Budd Chiari syndrome/ HTN/ CAD (s/p CABG)/ A-fib/ LONG/ ischemic cardiomyopathy/ HLD/ SSS (s/p PPM) who presented to the hospital for abdominal pain. For the past week he noticed abdominal pain in his epigastric region which was intermittent and cramping rated 6/10. It was worse with meals and he has not been eating much as a result with weight loss. He denied any alleviating factors. He denied nausea, emesis, diarrhea, constipation, bloody stools.     Respiratory: No rhonchi, no wheezes, on room air  Cardiovascular: S1, S2. Regular rate and rhythm  Abdomen: Soft, Non-tender, non-distended, positive bowel sounds  Neuro: No new focal deficits  Extremities: No edema    Lab 09/08/24 2054   RBC 4.12   HGB 12.9*   HCT 37.7*   MCV 91.5   MCH 31.3   MCHC 34.2   RDW 14.7   NEPRELIM 3.22   WBC 6.2   .0      Lab 09/08/24 2054   *   BUN 20   CREATSERUM 1.32*   EGFRCR 57*   CA 9.8   ALB 4.2   *   K 4.0      CO2 24.0   ALKPHO 96   AST 21   ALT 23   BILT 0.6   TP 7.3    Assessment & Plan:       #Abdominal pain 2/2 enteritis vs ischemia  -LFT WNL  -CT showing thickened proximal jejunal loops infectious vs inflammatory, cannot r//o ischemia  -abdomen is soft and not painful out of proportion making ischemia less likely  -check lactate  -pain control: dilaudid prn  -cover with cipro and flagyl for now  -GI c/s in ED     #hyponatremia  -Na 123  -likely 2/2 SIADH from pain as well as chronic diuretic use and poor salt intake  -can allow to correct to normal over 24 hrs  -cont to monitor BMP     #CKD 3a  -Cr within baseline range     #normocytic anemia  -hgb 12.9: baseline 13.3  -no signs acute bleeding  -cont to monitor CBC  -consider iron studies, retic count if worsening     #A-fib  -cont home amiodarone, metoprolol     #HLD  -cont home statin     #HFrEF  -cont home entresto, furosemide     #GERD  -cont home PPI        9/10/24  Still with LUQ  pain. No F/C. No BM. Awaiting EGD.     Temp:  [97.7 °F (36.5 °C)-98.8 °F (37.1 °C)] 97.9 °F (36.6 °C)  Pulse:  [55-72] 72  Resp:  [12-20] 12  BP: (107-131)/(53-72) 110/66  SpO2:  [91 %-95 %] 94 %     Physical Exam:    Respiratory: No wheezes, no rhonchi  Cardiovascular: S1, S2, regular rate and rhythm  Abdomen: Soft, Non-tender, non-distended, positive bowel sounds  Neuro: No new focal deficits.   Extremities: No edema     Lab 09/08/24 2054 09/09/24  0645 09/10/24  0700   WBC 6.2 7.1 7.9   HGB 12.9* 12.9* 13.0   MCV 91.5 90.0 91.6   .0 190.0 199.0      Lab 09/08/24 2054 09/09/24 0645 09/09/24  1020 09/10/24  0700   * 114* 88 91   BUN 20 14 15 12   CREATSERUM 1.32* 1.20 1.23 1.20   CA 9.8 9.4 9.6 9.3   ALB 4.2  --   --  3.7   * 134* 135* 134*   K 4.0 3.9 4.1 4.0    103 104 106   CO2 24.0 22.0 23.0 22.0   ALKPHO 96  --   --  77   AST 21  --   --  19   ALT 23  --   --  20   BILT 0.6  --   --  0.9   TP 7.3  --   --  6.6    Medications:    magnesium oxide  400 mg Oral Once    enoxaparin  40 mg Subcutaneous Daily    ciprofloxacin  400 mg Intravenous Q12H    metRONIDAZOLE  500 mg Oral Q12H    aspirin  81 mg Oral Daily    atorvastatin  20 mg Oral Nightly    furosemide  20 mg Oral QOD    metoprolol succinate ER  100 mg Oral Daily Beta Blocker    pancrelipase (Lip-Prot-Amyl)  20,000 Units Oral TID CC    sacubitril-valsartan  1 tablet Oral BID    pantoprazole  40 mg Intravenous Q12H       Assessment & Plan:    #Abdominal pain 2/2 enteritis vs ischemia  -LFT WNL  -CT showing thickened proximal jejunal loops infectious vs inflammatory, cannot r//o ischemia  -abdomen is soft and not painful out of proportion making ischemia less likely  -check lactate  -pain control: dilaudid prn  -cover with cipro and flagyl for now  -GI following  -plan for EGD today     #hyponatremia  -likely 2/2 SIADH from pain as well as chronic diuretic use and poor salt intake  -can allow to correct to normal over 24 hrs  -cont  to monitor BMP     #CKD 3a  -Cr within baseline range     #normocytic anemia  -hgb 12.9: baseline 13.3  -no signs acute bleeding  -cont to monitor CBC  -consider iron studies, retic count if worsening     #A-fib  -cont home amiodarone, metoprolol     #HLD  -cont home statin     #HFrEF  -cont home entresto, furosemide     #GERD  -cont home PPI      MEDICATIONS ADMINISTERED IN LAST 1 DAY:  atorvastatin (Lipitor) tab 20 mg       Date Action Dose Route User    9/9/2024 2023 Given 20 mg Oral Niurka Freire RN          ciprofloxacin in dextrose 5% (Cipro) 400 mg/200mL IVPB premix 400 mg       Date Action Dose Route User    9/10/2024 1333 New Bag 400 mg Intravenous Milagros Valdez RN    9/10/2024 0158 New Bag 400 mg Intravenous Niurka Freire RN          lactated ringers infusion       Date Action Dose Route User    9/10/2024 1331 New Bag (none) Intravenous Milagros Valdez RN          lactated ringers infusion       Date Action Dose Route User    9/10/2024 1246 New Bag (none) Intravenous Joshua Alejandro MD     magnesium oxide (Mag-Ox) tab 400 mg       Date Action Dose Route User    9/10/2024 1331 Given 400 mg Oral Milagros Valdez RN          metoprolol succinate ER (Toprol XL) 24 hr tab 100 mg       Date Action Dose Route User    9/10/2024 0537 Given 100 mg Oral Niurka Freire RN          metRONIDAZOLE (Flagyl) tab 500 mg       Date Action Dose Route User    9/9/2024 2023 Given 500 mg Oral Niurka Freire RN          pancrelipase (Lip-Prot-Amyl) (Zenpep) DR particles cap 20,000 Units       Date Action Dose Route User    9/10/2024 1331 Given 20,000 Units Oral Milagros Valdez RN    9/9/2024 1803 Given 20,000 Units Oral Vivienne Mart RN          pantoprazole (Protonix) 40 mg in sodium chloride 0.9% PF 10 mL IV push       Date Action Dose Route User    9/10/2024 1331 Given 40 mg Intravenous Milagros Valdez RN    9/10/2024 0156 Given 40 mg Niurka Guerrero, RN      sacubitril-valsartan (Entresto) 24-26 MG per tab 1 tablet       Date Action Dose Route User    9/9/2024 2023 Given 1 tablet Oral Niurka Freire, RN            Vitals (last day)       Date/Time Temp Pulse Resp BP SpO2 Weight O2 Device O2 Flow Rate (L/min) [facility]    09/10/24 1329 98.4 °F (36.9 °C) 71 16 126/78 94 % -- None (Room air) -- [place]    09/10/24 0748 97.9 °F (36.6 °C) 72 12 110/66 94 % -- None (Room air) -- [place]    09/09/24 1948 98.5 °F (36.9 °C) 70 15 116/53 91 % -- None (Room air) -- [place]    09/09/24 1616 97.9 °F (36.6 °C) 68 18 107/55 94 % -- None (Room air) -- [place]    09/09/24 1205 98.8 °F (37.1 °C) 55 20 117/54 95 % -- None (Room air) -- CN

## 2024-09-10 NOTE — INTERVAL H&P NOTE
Pre-op Diagnosis: inpt    The above referenced H&P was reviewed by Ten Brizuela DO on 9/10/2024, the patient was examined and no significant changes have occurred in the patient's condition since the H&P was performed.  I discussed with the patient and/or legal representative the potential benefits, risks and side effects of this procedure; the likelihood of the patient achieving goals; and potential problems that might occur during recuperation.  I discussed reasonable alternatives to the procedure, including risks, benefits and side effects related to the alternatives and risks related to not receiving this procedure.  We will proceed with procedure as planned.

## 2024-09-10 NOTE — PROGRESS NOTES
[facility]   part of [facility]     Hospitalist Progress Note     Mark Anthony Bryan Patient Status:  Inpatient    1950 MRN [id number]   [facility] ENDOSCOPY PAIN CENTER Attending Edmund Blanc MD   Hosp Day # 1 PCP Elton Flores MD     Chief Complaint: abd pain    Subjective:     Patient without acute evnts overnight. Still with LUQ pain. No F/C. No BM. Awaiting EGD.     Objective:    Review of Systems:   A comprehensive review of systems was completed; pertinent positive and negatives stated in subjective.    Vital signs:  Temp:  [97.7 °F (36.5 °C)-98.8 °F (37.1 °C)] 97.9 °F (36.6 °C)  Pulse:  [55-72] 72  Resp:  [12-20] 12  BP: (107-131)/(53-72) 110/66  SpO2:  [91 %-95 %] 94 %    Physical Exam:    General: No acute distress  Respiratory: No wheezes, no rhonchi  Cardiovascular: S1, S2, regular rate and rhythm  Abdomen: Soft, Non-tender, non-distended, positive bowel sounds  Neuro: No new focal deficits.   Extremities: No edema      Diagnostic Data:    Labs:  Recent Labs   Lab 24  0645 09/10/24  0700   WBC 6.2 7.1 7.9   HGB 12.9* 12.9* 13.0   MCV 91.5 90.0 91.6   .0 190.0 199.0       Recent Labs   Lab 24  0645 24  1020 09/10/24  0700   * 114* 88 91   BUN 20 14 15 12   CREATSERUM 1.32* 1.20 1.23 1.20   CA 9.8 9.4 9.6 9.3   ALB 4.2  --   --  3.7   * 134* 135* 134*   K 4.0 3.9 4.1 4.0    103 104 106   CO2 24.0 22.0 23.0 22.0   ALKPHO 96  --   --  77   AST 21  --   --  19   ALT 23  --   --  20   BILT 0.6  --   --  0.9   TP 7.3  --   --  6.6       Estimated Creatinine Clearance: 54.8 mL/min (based on SCr of 1.2 mg/dL).    No results for input(s): \"TROP\", \"TROPHS\", \"CK\" in the last 168 hours.    No results for input(s): \"PTP\", \"INR\" in the last 168 hours.               Microbiology    No results found for this visit on 24.      Imaging: Reviewed in Epic.    Medications:    magnesium oxide  400 mg Oral Once     enoxaparin  40 mg Subcutaneous Daily    ciprofloxacin  400 mg Intravenous Q12H    metRONIDAZOLE  500 mg Oral Q12H    aspirin  81 mg Oral Daily    atorvastatin  20 mg Oral Nightly    furosemide  20 mg Oral QOD    metoprolol succinate ER  100 mg Oral Daily Beta Blocker    pancrelipase (Lip-Prot-Amyl)  20,000 Units Oral TID CC    sacubitril-valsartan  1 tablet Oral BID    pantoprazole  40 mg Intravenous Q12H       Assessment & Plan:      #Abdominal pain 2/2 enteritis vs ischemia  -LFT WNL  -CT showing thickened proximal jejunal loops infectious vs inflammatory, cannot r//o ischemia  -abdomen is soft and not painful out of proportion making ischemia less likely  -check lactate  -pain control: dilaudid prn  -cover with cipro and flagyl for now  -GI following  -plan for EGD today     #hyponatremia  -likely 2/2 SIADH from pain as well as chronic diuretic use and poor salt intake  -can allow to correct to normal over 24 hrs  -cont to monitor BMP     #CKD 3a  -Cr within baseline range     #normocytic anemia  -hgb 12.9: baseline 13.3  -no signs acute bleeding  -cont to monitor CBC  -consider iron studies, retic count if worsening     #A-fib  -cont home amiodarone, metoprolol     #HLD  -cont home statin     #HFrEF  -cont home entresto, furosemide     #GERD  -cont home PPI            Nick Balbuena MD    Supplementary Documentation:     Quality:  DVT Mechanical Prophylaxis:   SCDs,    DVT Pharmacologic Prophylaxis   Medication    enoxaparin (Lovenox) 40 MG/0.4ML SUBQ injection 40 mg         DVT Pharmacologic prophylaxis: Aspirin 81 mg      Code Status: Full Code  Benavidez: No urinary catheter in place  Benavidez Duration (in days):   Central line:    [place]:     Discharge is dependent on: progress  At this point Mr. Bryan is expected to be discharge to: home    The 21st Century Cures Act makes medical notes like these available to patients in the interest of transparency. Please be advised this is a medical document. Medical documents  are intended to carry relevant information, facts as evident, and the clinical opinion of the practitioner. The medical note is intended as peer to peer communication and may appear blunt or direct. It is written in medical language and may contain abbreviations or verbiage that are unfamiliar.              **Certification      PHYSICIAN Certification of Need for Inpatient Hospitalization - Initial Certification    Patient will require inpatient services that will reasonably be expected to span two midnight's based on the clinical documentation in H+P.   Based on patients current state of illness, I anticipate that, after discharge, patient will require TBD.

## 2024-09-10 NOTE — ANESTHESIA POSTPROCEDURE EVALUATION
[facility]    Mark Anthony Bryan Patient Status:  Inpatient   Age/Gender 73 year old male MRN [id number]   Location [facility] ENDOSCOPY PAIN CENTER Attending Edmund Blanc MD   Hosp Day # 1 PCP Elton Flores MD       Anesthesia Post-op Note    ESOPHAGOGASTRODUODENOSCOPY (EGD) with biopsies    Procedure Summary       Date: 09/10/24 Room / Location:  ENDOSCOPY 03 /  ENDOSCOPY    Anesthesia Start: 1246 Anesthesia Stop: 1256    Procedure: ESOPHAGOGASTRODUODENOSCOPY (EGD) with biopsies Diagnosis: (Gastritis)    Surgeons: Ten Brizuela DO Anesthesiologist: Joshua Alejandro MD    Anesthesia Type: MAC ASA Status: 3            Anesthesia Type: MAC    Vitals Value Taken Time   /74 09/10/24 1301   Temp 98 09/10/24 1306   Pulse 69 09/10/24 1305   Resp 18 09/10/24 1305   SpO2 95 % 09/10/24 1305   Vitals shown include unfiled device data.    Patient Location: Endoscopy    Anesthesia Type: MAC    Airway Patency: patent    Postop Pain Control: adequate    Mental Status: preanesthetic baseline    Nausea/Vomiting: none    Cardiopulmonary/Hydration status: stable euvolemic    Complications: no apparent anesthesia related complications    Postop vital signs: stable    Dental Exam: Unchanged from Preop    Patient to be discharged home when criteria met.

## 2024-09-10 NOTE — OPERATIVE REPORT
Mark Anthony Bryan Patient Status:  Inpatient    1950 MRN [id number]   [facility] ENDOSCOPY PAIN CENTER Attending Edmund Blanc MD   Hosp Day # 1 PCP Elton Flores MD       PREOPERATIVE DIAGNOSIS/INDICATION: Abdominal pain  POSTOPERTATIVE DIAGNOSIS: See Impression  PROCEDURE PERFORMED:  EGD WITH BIOPSY  DATE: 9/10/24  SEDATION: MAC sedation provided by General Anesthesia    TIME OUT WAS PERFORMED    INFORMED CONSENT: I have discussed the risks, benefits, and alternatives to upper GI endoscopy with the patient including but not limited to the risks of bleeding, infection, pain, as well as the risks of anesthesia and perforation all possibly leading to prolonged hospitalization, surgical intervention. All questions were answered to the patient’s satisfaction. The patient elected to proceed with upper GI endoscopy with intervention as indicated.    PROCEDURE DESCRIPTION: A regular upper endoscope was introduced into the patient’s mouth, hypo pharynx, esophagus, stomach and the first and second portion of the duodenum. Retroflexion of the endoscope was performed in the stomach.  Careful examination of the above described areas was performed on withdrawal of the endoscope. The patient tolerated the procedure well.  There were no immediate complications immediately following the procedure and the patient was transferred to recovery in stable condition.  FINDINGS:  Esophagus: The esophagus was normal. The esophagogastric junction was 40 cm from the incisors. The squamocolumnar junction was 40 cm from the incisors and regular.     Stomach: The gastric mucosa was erythematous. Random gastric biopsies were taken to rule out H. Pylori.     Small bowel: Normal gastrojejunal anastomosis. No marginal ulcer was visualized. The examined small bowel was normal up to extent of exam.     IMPRESSION:   1. Gastritis.   RECOMMENDATIONS:    1. Follow up pathology results.    2. Advance diet as tolerated.     3. Follow up in GI office.     KATIE Brizuela  Gastroenterology/Advanced Endoscopy  [facility]

## 2024-09-10 NOTE — PLAN OF CARE
Patient is A/O x4, NPO at [facility] and compliant. EDG with possible biopsy and control bleeding at am - consent signed. Requested Cpap at , stated he has been using Cpap at home due to his heart condition, MD approved and referred to RT. Needs addressed. Call light within reach.       Problem: CARDIOVASCULAR - ADULT  Goal: Maintains optimal cardiac output and hemodynamic stability  Description: INTERVENTIONS:  - Monitor vital signs, rhythm, and trends  - Monitor for bleeding, hypotension and signs of decreased cardiac output  - Evaluate effectiveness of vasoactive medications to optimize hemodynamic stability  - Monitor arterial and/or venous puncture sites for bleeding and/or hematoma  - Assess quality of pulses, skin color and temperature  - Assess for signs of decreased coronary artery perfusion - ex. Angina  - Evaluate fluid balance, assess for edema, trend weights  Outcome: Progressing  Goal: Absence of cardiac arrhythmias or at baseline  Description: INTERVENTIONS:  - Continuous cardiac monitoring, monitor vital signs, obtain 12 lead EKG if indicated  - Evaluate effectiveness of antiarrhythmic and heart rate control medications as ordered  - Initiate emergency measures for life threatening arrhythmias  - Monitor electrolytes and administer replacement therapy as ordered  Outcome: Progressing     Problem: GASTROINTESTINAL - ADULT  Goal: Minimal or absence of nausea and vomiting  Description: INTERVENTIONS:  - Maintain adequate hydration with IV or PO as ordered and tolerated  - Nasogastric tube to low intermittent suction as ordered  - Evaluate effectiveness of ordered antiemetic medications  - Provide nonpharmacologic comfort measures as appropriate  - Advance diet as tolerated, if ordered  - Obtain nutritional consult as needed  - Evaluate fluid balance  Outcome: Progressing

## 2024-09-10 NOTE — DISCHARGE SUMMARY
[facility]     Mark Anthony Bryan Patient Status:  Inpatient    1950 MRN [id number]   Location [facility]NW-A Attending Edmund Blanc MD   Hosp Day # 1 PCP Elton Flores MD     Date of Admission: 2024  Date of Discharge:   9/10/2024      Discharge Disposition: Home or Self Care    Discharge Diagnosis:  Abd pain  Hyponatremia  CKD3  Afib    History of Present Illness: Mark Anthony Bryan is a 73 year old male with PMHx cholangiocarcinoma (s/p Whipple 2016, chemo/ rads)/ Budd Chiari syndrome/ HTN/ CAD (s/p CABG)/ A-fib/ LONG/ ischemic cardiomyopathy/ HLD/ SSS (s/p PPM) who presented to the hospital for abdominal pain. For the past week he noticed abdominal pain in his epigastric region which was intermittent and cramping rated 6/10. It was worse with meals and he has not been eating much as a result with weight loss. He denied any alleviating factors. He denied nausea, emesis, diarrhea, constipation, bloody stools.       Brief Synopsis: Pt was admitted and given pain control and IVF. Pt was seen by GI and had EGD that demosntrated gastritis. Pt had biopsy taken and advised to follow up with GI in office.     Lace+ Score: 78  59-90 High Risk  29-58 Medium Risk  0-28   Low Risk       TCM Follow-Up Recommendation:  LACE > 58: High Risk of readmission after discharge from the hospital.  **Certification    Admission date was 2024.  Inpatient stay was shorter than expected.  Patient's Abdominal pain, acute was initially serious enough to expect a more lengthy hospitalization but patient improved faster than expected.                 Procedures during hospitalization:   EGD    Incidental or significant findings and recommendations (brief descriptions):  none    Lab/Test results pending at Discharge:   none    Consultants:  GI    Discharge Medication List:     Discharge Medications        CHANGE how you take these medications        Instructions Prescription details   atorvastatin 20  MG Tabs  Commonly known as: Lipitor  What changed: Another medication with the same name was changed. Make sure you understand how and when to take each.      Take 1 tablet (20 mg total) by mouth nightly.   Refills: 0     atorvastatin 20 MG Tabs  Commonly known as: Lipitor  What changed: when to take this      Take 1 tablet (20 mg total) by mouth daily.   Quantity: 90 tablet  Refills: 0            CONTINUE taking these medications        Instructions Prescription details   amiodarone 200 MG Tabs  Commonly known as: Pacerone      Take 1 tablet (200 mg total) by mouth daily.   Refills: 0     amiodarone 200 MG Tabs  Commonly known as: Pacerone      Take 0.5 tablets (100 mg total) by mouth daily.   Refills: 0     aspirin 81 MG Tbec      Take 1 tablet (81 mg total) by mouth daily.   Refills: 0     denosumab 60 MG/ML Sosy  Commonly known as: Prolia      Inject 1 mL (60 mg total) into the skin every 6 (six) months.   Refills: 0     furosemide 20 MG Tabs  Commonly known as: Lasix      Take 1 tablet (20 mg total) by mouth every other day.   Refills: 0     magnesium 250 MG Tabs      Take 1 tablet (250 mg total) by mouth daily.   Refills: 0     metoprolol succinate  MG Tb24  Commonly known as: Toprol XL      Take 1 tablet (100 mg total) by mouth daily.   Refills: 0     Omeprazole 40 MG Cpdr      Take 1 capsule (40 mg total) by mouth daily.   Quantity: 90 capsule  Refills: 3     sacubitril-valsartan 24-26 MG Tabs  Commonly known as: Entresto      Take 1 tablet by mouth 2 (two) times daily.   Refills: 0     Zenpep 94200-27585 units Cpep  Generic drug: pancrelipase (Lip-Prot-Amyl)      TAKE 1 CAPSULE BY MOUTH 6 TIMES DAILY WITH MEALS   Quantity: 180 capsule  Refills: 3              [place]    Follow-up appointment:   Lalo Hess MD  [address] [place] [address] [phone]    Go in 1 month(s)  for a follow-up office visit with GI. The office will call you to arrange date/time of  visit    Appointments for Next 30 Days 9/10/2024 - 10/10/2024      None            Vital signs:  Temp:  [97.7 °F (36.5 °C)-98.5 °F (36.9 °C)] 98.4 °F (36.9 °C)  Pulse:  [66-73] 71  Resp:  [12-22] 16  BP: (107-131)/(53-78) 126/78  SpO2:  [91 %-98 %] 94 %      -----------------------------------------------------------------------------------------------  PATIENT DISCHARGE INSTRUCTIONS: See electronic chart    Nick Balbuena MD    Total time spent on discharge plannin minutes     The  Cures Act makes medical notes like these available to patients in the interest of transparency. Please be advised this is a medical document. Medical documents are intended to carry relevant information, facts as evident, and the clinical opinion of the practitioner. The medical note is intended as peer to peer communication and may appear blunt or direct. It is written in medical language and may contain abbreviations or verbiage that are unfamiliar.

## 2024-09-12 ENCOUNTER — PATIENT OUTREACH (OUTPATIENT)
Dept: CASE MANAGEMENT | Age: 74
End: 2024-09-12

## 2024-09-19 ENCOUNTER — ANCILLARY PROCEDURE (OUTPATIENT)
Dept: CARDIOLOGY | Age: 74
End: 2024-09-19
Attending: INTERNAL MEDICINE

## 2024-09-19 ENCOUNTER — ANCILLARY ORDERS (OUTPATIENT)
Dept: CARDIOLOGY | Age: 74
End: 2024-09-19

## 2024-09-19 DIAGNOSIS — Z95.810 ICD (IMPLANTABLE CARDIOVERTER-DEFIBRILLATOR) IN PLACE: ICD-10-CM

## 2024-09-19 DIAGNOSIS — I25.5 CARDIOMYOPATHY, ISCHEMIC: Primary | ICD-10-CM

## 2024-09-19 PROCEDURE — 93296 REM INTERROG EVL PM/IDS: CPT | Performed by: INTERNAL MEDICINE

## 2024-09-19 PROCEDURE — 93295 DEV INTERROG REMOTE 1/2/MLT: CPT | Performed by: INTERNAL MEDICINE

## 2024-09-25 ENCOUNTER — LAB SERVICES (OUTPATIENT)
Dept: LAB | Age: 74
End: 2024-09-25

## 2024-09-25 LAB
ALBUMIN SERPL-MCNC: 3.7 G/DL (ref 3.4–5)
ANION GAP SERPL CALC-SCNC: 9 MMOL/L (ref 7–19)
BUN SERPL-MCNC: 21 MG/DL (ref 6–20)
BUN/CREAT SERPL: 17 (ref 7–25)
CALCIUM SERPL-MCNC: 8.9 MG/DL (ref 8.4–10.2)
CHLORIDE SERPL-SCNC: 100 MMOL/L (ref 97–110)
CO2 SERPL-SCNC: 27 MMOL/L (ref 21–32)
CREAT SERPL-MCNC: 1.25 MG/DL (ref 0.67–1.17)
EGFRCR SERPLBLD CKD-EPI 2021: 61 ML/MIN/{1.73_M2}
FASTING DURATION TIME PATIENT: ABNORMAL H
GLUCOSE SERPL-MCNC: 87 MG/DL (ref 70–99)
OSMOLALITY UR: 453 MOSM/KG (ref 50–1200)
PHOSPHATE SERPL-MCNC: 3.3 MG/DL (ref 2.4–4.7)
POTASSIUM SERPL-SCNC: 4.8 MMOL/L (ref 3.4–5.1)
SODIUM SERPL-SCNC: 131 MMOL/L (ref 135–145)
SODIUM UR-SCNC: 43 MMOL/L
URATE SERPL-MCNC: 5.3 MG/DL (ref 3.5–7.2)

## 2024-09-25 PROCEDURE — 36415 COLL VENOUS BLD VENIPUNCTURE: CPT | Performed by: CLINICAL MEDICAL LABORATORY

## 2024-09-25 PROCEDURE — 80069 RENAL FUNCTION PANEL: CPT | Performed by: CLINICAL MEDICAL LABORATORY

## 2024-09-25 PROCEDURE — 84300 ASSAY OF URINE SODIUM: CPT | Performed by: CLINICAL MEDICAL LABORATORY

## 2024-09-25 PROCEDURE — 83935 ASSAY OF URINE OSMOLALITY: CPT | Performed by: CLINICAL MEDICAL LABORATORY

## 2024-09-25 PROCEDURE — 84550 ASSAY OF BLOOD/URIC ACID: CPT | Performed by: CLINICAL MEDICAL LABORATORY

## 2024-10-08 RX ORDER — METOPROLOL SUCCINATE 100 MG/1
100 TABLET, EXTENDED RELEASE ORAL DAILY
Qty: 90 TABLET | Refills: 2 | Status: SHIPPED | OUTPATIENT
Start: 2024-10-08

## 2024-11-15 PROBLEM — K29.70 GASTRITIS: Status: ACTIVE | Noted: 2024-01-01

## 2024-11-15 PROBLEM — K02.9 DENTAL CARIES, UNSPECIFIED: Status: ACTIVE | Noted: 2024-11-15

## 2024-11-15 PROBLEM — K03.6 DEPOSITS (ACCRETIONS) ON TEETH: Status: ACTIVE | Noted: 2024-11-15

## 2024-11-21 ENCOUNTER — LAB ENCOUNTER (OUTPATIENT)
Dept: LAB | Facility: HOSPITAL | Age: 74
End: 2024-11-21
Attending: INTERNAL MEDICINE
Payer: OTHER GOVERNMENT

## 2024-11-21 DIAGNOSIS — R73.9 HYPERGLYCEMIA: ICD-10-CM

## 2024-11-21 LAB
EST. AVERAGE GLUCOSE BLD GHB EST-MCNC: 120 MG/DL (ref 68–126)
HBA1C MFR BLD: 5.8 % (ref ?–5.7)

## 2024-11-21 PROCEDURE — 83036 HEMOGLOBIN GLYCOSYLATED A1C: CPT

## 2024-11-21 PROCEDURE — 36415 COLL VENOUS BLD VENIPUNCTURE: CPT

## 2024-12-23 PROCEDURE — 93296 REM INTERROG EVL PM/IDS: CPT | Performed by: INTERNAL MEDICINE

## 2024-12-23 PROCEDURE — 93295 DEV INTERROG REMOTE 1/2/MLT: CPT | Performed by: INTERNAL MEDICINE

## 2024-12-23 RX ORDER — AMIODARONE HYDROCHLORIDE 200 MG/1
200 TABLET ORAL DAILY
Qty: 90 TABLET | Refills: 0 | Status: SHIPPED | OUTPATIENT
Start: 2024-12-23

## 2024-12-24 RX ORDER — AMIODARONE HYDROCHLORIDE 200 MG/1
200 TABLET ORAL DAILY
Qty: 90 TABLET | Refills: 1 | OUTPATIENT
Start: 2024-12-24

## 2024-12-26 ENCOUNTER — ANCILLARY ORDERS (OUTPATIENT)
Dept: CARDIOLOGY | Age: 74
End: 2024-12-26

## 2024-12-26 ENCOUNTER — TELEPHONE (OUTPATIENT)
Dept: CARDIOLOGY | Age: 74
End: 2024-12-26

## 2024-12-26 ENCOUNTER — ANCILLARY PROCEDURE (OUTPATIENT)
Dept: CARDIOLOGY | Age: 74
End: 2024-12-26
Attending: INTERNAL MEDICINE

## 2024-12-26 DIAGNOSIS — Z95.810 ICD (IMPLANTABLE CARDIOVERTER-DEFIBRILLATOR) IN PLACE: Primary | ICD-10-CM

## 2024-12-26 DIAGNOSIS — Z95.810 ICD (IMPLANTABLE CARDIOVERTER-DEFIBRILLATOR) IN PLACE: ICD-10-CM

## 2024-12-30 ENCOUNTER — TELEPHONE (OUTPATIENT)
Dept: CARDIOLOGY | Age: 74
End: 2024-12-30

## 2024-12-30 RX ORDER — AMIODARONE HYDROCHLORIDE 200 MG/1
200 TABLET ORAL DAILY
Qty: 90 TABLET | Refills: 0 | Status: SHIPPED | OUTPATIENT
Start: 2024-12-30

## 2024-12-30 RX ORDER — AMIODARONE HYDROCHLORIDE 200 MG/1
200 TABLET ORAL DAILY
Qty: 90 TABLET | Refills: 0 | OUTPATIENT
Start: 2024-12-30

## 2025-03-24 RX ORDER — AMIODARONE HYDROCHLORIDE 200 MG/1
200 TABLET ORAL DAILY
Qty: 90 TABLET | Refills: 0 | Status: SHIPPED | OUTPATIENT
Start: 2025-03-24

## 2025-03-27 PROCEDURE — 93296 REM INTERROG EVL PM/IDS: CPT | Performed by: INTERNAL MEDICINE

## 2025-03-27 PROCEDURE — 93295 DEV INTERROG REMOTE 1/2/MLT: CPT | Performed by: INTERNAL MEDICINE

## 2025-04-01 ENCOUNTER — ANCILLARY PROCEDURE (OUTPATIENT)
Dept: CARDIOLOGY | Age: 75
End: 2025-04-01
Attending: INTERNAL MEDICINE

## 2025-04-01 ENCOUNTER — ANCILLARY ORDERS (OUTPATIENT)
Dept: CARDIOLOGY | Age: 75
End: 2025-04-01

## 2025-04-01 DIAGNOSIS — Z95.810 ICD (IMPLANTABLE CARDIOVERTER-DEFIBRILLATOR) IN PLACE: ICD-10-CM

## 2025-04-01 DIAGNOSIS — Z95.810 ICD (IMPLANTABLE CARDIOVERTER-DEFIBRILLATOR) IN PLACE: Primary | ICD-10-CM

## 2025-04-18 ENCOUNTER — APPOINTMENT (OUTPATIENT)
Dept: CARDIOLOGY | Age: 75
End: 2025-04-18
Attending: INTERNAL MEDICINE

## 2025-04-18 ENCOUNTER — APPOINTMENT (OUTPATIENT)
Dept: CARDIOLOGY | Age: 75
End: 2025-04-18

## 2025-04-22 ENCOUNTER — TELEPHONE (OUTPATIENT)
Age: 75
End: 2025-04-22

## 2025-04-22 DIAGNOSIS — N18.31 STAGE 3A CHRONIC KIDNEY DISEASE (HCC): Primary | ICD-10-CM

## 2025-04-22 DIAGNOSIS — I50.42 CHRONIC COMBINED SYSTOLIC AND DIASTOLIC CONGESTIVE HEART FAILURE (HCC): ICD-10-CM

## 2025-04-22 DIAGNOSIS — I70.0 AORTIC ATHEROSCLEROSIS: ICD-10-CM

## 2025-04-22 DIAGNOSIS — D63.8 ANEMIA OF CHRONIC DISEASE: ICD-10-CM

## 2025-04-22 DIAGNOSIS — Z86.73 HISTORY OF TRANSIENT ISCHEMIC ATTACK (TIA): ICD-10-CM

## 2025-04-22 DIAGNOSIS — I25.10 CORONARY ARTERY DISEASE INVOLVING NATIVE CORONARY ARTERY OF NATIVE HEART WITHOUT ANGINA PECTORIS: ICD-10-CM

## 2025-04-22 DIAGNOSIS — D69.6 THROMBOCYTOPENIA: ICD-10-CM

## 2025-04-22 DIAGNOSIS — E78.5 HYPERLIPIDEMIA, UNSPECIFIED HYPERLIPIDEMIA TYPE: ICD-10-CM

## 2025-04-22 DIAGNOSIS — Z00.00 ROUTINE GENERAL MEDICAL EXAMINATION AT A HEALTH CARE FACILITY: ICD-10-CM

## 2025-04-22 DIAGNOSIS — E87.1 HYPONATREMIA: ICD-10-CM

## 2025-04-22 DIAGNOSIS — I48.0 PAROXYSMAL ATRIAL FIBRILLATION (HCC): ICD-10-CM

## 2025-04-22 NOTE — TELEPHONE ENCOUNTER
Future Appointments   Date Time Provider Department Center   5/15/2025  9:00 AM Elton Flores MD [facility]     Orders to jono     Pt informed that labs need to be completed no sooner than 2 weeks prior to the appt. Pt aware to fast-no call back required    Patient cannot fast more than 4 hours at a time so he probably cannot get his lipid done

## 2025-04-22 NOTE — TELEPHONE ENCOUNTER
Patient has not been seen by PCP in the past.  Ok for labs as pended or would PCP like to order at first visit?

## 2025-04-30 ENCOUNTER — APPOINTMENT (OUTPATIENT)
Dept: CARDIOLOGY | Age: 75
End: 2025-04-30

## 2025-04-30 VITALS
DIASTOLIC BLOOD PRESSURE: 82 MMHG | SYSTOLIC BLOOD PRESSURE: 128 MMHG | HEIGHT: 69 IN | BODY MASS INDEX: 24.88 KG/M2 | WEIGHT: 168 LBS | HEART RATE: 73 BPM

## 2025-04-30 DIAGNOSIS — I49.3 PVCS (PREMATURE VENTRICULAR CONTRACTIONS): ICD-10-CM

## 2025-04-30 DIAGNOSIS — I10 BENIGN ESSENTIAL HTN: Primary | ICD-10-CM

## 2025-04-30 DIAGNOSIS — I65.23 CAROTID STENOSIS, ASYMPTOMATIC, BILATERAL: ICD-10-CM

## 2025-04-30 DIAGNOSIS — I25.5 CARDIOMYOPATHY, ISCHEMIC: ICD-10-CM

## 2025-04-30 DIAGNOSIS — I25.10 CORONARY ARTERY DISEASE INVOLVING NATIVE CORONARY ARTERY OF NATIVE HEART WITHOUT ANGINA PECTORIS: ICD-10-CM

## 2025-04-30 DIAGNOSIS — I48.0 PAROXYSMAL ATRIAL FIBRILLATION  (CMD): ICD-10-CM

## 2025-04-30 DIAGNOSIS — I50.22 CHRONIC SYSTOLIC CONGESTIVE HEART FAILURE  (CMD): ICD-10-CM

## 2025-04-30 RX ORDER — PANCRELIPASE 36000; 180000; 114000 [USP'U]/1; [USP'U]/1; [USP'U]/1
2 CAPSULE, DELAYED RELEASE PELLETS ORAL 4 TIMES DAILY
COMMUNITY
Start: 2025-04-24 | End: 2025-05-24

## 2025-04-30 SDOH — HEALTH STABILITY: PHYSICAL HEALTH: ON AVERAGE, HOW MANY MINUTES DO YOU ENGAGE IN EXERCISE AT THIS LEVEL?: 0 MIN

## 2025-04-30 SDOH — HEALTH STABILITY: PHYSICAL HEALTH: ON AVERAGE, HOW MANY DAYS PER WEEK DO YOU ENGAGE IN MODERATE TO STRENUOUS EXERCISE (LIKE A BRISK WALK)?: 0 DAYS

## 2025-04-30 ASSESSMENT — PATIENT HEALTH QUESTIONNAIRE - PHQ9
SUM OF ALL RESPONSES TO PHQ9 QUESTIONS 1 AND 2: 0
1. LITTLE INTEREST OR PLEASURE IN DOING THINGS: NOT AT ALL
SUM OF ALL RESPONSES TO PHQ9 QUESTIONS 1 AND 2: 0
CLINICAL INTERPRETATION OF PHQ2 SCORE: NO FURTHER SCREENING NEEDED
2. FEELING DOWN, DEPRESSED OR HOPELESS: NOT AT ALL

## 2025-05-09 ENCOUNTER — APPOINTMENT (OUTPATIENT)
Dept: CARDIOLOGY | Age: 75
End: 2025-05-09
Attending: INTERNAL MEDICINE

## 2025-05-09 ENCOUNTER — APPOINTMENT (OUTPATIENT)
Dept: CARDIOLOGY | Age: 75
End: 2025-05-09

## 2025-05-09 ENCOUNTER — LAB ENCOUNTER (OUTPATIENT)
Dept: LAB | Facility: HOSPITAL | Age: 75
End: 2025-05-09
Attending: FAMILY MEDICINE
Payer: MEDICARE

## 2025-05-09 VITALS
BODY MASS INDEX: 24.44 KG/M2 | HEART RATE: 75 BPM | SYSTOLIC BLOOD PRESSURE: 122 MMHG | DIASTOLIC BLOOD PRESSURE: 73 MMHG | WEIGHT: 165 LBS | HEIGHT: 69 IN

## 2025-05-09 DIAGNOSIS — I47.20 V-TACH  (CMD): ICD-10-CM

## 2025-05-09 DIAGNOSIS — I70.0 AORTIC ATHEROSCLEROSIS: ICD-10-CM

## 2025-05-09 DIAGNOSIS — D63.8 ANEMIA OF CHRONIC DISEASE: ICD-10-CM

## 2025-05-09 DIAGNOSIS — I49.3 PVCS (PREMATURE VENTRICULAR CONTRACTIONS): Primary | ICD-10-CM

## 2025-05-09 DIAGNOSIS — I48.0 PAROXYSMAL ATRIAL FIBRILLATION (HCC): ICD-10-CM

## 2025-05-09 DIAGNOSIS — N18.31 STAGE 3A CHRONIC KIDNEY DISEASE (HCC): ICD-10-CM

## 2025-05-09 DIAGNOSIS — R74.8 ELEVATED ALKALINE PHOSPHATASE LEVEL: ICD-10-CM

## 2025-05-09 DIAGNOSIS — E87.1 HYPONATREMIA: ICD-10-CM

## 2025-05-09 DIAGNOSIS — Z95.810 ICD (IMPLANTABLE CARDIOVERTER-DEFIBRILLATOR) IN PLACE: ICD-10-CM

## 2025-05-09 DIAGNOSIS — Z00.00 ROUTINE GENERAL MEDICAL EXAMINATION AT A HEALTH CARE FACILITY: ICD-10-CM

## 2025-05-09 DIAGNOSIS — D69.6 THROMBOCYTOPENIA: ICD-10-CM

## 2025-05-09 DIAGNOSIS — I50.42 CHRONIC COMBINED SYSTOLIC AND DIASTOLIC CONGESTIVE HEART FAILURE (HCC): ICD-10-CM

## 2025-05-09 DIAGNOSIS — Z86.73 HISTORY OF TRANSIENT ISCHEMIC ATTACK (TIA): ICD-10-CM

## 2025-05-09 DIAGNOSIS — E78.5 HYPERLIPIDEMIA, UNSPECIFIED HYPERLIPIDEMIA TYPE: ICD-10-CM

## 2025-05-09 DIAGNOSIS — I25.10 CORONARY ARTERY DISEASE INVOLVING NATIVE CORONARY ARTERY OF NATIVE HEART WITHOUT ANGINA PECTORIS: ICD-10-CM

## 2025-05-09 LAB
ALBUMIN SERPL-MCNC: 4.6 G/DL (ref 3.2–4.8)
ALBUMIN/GLOB SERPL: 1.4 {RATIO} (ref 1–2)
ALP LIVER SERPL-CCNC: 191 U/L (ref 45–117)
ALT SERPL-CCNC: 39 U/L (ref 10–49)
ANION GAP SERPL CALC-SCNC: 4 MMOL/L (ref 0–18)
AST SERPL-CCNC: 34 U/L (ref ?–34)
ATRIAL RATE (BPM): 70
BASOPHILS # BLD AUTO: 0.04 X10(3) UL (ref 0–0.2)
BASOPHILS NFR BLD AUTO: 0.6 %
BILIRUB SERPL-MCNC: 1 MG/DL (ref 0.2–1.1)
BUN BLD-MCNC: 37 MG/DL (ref 9–23)
CALCIUM BLD-MCNC: 10.2 MG/DL (ref 8.7–10.6)
CHLORIDE SERPL-SCNC: 102 MMOL/L (ref 98–112)
CHOLEST SERPL-MCNC: 122 MG/DL (ref ?–200)
CO2 SERPL-SCNC: 29 MMOL/L (ref 21–32)
CREAT BLD-MCNC: 1.27 MG/DL (ref 0.7–1.3)
EGFRCR SERPLBLD CKD-EPI 2021: 59 ML/MIN/1.73M2 (ref 60–?)
EOSINOPHIL # BLD AUTO: 0.24 X10(3) UL (ref 0–0.7)
EOSINOPHIL NFR BLD AUTO: 3.9 %
ERYTHROCYTE [DISTWIDTH] IN BLOOD BY AUTOMATED COUNT: 15.1 %
FASTING PATIENT LIPID ANSWER: YES
FASTING STATUS PATIENT QL REPORTED: YES
GGT SERPL-CCNC: 189 U/L (ref ?–73)
GLOBULIN PLAS-MCNC: 3.3 G/DL (ref 2–3.5)
GLUCOSE BLD-MCNC: 100 MG/DL (ref 70–99)
HCT VFR BLD AUTO: 42.1 % (ref 39–53)
HDLC SERPL-MCNC: 59 MG/DL (ref 40–59)
HGB BLD-MCNC: 14.3 G/DL (ref 13–17.5)
IMM GRANULOCYTES # BLD AUTO: 0.02 X10(3) UL (ref 0–1)
IMM GRANULOCYTES NFR BLD: 0.3 %
LDLC SERPL CALC-MCNC: 49 MG/DL (ref ?–100)
LYMPHOCYTES # BLD AUTO: 1.33 X10(3) UL (ref 1–4)
LYMPHOCYTES NFR BLD AUTO: 21.4 %
MCH RBC QN AUTO: 30.9 PG (ref 26–34)
MCHC RBC AUTO-ENTMCNC: 34 G/DL (ref 31–37)
MCV RBC AUTO: 90.9 FL (ref 80–100)
MDC_IDC_EPISODE_DTM: NORMAL
MDC_IDC_EPISODE_DURATION: 3 S
MDC_IDC_EPISODE_ID: 12
MDC_IDC_EPISODE_TYPE: NORMAL
MDC_IDC_EPISODE_TYPE_INDUCED: NO
MDC_IDC_LEAD_CONNECTION_STATUS: NORMAL
MDC_IDC_LEAD_CONNECTION_STATUS: NORMAL
MDC_IDC_LEAD_IMPLANT_DT: NORMAL
MDC_IDC_LEAD_IMPLANT_DT: NORMAL
MDC_IDC_LEAD_LOCATION: NORMAL
MDC_IDC_LEAD_LOCATION: NORMAL
MDC_IDC_LEAD_LOCATION_DETAIL_1: NORMAL
MDC_IDC_LEAD_LOCATION_DETAIL_1: NORMAL
MDC_IDC_LEAD_MFG: NORMAL
MDC_IDC_LEAD_MFG: NORMAL
MDC_IDC_LEAD_MODEL: NORMAL
MDC_IDC_LEAD_MODEL: NORMAL
MDC_IDC_LEAD_POLARITY_TYPE: NORMAL
MDC_IDC_LEAD_POLARITY_TYPE: NORMAL
MDC_IDC_LEAD_SERIAL: NORMAL
MDC_IDC_LEAD_SERIAL: NORMAL
MDC_IDC_MSMT_BATTERY_DTM: NORMAL
MDC_IDC_MSMT_BATTERY_REMAINING_LONGEVITY: 33 MO
MDC_IDC_MSMT_BATTERY_RRT_TRIGGER: 2.73
MDC_IDC_MSMT_BATTERY_STATUS: NORMAL
MDC_IDC_MSMT_BATTERY_VOLTAGE: 2.95 V
MDC_IDC_MSMT_LEADCHNL_RA_IMPEDANCE_VALUE: 513 OHM
MDC_IDC_MSMT_LEADCHNL_RA_PACING_THRESHOLD_AMPLITUDE: 0.38 V
MDC_IDC_MSMT_LEADCHNL_RA_PACING_THRESHOLD_AMPLITUDE: 0.5 V
MDC_IDC_MSMT_LEADCHNL_RA_PACING_THRESHOLD_PULSEWIDTH: 0.4 MS
MDC_IDC_MSMT_LEADCHNL_RA_PACING_THRESHOLD_PULSEWIDTH: 0.4 MS
MDC_IDC_MSMT_LEADCHNL_RA_SENSING_INTR_AMPL: 2.25 MV
MDC_IDC_MSMT_LEADCHNL_RA_SENSING_INTR_AMPL: 2.25 MV
MDC_IDC_MSMT_LEADCHNL_RV_IMPEDANCE_VALUE: 304 OHM
MDC_IDC_MSMT_LEADCHNL_RV_IMPEDANCE_VALUE: 380 OHM
MDC_IDC_MSMT_LEADCHNL_RV_PACING_THRESHOLD_AMPLITUDE: 1 V
MDC_IDC_MSMT_LEADCHNL_RV_PACING_THRESHOLD_AMPLITUDE: 1.12 V
MDC_IDC_MSMT_LEADCHNL_RV_PACING_THRESHOLD_PULSEWIDTH: 0.4 MS
MDC_IDC_MSMT_LEADCHNL_RV_PACING_THRESHOLD_PULSEWIDTH: 0.4 MS
MDC_IDC_MSMT_LEADCHNL_RV_SENSING_INTR_AMPL: 7.12 MV
MDC_IDC_MSMT_LEADCHNL_RV_SENSING_INTR_AMPL: 7.5 MV
MDC_IDC_PG_IMPLANT_DTM: NORMAL
MDC_IDC_PG_MFG: NORMAL
MDC_IDC_PG_MODEL: NORMAL
MDC_IDC_PG_SERIAL: NORMAL
MDC_IDC_PG_TYPE: NORMAL
MDC_IDC_SESS_CLINIC_NAME: NORMAL
MDC_IDC_SESS_DTM: NORMAL
MDC_IDC_SESS_TYPE: NORMAL
MDC_IDC_SET_BRADY_AT_MODE_SWITCH_RATE: 171 {BEATS}/MIN
MDC_IDC_SET_BRADY_HYSTRATE: NORMAL
MDC_IDC_SET_BRADY_LOWRATE: 70 {BEATS}/MIN
MDC_IDC_SET_BRADY_MAX_SENSOR_RATE: 120 {BEATS}/MIN
MDC_IDC_SET_BRADY_MAX_TRACKING_RATE: 130 {BEATS}/MIN
MDC_IDC_SET_BRADY_MODE: NORMAL
MDC_IDC_SET_BRADY_PAV_DELAY_LOW: 180 MS
MDC_IDC_SET_BRADY_SAV_DELAY_LOW: 150 MS
MDC_IDC_SET_LEADCHNL_RA_PACING_AMPLITUDE: 1.5 V
MDC_IDC_SET_LEADCHNL_RA_PACING_ANODE_ELECTRODE_1: NORMAL
MDC_IDC_SET_LEADCHNL_RA_PACING_ANODE_LOCATION_1: NORMAL
MDC_IDC_SET_LEADCHNL_RA_PACING_CAPTURE_MODE: NORMAL
MDC_IDC_SET_LEADCHNL_RA_PACING_CATHODE_ELECTRODE_1: NORMAL
MDC_IDC_SET_LEADCHNL_RA_PACING_CATHODE_LOCATION_1: NORMAL
MDC_IDC_SET_LEADCHNL_RA_PACING_POLARITY: NORMAL
MDC_IDC_SET_LEADCHNL_RA_PACING_PULSEWIDTH: 0.4 MS
MDC_IDC_SET_LEADCHNL_RA_SENSING_ANODE_ELECTRODE_1: NORMAL
MDC_IDC_SET_LEADCHNL_RA_SENSING_ANODE_LOCATION_1: NORMAL
MDC_IDC_SET_LEADCHNL_RA_SENSING_CATHODE_ELECTRODE_1: NORMAL
MDC_IDC_SET_LEADCHNL_RA_SENSING_CATHODE_LOCATION_1: NORMAL
MDC_IDC_SET_LEADCHNL_RA_SENSING_POLARITY: NORMAL
MDC_IDC_SET_LEADCHNL_RA_SENSING_SENSITIVITY: 0.3 MV
MDC_IDC_SET_LEADCHNL_RV_PACING_AMPLITUDE: 2.25 V
MDC_IDC_SET_LEADCHNL_RV_PACING_ANODE_ELECTRODE_1: NORMAL
MDC_IDC_SET_LEADCHNL_RV_PACING_ANODE_LOCATION_1: NORMAL
MDC_IDC_SET_LEADCHNL_RV_PACING_CAPTURE_MODE: NORMAL
MDC_IDC_SET_LEADCHNL_RV_PACING_CATHODE_ELECTRODE_1: NORMAL
MDC_IDC_SET_LEADCHNL_RV_PACING_CATHODE_LOCATION_1: NORMAL
MDC_IDC_SET_LEADCHNL_RV_PACING_POLARITY: NORMAL
MDC_IDC_SET_LEADCHNL_RV_PACING_PULSEWIDTH: 0.4 MS
MDC_IDC_SET_LEADCHNL_RV_SENSING_ANODE_ELECTRODE_1: NORMAL
MDC_IDC_SET_LEADCHNL_RV_SENSING_ANODE_LOCATION_1: NORMAL
MDC_IDC_SET_LEADCHNL_RV_SENSING_CATHODE_ELECTRODE_1: NORMAL
MDC_IDC_SET_LEADCHNL_RV_SENSING_CATHODE_LOCATION_1: NORMAL
MDC_IDC_SET_LEADCHNL_RV_SENSING_POLARITY: NORMAL
MDC_IDC_SET_LEADCHNL_RV_SENSING_SENSITIVITY: 0.3 MV
MDC_IDC_SET_ZONE_DETECTION_BEATS_DENOMINATOR: 16 {BEATS}
MDC_IDC_SET_ZONE_DETECTION_BEATS_DENOMINATOR: 32 {BEATS}
MDC_IDC_SET_ZONE_DETECTION_BEATS_DENOMINATOR: 40 {BEATS}
MDC_IDC_SET_ZONE_DETECTION_BEATS_NUMERATOR: 16 {BEATS}
MDC_IDC_SET_ZONE_DETECTION_BEATS_NUMERATOR: 30 {BEATS}
MDC_IDC_SET_ZONE_DETECTION_BEATS_NUMERATOR: 32 {BEATS}
MDC_IDC_SET_ZONE_DETECTION_INTERVAL: 320 MS
MDC_IDC_SET_ZONE_DETECTION_INTERVAL: 350 MS
MDC_IDC_SET_ZONE_DETECTION_INTERVAL: 360 MS
MDC_IDC_SET_ZONE_DETECTION_INTERVAL: 450 MS
MDC_IDC_SET_ZONE_DETECTION_INTERVAL: NORMAL
MDC_IDC_SET_ZONE_STATUS: NORMAL
MDC_IDC_SET_ZONE_TYPE: NORMAL
MDC_IDC_SET_ZONE_VENDOR_TYPE: NORMAL
MDC_IDC_STAT_AT_BURDEN_PERCENT: 0 %
MDC_IDC_STAT_AT_DTM_END: NORMAL
MDC_IDC_STAT_AT_DTM_START: NORMAL
MDC_IDC_STAT_BRADY_AP_VP_PERCENT: 0.15 %
MDC_IDC_STAT_BRADY_AP_VS_PERCENT: 99.74 %
MDC_IDC_STAT_BRADY_AS_VP_PERCENT: 0.02 %
MDC_IDC_STAT_BRADY_AS_VS_PERCENT: 0.09 %
MDC_IDC_STAT_BRADY_DTM_END: NORMAL
MDC_IDC_STAT_BRADY_DTM_START: NORMAL
MDC_IDC_STAT_BRADY_RA_PERCENT_PACED: 99.87 %
MDC_IDC_STAT_BRADY_RV_PERCENT_PACED: 0.19 %
MDC_IDC_STAT_EPISODE_RECENT_COUNT: 0
MDC_IDC_STAT_EPISODE_RECENT_COUNT: 1
MDC_IDC_STAT_EPISODE_RECENT_COUNT_DTM_END: NORMAL
MDC_IDC_STAT_EPISODE_RECENT_COUNT_DTM_START: NORMAL
MDC_IDC_STAT_EPISODE_TOTAL_COUNT: 0
MDC_IDC_STAT_EPISODE_TOTAL_COUNT: 1
MDC_IDC_STAT_EPISODE_TOTAL_COUNT: 2
MDC_IDC_STAT_EPISODE_TOTAL_COUNT: 8
MDC_IDC_STAT_EPISODE_TOTAL_COUNT_DTM_END: NORMAL
MDC_IDC_STAT_EPISODE_TOTAL_COUNT_DTM_START: NORMAL
MDC_IDC_STAT_EPISODE_TYPE: NORMAL
MDC_IDC_STAT_TACHYTHERAPY_ATP_DELIVERED_RECENT: 0
MDC_IDC_STAT_TACHYTHERAPY_ATP_DELIVERED_TOTAL: 1
MDC_IDC_STAT_TACHYTHERAPY_RECENT_DTM_END: NORMAL
MDC_IDC_STAT_TACHYTHERAPY_RECENT_DTM_START: NORMAL
MDC_IDC_STAT_TACHYTHERAPY_SHOCKS_ABORTED_RECENT: 0
MDC_IDC_STAT_TACHYTHERAPY_SHOCKS_ABORTED_TOTAL: 0
MDC_IDC_STAT_TACHYTHERAPY_SHOCKS_DELIVERED_RECENT: 0
MDC_IDC_STAT_TACHYTHERAPY_SHOCKS_DELIVERED_TOTAL: 4
MDC_IDC_STAT_TACHYTHERAPY_TOTAL_DTM_END: NORMAL
MDC_IDC_STAT_TACHYTHERAPY_TOTAL_DTM_START: NORMAL
MONOCYTES # BLD AUTO: 1.14 X10(3) UL (ref 0.1–1)
MONOCYTES NFR BLD AUTO: 18.4 %
NEUTROPHILS # BLD AUTO: 3.44 X10 (3) UL (ref 1.5–7.7)
NEUTROPHILS # BLD AUTO: 3.44 X10(3) UL (ref 1.5–7.7)
NEUTROPHILS NFR BLD AUTO: 55.4 %
NONHDLC SERPL-MCNC: 63 MG/DL (ref ?–130)
OSMOLALITY SERPL CALC.SUM OF ELEC: 289 MOSM/KG (ref 275–295)
P AXIS (DEGREES): 11
PLATELET # BLD AUTO: 237 10(3)UL (ref 150–450)
POTASSIUM SERPL-SCNC: 4.4 MMOL/L (ref 3.5–5.1)
PR-INTERVAL (MSEC): 238
PROT SERPL-MCNC: 7.9 G/DL (ref 5.7–8.2)
QRS-INTERVAL (MSEC): 136
QT-INTERVAL (MSEC): 418
QTC: 451
R AXIS (DEGREES): 87
RBC # BLD AUTO: 4.63 X10(6)UL (ref 3.8–5.8)
REPORT TEXT: NORMAL
SODIUM SERPL-SCNC: 135 MMOL/L (ref 136–145)
T AXIS (DEGREES): -56
TRIGL SERPL-MCNC: 67 MG/DL (ref 30–149)
TSI SER-ACNC: 0.98 UIU/ML (ref 0.55–4.78)
VENTRICULAR RATE EKG/MIN (BPM): 70
VLDLC SERPL CALC-MCNC: 10 MG/DL (ref 0–30)
WBC # BLD AUTO: 6.2 X10(3) UL (ref 4–11)

## 2025-05-09 PROCEDURE — 85025 COMPLETE CBC W/AUTO DIFF WBC: CPT

## 2025-05-09 PROCEDURE — 93283 PRGRMG EVAL IMPLANTABLE DFB: CPT | Performed by: INTERNAL MEDICINE

## 2025-05-09 PROCEDURE — 36415 COLL VENOUS BLD VENIPUNCTURE: CPT

## 2025-05-09 PROCEDURE — 80053 COMPREHEN METABOLIC PANEL: CPT

## 2025-05-09 PROCEDURE — 84443 ASSAY THYROID STIM HORMONE: CPT

## 2025-05-09 PROCEDURE — 82977 ASSAY OF GGT: CPT

## 2025-05-09 PROCEDURE — 80061 LIPID PANEL: CPT

## 2025-05-09 SDOH — HEALTH STABILITY: PHYSICAL HEALTH: ON AVERAGE, HOW MANY DAYS PER WEEK DO YOU ENGAGE IN MODERATE TO STRENUOUS EXERCISE (LIKE A BRISK WALK)?: 0 DAYS

## 2025-05-09 ASSESSMENT — PATIENT HEALTH QUESTIONNAIRE - PHQ9
2. FEELING DOWN, DEPRESSED OR HOPELESS: NOT AT ALL
CLINICAL INTERPRETATION OF PHQ2 SCORE: NO FURTHER SCREENING NEEDED
SUM OF ALL RESPONSES TO PHQ9 QUESTIONS 1 AND 2: 0
1. LITTLE INTEREST OR PLEASURE IN DOING THINGS: NOT AT ALL
SUM OF ALL RESPONSES TO PHQ9 QUESTIONS 1 AND 2: 0

## 2025-05-13 LAB
ATRIAL RATE (BPM): 70
P AXIS (DEGREES): 11
PR-INTERVAL (MSEC): 238
QRS-INTERVAL (MSEC): 136
QT-INTERVAL (MSEC): 418
QTC: 451
R AXIS (DEGREES): 87
REPORT TEXT: NORMAL
T AXIS (DEGREES): -56
VENTRICULAR RATE EKG/MIN (BPM): 70

## 2025-05-15 ENCOUNTER — OFFICE VISIT (OUTPATIENT)
Age: 75
End: 2025-05-15
Payer: MEDICARE

## 2025-05-15 VITALS
RESPIRATION RATE: 16 BRPM | SYSTOLIC BLOOD PRESSURE: 112 MMHG | HEART RATE: 71 BPM | HEIGHT: 69.5 IN | DIASTOLIC BLOOD PRESSURE: 68 MMHG | TEMPERATURE: 97 F | BODY MASS INDEX: 23.89 KG/M2 | OXYGEN SATURATION: 96 % | WEIGHT: 165 LBS

## 2025-05-15 DIAGNOSIS — Z85.09 HISTORY OF CHOLANGIOCARCINOMA: ICD-10-CM

## 2025-05-15 DIAGNOSIS — Z95.0 PACEMAKER: ICD-10-CM

## 2025-05-15 DIAGNOSIS — I25.10 CORONARY ARTERY DISEASE INVOLVING NATIVE CORONARY ARTERY OF NATIVE HEART WITHOUT ANGINA PECTORIS: ICD-10-CM

## 2025-05-15 DIAGNOSIS — I70.0 AORTIC ATHEROSCLEROSIS: ICD-10-CM

## 2025-05-15 DIAGNOSIS — Z90.49 H/O WHIPPLE PROCEDURE: ICD-10-CM

## 2025-05-15 DIAGNOSIS — I48.0 PAROXYSMAL ATRIAL FIBRILLATION (HCC): ICD-10-CM

## 2025-05-15 DIAGNOSIS — G47.33 OSA ON CPAP: ICD-10-CM

## 2025-05-15 DIAGNOSIS — I49.5 SSS (SICK SINUS SYNDROME) (HCC): ICD-10-CM

## 2025-05-15 DIAGNOSIS — K86.81 EXOCRINE PANCREATIC INSUFFICIENCY (HCC): ICD-10-CM

## 2025-05-15 DIAGNOSIS — I50.42 CHRONIC COMBINED SYSTOLIC AND DIASTOLIC CONGESTIVE HEART FAILURE (HCC): ICD-10-CM

## 2025-05-15 DIAGNOSIS — N18.31 STAGE 3A CHRONIC KIDNEY DISEASE (HCC): ICD-10-CM

## 2025-05-15 DIAGNOSIS — Z90.410 H/O WHIPPLE PROCEDURE: ICD-10-CM

## 2025-05-15 DIAGNOSIS — K65.1 INTRA-ABDOMINAL ABSCESS (HCC): ICD-10-CM

## 2025-05-15 DIAGNOSIS — I42.9 CARDIOMYOPATHY, UNSPECIFIED TYPE (HCC): ICD-10-CM

## 2025-05-15 DIAGNOSIS — I10 BENIGN ESSENTIAL HTN: ICD-10-CM

## 2025-05-15 DIAGNOSIS — M81.8 OTHER OSTEOPOROSIS WITHOUT CURRENT PATHOLOGICAL FRACTURE: ICD-10-CM

## 2025-05-15 DIAGNOSIS — Z12.5 SCREENING FOR PROSTATE CANCER: ICD-10-CM

## 2025-05-15 DIAGNOSIS — Z95.1 S/P CABG (CORONARY ARTERY BYPASS GRAFT): ICD-10-CM

## 2025-05-15 DIAGNOSIS — Z86.018 HISTORY OF PHEOCHROMOCYTOMA: ICD-10-CM

## 2025-05-15 DIAGNOSIS — Z00.00 ENCOUNTER FOR ANNUAL HEALTH EXAMINATION: Primary | ICD-10-CM

## 2025-05-15 DIAGNOSIS — J43.2 CENTRILOBULAR EMPHYSEMA (HCC): ICD-10-CM

## 2025-05-15 DIAGNOSIS — Z95.810 PRESENCE OF COMBINATION INTERNAL CARDIAC DEFIBRILLATOR (ICD) AND PACEMAKER: ICD-10-CM

## 2025-05-15 DIAGNOSIS — Z86.73 HISTORY OF TRANSIENT ISCHEMIC ATTACK (TIA): ICD-10-CM

## 2025-05-15 PROBLEM — I50.23 ACUTE ON CHRONIC SYSTOLIC HEART FAILURE (HCC): Status: ACTIVE | Noted: 2025-05-15

## 2025-05-15 PROBLEM — D69.6 THROMBOCYTOPENIA: Status: RESOLVED | Noted: 2017-03-29 | Resolved: 2025-05-15

## 2025-05-15 PROBLEM — I50.23 ACUTE ON CHRONIC SYSTOLIC HEART FAILURE (HCC): Status: RESOLVED | Noted: 2025-05-15 | Resolved: 2025-05-15

## 2025-05-15 NOTE — PROGRESS NOTES
Subjective:   Mark Anthony Bryan is a 74 year old male who presents for a MA AHA (Medicare Advantage Annual Health Assessment) and Subsequent Annual Wellness visit (Pt already had Initial Annual Wellness) and scheduled follow up of multiple significant but stable problems.   History of Present Illness            He overall feels well but is currently working with chiro for some SI pain. He has a MRCP ordered for further evaluation of his elevated alk phosh level. Overall stable cardiac status and continuing to follow regularly with his cardiologist.     History/Other:   Fall Risk Assessment:   He has been screened for Falls and is low risk.      Cognitive Assessment:   He had a completely normal cognitive assessment - see flowsheet entries     Functional Ability/Status:   Mark Anthony Bryan has some abnormal functions as listed below:  He has Hearing problems based on screening of functional status.      Depression Screening (PHQ):  PHQ-2 SCORE: 0  , done 5/14/2025        <5 minutes spent screening and counseling for depression    Advanced Directives:   He does have a Living Will but we do NOT have it on file in Epic.    He has a Power of  for Health Care on file in [facility].  Not discussed      Patient Active Problem List   Diagnosis    Myelolipoma of left adrenal gland    Osteoporosis    Aortic atherosclerosis    Exocrine pancreatic insufficiency (HCC)    Benign essential HTN    History of cholangiocarcinoma    History of pheochromocytoma    Coronary artery disease involving native coronary artery of native heart    S/P CABG (coronary artery bypass graft)    Arthritis of facet joint of lumbar spine    SSS (sick sinus syndrome) ([facility])    PVCs (premature ventricular contractions)    Paroxysmal atrial fibrillation (HCC)    Chronic combined systolic and diastolic congestive heart failure (HCC)    Pacemaker    History of transient ischemic attack (TIA)    LONG on CPAP    CKD (chronic kidney disease) stage 3, GFR 30-59 ml/min  (HCC)    Ventral hernia without obstruction or gangrene    Intra-abdominal abscess (HCC)    Ischemic cardiomyopathy    History of syncope    History of ventricular tachycardia    Centrilobular emphysema (HCC)    Presence of combination internal cardiac defibrillator (ICD) and pacemaker    Acute duodenal ulcer    Adjustment disorder with depressed mood    Hyperlipidemia    Obstruction of bile duct (HCC)    Abdominal pain, acute    Dehydration    Nausea and vomiting in adult    Hyponatremia    Dental caries, unspecified    Deposits (accretions) on teeth    Gastritis    Cardiomyopathy, unspecified type (HCC)     Allergies:  He has no known allergies.    Current Medications:  Outpatient Medications Marked as Taking for the 5/15/25 encounter (Office Visit) with Elton Flores MD   Medication Sig    Pancrelipase, Lip-Prot-Amyl, (CREON) [id number] units Oral Cap DR Particles Take 2 capsules by mouth in the morning, at noon, in the evening, and at bedtime. Meals    OMEPRAZOLE 40 MG Oral Capsule Delayed Release TAKE 1 CAPSULE(40 MG) BY MOUTH DAILY    Pancrelipase, Lip-Prot-Amyl, (CREON) [id number] units Oral Cap DR Particles Take 2 capsules by mouth with breakfast, with lunch, and with evening meal. One capsule with snacks    amiodarone 200 MG Oral Tab Take 1 tablet (200 mg total) by mouth daily.    sacubitril-valsartan 24-26 MG Oral Tab Take 1 tablet by mouth 2 (two) times daily.    amiodarone 200 MG Oral Tab Take 0.5 tablets (100 mg total) by mouth daily.    furosemide 20 MG Oral Tab Take 1 tablet (20 mg total) by mouth every other day.    magnesium 250 MG Oral Tab Take 1 tablet (250 mg total) by mouth daily.    Metoprolol Succinate  MG Oral Tablet 24 Hr Take 1 tablet (100 mg total) by mouth daily.    atorvastatin 20 MG Oral Tab Take 1 tablet (20 mg total) by mouth daily.    aspirin 81 MG Oral Tab EC Take 1 tablet (81 mg total) by mouth daily.    Denosumab 60 MG/ML Subcutaneous Solution Prefilled Syringe Inject 1  mL (60 mg total) into the skin every 6 (six) months.       Medical History:  He  has a past medical history of Abdominal distention, Abdominal hernia, Abdominal pain (frequent), Abnormal finding on GI tract imaging (11/04/2020), Acute respiratory failure, unspecified whether with hypoxia or hypercapnia (HCC) (06/24/2019), Agent orange exposure, MATT (acute kidney injury), Allergic rhinitis (Last year), Anemia, Atherosclerosis of coronary artery (06/24/2019), Back pain, Back problem, Belching (alot), Bloating (always), Blurred vision (meds), Bradycardia (07/07/2019), Cancer ([facility]) (08/2016), Cardiac defibrillator in place, Cardiomyopathy ([facility]), Cataract, Cholangiocarcinoma ([facility]) (10/01/2016), Closed compression fracture of L1 vertebra ([facility]) (10/13/2017), Congestive heart disease ([facility]), Coronary atherosclerosis, COVID (08/2022), Decorative tattoo (long time ago), Diverticulosis of large intestine, Dizziness (lately....meds?), Enteritis, Essential hypertension, Exposure to radiation, Fatigue (Sometimes), Flash pulmonary edema ([facility]), Flatulence/gas pain/belching (usually), Frequent urination (drink 5 bottles of water daily), Hearing impairment, Hearing loss (10% ), Heart attack ([facility]) (06/24/2019), Hemorrhoids (teenager), High blood pressure, High cholesterol, History of syncope (06/29/2024), Hyperkalemia, Hypokalemia (04/26/2019), Hyponatremia (11/07/2016), Ileus ([facility]), Jaundice, Nausea (Sometimes), Nausea and vomiting in adult (09/09/2024), Night sweats, NSTEMI (non-ST elevated myocardial infarction) ([facility]), Osteoporosis, Pacemaker (With defib), Pancreatic cancer ([facility]), Personal history of antineoplastic chemotherapy, Pheochromocytoma, left, Pneumoperitoneum (07/07/2019), Rash (after prolia shot), Renal disorder, Shortness of breath (upon onset of spasms), Sleep apnea, Syncope, near (11/18/2019), Tobacco abuse, Uncomfortable fullness after meals (always), Visual impairment, Weight gain, and Weight loss.  Surgical  History:  He  has a past surgical history that includes hernia surgery; placement, bile duct stent; other surgical history (2016); other; removal gallbladder; whipple (2016); colonoscopy (2011); cabg; angiogram (2019); bypass surgery (2019); cholecystectomy (2016); colonoscopy (overdue); needle biopsy liver (2016); Cardiac pacemaker placement; Cardiac defibrillator placement; bowel resection; cath drug eluting stent; cataract (3/22); pacemaker/defibrillator (2019); Laparoscopic cholecystectomy (Aug 2016); and stomach surgery procedure unlisted (Aug 2016).   Family History:  His family history includes Cancer in his mother; Colon Cancer in his mother; Crohn's Disease in his brother; Dementia in his father; Diabetes in his father, mother, and sister; Heart Attack in his brother; Hypertension in his mother; Other in his father and another family member.  Social History:  He  reports that he quit smoking about 45 years ago. His smoking use included cigarettes. He has never used smokeless tobacco. He reports that he does not currently use drugs after having used the following drugs: Cannabis. Frequency: 7.00 times per week. He reports that he does not drink alcohol.    Tobacco:  He smoked tobacco in the past but quit greater than 12 months ago.  Social History     Tobacco Use   Smoking Status Former    Current packs/day: 0.00    Types: Cigarettes    Quit date: 6/15/1979    Years since quittin.9   Smokeless Tobacco Never   Tobacco Comments    Stopped        CAGE Alcohol Screen:   CAGE screening score of 0 on 2025, showing low risk of alcohol abuse.      Patient Care Team:  Elton Flores MD as PCP - General (Family Medicine)  Lalo Hess MD (GASTROENTEROLOGY)  Tae Rapp MD (Surgical Oncology)  Amor Hill MD (Radiation Oncology)  Komal Boykin (General Practice)  Lalo Hess MD (GASTROENTEROLOGY)  Patrick Espino MD (NEPHROLOGY)  Haris  DO Natalia as Consulting Physician (NEUROLOGY)  Daniel Johnson MD (CARDIOLOGY)  Giovanni Toney MD (Internal Medicine)  Apoorva Whitman APRN (Nurse Practitioner)    Review of Systems     Negative except as in HPI    Objective:   Physical Exam  /68 (BP Location: Left arm, Patient Position: Sitting, Cuff Size: adult)   Pulse 71   Temp 97.2 °F (36.2 °C) (Temporal)   Resp 16   Ht 5' 9.5\" (1.765 m)   Wt 165 lb (74.8 kg)   SpO2 96%   BMI 24.02 kg/m²  Estimated body mass index is 24.02 kg/m² as calculated from the following:    Height as of this encounter: 5' 9.5\" (1.765 m).    Weight as of this encounter: 165 lb (74.8 kg).  GEN: no distress  HEENT: NIKI, ears clear  CV: RRR  CHEST: CTAB  ABD: soft, NT    Medicare Hearing Assessment:   Hearing Screening    Time taken: 5/15/2025  9:05 AM  Screening Method: Finger Rub  Finger Rub Result: Pass         Visual Acuity:   Right Eye Visual Acuity: Uncorrected Right Eye Chart Acuity: 20/40   Left Eye Visual Acuity: Uncorrected Left Eye Chart Acuity: 20/30   Both Eyes Visual Acuity: Uncorrected Both Eyes Chart Acuity: 20/30   Able To Tolerate Visual Acuity: Yes        Assessment & Plan:   Mark Anthony Bryan is a 74 year old male who presents for a Medicare Assessment.     Encounter for annual health examination    CAD SP CABG (coronary artery bypass graft)  Cardiomyopathy, unspecified type (HCC)  Chronic combined systolic and diastolic congestive heart failure (HCC)  Paroxysmal atrial fibrillation (HCC)  SSS (sick sinus syndrome) (HCC)  Pacemaker  Presence of combination internal cardiac defibrillator (ICD) and pacemaker  Aortic atherosclerosis  Compensated cardiac status.  Continue GDMT and management per cardiology    History of cholangiocarcinoma status post Whipple  History of pheochromocytoma status post adrenalectomy  Intra-abdominal abscess (HCC)  Continuing to follow regularly with his GI team. Alkaline phosphatase level and GGT elevated.  He will  schedule his MRCP ordered by his gastroenterology team.     Exocrine pancreatic insufficiency (HCC)  Following with GI, recent dose increase of his Zenpep    Centrilobular emphysema (HCC)  Overall stable with no new symptoms.    Benign essential HTN  Stage 3a chronic kidney disease (HCC)  Controlled on current treatment.  Renal function stable    Other osteoporosis without current pathological fracture  Following with the fracture clinic, on Prolia.    History of transient ischemic attack (TIA)  Continue ASA and statin    LONG on CPAP  Stable    Screening for prostate CA  PSA ordered to be completed at his next lab draw    Colon cancer screening  Patient is due for colonoscopy but has deferred at this time.    The patient indicates understanding of these issues and agrees to the plan.  Reinforced healthy diet, lifestyle, and exercise.      No follow-ups on file.     Elton Flores MD, 5/15/2025     Supplementary Documentation:   General Health:  In the past six months, have you lost more than 10 pounds without trying?: (Patient-Rptd) 3 - Don't know  Has your appetite been poor?: (Patient-Rptd) No  Type of Diet: (Patient-Rptd) Balanced, Low Salt  How does the patient maintain a good energy level?: (Patient-Rptd) Other  How would you describe your daily physical activity?: (Patient-Rptd) Light  How would you describe your current health state?: (Patient-Rptd) Good  How do you maintain positive mental well-being?: (Patient-Rptd) Social Interaction, Visiting Friends, Visiting Family  On a scale of 0 to 10, with 0 being no pain and 10 being severe pain, what is your pain level?: (Patient-Rptd) 4 - (Moderate)  In the past six months, have you experienced urine leakage?: (Patient-Rptd) 0-No  At any time do you feel concerned for the safety/well-being of yourself and/or your children, in your home or elsewhere?: (Patient-Rptd) No  Have you had any immunizations at another office such as Influenza, Hepatitis B, Tetanus, or  Pneumococcal?: (Patient-Rptd) No    Health Maintenance   Topic Date Due    Zoster Vaccines (1 of 2) Never done    Colorectal Cancer Screening  04/05/2022    PSA  06/11/2023    COVID-19 Vaccine (1 - 2024-25 season) Never done    Annual Well Visit  01/01/2025    Annual Depression Screening  01/01/2025    Influenza Vaccine (Season Ended) 10/01/2025    Fall Risk Screening (Annual)  Completed    Pneumococcal Vaccine: 50+ Years  Completed    Meningococcal B Vaccine  Aged Out

## 2025-05-16 NOTE — TELEPHONE ENCOUNTER
Spoke to pt. Informed pt that Rx diazePAM 5 MG Oral Tab was sent to [facility] in [place]. Pt voiced understanding.

## 2025-05-16 NOTE — TELEPHONE ENCOUNTER
See 5/15/25 office visit. Pt relays his labs show that his liver enzymes were elevated and he should have an MRI done. Pt scheduled MRI abdomen on 6/5/25. Pt is claustrophobic and asked if Dr. Flores can send in sedative for the MRI. Confirmed [facility] pharmacy on file ([facility] [place]).

## 2025-05-19 ENCOUNTER — TELEPHONE (OUTPATIENT)
Age: 75
End: 2025-05-19

## 2025-05-19 DIAGNOSIS — F40.240 CLAUSTROPHOBIA: ICD-10-CM

## 2025-05-20 PROBLEM — K65.1 INTRA-ABDOMINAL ABSCESS (HCC): Status: RESOLVED | Noted: 2023-04-06 | Resolved: 2025-05-20

## 2025-05-20 RX ORDER — DIAZEPAM 5 MG/1
5 TABLET ORAL
Qty: 1 TABLET | Refills: 0 | Status: SHIPPED | OUTPATIENT
Start: 2025-05-20 | End: 2025-05-20

## 2025-06-05 ENCOUNTER — HOSPITAL ENCOUNTER (OUTPATIENT)
Dept: MRI IMAGING | Facility: HOSPITAL | Age: 75
Discharge: HOME | End: 2025-06-05
Payer: MEDICARE

## 2025-06-05 ENCOUNTER — LAB ENCOUNTER (OUTPATIENT)
Dept: LAB | Facility: HOSPITAL | Age: 75
End: 2025-06-05
Payer: MEDICARE

## 2025-06-05 VITALS — HEART RATE: 69 BPM | SYSTOLIC BLOOD PRESSURE: 142 MMHG | OXYGEN SATURATION: 99 % | DIASTOLIC BLOOD PRESSURE: 86 MMHG

## 2025-06-05 DIAGNOSIS — R74.8 ELEVATED ALKALINE PHOSPHATASE LEVEL: ICD-10-CM

## 2025-06-05 DIAGNOSIS — C22.1 CHOLANGIOCARCINOMA (HCC): ICD-10-CM

## 2025-06-05 DIAGNOSIS — R79.89 ABNORMAL LFTS: ICD-10-CM

## 2025-06-05 DIAGNOSIS — Z12.5 SCREENING FOR PROSTATE CANCER: ICD-10-CM

## 2025-06-05 DIAGNOSIS — R74.8 ELEVATED LIVER ENZYMES: ICD-10-CM

## 2025-06-05 LAB
ALBUMIN SERPL-MCNC: 4.5 G/DL (ref 3.2–4.8)
ALBUMIN/GLOB SERPL: 1.5 {RATIO} (ref 1–2)
ALP LIVER SERPL-CCNC: 157 U/L (ref 45–117)
ALT SERPL-CCNC: 33 U/L (ref 10–49)
ANION GAP SERPL CALC-SCNC: 9 MMOL/L (ref 0–18)
AST SERPL-CCNC: 27 U/L (ref ?–34)
BILIRUB DIRECT SERPL-MCNC: 0.4 MG/DL (ref ?–0.3)
BILIRUB SERPL-MCNC: 1 MG/DL (ref 0.2–1.1)
BUN BLD-MCNC: 23 MG/DL (ref 9–23)
CALCIUM BLD-MCNC: 9.8 MG/DL (ref 8.7–10.6)
CHLORIDE SERPL-SCNC: 102 MMOL/L (ref 98–112)
CO2 SERPL-SCNC: 25 MMOL/L (ref 21–32)
COMPLEXED PSA SERPL-MCNC: 2.96 NG/ML (ref ?–4)
CREAT BLD-MCNC: 1.32 MG/DL (ref 0.7–1.3)
EGFRCR SERPLBLD CKD-EPI 2021: 57 ML/MIN/1.73M2 (ref 60–?)
FASTING STATUS PATIENT QL REPORTED: YES
GGT SERPL-CCNC: 102 U/L (ref ?–73)
GLOBULIN PLAS-MCNC: 3.1 G/DL (ref 2–3.5)
GLUCOSE BLD-MCNC: 98 MG/DL (ref 70–99)
OSMOLALITY SERPL CALC.SUM OF ELEC: 286 MOSM/KG (ref 275–295)
POTASSIUM SERPL-SCNC: 4.4 MMOL/L (ref 3.5–5.1)
PROT SERPL-MCNC: 7.6 G/DL (ref 5.7–8.2)
SODIUM SERPL-SCNC: 136 MMOL/L (ref 136–145)

## 2025-06-05 PROCEDURE — A9575 INJ GADOTERATE MEGLUMI 0.1ML: HCPCS

## 2025-06-05 PROCEDURE — 74183 MRI ABD W/O CNTR FLWD CNTR: CPT

## 2025-06-05 PROCEDURE — 83516 IMMUNOASSAY NONANTIBODY: CPT

## 2025-06-05 PROCEDURE — 82248 BILIRUBIN DIRECT: CPT

## 2025-06-05 PROCEDURE — 36415 COLL VENOUS BLD VENIPUNCTURE: CPT

## 2025-06-05 PROCEDURE — 80053 COMPREHEN METABOLIC PANEL: CPT

## 2025-06-05 PROCEDURE — 82977 ASSAY OF GGT: CPT

## 2025-06-05 PROCEDURE — 76376 3D RENDER W/INTRP POSTPROCES: CPT

## 2025-06-05 RX ORDER — GADOTERATE MEGLUMINE 376.9 MG/ML
15 INJECTION INTRAVENOUS
Status: COMPLETED | OUTPATIENT
Start: 2025-06-05 | End: 2025-06-05

## 2025-06-05 RX ADMIN — GADOTERATE MEGLUMINE 15 ML: 376.9 INJECTION INTRAVENOUS at 10:58:00

## 2025-06-05 NOTE — IMAGING NOTE
Pt verified name, , and allergies upon arrival to MRI. Pt here for MRI with [employer] MR conditional pacemaker. Pt remotely placed in MR safe mode by [facility] staff as ordered by cardiologist.  Defibrillator available immediately available in safe zone. Continuous monitoring of HR, B/P, and SpO2 throughout MRI scan. Following scan device placed in pre scan settings by [facility] staff.

## 2025-06-06 LAB — M2 MITOCHONDRIAL AB: <20 UNITS

## 2025-06-09 NOTE — PROGRESS NOTES
Results were reviewed and discussed with the patient over the phone. Images reviewed by the attending provider. There was no evidence of recurrent malignancy. Also, Continued slow dilatation of the pancreatic duct could be due to physiologic changes. Continue to follow up at the office.

## 2025-06-12 ENCOUNTER — TELEPHONE (OUTPATIENT)
Dept: SURGERY | Facility: CLINIC | Age: 75
End: 2025-06-12

## 2025-06-12 ENCOUNTER — APPOINTMENT (OUTPATIENT)
Dept: CT IMAGING | Facility: HOSPITAL | Age: 75
End: 2025-06-12
Attending: STUDENT IN AN ORGANIZED HEALTH CARE EDUCATION/TRAINING PROGRAM
Payer: OTHER GOVERNMENT

## 2025-06-12 ENCOUNTER — HOSPITAL ENCOUNTER (OUTPATIENT)
Facility: HOSPITAL | Age: 75
Setting detail: OBSERVATION
Discharge: HOME | End: 2025-06-16
Attending: STUDENT IN AN ORGANIZED HEALTH CARE EDUCATION/TRAINING PROGRAM | Admitting: STUDENT IN AN ORGANIZED HEALTH CARE EDUCATION/TRAINING PROGRAM
Payer: OTHER GOVERNMENT

## 2025-06-12 DIAGNOSIS — K85.80 OTHER ACUTE PANCREATITIS, UNSPECIFIED COMPLICATION STATUS (HCC): Primary | ICD-10-CM

## 2025-06-12 LAB
ALBUMIN SERPL-MCNC: 4.5 G/DL (ref 3.2–4.8)
ALBUMIN/GLOB SERPL: 1.4 {RATIO} (ref 1–2)
ALP LIVER SERPL-CCNC: 157 U/L (ref 45–117)
ALT SERPL-CCNC: 30 U/L (ref 10–49)
ANION GAP SERPL CALC-SCNC: 9 MMOL/L (ref 0–18)
AST SERPL-CCNC: 27 U/L (ref ?–34)
BASOPHILS # BLD AUTO: 0.03 X10(3) UL (ref 0–0.2)
BASOPHILS NFR BLD AUTO: 0.3 %
BILIRUB SERPL-MCNC: 0.9 MG/DL (ref 0.2–1.1)
BUN BLD-MCNC: 18 MG/DL (ref 9–23)
CALCIUM BLD-MCNC: 9.8 MG/DL (ref 8.7–10.6)
CHLORIDE SERPL-SCNC: 99 MMOL/L (ref 98–112)
CO2 SERPL-SCNC: 24 MMOL/L (ref 21–32)
CREAT BLD-MCNC: 1.29 MG/DL (ref 0.7–1.3)
EGFRCR SERPLBLD CKD-EPI 2021: 58 ML/MIN/1.73M2 (ref 60–?)
EOSINOPHIL # BLD AUTO: 0.12 X10(3) UL (ref 0–0.7)
EOSINOPHIL NFR BLD AUTO: 1.2 %
ERYTHROCYTE [DISTWIDTH] IN BLOOD BY AUTOMATED COUNT: 15.1 %
GLOBULIN PLAS-MCNC: 3.3 G/DL (ref 2–3.5)
GLUCOSE BLD-MCNC: 100 MG/DL (ref 70–99)
GLUCOSE BLD-MCNC: 108 MG/DL (ref 70–99)
HCT VFR BLD AUTO: 38.1 % (ref 39–53)
HGB BLD-MCNC: 13.6 G/DL (ref 13–17.5)
IMM GRANULOCYTES # BLD AUTO: 0.03 X10(3) UL (ref 0–1)
IMM GRANULOCYTES NFR BLD: 0.3 %
LIPASE SERPL-CCNC: 233 U/L (ref 12–53)
LYMPHOCYTES # BLD AUTO: 1.31 X10(3) UL (ref 1–4)
LYMPHOCYTES NFR BLD AUTO: 13.3 %
MCH RBC QN AUTO: 31.5 PG (ref 26–34)
MCHC RBC AUTO-ENTMCNC: 35.7 G/DL (ref 31–37)
MCV RBC AUTO: 88.2 FL (ref 80–100)
MONOCYTES # BLD AUTO: 1.33 X10(3) UL (ref 0.1–1)
MONOCYTES NFR BLD AUTO: 13.5 %
NEUTROPHILS # BLD AUTO: 7.01 X10 (3) UL (ref 1.5–7.7)
NEUTROPHILS # BLD AUTO: 7.01 X10(3) UL (ref 1.5–7.7)
NEUTROPHILS NFR BLD AUTO: 71.4 %
OSMOLALITY SERPL CALC.SUM OF ELEC: 276 MOSM/KG (ref 275–295)
PLATELET # BLD AUTO: 218 10(3)UL (ref 150–450)
POTASSIUM SERPL-SCNC: 4.6 MMOL/L (ref 3.5–5.1)
PROT SERPL-MCNC: 7.8 G/DL (ref 5.7–8.2)
RBC # BLD AUTO: 4.32 X10(6)UL (ref 3.8–5.8)
SODIUM SERPL-SCNC: 132 MMOL/L (ref 136–145)
TROPONIN I SERPL HS-MCNC: 8 NG/L (ref ?–53)
WBC # BLD AUTO: 9.8 X10(3) UL (ref 4–11)

## 2025-06-12 PROCEDURE — 74177 CT ABD & PELVIS W/CONTRAST: CPT | Performed by: STUDENT IN AN ORGANIZED HEALTH CARE EDUCATION/TRAINING PROGRAM

## 2025-06-12 PROCEDURE — 99223 1ST HOSP IP/OBS HIGH 75: CPT | Performed by: HOSPITALIST

## 2025-06-12 RX ORDER — BENZONATATE 200 MG/1
200 CAPSULE ORAL 3 TIMES DAILY PRN
Status: DISCONTINUED | OUTPATIENT
Start: 2025-06-12 | End: 2025-06-16

## 2025-06-12 RX ORDER — ENOXAPARIN SODIUM 100 MG/ML
40 INJECTION SUBCUTANEOUS DAILY
Status: DISCONTINUED | OUTPATIENT
Start: 2025-06-13 | End: 2025-06-16

## 2025-06-12 RX ORDER — METOPROLOL SUCCINATE 100 MG/1
100 TABLET, EXTENDED RELEASE ORAL
Status: DISCONTINUED | OUTPATIENT
Start: 2025-06-13 | End: 2025-06-16

## 2025-06-12 RX ORDER — MORPHINE SULFATE 4 MG/ML
4 INJECTION, SOLUTION INTRAMUSCULAR; INTRAVENOUS EVERY 2 HOUR PRN
Status: DISCONTINUED | OUTPATIENT
Start: 2025-06-12 | End: 2025-06-16

## 2025-06-12 RX ORDER — ONDANSETRON 2 MG/ML
4 INJECTION INTRAMUSCULAR; INTRAVENOUS EVERY 6 HOURS PRN
Status: DISCONTINUED | OUTPATIENT
Start: 2025-06-12 | End: 2025-06-16

## 2025-06-12 RX ORDER — SODIUM CHLORIDE, SODIUM LACTATE, POTASSIUM CHLORIDE, CALCIUM CHLORIDE 600; 310; 30; 20 MG/100ML; MG/100ML; MG/100ML; MG/100ML
INJECTION, SOLUTION INTRAVENOUS ONCE
Status: COMPLETED | OUTPATIENT
Start: 2025-06-12 | End: 2025-06-12

## 2025-06-12 RX ORDER — SENNOSIDES 8.6 MG
17.2 TABLET ORAL NIGHTLY PRN
Status: DISCONTINUED | OUTPATIENT
Start: 2025-06-12 | End: 2025-06-16

## 2025-06-12 RX ORDER — PANTOPRAZOLE SODIUM 40 MG/1
40 TABLET, DELAYED RELEASE ORAL
Status: DISCONTINUED | OUTPATIENT
Start: 2025-06-13 | End: 2025-06-16

## 2025-06-12 RX ORDER — HYDROCODONE BITARTRATE AND ACETAMINOPHEN 5; 325 MG/1; MG/1
1 TABLET ORAL EVERY 4 HOURS PRN
Refills: 0 | Status: DISCONTINUED | OUTPATIENT
Start: 2025-06-12 | End: 2025-06-16

## 2025-06-12 RX ORDER — POLYETHYLENE GLYCOL 3350 17 G/17G
17 POWDER, FOR SOLUTION ORAL DAILY PRN
Status: DISCONTINUED | OUTPATIENT
Start: 2025-06-12 | End: 2025-06-16

## 2025-06-12 RX ORDER — SODIUM PHOSPHATE, DIBASIC AND SODIUM PHOSPHATE, MONOBASIC 7; 19 G/230ML; G/230ML
1 ENEMA RECTAL ONCE AS NEEDED
Status: DISCONTINUED | OUTPATIENT
Start: 2025-06-12 | End: 2025-06-16

## 2025-06-12 RX ORDER — MORPHINE SULFATE 2 MG/ML
1 INJECTION, SOLUTION INTRAMUSCULAR; INTRAVENOUS EVERY 2 HOUR PRN
Status: DISCONTINUED | OUTPATIENT
Start: 2025-06-12 | End: 2025-06-16

## 2025-06-12 RX ORDER — BISACODYL 10 MG
10 SUPPOSITORY, RECTAL RECTAL
Status: DISCONTINUED | OUTPATIENT
Start: 2025-06-12 | End: 2025-06-16

## 2025-06-12 RX ORDER — ACETAMINOPHEN 500 MG
1000 TABLET ORAL EVERY 4 HOURS PRN
Status: DISCONTINUED | OUTPATIENT
Start: 2025-06-12 | End: 2025-06-14

## 2025-06-12 RX ORDER — METOCLOPRAMIDE HYDROCHLORIDE 5 MG/ML
10 INJECTION INTRAMUSCULAR; INTRAVENOUS EVERY 8 HOURS PRN
Status: DISCONTINUED | OUTPATIENT
Start: 2025-06-12 | End: 2025-06-12

## 2025-06-12 RX ORDER — METOCLOPRAMIDE HYDROCHLORIDE 5 MG/ML
5 INJECTION INTRAMUSCULAR; INTRAVENOUS EVERY 8 HOURS PRN
Status: DISCONTINUED | OUTPATIENT
Start: 2025-06-12 | End: 2025-06-16

## 2025-06-12 RX ORDER — ATORVASTATIN CALCIUM 20 MG/1
20 TABLET, FILM COATED ORAL NIGHTLY
Status: DISCONTINUED | OUTPATIENT
Start: 2025-06-12 | End: 2025-06-16

## 2025-06-12 RX ORDER — ASPIRIN 81 MG/1
81 TABLET ORAL DAILY
Status: DISCONTINUED | OUTPATIENT
Start: 2025-06-13 | End: 2025-06-16

## 2025-06-12 RX ORDER — SODIUM CHLORIDE 9 MG/ML
INJECTION, SOLUTION INTRAVENOUS CONTINUOUS
Status: DISCONTINUED | OUTPATIENT
Start: 2025-06-12 | End: 2025-06-16

## 2025-06-12 RX ORDER — HYDROCODONE BITARTRATE AND ACETAMINOPHEN 5; 325 MG/1; MG/1
2 TABLET ORAL EVERY 4 HOURS PRN
Refills: 0 | Status: DISCONTINUED | OUTPATIENT
Start: 2025-06-12 | End: 2025-06-16

## 2025-06-12 RX ORDER — MORPHINE SULFATE 2 MG/ML
2 INJECTION, SOLUTION INTRAMUSCULAR; INTRAVENOUS EVERY 2 HOUR PRN
Status: DISCONTINUED | OUTPATIENT
Start: 2025-06-12 | End: 2025-06-16

## 2025-06-12 RX ORDER — HYDROMORPHONE HYDROCHLORIDE 1 MG/ML
1 INJECTION, SOLUTION INTRAMUSCULAR; INTRAVENOUS; SUBCUTANEOUS ONCE
Refills: 0 | Status: COMPLETED | OUTPATIENT
Start: 2025-06-12 | End: 2025-06-12

## 2025-06-12 RX ORDER — ACETAMINOPHEN 325 MG/1
650 TABLET ORAL EVERY 4 HOURS PRN
Status: DISCONTINUED | OUTPATIENT
Start: 2025-06-12 | End: 2025-06-14

## 2025-06-12 RX ORDER — AMIODARONE HYDROCHLORIDE 200 MG/1
200 TABLET ORAL DAILY
Status: DISCONTINUED | OUTPATIENT
Start: 2025-06-13 | End: 2025-06-16

## 2025-06-12 NOTE — ED QUICK NOTES
Pt ate a cracker with no abd pain. Pt reevaluated by dr. Rebollar. Pt informed of his test report and plan of care. Verbalizing understanding

## 2025-06-12 NOTE — ED INITIAL ASSESSMENT (HPI)
Pt brought in via [facility] for abd pain since yesterday. 20 min pta had a sudden onset of dizziness

## 2025-06-12 NOTE — ED PROVIDER NOTES
Patient Seen in: [facility] Emergency Department        History  Chief Complaint   Patient presents with    Abdomen/Flank Pain     Stated Complaint: abd pain    Subjective:   HPI            Patient is a 74-year-old male history of cholangiocarcinoma now in remission with multiple abdominal surgeries presenting for 3 days of left upper abdominal pain.  He states is worse anytime he eats.  He has had no actual vomiting.  He is continue to have bowel movements every day denying any diarrhea.  He has had no fevers or chills.    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 Had fever and Ha    Decorative tattoo    Diverticulosis of large intestine    Dizziness    Enteritis    Essential hypertension    Exposure to radiation    completed 2/17/17    Fatigue    Flash pulmonary edema (HCC)    Flatulence/gas pain/belching    Frequent urination    Hearing impairment    hearing loss in right    Hearing loss    Heart attack (HCC)    NONSTEMI    Hemorrhoids    High blood pressure    High cholesterol    History of syncope    Hyperkalemia    Hypokalemia    Hyponatremia    Ileus (HCC)    Jaundice    Nausea    Nausea and vomiting in adult    Night sweats    NSTEMI (non-ST elevated myocardial infarction)  (HCC)    Osteoporosis    Pacemaker    Pancreatic cancer (HCC)    chloangiocarcinoma    Personal history of antineoplastic chemotherapy    Completed chemo 17    Pheochromocytoma, left    Dx in 2016: 1.7 cm mass near inferior adrenal gland    Pneumoperitoneum    Rash    Renal disorder    Shortness of breath    Sleep apnea    CPAP    Syncope, near    Tobacco abuse    Uncomfortable fullness after meals    Visual impairment    glasses    Weight gain    Weight loss              Past Surgical History:   Procedure Laterality Date    Angiogram  2019    Bowel resection      Bypass surgery  2019    CABG x 3    Cabg  2019    Cardiac defibrillator placement      Cardiac pacemaker placement      medtronic    Cataract  3/22    Cath drug eluting stent      Cholecystectomy  8.16.16    Colonoscopy  2011    Dr. Hu repeat 5 yrs    Colonoscopy  overdue    Can not fast that long    Hernia surgery  1980    over 35 yrs ago    Laparoscopic cholecystectomy  Aug 2016    Needle biopsy liver  8.16.16    Other      right index finger surgery    Other surgical history  2016    EUS/EGD/FNA    Pacemaker/defibrillator  2019    Placement, bile duct stent      Removal gallbladder      Stomach surgery procedure unlisted  Aug 2016    Duodennual ulcers    Whipple  2016    Pancreaticoduodenectomy with regional lymphadenectomy, Left adrenalectomy with resection of retroperitoneal periadrenal tumor, Omental pedicle flap, Splenectomy, and Wedge resection segment 4b liver mass.                Social History     Socioeconomic History    Marital status:     Number of children: 2   Occupational History    Occupation: retired   Tobacco Use    Smoking status: Former     Current packs/day: 0.00     Types: Cigarettes     Quit date: 6/15/1979     Years since quittin.0    Smokeless tobacco: Never    Tobacco comments:     Stopped   Vaping Use    Vaping status: Never Used   Substance and Sexual Activity     Alcohol use: No    Drug use: Not Currently     Frequency: 7.0 times per week     Types: Cannabis   Other Topics Concern     Service Yes     Comment: agent orange.     Caffeine Concern No     Comment: coffee a couple of cups-decaf    Occupational Exposure Yes    Exercise Yes     Comment: daily   Social History Narrative    Here with his wife.     Hobby of gun dealer.     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     Live close to [place].     Lives with his wife.      Social Drivers of Health     Food Insecurity: Low Risk  (3/31/2025)    Received from [facility] Food Security     Within the past 12 months, the food you bought just didn't last and you didn't have money to get more.: 3     Within the past 12 months, you worried that your food would run out before you got money to buy more.: 3   Transportation Needs: Not At Risk (3/31/2025)    Received from [facility] Transportation Needs     In the past 12 months, has lack of reliable transportation kept you from medical appointments, meetings, work or from getting things needed for daily living?: No   Housing Stability: Not At Risk (3/31/2025)    Received from [facility] Housing     What is your living situation today?: I have a steady place to live     Think about the place you live. Do you have problems with any of the following?: None of the above                                Physical Exam    ED Triage Vitals   BP 06/12/25 1522 146/75   Pulse 06/12/25 1522 72   Resp 06/12/25 1522 16   Temp 06/12/25 1522 97.8 °F (36.6 °C)   Temp src 06/12/25 1522 Oral   SpO2 06/12/25 1700 100 %   O2 Device 06/12/25 1700 None (Room air)       Current Vitals:   Vital Signs  BP: 114/61  Pulse: 71  Resp: 20  Temp: 98.3 °F (36.8 °C)  Temp src: Oral  MAP (mmHg): 74    Oxygen Therapy  SpO2: 94 %  O2 Device: None (Room air)  Pulse Oximetry Type: Continuous  Oximetry Probe Site Changed: No  Pulse Ox Probe Location: Left hand            Physical Exam  Vitals and nursing  note reviewed.   Constitutional:       General: He is not in acute distress.     Appearance: Normal appearance.   HENT:      Head: Normocephalic.      Nose: Nose normal.      Mouth/Throat:      Mouth: Mucous membranes are moist.   Eyes:      Extraocular Movements: Extraocular movements intact.      Pupils: Pupils are equal, round, and reactive to light.   Cardiovascular:      Rate and Rhythm: Normal rate and regular rhythm.      Pulses: Normal pulses.   Pulmonary:      Effort: Pulmonary effort is normal.   Abdominal:      General: Abdomen is flat. Bowel sounds are normal. There is no distension.      Palpations: Abdomen is soft.      Tenderness: There is abdominal tenderness in the epigastric area and left upper quadrant. There is no right CVA tenderness, left CVA tenderness, guarding or rebound.      Hernia: No hernia is present.   Musculoskeletal:         General: No swelling or tenderness. Normal range of motion.      Cervical back: Normal range of motion.   Skin:     General: Skin is warm and dry.   Neurological:      Mental Status: He is alert and oriented to person, place, and time. Mental status is at baseline.   Psychiatric:         Mood and Affect: Mood normal.                 ED Course  Labs Reviewed   COMP METABOLIC PANEL (14) - Abnormal; Notable for the following components:       Result Value    Glucose 108 (*)     Sodium 132 (*)     eGFR-Cr 58 (*)     Alkaline Phosphatase 157 (*)     All other components within normal limits   CBC WITH DIFFERENTIAL WITH PLATELET - Abnormal; Notable for the following components:    HCT 38.1 (*)     Monocyte Absolute 1.33 (*)     All other components within normal limits   LIPASE - Abnormal; Notable for the following components:    Lipase 233 (*)     All other components within normal limits   POCT GLUCOSE - Abnormal; Notable for the following components:    POC Glucose 100 (*)     All other components within normal limits   TROPONIN I HIGH SENSITIVITY - Normal   RAINBOW  DRAW LAVENDER   RAINBOW DRAW LIGHT GREEN   RAINBOW DRAW BLUE   RAINBOW DRAW GOLD     EKG    Rate, intervals and axes as noted on EKG Report.  Rate: 68  Rhythm: av paced w/ pvcs  Reading: unchanged              CT ABDOMEN+PELVIS(CONTRAST ONLY)(CPT=74177)  Result Date: 6/12/2025  CONCLUSION:  Some air present in the biliary tree left hepatic lobe.  The patient has had cholecystectomy in this could be iatrogenic.  Healing compression deformity upper endplate T12.  Bladder diverticulum redemonstrated.  No hydronephrosis.  Sigmoid diverticula but no sign of definite colitis or acute diverticulitis.   LOCATION:  [facility]   Dictated by (CST): Long Huggins MD on 6/12/2025 at 5:37 PM     Finalized by (CST): Long Huggins MD on 6/12/2025 at 5:47 PM                         [facility]         Admission disposition: 6/12/2025  7:20 PM           Medical Decision Making    The differential includes the following bowel obstruction, pancreatitis, choledocholithiasis, recurrent cancer    Pertinent comorbidities include  As detailed above    Pertinent social history includes  As detailed above    Labs  Lipase 233    Imaging studies  CT ABDOMEN+PELVIS(CONTRAST ONLY)(CPT=74177)  Result Date: 6/12/2025  CONCLUSION:  Some air present in the biliary tree left hepatic lobe.  The patient has had cholecystectomy in this could be iatrogenic.  Healing compression deformity upper endplate T12.  Bladder diverticulum redemonstrated.  No hydronephrosis.  Sigmoid diverticula but no sign of definite colitis or acute diverticulitis.   LOCATION:  [facility]   Dictated by (CST): Long Huggins MD on 6/12/2025 at 5:37 PM     Finalized by (CST): Long Huggins MD on 6/12/2025 at 5:47 PM           External data reviewed    Discussion of management with external providers  [facility] who recommends admission for observation    ER course  Patient is afebrile hemodynamically stable here.  On arrival he has some mild tenderness to palpation epigastric  region.  But he did initially refuse analgesia.  Lipase is elevated.  CT abdomen pelvis obtained to evaluate for any signs of pancreatic pseudocyst or bowel obstruction as well as any other intra-abdominal pathology given patient's history of multiple surgeries and prior cholangiocarcinoma.  CT did not show significant findings.  Patient was p.o. trialed and noted he had some pain after having crackers and water.  [facility] detailed [facility] who recommend admission observation and reevaluation in the morning.  Disposition and Plan     Clinical Impression:  1. Other acute pancreatitis, unspecified complication status (HCC)         Disposition:  Admit  6/12/2025  7:20 pm    Follow-up:  No follow-up provider specified.        Medications Prescribed:  Current Discharge Medication List                Supplementary Documentation:         Hospital Problems       Present on Admission  Date Reviewed: 5/26/2025          ICD-10-CM Noted POA    * (Principal) Other acute pancreatitis, unspecified complication status (HCC) K85.80 6/12/2025 Unknown

## 2025-06-13 ENCOUNTER — ANESTHESIA (OUTPATIENT)
Dept: ENDOSCOPY | Facility: HOSPITAL | Age: 75
End: 2025-06-13
Payer: OTHER GOVERNMENT

## 2025-06-13 ENCOUNTER — ANESTHESIA EVENT (OUTPATIENT)
Dept: ENDOSCOPY | Facility: HOSPITAL | Age: 75
End: 2025-06-13
Payer: OTHER GOVERNMENT

## 2025-06-13 LAB
ALBUMIN SERPL-MCNC: 3.8 G/DL (ref 3.2–4.8)
ALBUMIN/GLOB SERPL: 1.5 {RATIO} (ref 1–2)
ALP LIVER SERPL-CCNC: 126 U/L (ref 45–117)
ALT SERPL-CCNC: 32 U/L (ref 10–49)
ANION GAP SERPL CALC-SCNC: 8 MMOL/L (ref 0–18)
AST SERPL-CCNC: 30 U/L (ref ?–34)
ATRIAL RATE: 416 BPM
BASOPHILS # BLD AUTO: 0.04 X10(3) UL (ref 0–0.2)
BASOPHILS NFR BLD AUTO: 0.7 %
BILIRUB SERPL-MCNC: 1 MG/DL (ref 0.2–1.1)
BUN BLD-MCNC: 13 MG/DL (ref 9–23)
CALCIUM BLD-MCNC: 9.2 MG/DL (ref 8.7–10.6)
CHLORIDE SERPL-SCNC: 104 MMOL/L (ref 98–112)
CHOLEST SERPL-MCNC: 96 MG/DL (ref ?–200)
CO2 SERPL-SCNC: 25 MMOL/L (ref 21–32)
CREAT BLD-MCNC: 1.13 MG/DL (ref 0.7–1.3)
EGFRCR SERPLBLD CKD-EPI 2021: 68 ML/MIN/1.73M2 (ref 60–?)
EOSINOPHIL # BLD AUTO: 0.22 X10(3) UL (ref 0–0.7)
EOSINOPHIL NFR BLD AUTO: 3.6 %
ERYTHROCYTE [DISTWIDTH] IN BLOOD BY AUTOMATED COUNT: 15.6 %
GLOBULIN PLAS-MCNC: 2.6 G/DL (ref 2–3.5)
GLUCOSE BLD-MCNC: 98 MG/DL (ref 70–99)
HCT VFR BLD AUTO: 37 % (ref 39–53)
HDLC SERPL-MCNC: 46 MG/DL (ref 40–59)
HGB BLD-MCNC: 12.7 G/DL (ref 13–17.5)
IMM GRANULOCYTES # BLD AUTO: 0.02 X10(3) UL (ref 0–1)
IMM GRANULOCYTES NFR BLD: 0.3 %
LDLC SERPL CALC-MCNC: 37 MG/DL (ref ?–100)
LYMPHOCYTES # BLD AUTO: 1.43 X10(3) UL (ref 1–4)
LYMPHOCYTES NFR BLD AUTO: 23.6 %
MCH RBC QN AUTO: 31.4 PG (ref 26–34)
MCHC RBC AUTO-ENTMCNC: 34.3 G/DL (ref 31–37)
MCV RBC AUTO: 91.6 FL (ref 80–100)
MONOCYTES # BLD AUTO: 1.13 X10(3) UL (ref 0.1–1)
MONOCYTES NFR BLD AUTO: 18.6 %
NEUTROPHILS # BLD AUTO: 3.23 X10 (3) UL (ref 1.5–7.7)
NEUTROPHILS # BLD AUTO: 3.23 X10(3) UL (ref 1.5–7.7)
NEUTROPHILS NFR BLD AUTO: 53.2 %
NONHDLC SERPL-MCNC: 50 MG/DL (ref ?–130)
OSMOLALITY SERPL CALC.SUM OF ELEC: 284 MOSM/KG (ref 275–295)
P-R INTERVAL: 330 MS
PLATELET # BLD AUTO: 202 10(3)UL (ref 150–450)
POTASSIUM SERPL-SCNC: 4.6 MMOL/L (ref 3.5–5.1)
PROT SERPL-MCNC: 6.4 G/DL (ref 5.7–8.2)
Q-T INTERVAL: 422 MS
QRS DURATION: 118 MS
QTC CALCULATION (BEZET): 448 MS
R AXIS: 88 DEGREES
RBC # BLD AUTO: 4.04 X10(6)UL (ref 3.8–5.8)
SODIUM SERPL-SCNC: 137 MMOL/L (ref 136–145)
T AXIS: 18 DEGREES
TRIGL SERPL-MCNC: 54 MG/DL (ref 30–149)
VENTRICULAR RATE: 68 BPM
VLDLC SERPL CALC-MCNC: 7 MG/DL (ref 0–30)
WBC # BLD AUTO: 6.1 X10(3) UL (ref 4–11)

## 2025-06-13 PROCEDURE — 99232 SBSQ HOSP IP/OBS MODERATE 35: CPT | Performed by: STUDENT IN AN ORGANIZED HEALTH CARE EDUCATION/TRAINING PROGRAM

## 2025-06-13 RX ORDER — SODIUM CHLORIDE, SODIUM LACTATE, POTASSIUM CHLORIDE, CALCIUM CHLORIDE 600; 310; 30; 20 MG/100ML; MG/100ML; MG/100ML; MG/100ML
INJECTION, SOLUTION INTRAVENOUS CONTINUOUS PRN
Status: DISCONTINUED | OUTPATIENT
Start: 2025-06-13 | End: 2025-06-13 | Stop reason: SURG

## 2025-06-13 RX ORDER — LIDOCAINE HYDROCHLORIDE 10 MG/ML
INJECTION, SOLUTION EPIDURAL; INFILTRATION; INTRACAUDAL; PERINEURAL AS NEEDED
Status: DISCONTINUED | OUTPATIENT
Start: 2025-06-13 | End: 2025-06-13 | Stop reason: SURG

## 2025-06-13 RX ADMIN — SODIUM CHLORIDE, SODIUM LACTATE, POTASSIUM CHLORIDE, CALCIUM CHLORIDE: 600; 310; 30; 20 INJECTION, SOLUTION INTRAVENOUS at 12:57:00

## 2025-06-13 RX ADMIN — LIDOCAINE HYDROCHLORIDE 100 MG: 10 INJECTION, SOLUTION EPIDURAL; INFILTRATION; INTRACAUDAL; PERINEURAL at 13:00:00

## 2025-06-13 NOTE — ED QUICK NOTES
Rounding Completed    Plan of Care reviewed. Waiting for in patient bed.  Elimination needs assessed.  Provided updates, extra blankets.    Bed is locked and in lowest position. Call light within reach.

## 2025-06-13 NOTE — PLAN OF CARE
Problem: GASTROINTESTINAL - ADULT  Goal: Minimal or absence of nausea and vomiting  Description: INTERVENTIONS:  - Maintain adequate hydration with IV or PO as ordered and tolerated  - Nasogastric tube to low intermittent suction as ordered  - Evaluate effectiveness of ordered antiemetic medications  - Provide nonpharmacologic comfort measures as appropriate  - Advance diet as tolerated, if ordered  - Obtain nutritional consult as needed  - Evaluate fluid balance  Outcome: Progressing  Goal: Maintains adequate nutritional intake (undernourished)  Description: INTERVENTIONS:  - Monitor percentage of each meal consumed  - Identify factors contributing to decreased intake, treat as appropriate  - Assist with meals as needed  - Monitor I&O, WT and lab values  - Obtain nutritional consult as needed  - Optimize oral hygiene and moisture  - Encourage food from home; allow for food preferences  - Enhance eating environment  Outcome: Progressing     Problem: PAIN - ADULT  Goal: Verbalizes/displays adequate comfort level or patient's stated pain goal  Description: INTERVENTIONS:  - Encourage pt to monitor pain and request assistance  - Assess pain using appropriate pain scale  - Administer analgesics based on type and severity of pain and evaluate response  - Implement non-pharmacological measures as appropriate and evaluate response  - Consider cultural and social influences on pain and pain management  - Manage/alleviate anxiety  - Utilize distraction and/or relaxation techniques  - Monitor for opioid side effects  - Notify MD/LIP if interventions unsuccessful or patient reports new pain  - Anticipate increased pain with activity and pre-medicate as appropriate  Outcome: Progressing     Patient NPO w sips prior to EGD, consent signed, writer informed endo lab of lovenox given this AM and pacemaker. Patient alert and oriented, zofran given for nausea, no emesis, relief reported upon reassessment. Patient completed EGD and  informed of orders to start CLD. Pt declines CLD at this time stating concern for increased pain w PO intake. Pain medications offered and declined by patient as pt reports pain is very brief. IVF as ordered. Safety precautions in place. Patient voids.

## 2025-06-13 NOTE — ED QUICK NOTES
"                            Physical Therapy Daily Treatment Note     Name: Luis Wong  Clinic Number: [id number]    Therapy Diagnosis:   Encounter Diagnoses   Name Primary?    Impaired functional mobility, balance, gait, and endurance     Decreased strength of lower extremity      Physician: Carla Altman PA-C     Visit Date: 1/6/2020    Physician Orders: PT Eval and Treat  Medical Diagnosis from Referral: I63.9 (ICD-10-CM) - CVA (cerebral vascular accident)  Evaluation Date: 9/13/2019  Authorization Period Expiration: 12/31/2019  Plan of Care Expiration: 1/10/2020  Visit # / Visits authorized: 24/50 (20 prior visits)  FOTO/G-Code: 4/10    Time In: 9:28 AM  Time Out: 10:18 AM  Total Billable Time: 50 minutes (3 TE)    Precautions: Standard; history of of CVA's    Subjective     Pt reports: Patient has no new complaints today. States he will be calling his MD's office to find out the status of him being approved for his AFO as well as Botox treatments.    Response to previous treatment: No adverse reactions.   Functional change: Ongoing       Objective     Luis did NOT receive the following manual therapy techniques: Joint mobilizations and manual stretching were applied to the: Left Lower Extremity: NOT TODAY  Manual stretching of hamstrings in supine: 30"x3, LLE only  PROM to L Hip: Flexion/Extension, IR/ER  Left ankle/foot mobilizations:    - Grade III anterior and posterior mobilizations of TC joint   - Splaying of metatarsals   - Anterior/posterior grade III mobilizations of metatarsals       Luis participated in therapeutic exercises for 50 minutes including:    -- Nu-Step for reciprocal BUE and BLE motion for 10 minutes at level 7.0    - SLR:    3x10, LLE only, 1.5# - OOT  - Bridges:   3x10, 3" holds  - Supine Hip ADD  3x10, 5" holds  - Supine Hip ABD:  3x10, BTB - OOT  - Sidelying Hip ABD:  3x10, LLE only, 1# - OOT    - Sit to Stands   2x10, LLE positioned posteriorly   - Fwd Step-ups  x20 RLE " Hospitalist at bedside.    "leading, x20 LLE leading, 6" step  - Lat stepping over primo 30"x3, 1 primo    -Cybex knee extension: 25#, 2x15, B      20# 2x15, DL up, LLE down  -Cybex Ham curls:  1.0 pl 2x15 B  -Cybex Leg Press:  7.0 plates, 3x10, B      4.5 plates, 3x10, LLE only           Home Exercises Provided and Patient Education Provided     Education provided:   Cont HEP      Written Home Exercises Provided: Not today  Exercises were reviewed and Luis was able to demonstrate them prior to the end of the session.  Luis demonstrated good  understanding of the education provided.     See EMR under Patient Instructions for exercises provided 9/23/2019 and 10/28/2019.    Assessment     Resumed resisted lower extremity strengthening today without adverse reactions. Patient continues to ambulate throughout clinic without AD. Continues to display left knee buckling during ambulation and requires close SBA to prevent LOB.     Luis is progressing well towards his goals.   Pt prognosis is Good.   Pt will continue to benefit from skilled outpatient physical therapy to address the deficits listed in the problem list box on initial evaluation, provide pt/family education and to maximize pt's level of independence in the home and community environment.     Pt's spiritual, cultural and educational needs considered and pt agreeable to plan of care and goals.  Anticipated barriers to physical therapy: Co-morbidities    Long Term Goals (8 Weeks):   3. Pt will be able to perform TUG in less than or equal to 25 secs without use of AD demonstrating overall improved functional mobility. MET 12/16/2019; 21 seconds  4. Pt will performed sit to stand x 5 without UE support in 10 seconds without LOB backward or posterior LE hooking for functional and safe transfers. MET  5. Pt will score greater than or equal to 45/56 on the Choi Balance Assessment with use of AD demonstrating overall improved functional mobility and balance and reduce fall risk. MET " 12/16/2019  6. Pt will walk > than or equal to 210 ft on the 2 minute walk test indoor with AD with 0 LOB and minimal gait deviations for improved safety in home ambulation and safety.  PROGRESSING, NOT MET 1/6/2020; 195 feet on 12/16/19  7. Pt will be able to safely perform and tolerate high level ADL's without LOB.  PROGRESSING, NOT MET 1/6/2020  8. Pt will have 0 falls from start of PT sessions.  PROGRESSING, NOT MET 1/6/2020; fall reported 11/6/19  9. Independent with updated HEP. MET  10. Pt will have MMT score of 3+/5 in all major ms groups in Left LE.  PROGRESSING, NOT MET 1/6/2020  11. Pt will ambulate on TM x 6 minutes with use of UE support with 0 LOB at greater than or equal to 1.3 mph. PROGRESSING, NOT MET 1/6/2020  12. Pt will begin some form of community fitness to begin regular and consistent performance of exercise to continue maintenance of gains made in PT.  PROGRESSING, NOT MET 1/6/2020     Plan     Continue with LE strengthening.     Advance PT as per POC.     Kandy Gatica, PT

## 2025-06-13 NOTE — OCCUPATIONAL THERAPY NOTE
Attempted to see patient for OT evaluation, however patient declining session d/t awaiting EGD today.  Will re-attempt post procedure, schedule permitting.  RN aware.

## 2025-06-13 NOTE — H&P
[facility]     Mark Anthony Bryan Patient Status:  Emergency    1950 MRN [id number]   Location [facility] EMERGENCY DEPARTMENT Attending Janae Rebollar MD   Hosp Day # 0 PCP Elton Flores MD     Chief Complaint: abd pain     Subjective:    History of Present Illness:     Mark Anthony Bryan is a 74 year old male with past medical history agent orange exposure, cholangiocarcinoma status post Whipple now in remission, CAD, CHF, LONG who presents with abdominal pain.  3 days ago, developed left upper quadrant abdominal pain after eating a bowl of shrimp.  Has never had this before.  Nausea but no vomiting.  Pain has been persistent but worsens anytime he tries to eat.  Is having normal bowel movements during this time.  Denies any fever, chills, chest pain, shortness of breath, lower extremity edema.    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 Had fever and Ha    Decorative tattoo    Diverticulosis of large intestine    Dizziness    Enteritis    Essential hypertension    Exposure to radiation    completed 17    Fatigue    Flash pulmonary edema (HCC)    Flatulence/gas pain/belching    Frequent urination    Hearing impairment     hearing loss in right    Hearing loss    Heart attack (HCC)    NONSTEMI    Hemorrhoids    High blood pressure    High cholesterol    History of syncope    Hyperkalemia    Hypokalemia    Hyponatremia    Ileus (HCC)    Jaundice    Nausea    Nausea and vomiting in adult    Night sweats    NSTEMI (non-ST elevated myocardial infarction) (HCC)    Osteoporosis    Pacemaker    Pancreatic cancer (HCC)    chloangiocarcinoma    Personal history of antineoplastic chemotherapy    Completed chemo 2/17/17    Pheochromocytoma, left    Dx in 6/2016: 1.7 cm mass near inferior adrenal gland    Pneumoperitoneum    Rash    Renal disorder    Shortness of breath    Sleep apnea    CPAP    Syncope, near    Tobacco abuse    Uncomfortable fullness after meals    Visual impairment    glasses    Weight gain    Weight loss     Past Surgical History:   Past Surgical History:   Procedure Laterality Date    Angiogram  06/24/2019    Bowel resection      Bypass surgery  06/27/2019    CABG x 3    Cabg  June 24, 2019    Cardiac defibrillator placement      Cardiac pacemaker placement      medtronic    Cataract  3/22    Cath drug eluting stent      Cholecystectomy  8.16.16    Colonoscopy  12/13/2011    Dr. Hu repeat 5 yrs    Colonoscopy  overdue    Can not fast that long    Hernia surgery  1980    over 35 yrs ago    Laparoscopic cholecystectomy  Aug 2016    Needle biopsy liver  8.16.16    Other      right index finger surgery    Other surgical history  08/16/2016    EUS/EGD/FNA    Pacemaker/defibrillator  June 2019    Placement, bile duct stent      Removal gallbladder      Stomach surgery procedure unlisted  Aug 2016    Duodennual ulcers    Whipple  08/16/2016    Pancreaticoduodenectomy with regional lymphadenectomy, Left adrenalectomy with resection of retroperitoneal periadrenal tumor, Omental pedicle flap, Splenectomy, and Wedge resection segment 4b liver mass.      Family History:   Family History   Problem Relation Age of Onset    Diabetes  Father         83 yrs old    Other (Other) Father         Heart Failure    Dementia Father     Cancer Mother         Rectal Cancer    Diabetes Mother         82 yrs old    Hypertension Mother         ?    Colon Cancer Mother         83    Diabetes Sister     Crohn's Disease Brother     Heart Attack Brother     Other (Other) Other         No adrenal issues    Thyroid disease Neg     Thyroid Disorder Neg      Social History:    reports that he quit smoking about 46 years ago. His smoking use included cigarettes. He has never used smokeless tobacco. He reports that he does not currently use drugs after having used the following drugs: Cannabis. Frequency: 7.00 times per week. He reports that he does not drink alcohol.     Allergies: No Known Allergies    Medications:    Current Facility-Administered Medications on File Prior to Encounter   Medication Dose Route Frequency Provider Last Rate Last Admin    [COMPLETED] gadoterate meglumine (Dotarem) 7.5 MMOL/15ML injection 15 mL  15 mL Intravenous ONCE PRN Sheldon Swartz, JEWEL   15 mL at 25 1058     Current Outpatient Medications on File Prior to Encounter   Medication Sig Dispense Refill    [] diazePAM 5 MG Oral Tab Take 1 tablet (5 mg total) by mouth 1 (one) time if needed for Anxiety. 1 tablet 0    [] Pancrelipase, Lip-Prot-Amyl, (CREON) 68247-744921 units Oral Cap DR Particles Take 2 capsules by mouth in the morning, at noon, in the evening, and at bedtime. Meals 240 capsule 3    OMEPRAZOLE 40 MG Oral Capsule Delayed Release TAKE 1 CAPSULE(40 MG) BY MOUTH DAILY 90 capsule 2    Pancrelipase, Lip-Prot-Amyl, (CREON) 82259-65289 units Oral Cap DR Particles Take 2 capsules by mouth with breakfast, with lunch, and with evening meal. One capsule with snacks      amiodarone 200 MG Oral Tab Take 1 tablet (200 mg total) by mouth daily.      sacubitril-valsartan 24-26 MG Oral Tab Take 1 tablet by mouth 2 (two) times daily.      amiodarone 200 MG Oral  Tab Take 0.5 tablets (100 mg total) by mouth daily.      furosemide 20 MG Oral Tab Take 1 tablet (20 mg total) by mouth every other day.      magnesium 250 MG Oral Tab Take 1 tablet (250 mg total) by mouth daily.      Metoprolol Succinate  MG Oral Tablet 24 Hr Take 1 tablet (100 mg total) by mouth daily.      atorvastatin 20 MG Oral Tab Take 1 tablet (20 mg total) by mouth daily. 90 tablet 0    aspirin 81 MG Oral Tab EC Take 1 tablet (81 mg total) by mouth daily.      Denosumab 60 MG/ML Subcutaneous Solution Prefilled Syringe Inject 1 mL (60 mg total) into the skin every 6 (six) months.         Review of Systems:   A comprehensive review of systems was completed.    Pertinent positives and negatives noted in the HPI.    Objective:   Physical Exam:    BP (!) 157/96   Pulse 72   Temp 97.8 °F (36.6 °C) (Oral)   Resp 21   Ht 5' 9\" (1.753 m)   Wt 170 lb (77.1 kg)   SpO2 100%   BMI 25.10 kg/m²   General: No acute distress, Alert  Respiratory: No rhonchi, no wheezes  Cardiovascular: S1, S2. Regular rate and rhythm  Abdomen: Soft, LUQ-tender, non-distended, positive bowel sounds  Neuro: No new focal deficits  Extremities: No edema      Results:    Labs:      Labs Last 24 Hours:    Recent Labs   Lab 06/12/25  1537   RBC 4.32   HGB 13.6   HCT 38.1*   MCV 88.2   MCH 31.5   MCHC 35.7   RDW 15.1   NEPRELIM 7.01   WBC 9.8   .0       Recent Labs   Lab 06/12/25  1537   *   BUN 18   CREATSERUM 1.29   EGFRCR 58*   CA 9.8   ALB 4.5   *   K 4.6   CL 99   CO2 24.0   ALKPHO 157*   AST 27   ALT 30   BILT 0.9   TP 7.8       Estimated Glomerular Filtration Rate: 58 mL/min/1.73m2 (A) (result from lab).    Lab Results   Component Value Date    INR 1.18 (H) 04/25/2023    INR 1.10 10/03/2020    INR 1.50 (H) 06/27/2019       Recent Labs   Lab 06/12/25  1537   TROPHS 8       No results for input(s): \"TROP\", \"PBNP\" in the last 168 hours.    No results for input(s): \"PCT\" in the last 168 hours.    Imaging: Imaging  data reviewed in Epic.    Assessment & Plan:      # Left upper quadrant abdominal pain  #Elevated lipase  #Possible early acute pancreatitis  - CT abdomen relatively benign  - Consult GI  - IV fluids, gentle considering hx of CHF  - N.p.o.  - Pain control    #Hyponatremia  - IV fluids    #History of cholangiocarcinoma status post Whipple and chemo  - In remission    #CAD    #Chronic systolic CHF  - Compensated    #Bradycardia status post pacemaker    #LONG on CPAP      Plan of care discussed with patient, ED physician    Manuelito Ferrara MD    Supplementary Documentation:     The 21st Century Cures Act makes medical notes like these available to patients in the interest of transparency. Please be advised this is a medical document. Medical documents are intended to carry relevant information, facts as evident, and the clinical opinion of the practitioner. The medical note is intended as peer to peer communication and may appear blunt or direct. It is written in medical language and may contain abbreviations or verbiage that are unfamiliar.

## 2025-06-13 NOTE — OPERATIVE REPORT
Operative Report-Esophagogastroduodenoscopy with biopsy      PREOPERATIVE DIAGNOSIS/INDICATION:  LUQ abdominal pain  H/o marginal ulcer  POSTOPERTATIVE DIAGNOSIS:  Angelo - en Y gastric bypass anatomy  Diffuse gastritis  Otherwise normal endoscopy without marginal ulcer  PROCEDURE PERFORMED: EGD with biopsy  INFORMED CONSENT: Once a brief history and physical examination was performed, the risks, benefits and alternatives to the procedure were discussed with the patient and/or family and informed consent was obtained.  The risks of sedation, perforation, missed lesions and need for surgery were all discussed.  Patient expressed understanding of the risks and agreed to proceed.    PROCEDURE DESCRIPTION:  The patient was then brought to the endoscopy suite where his/her pulse, pulse oximetry and blood pressure were monitored. He/she was placed in the left lateral decubitus position and deep sedation was administered. Once adequate sedation was achieved, a bite block was placed and a lubricated tip of an Olympus video upper endoscope was inserted through the oropharynx and gently manipulated through the esophagus into the stomach and the distal duodenum. Upon withdrawal of the endoscope, careful visualization of the mucosa was performed.   FINDINGS:  ESOPHAGUS:Normal in course and caliber, no mucosal erosions, ulcers  EGJ: Located at 40 cm otherwise normal  STOMACH:  Moderate erythema, edema of gastric remnant with prominent rugal folds. The anastomosis was widely patent without ulceration erosion. The small bowel was normal to the extent examined  DUODENUM: Normal to the extent examined  THERAPEUTICS: Biopsies were performed of the gastric remnant  RECOMMENDATIONS:   Post EGD precautions, watch for bleeding, infection, perforation, adverse drug reactions   Would treat this as mild acute pancreatitis    Continue IVFs  Clear liquid diet.  .  CC Report:     Radha Zaragoza MD  6/13/2025  1:09  PM  Elton Flores MD

## 2025-06-13 NOTE — PHYSICAL THERAPY NOTE
PT order received. Chart reviewed. Pt declining therapy services this date stating \"We will figure it out after the procedures. There plan is probably to starve me for 5 days before they figure anything out, that's how it usually works.\" RN aware of attempt. Will re-attempt pending medical stability and willingness to work with therapy.

## 2025-06-13 NOTE — ANESTHESIA POSTPROCEDURE EVALUATION
[facility]    Mark Anthony Bryan Patient Status:  Observation   Age/Gender 74 year old male MRN [id number]   Location [facility] ENDOSCOPY PAIN CENTER Attending Manuelito Ferrara MD   Hosp Day # 0 PCP Elton Flores MD       Anesthesia Post-op Note    ESOPHAGOGASTRODUODENOSCOPY (EGD) with biopsies    Procedure Summary       Date: 06/13/25 Room / Location:  ENDOSCOPY 03 /  ENDOSCOPY    Anesthesia Start: 1257 Anesthesia Stop: 1311    Procedure: ESOPHAGOGASTRODUODENOSCOPY (EGD) with biopsies Diagnosis: (gastritis)    Surgeons: Radha Zaragoza MD Anesthesiologist: Matthew Cardenas MD    Anesthesia Type: MAC ASA Status: 3            Anesthesia Type: MAC    Vitals Value Taken Time   /65 06/13/25 13:13   Temp 98 06/13/25 13:14   Pulse 70 06/13/25 13:14   Resp 11 06/13/25 13:14   SpO2 95% 06/13/25 13:14   Vitals shown include unfiled device data.        Patient Location: Endoscopy    Anesthesia Type: MAC    Airway Patency: patent and extubated    Postop Pain Control: adequate    Mental Status: mildly sedated but able to meaningfully participate in the post-anesthesia evaluation    Nausea/Vomiting: none    Cardiopulmonary/Hydration status: stable euvolemic    Complications: no apparent anesthesia related complications    Postop vital signs: stable    Dental Exam: Unchanged from Preop    Patient to be discharged from PACU when criteria met.

## 2025-06-13 NOTE — ANESTHESIA PREPROCEDURE EVALUATION
PRE-OP EVALUATION    Patient Name: Mark Anthony Bryan    Admit Diagnosis: Dehydration [E86.0]  Hyponatremia [E87.1]  Abdominal pain, acute [R10.9]  Nausea and vomiting in adult [R11.2]    Pre-op Diagnosis: inpt    ESOPHAGOGASTRODUODENOSCOPY (EGD)    Anesthesia Procedure: ESOPHAGOGASTRODUODENOSCOPY (EGD)    Surgeons and Role:     * Ten Brizuela, DO - Primary    Pre-op vitals reviewed.  Temp: 98.2 °F (36.8 °C)  Pulse: 70  Resp: 16  BP: 122/70  SpO2: 96 %  Body mass index is 25.1 kg/m².    Current medications reviewed.  Hospital Medications:   [COMPLETED] iopamidol 76% (ISOVUE-370) injection for power injector  100 mL Intravenous ONCE PRN    [COMPLETED] lactated ringers IV bolus 1,000 mL  1,000 mL Intravenous Once    [COMPLETED] pantoprazole (Protonix) 40 mg in sodium chloride 0.9% PF 10 mL IV push  40 mg Intravenous Once    [COMPLETED] lactated ringers infusion   Intravenous Once    [COMPLETED] HYDROmorphone (Dilaudid) 1 MG/ML injection 1 mg  1 mg Intravenous Once    amiodarone (Pacerone) tab 200 mg  200 mg Oral Daily    aspirin DR tab 81 mg  81 mg Oral Daily    atorvastatin (Lipitor) tab 20 mg  20 mg Oral Nightly    metoprolol succinate ER (Toprol XL) 24 hr tab 100 mg  100 mg Oral Daily Beta Blocker    pantoprazole (Protonix) DR tab 40 mg  40 mg Oral QAM AC    pancrelipase (Lip-Prot-Amyl) (Zenpep) DR particles cap 50,000 Units  50,000 Units Oral TID CC    sacubitril-valsartan (Entresto) 24-26 MG per tab 1 tablet  1 tablet Oral BID    morphINE PF 2 MG/ML injection 1 mg  1 mg Intravenous Q2H PRN    Or    morphINE PF 2 MG/ML injection 2 mg  2 mg Intravenous Q2H PRN    Or    morphINE PF 4 MG/ML injection 4 mg  4 mg Intravenous Q2H PRN    acetaminophen (Tylenol Extra Strength) tab 1,000 mg  1,000 mg Oral Q4H PRN    melatonin tab 3 mg  3 mg Oral Nightly PRN    polyethylene glycol (PEG 3350) (Miralax) 17 g oral packet 17 g  17 g Oral Daily PRN    sennosides (Senokot) tab 17.2 mg  17.2 mg Oral Nightly PRN    bisacodyl  (Dulcolax) 10 MG rectal suppository 10 mg  10 mg Rectal Daily PRN    fleet enema (Fleet) rectal enema 133 mL  1 enema Rectal Once PRN    ondansetron (Zofran) 4 MG/2ML injection 4 mg  4 mg Intravenous Q6H PRN    benzonatate (Tessalon) cap 200 mg  200 mg Oral TID PRN    sodium chloride 0.9% infusion   Intravenous Continuous    enoxaparin (Lovenox) 40 MG/0.4ML SUBQ injection 40 mg  40 mg Subcutaneous Daily    acetaminophen (Tylenol) tab 650 mg  650 mg Oral Q4H PRN    Or    HYDROcodone-acetaminophen (Norco) 5-325 MG per tab 1 tablet  1 tablet Oral Q4H PRN    Or    HYDROcodone-acetaminophen (Norco) 5-325 MG per tab 2 tablet  2 tablet Oral Q4H PRN    metoclopramide (Reglan) 5 mg/mL injection 5 mg  5 mg Intravenous Q8H PRN       Outpatient Medications:     Medications Prior to Admission   Medication Sig Dispense Refill Last Dose/Taking    OMEPRAZOLE 40 MG Oral Capsule Delayed Release TAKE 1 CAPSULE(40 MG) BY MOUTH DAILY 90 capsule 2 2025 Morning    Pancrelipase, Lip-Prot-Amyl, (CREON) 18892-42772 units Oral Cap DR Particles Take 2 capsules by mouth with breakfast, with lunch, and with evening meal. One capsule with snacks   2025 Evening    sacubitril-valsartan 24-26 MG Oral Tab Take 1 tablet by mouth in the morning and 1 tablet before bedtime.   2025 Morning    amiodarone 200 MG Oral Tab Take 1 tablet (200 mg total) by mouth in the morning.   2025 Morning    magnesium 250 MG Oral Tab Take 1 tablet (250 mg total) by mouth in the morning.   2025 Morning    Metoprolol Succinate  MG Oral Tablet 24 Hr Take 1 tablet (100 mg total) by mouth in the morning.   2025 Morning    atorvastatin 20 MG Oral Tab Take 1 tablet (20 mg total) by mouth daily. 90 tablet 0 2025 Evening    aspirin 81 MG Oral Tab EC Take 1 tablet (81 mg total) by mouth in the morning.   2025 Morning    [] diazePAM 5 MG Oral Tab Take 1 tablet (5 mg total) by mouth 1 (one) time if needed for Anxiety. 1 tablet 0      [] Pancrelipase, Lip-Prot-Amyl, (CREON) 44910-187471 units Oral Cap DR Particles Take 2 capsules by mouth in the morning, at noon, in the evening, and at bedtime. Meals 240 capsule 3     amiodarone 200 MG Oral Tab Take 1 tablet (200 mg total) by mouth daily.       furosemide 20 MG Oral Tab Take 1 tablet (20 mg total) by mouth every other day.   Unknown    Denosumab 60 MG/ML Subcutaneous Solution Prefilled Syringe Inject 1 mL (60 mg total) into the skin every 6 (six) months.   Unknown       Allergies: Patient has no known allergies.      Anesthesia Evaluation        Anesthetic Complications  (-) history of anesthetic complications         GI/Hepatic/Renal  Comment: Lionel Machado Cholangiocarcinoma s/p whipple                               Cardiovascular  Comment: 10/3/23 TTE    Left ventricle: The cavity size was increased. Wall thickness was normal.      Systolic function was mildly to moderately reduced. The estimated      ejection fraction was 35-40%, by visual assessment. Unable to assess LV      diastolic function due to heart rhythm.   3. Left ventricle: There is akinesis of the apical inferior wall and the      apex. There is hypokinesis of the basal-mid inferior wall.   4. Right ventricle: The cavity size was normal. Pacer C/W ICD noted in the      right ventricle. Systolic function was mildly reduced.   5. Left atrium: The atrium was mildly to moderately dilated.   6. Right atrium: The atrium was mildly dilated. Pacer C/W ICD noted in right      atrium.   7. Mitral valve: There was moderate to severe regurgitation.   8. Pulmonary arteries: Systolic pressure was at the upper limits of normal      to, at most, mildly increased; in the range of 30 mm Hg to 35 mm Hg,      estimated to be 35mm Hg. The peak systolic pressure is 35mm Hg.   9. Pericardium, extracardiac: There was no pericardial effusion. There was a      right pleural effusion.         Exercise tolerance: poor     MET: <=4      (+)  hypertension     (+) CAD    (+) CABG/stent  (+) pacemaker/AICD       (+) dysrhythmias and atrial fibrillation  (+) CHF                Endo/Other                                  Pulmonary        (+) COPD       (+) shortness of breath     (+) sleep apnea       Neuro/Psych                                      Past Surgical History:   Procedure Laterality Date    Angiogram  2019    Bowel resection      Bypass surgery  2019    CABG x 3    Cabg  2019    Cardiac defibrillator placement      Cardiac pacemaker placement      medtronic    Cataract  3/22    Cath drug eluting stent      Cholecystectomy  8.16.16    Colonoscopy  2011    Dr. Hu repeat 5 yrs    Colonoscopy  overdue    Can not fast that long    Hernia surgery  1980    over 35 yrs ago    Laparoscopic cholecystectomy  Aug 2016    Needle biopsy liver  8.16.16    Other      right index finger surgery    Other surgical history  2016    EUS/EGD/FNA    Pacemaker/defibrillator  2019    Placement, bile duct stent      Removal gallbladder      Stomach surgery procedure unlisted  Aug 2016    Duodennual ulcers    Whipple  2016    Pancreaticoduodenectomy with regional lymphadenectomy, Left adrenalectomy with resection of retroperitoneal periadrenal tumor, Omental pedicle flap, Splenectomy, and Wedge resection segment 4b liver mass.     Social History     Socioeconomic History    Marital status:     Number of children: 2   Occupational History    Occupation: retired   Tobacco Use    Smoking status: Former     Current packs/day: 0.00     Types: Cigarettes     Quit date: 6/15/1979     Years since quittin.0    Smokeless tobacco: Never    Tobacco comments:     Stopped   Vaping Use    Vaping status: Never Used   Substance and Sexual Activity    Alcohol use: No    Drug use: Not Currently     Frequency: 7.0 times per week     Types: Cannabis   Other Topics Concern     Service Yes     Comment: agent orange.     Caffeine  Concern No     Comment: coffee a couple of cups-decaf    Occupational Exposure Yes    Exercise Yes     Comment: daily     History   Drug Use Unknown     Available pre-op labs reviewed.  Lab Results   Component Value Date    WBC 6.1 06/13/2025    RBC 4.04 06/13/2025    HGB 12.7 (L) 06/13/2025    HCT 37.0 (L) 06/13/2025    MCV 91.6 06/13/2025    MCH 31.4 06/13/2025    MCHC 34.3 06/13/2025    RDW 15.6 06/13/2025    .0 06/13/2025     Lab Results   Component Value Date     06/13/2025    K 4.6 06/13/2025     06/13/2025    CO2 25.0 06/13/2025    BUN 13 06/13/2025    CREATSERUM 1.13 06/13/2025    GLU 98 06/13/2025    CA 9.2 06/13/2025            Airway      Mallampati: II  Mouth opening: 3 FB  TM distance: 4 - 6 cm  Neck ROM: full Cardiovascular      Rhythm: regular  Rate: normal     Dental             Pulmonary      Breath sounds clear to auscultation bilaterally.               Other findings              ASA: 3   Plan: MAC  NPO status verified and patient meets guidelines.  Patient has not taken beta blockers in last 24 hours.  Post-procedure pain management plan discussed with surgeon and patient.    Comment: Discussed MAC anesthesia with patient.  Discussed risks including but not limited to perioperative awareness, conversion to general anesthesia and risks associated with GA.  PT understands and agrees to proceed.    Plan/risks discussed with: patient                Present on Admission:  **None**

## 2025-06-13 NOTE — PROGRESS NOTES
[facility]   part of [facility]     Hospitalist Progress Note     Mark Anthony Bryan Patient Status:  Observation    1950 MRN [id number]   [facility] 4NW-A Attending Manuelito Ferrara MD   Hosp Day # 0 PCP Elton Flores MD     Chief Complaint: Abdominal pain     Subjective:     Patient  hungry. No pain. Nausea noted in AM.     Objective:    Review of Systems:   A comprehensive review of systems was completed; pertinent positive and negatives stated in subjective.    Vital signs:  Temp:  [97.7 °F (36.5 °C)-98.3 °F (36.8 °C)] 97.7 °F (36.5 °C)  Pulse:  [68-75] 69  Resp:  [14-21] 18  BP: (101-157)/(59-96) 119/71  SpO2:  [94 %-100 %] 97 %    Physical Exam:    General: No acute distress  Respiratory: No wheezes, no rhonchi  Cardiovascular: S1, S2, regular rate and rhythm  Abdomen: Soft, Non-tender, non-distended, positive bowel sounds  Neuro: No new focal deficits.   Extremities: No edema      Diagnostic Data:    Labs:  Recent Labs   Lab 25  1537 25  0557   WBC 9.8 6.1   HGB 13.6 12.7*   MCV 88.2 91.6   .0 202.0       Recent Labs   Lab 25  1537 25  0557   * 98   BUN 18 13   CREATSERUM 1.29 1.13   CA 9.8 9.2   ALB 4.5 3.8   * 137   K 4.6 4.6   CL 99 104   CO2 24.0 25.0   ALKPHO 157* 126*   AST 27 30   ALT 30 32   BILT 0.9 1.0   TP 7.8 6.4       Estimated Glomerular Filtration Rate: 68 mL/min/1.73m2 (result from lab).    Recent Labs   Lab 25  1537   TROPHS 8       No results for input(s): \"PTP\", \"INR\" in the last 168 hours.               Microbiology    No results found for this visit on 25.      Imaging: Reviewed in Epic.    Medications: Scheduled Medications[1]    Assessment & Plan:      #Abdomina pain- CT no acute findings but lipase elevated  GI on Cs, plan for EGD 25    #Hyponatremia  - IV fluids     #History of cholangiocarcinoma status post Whipple and chemo  - In remission     #CAD     #Chronic systolic CHF  - Compensated      #Bradycardia status post pacemaker     #LONG on CPAP      Anna Ricketts MD    Supplementary Documentation:     Quality:  DVT Mechanical Prophylaxis:   SCDs,    DVT Pharmacologic Prophylaxis   Medication    enoxaparin (Lovenox) 40 MG/0.4ML SUBQ injection 40 mg         DVT Pharmacologic prophylaxis: Aspirin 81 mg      Code Status: Full Code  Benavidez: No urinary catheter in place  Benavidez Duration (in days):   Central line:    [place]:     Discharge is dependent on: clinical   At this point Mr. Bryan is expected to be discharge to: home     The 21st Century Cures Act makes medical notes like these available to patients in the interest of transparency. Please be advised this is a medical document. Medical documents are intended to carry relevant information, facts as evident, and the clinical opinion of the practitioner. The medical note is intended as peer to peer communication and may appear blunt or direct. It is written in medical language and may contain abbreviations or verbiage that are unfamiliar.                       [1]    amiodarone  200 mg Oral Daily    aspirin  81 mg Oral Daily    atorvastatin  20 mg Oral Nightly    metoprolol succinate ER  100 mg Oral Daily Beta Blocker    pantoprazole  40 mg Oral QAM AC    lipase-protease-amylase (Lip-Prot-Amyl)  50,000 Units Oral TID CC    sacubitril-valsartan  1 tablet Oral BID    enoxaparin  40 mg Subcutaneous Daily

## 2025-06-13 NOTE — ED QUICK NOTES
Rounding Completed. Report received from TOREY Ramírez    Plan of Care reviewed. Waiting for in patient bed.  Elimination needs assessed.  Provided updates.    Bed is locked and in lowest position. Call light within reach.

## 2025-06-13 NOTE — ED QUICK NOTES
Orders for admission, patient is aware of plan and ready to go upstairs. Any questions, please call ED RN jacob at extension [phone].     Patient Covid vaccination status: Unvaccinated     COVID Test Ordered in ED: None    COVID Suspicion at Admission: N/A    Running Infusions: Medication Infusions[1] None    Mental Status/LOC at time of transport: aox4    Other pertinent information:   CIWA score: N/A   NIH score:  N/A             [1]

## 2025-06-13 NOTE — CONSULTS
Consultation Note        Mark Anthony Bryan Patient Status:  Observation    1950 MRN [id number]   Location [facility] Attending Manuelito Ferrara MD   Hosp Day # 0 PCP Elton Flores MD       Reason for Consultation   Abdominal pain       History of Present Illness     Patient is a 73 y/o male with a h/o cholangioCA Status post Whipple 2016, Left adrenal pheochromocytoma, marginal anastomotic ulcer, EPI, Pacemaker, CAD Status post CABG, recent admission for abd pain,enteritis who presents with acute onset abdominal pain, started 2 days ago  -Localized to LUQ, worse with eating, no nausea, vomiting  -Denies NSAIDs, alcohol, no recent travel  -Recent triglycerides normal, imaging with mild dilation of pancreatic duct  -Labs show mildly elevated lipase       PMH/MEDS/ALLERGIES/SH/FH:     Past Medical History[1]   Past Surgical History[2]     No recently discontinued medications to reconcile   Medications Ordered Prior to Encounter[3]    Current Allergies: Allergies[4]    Social History     Occupational History    Occupation: retired   Tobacco Use    Smoking status: Former     Current packs/day: 0.00     Types: Cigarettes     Quit date: 6/15/1979     Years since quittin.0    Smokeless tobacco: Never    Tobacco comments:     Stopped   Vaping Use    Vaping status: Never Used   Substance and Sexual Activity    Alcohol use: No    Drug use: Not Currently     Frequency: 7.0 times per week     Types: Cannabis    Sexual activity: Not on file       Marital Status:    Lives alone?:    Occupation:   Taking fiber supplements?:    Tattoos?:   Tattoos      Body piercing?:   High risk sexual behavior?:    Advance Directive:          Family History[5]           MEDICATIONS   Current Medications[6]           ROS:     A comprehensive 14 point review of systems was completed.  Pertinent positives and negatives noted in the the HPI.          Physical Exam     Vital signs:  /70 (BP Location:  Right arm)   Pulse 70   Temp 98.2 °F (36.8 °C) (Oral)   Resp 16   Ht 5' 9\" (1.753 m)   Wt 170 lb (77.1 kg)   SpO2 96%   BMI 25.10 kg/m²         Physical Exam        General: Appears alert, oriented x 3 and in no acute distress.  HEENT: Normal. No scleral icterus.   NECK: Supple. No neck vein distention. Thyroid not enlarged. No lymphadenopathy.  CV: S1 and S2 normal. No murmurs or gallops.  LUNGS: Clear to percussion and auscultation.  ABDOMEN: Soft and non-distended. Tender in LUQ. No masses. Bowel sounds are present.  BACK: No CVA tenderness.  EXTREMITIES: No edema, cyanosis or clubbing.  SKIN: Warm and dry.         IMAGING/LABS       Labs:   Lab Results   Component Value Date    WBC 6.1 06/13/2025    HGB 12.7 06/13/2025    HCT 37.0 06/13/2025    .0 06/13/2025    CREATSERUM 1.13 06/13/2025    BUN 13 06/13/2025     06/13/2025    K 4.6 06/13/2025     06/13/2025    CO2 25.0 06/13/2025    GLU 98 06/13/2025    CA 9.2 06/13/2025    ALB 3.8 06/13/2025    ALKPHO 126 06/13/2025    BILT 1.0 06/13/2025    AST 30 06/13/2025    ALT 32 06/13/2025     06/12/2025     Recent Labs   Lab 06/12/25  1537 06/13/25  0557   * 98   BUN 18 13   CREATSERUM 1.29 1.13   CA 9.8 9.2   * 137   K 4.6 4.6   CL 99 104   CO2 24.0 25.0     Recent Labs   Lab 06/13/25  0557   RBC 4.04   HGB 12.7*   HCT 37.0*   MCV 91.6   MCH 31.4   MCHC 34.3   RDW 15.6   NEPRELIM 3.23   WBC 6.1   .0       Recent Labs   Lab 06/12/25  1537 06/13/25  0557   ALT 30 32   AST 27 30       Imaging:   CT ABDOMEN+PELVIS(CONTRAST ONLY)(CPT=74177)  Narrative: PROCEDURE:  CT ABDOMEN+PELVIS (CONTRAST ONLY) (CPT=74177)     COMPARISON:  [facility] , CT, CT ABDOMEN+PELVIS(CONTRAST ONLY)(CPT=74177), 9/08/2024, 11:05 PM.  [facility] , MR, MRI ABDOMEN AND MRCP W/3D (W+W0)(CPT=74183/52429), 6/05/2025, 10:28 AM.     INDICATIONS:  abd pain     TECHNIQUE:  CT scanning was performed from the dome of the diaphragm to the pubic symphysis with  non-ionic intravenous contrast material. Post contrast coronal MPR imaging was performed.  Dose reduction techniques were used. Dose information is   transmitted to the ACR (American College of Radiology) NRDR (National Radiology Data Registry) which includes the Dose Index Registry.     PATIENT STATED HISTORY:(As transcribed by Technologist)  Pt c/o LUQ abd pain      CONTRAST USED:  100cc of Isovue 370     FINDINGS:          LIVER:  Air in the biliary tree, not present on the prior, within the left hepatic lobe, may be iatrogenic in this patient with head cholecystectomy     BILIARY:  Cholecystectomy.     PANCREAS:  Unremarkable.     SPLEEN:  Unremarkable.      KIDNEYS:  No acute abnormality.  Stable chronic appearing perinephric stranding around both kidneys, and there is some cortical scarring, and small cysts, and left greater than right renal cortical atrophy, but no sign of hydronephrosis.     ADRENALS:  Unremarkable.     AORTA/VASCULAR:  No aortic aneurysm.      RETROPERITONEUM:  Unremarkable.     BOWEL/MESENTERY:  Surgical changes with enteric staples involving the stomach.  Moderate fluid and air in the small bowel without obstruction.  No free air or sign of ascites.  Numerous diverticula sigmoid colon, and moderate thickening throughout the   sigmoid colon.  Nothing specific for acute diverticulitis.  No features specific for acute colitis.     ABDOMINAL WALL:  Unremarkable.     URINARY BLADDER:  Diverticulum left side of the urinary bladder redemonstrated measuring about 2 cm.  No bladder gas bubbles or stone.       LYMPH NODES PELVIS:  Unremarkable.     PELVIC ORGANS:  No acute process.      LUNG BASES:  No acute process.  No effusion or pneumonia     BONES:  No acute abnormality. There are degenerative changes present in the spine.  These changes are advanced.  There is now loss of height from the upper endplate of T12 with sclerosis in the upper endplate consistent with healing fracture.  No    retropulsion.                         Impression: CONCLUSION:  Some air present in the biliary tree left hepatic lobe.  The patient has had cholecystectomy in this could be iatrogenic.  Healing compression deformity upper endplate T12.  Bladder diverticulum redemonstrated.  No hydronephrosis.  Sigmoid   diverticula but no sign of definite colitis or acute diverticulitis.        LOCATION:  [facility]        Dictated by (CST): Long Huggins MD on 6/12/2025 at 5:37 PM       Finalized by (CST): Long Huggins MD on 6/12/2025 at 5:47 PM               IMPRESSION:   75 y/o male with a h/o cholangioCA Status post whipple admitted with second episode of abdominal pain, no specific findings on imaging except elevated lipase and mildly dilated pancreatic duct, recurrent mild chronic pancreatitis? R/o marginal ulcer             PLAN:     Plan for EGD today. The risks, benefits, alternatives of the procedure including the risks of anesthesia, bleeding, perforation, missed lesions, need for surgery were discussed with the patient. He expressed understanding of the risks and was agreeable to proceed.  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 Had fever and Ha    Decorative tattoo    Diverticulosis of large intestine    Dizziness    Enteritis    Essential hypertension    Exposure to radiation    completed 2/17/17    Fatigue    Flash pulmonary edema (HCC)    Flatulence/gas pain/belching    Frequent urination    Hearing impairment    hearing loss in right    Hearing loss    Heart attack (HCC)    NONSTEMI    Hemorrhoids    High blood pressure    High cholesterol    History of syncope    Hyperkalemia    Hypokalemia    Hyponatremia    Ileus (HCC)    Jaundice    Nausea    Nausea and vomiting in adult    Night sweats    NSTEMI (non-ST elevated myocardial infarction) (HCC)    Osteoporosis    Pacemaker    Pancreatic cancer (HCC)    chloangiocarcinoma    Personal history of antineoplastic chemotherapy    Completed chemo 2/17/17    Pheochromocytoma, left    Dx in 6/2016: 1.7 cm mass near inferior adrenal gland    Pneumoperitoneum    Rash    Renal disorder    Shortness of breath    Sleep apnea    CPAP    Syncope, near    Tobacco abuse    Uncomfortable fullness after meals    Visual impairment    glasses    Weight gain    Weight loss   [2]   Past Surgical History:  Procedure Laterality Date    Angiogram  06/24/2019    Bowel resection      Bypass surgery  06/27/2019    CABG x 3    Cabg  June 24, 2019    Cardiac defibrillator placement      Cardiac pacemaker placement      medtronic    Cataract  3/22    Cath drug eluting stent      Cholecystectomy  8.16.16    Colonoscopy  12/13/2011    Dr. Hu repeat 5 yrs    Colonoscopy  overdue    Can not fast that long    Hernia surgery  1980    over 35 yrs ago    Laparoscopic cholecystectomy  Aug 2016    Needle biopsy liver  8.16.16    Other      right index finger surgery    Other surgical history  08/16/2016    EUS/EGD/FNA    Pacemaker/defibrillator  June 2019    Placement, bile duct stent      Removal gallbladder      Stomach surgery procedure unlisted   Aug 2016    Duodennual ulcers    Whipple  08/16/2016    Pancreaticoduodenectomy with regional lymphadenectomy, Left adrenalectomy with resection of retroperitoneal periadrenal tumor, Omental pedicle flap, Splenectomy, and Wedge resection segment 4b liver mass.   [3]   No current facility-administered medications on file prior to encounter.     Current Outpatient Medications on File Prior to Encounter   Medication Sig Dispense Refill    OMEPRAZOLE 40 MG Oral Capsule Delayed Release TAKE 1 CAPSULE(40 MG) BY MOUTH DAILY 90 capsule 2    Pancrelipase, Lip-Prot-Amyl, (CREON) 65674-16319 units Oral Cap DR Particles Take 2 capsules by mouth with breakfast, with lunch, and with evening meal. One capsule with snacks      sacubitril-valsartan 24-26 MG Oral Tab Take 1 tablet by mouth in the morning and 1 tablet before bedtime.      amiodarone 200 MG Oral Tab Take 1 tablet (200 mg total) by mouth in the morning.      magnesium 250 MG Oral Tab Take 1 tablet (250 mg total) by mouth in the morning.      Metoprolol Succinate  MG Oral Tablet 24 Hr Take 1 tablet (100 mg total) by mouth in the morning.      atorvastatin 20 MG Oral Tab Take 1 tablet (20 mg total) by mouth daily. 90 tablet 0    aspirin 81 MG Oral Tab EC Take 1 tablet (81 mg total) by mouth in the morning.      amiodarone 200 MG Oral Tab Take 1 tablet (200 mg total) by mouth daily.      furosemide 20 MG Oral Tab Take 1 tablet (20 mg total) by mouth every other day.      Denosumab 60 MG/ML Subcutaneous Solution Prefilled Syringe Inject 1 mL (60 mg total) into the skin every 6 (six) months.     [4] No Known Allergies  [5]   Family History  Problem Relation Age of Onset    Diabetes Father         83 yrs old    Other (Other) Father         Heart Failure    Dementia Father     Cancer Mother         Rectal Cancer    Diabetes Mother         82 yrs old    Hypertension Mother         ?    Colon Cancer Mother         83    Diabetes Sister     Crohn's Disease Brother     Heart  Attack Brother     Other (Other) Other         No adrenal issues    Thyroid disease Neg     Thyroid Disorder Neg    [6]    [COMPLETED] iopamidol 76% (ISOVUE-370) injection for power injector  100 mL Intravenous ONCE PRN    [COMPLETED] lactated ringers IV bolus 1,000 mL  1,000 mL Intravenous Once    [COMPLETED] pantoprazole (Protonix) 40 mg in sodium chloride 0.9% PF 10 mL IV push  40 mg Intravenous Once    [COMPLETED] lactated ringers infusion   Intravenous Once    [COMPLETED] HYDROmorphone (Dilaudid) 1 MG/ML injection 1 mg  1 mg Intravenous Once    amiodarone (Pacerone) tab 200 mg  200 mg Oral Daily    aspirin DR tab 81 mg  81 mg Oral Daily    atorvastatin (Lipitor) tab 20 mg  20 mg Oral Nightly    metoprolol succinate ER (Toprol XL) 24 hr tab 100 mg  100 mg Oral Daily Beta Blocker    pantoprazole (Protonix) DR tab 40 mg  40 mg Oral QAM AC    pancrelipase (Lip-Prot-Amyl) (Zenpep) DR particles cap 50,000 Units  50,000 Units Oral TID CC    sacubitril-valsartan (Entresto) 24-26 MG per tab 1 tablet  1 tablet Oral BID    morphINE PF 2 MG/ML injection 1 mg  1 mg Intravenous Q2H PRN    Or    morphINE PF 2 MG/ML injection 2 mg  2 mg Intravenous Q2H PRN    Or    morphINE PF 4 MG/ML injection 4 mg  4 mg Intravenous Q2H PRN    acetaminophen (Tylenol Extra Strength) tab 1,000 mg  1,000 mg Oral Q4H PRN    melatonin tab 3 mg  3 mg Oral Nightly PRN    polyethylene glycol (PEG 3350) (Miralax) 17 g oral packet 17 g  17 g Oral Daily PRN    sennosides (Senokot) tab 17.2 mg  17.2 mg Oral Nightly PRN    bisacodyl (Dulcolax) 10 MG rectal suppository 10 mg  10 mg Rectal Daily PRN    fleet enema (Fleet) rectal enema 133 mL  1 enema Rectal Once PRN    ondansetron (Zofran) 4 MG/2ML injection 4 mg  4 mg Intravenous Q6H PRN    benzonatate (Tessalon) cap 200 mg  200 mg Oral TID PRN    sodium chloride 0.9% infusion   Intravenous Continuous    enoxaparin (Lovenox) 40 MG/0.4ML SUBQ injection 40 mg  40 mg Subcutaneous Daily    acetaminophen  (Tylenol) tab 650 mg  650 mg Oral Q4H PRN    Or    HYDROcodone-acetaminophen (Norco) 5-325 MG per tab 1 tablet  1 tablet Oral Q4H PRN    Or    HYDROcodone-acetaminophen (Norco) 5-325 MG per tab 2 tablet  2 tablet Oral Q4H PRN    metoclopramide (Reglan) 5 mg/mL injection 5 mg  5 mg Intravenous Q8H PRN

## 2025-06-14 ENCOUNTER — APPOINTMENT (OUTPATIENT)
Dept: CT IMAGING | Facility: HOSPITAL | Age: 75
End: 2025-06-14
Attending: INTERNAL MEDICINE
Payer: OTHER GOVERNMENT

## 2025-06-14 LAB
ALBUMIN SERPL-MCNC: 3.8 G/DL (ref 3.2–4.8)
ALBUMIN/GLOB SERPL: 1.4 {RATIO} (ref 1–2)
ALP LIVER SERPL-CCNC: 127 U/L (ref 45–117)
ALT SERPL-CCNC: 28 U/L (ref 10–49)
ANION GAP SERPL CALC-SCNC: 8 MMOL/L (ref 0–18)
AST SERPL-CCNC: 23 U/L (ref ?–34)
BASOPHILS # BLD AUTO: 0.03 X10(3) UL (ref 0–0.2)
BASOPHILS NFR BLD AUTO: 0.4 %
BILIRUB SERPL-MCNC: 1.1 MG/DL (ref 0.2–1.1)
BUN BLD-MCNC: 12 MG/DL (ref 9–23)
CALCIUM BLD-MCNC: 9.2 MG/DL (ref 8.7–10.6)
CHLORIDE SERPL-SCNC: 103 MMOL/L (ref 98–112)
CO2 SERPL-SCNC: 24 MMOL/L (ref 21–32)
CREAT BLD-MCNC: 1.13 MG/DL (ref 0.7–1.3)
EGFRCR SERPLBLD CKD-EPI 2021: 68 ML/MIN/1.73M2 (ref 60–?)
EOSINOPHIL # BLD AUTO: 0.17 X10(3) UL (ref 0–0.7)
EOSINOPHIL NFR BLD AUTO: 2.2 %
ERYTHROCYTE [DISTWIDTH] IN BLOOD BY AUTOMATED COUNT: 15.5 %
GLOBULIN PLAS-MCNC: 2.7 G/DL (ref 2–3.5)
GLUCOSE BLD-MCNC: 97 MG/DL (ref 70–99)
HCT VFR BLD AUTO: 35.5 % (ref 39–53)
HGB BLD-MCNC: 12.6 G/DL (ref 13–17.5)
IMM GRANULOCYTES # BLD AUTO: 0.03 X10(3) UL (ref 0–1)
IMM GRANULOCYTES NFR BLD: 0.4 %
LIPASE SERPL-CCNC: 122 U/L (ref 12–53)
LYMPHOCYTES # BLD AUTO: 1.14 X10(3) UL (ref 1–4)
LYMPHOCYTES NFR BLD AUTO: 15 %
MCH RBC QN AUTO: 31.8 PG (ref 26–34)
MCHC RBC AUTO-ENTMCNC: 35.5 G/DL (ref 31–37)
MCV RBC AUTO: 89.6 FL (ref 80–100)
MONOCYTES # BLD AUTO: 1.24 X10(3) UL (ref 0.1–1)
MONOCYTES NFR BLD AUTO: 16.3 %
NEUTROPHILS # BLD AUTO: 5 X10 (3) UL (ref 1.5–7.7)
NEUTROPHILS # BLD AUTO: 5 X10(3) UL (ref 1.5–7.7)
NEUTROPHILS NFR BLD AUTO: 65.7 %
OSMOLALITY SERPL CALC.SUM OF ELEC: 280 MOSM/KG (ref 275–295)
PLATELET # BLD AUTO: 204 10(3)UL (ref 150–450)
POTASSIUM SERPL-SCNC: 4.2 MMOL/L (ref 3.5–5.1)
PROT SERPL-MCNC: 6.5 G/DL (ref 5.7–8.2)
RBC # BLD AUTO: 3.96 X10(6)UL (ref 3.8–5.8)
SODIUM SERPL-SCNC: 135 MMOL/L (ref 136–145)
WBC # BLD AUTO: 7.6 X10(3) UL (ref 4–11)

## 2025-06-14 PROCEDURE — 74177 CT ABD & PELVIS W/CONTRAST: CPT | Performed by: INTERNAL MEDICINE

## 2025-06-14 PROCEDURE — 99232 SBSQ HOSP IP/OBS MODERATE 35: CPT | Performed by: STUDENT IN AN ORGANIZED HEALTH CARE EDUCATION/TRAINING PROGRAM

## 2025-06-14 RX ORDER — ACETAMINOPHEN 10 MG/ML
1000 INJECTION, SOLUTION INTRAVENOUS EVERY 6 HOURS PRN
Status: DISCONTINUED | OUTPATIENT
Start: 2025-06-14 | End: 2025-06-16

## 2025-06-14 RX ORDER — SIMETHICONE 125 MG
125 TABLET,CHEWABLE ORAL
Status: DISCONTINUED | OUTPATIENT
Start: 2025-06-14 | End: 2025-06-16

## 2025-06-14 RX ORDER — POLYETHYLENE GLYCOL 3350 17 G/17G
17 POWDER, FOR SOLUTION ORAL DAILY
Status: DISCONTINUED | OUTPATIENT
Start: 2025-06-14 | End: 2025-06-16

## 2025-06-14 NOTE — PROGRESS NOTES
Inpatient Follow up Note    Mark Anthony MIKE Bryan Patient Status:  Observation    1950 MRN [id number]   Location [facility] 4NW-A Attending Manuelito Ferrara MD   Hosp Day # 0 PCP Elton Flores MD     Reason for Consultation   Abdominal pain  Elevated lipase     Subjective       - States pain was better yesterday after the scope, but woke up this morning with acute worsening of the pain, traveling from left upper quadrant to left lower quadrant  -No nausea, vomiting, tolerated clears yesterday, wanted to eat earlier today         Objective:     /76 (BP Location: Left arm)   Pulse 69   Temp 97.5 °F (36.4 °C) (Oral)   Resp 18   Ht 5' 9\" (1.753 m)   Wt 170 lb (77.1 kg)   SpO2 96%   BMI 25.10 kg/m²   Gen: AAOx2  CV: RRR with normal S1 / S2  Resp: CTA bilaterally  Abd: (+)BS, soft, tender diffusely, non-distended; no rebound or guarding  Ext: No edema or cyanosis  Skin: Warm and dry     Labs/Imaging     LABRCNTIP[PGLU:5@  No results for input(s): \"INR\" in the last 168 hours.      Recent Labs   Lab 25  1537 25  0557 25  0750   WBC 9.8 6.1 7.6   HGB 13.6 12.7* 12.6*   .0 202.0 204.0       Recent Labs   Lab 25  1537 25  0557 25  0802   * 137 135*   K 4.6 4.6 4.2   CL 99 104 103   CO2 24.0 25.0 24.0   BUN 18 13 12   CREATSERUM 1.29 1.13 1.13       Recent Labs   Lab 25  1537 25  0557 25  0802   ALT 30 32 28   AST 27 30 23     CT ABDOMEN+PELVIS(CONTRAST ONLY)(CPT=74177)  Result Date: 2025  CONCLUSION:  1. Mildly dilated loops of small bowel in the left abdomen may represent ileus. 2. Postsurgical changes in the upper abdomen status post Angelo-en-Y with mild dilatation of the pancreatic duct. 3. Aneurysmal dilatation of the infrarenal aorta.   LOCATION:  [facility]   Dictated by (CST): Ernesto Snider MD on 2025 at 12:24 PM     Finalized by (CST): Ernesto Snider MD on 2025 at 12:31 PM       CT ABDOMEN+PELVIS(CONTRAST  ONLY)(CPT=74177)  Result Date: 6/12/2025  CONCLUSION:  Some air present in the biliary tree left hepatic lobe.  The patient has had cholecystectomy in this could be iatrogenic.  Healing compression deformity upper endplate T12.  Bladder diverticulum redemonstrated.  No hydronephrosis.  Sigmoid diverticula but no sign of definite colitis or acute diverticulitis.   LOCATION:  [facility]   Dictated by (CST): Long Huggins MD on 6/12/2025 at 5:37 PM     Finalized by (CST): Long Huggins MD on 6/12/2025 at 5:47 PM       AST   Date/Time Value Ref Range Status   06/14/2025 08:02 AM 23 <34 U/L Final     ALT   Date/Time Value Ref Range Status   06/14/2025 08:02 AM 28 10 - 49 U/L Final     BUN   Date/Time Value Ref Range Status   06/14/2025 08:02 AM 12 9 - 23 mg/dL Final              Assessment       75 y/o male with a h/o cholangioCA Status post whipple admitted with second episode of abdominal pain, no specific findings on imaging except elevated lipase and mildly dilated pancreatic duct, recurrent mild chronic pancreatitis? No marginal ulcer        -Repeat CT performed 6/14 due to worsening pain, suggestive of mild ileus, no other acute process, labs stable         Plan     Miralax daily  Add simethicone tid  Soft diet  If tolerating ok for dc home from GI standpoint           Radha Zaragoza MD  1:08 PM  6/14/2025  [facility]  [phone]

## 2025-06-14 NOTE — OCCUPATIONAL THERAPY NOTE
OT orders received, chart reviewed. Attempted to see patient for OT evaluation, however patient declining stating that he is having severe abdominal pain, pending going down for CT. Therapists encouraged OOB mobility to get into wheelchair with transport awaiting to take patient down for testing, however patient declining and requesting to be taken down in bed. Will reschedule and re-attempt as able.

## 2025-06-14 NOTE — PLAN OF CARE
Problem: GASTROINTESTINAL - ADULT  Goal: Minimal or absence of nausea and vomiting  Description: INTERVENTIONS:  - Maintain adequate hydration with IV or PO as ordered and tolerated  -   Problem: PAIN - ADULT  Goal: Verbalizes/displays adequate comfort level or patient's stated pain goal  Description: INTERVENTIONS:  - Encourage pt to monitor pain and request assistance  - Assess pain using appropriate pain scale  - Administer analgesics based on type and severity of pain and evaluate response  - Implement non-pharmacological measures as appropriate and evaluate response  - Consider cultural and social influences on pain and pain management  - Manage/alleviate anxiety  - Utilize distraction and/or relaxation techniques  - Monitor for opioid side effects  - Notify MD/LIP if interventions unsuccessful or patient reports new pain  - Anticipate increased pain with activity and pre-medicate as appropriate  Outcome: Progressing   - Evaluate effectiveness of ordered antiemetic medications  - Provide nonpharmacologic comfort measures as appropriate  - Advance diet as tolerated, if ordered  - Obtain nutritional consult as needed  - Evaluate fluid balance  Outcome: Progressing  Alert and oriented x 4, denies abd pain or nausea at this time, tolerated some chicken broth without nausea or vomiting, patient wants to try to advance diet tomorrow, will update MD during am rounds. Urinating without difficulty. LONG, wears CPAP for few hours tonight. Fall prec in place.   0435: woke up with recurrent left sided abd pain, got some relief from IV pain med, verbalized frustrations, saying that his pain is related to not having more than clear liquid diet,  offered some broth and apple juice, reassured pt that staff will update MD during rounds.   0551: c/o mild nausea but declined antiemetic. Abd pain now is better.

## 2025-06-14 NOTE — PLAN OF CARE
Pt is aaox4, declined to have breakfast, for CT abd/pel..  Problem: GASTROINTESTINAL - ADULT  Goal: Minimal or absence of nausea and vomiting  Description: INTERVENTIONS:  - Maintain adequate hydration with IV or PO as ordered and tolerated  - Nasogastric tube to low intermittent suction as ordered  - Evaluate effectiveness of ordered antiemetic medications  - Provide nonpharmacologic comfort measures as appropriate  - Advance diet as tolerated, if ordered  - Obtain nutritional consult as needed  - Evaluate fluid balance  Outcome: Progressing  Goal: Maintains adequate nutritional intake (undernourished)  Description: INTERVENTIONS:  - Monitor percentage of each meal consumed  - Identify factors contributing to decreased intake, treat as appropriate  - Assist with meals as needed  - Monitor I&O, WT and lab values  - Obtain nutritional consult as needed  - Optimize oral hygiene and moisture  - Encourage food from home; allow for food preferences  - Enhance eating environment  Outcome: Progressing     Problem: PAIN - ADULT  Goal: Verbalizes/displays adequate comfort level or patient's stated pain goal  Description: INTERVENTIONS:  - Encourage pt to monitor pain and request assistance  - Assess pain using appropriate pain scale  - Administer analgesics based on type and severity of pain and evaluate response  - Implement non-pharmacological measures as appropriate and evaluate response  - Consider cultural and social influences on pain and pain management  - Manage/alleviate anxiety  - Utilize distraction and/or relaxation techniques  - Monitor for opioid side effects  - Notify MD/LIP if interventions unsuccessful or patient reports new pain  - Anticipate increased pain with activity and pre-medicate as appropriate  Outcome: Progressing

## 2025-06-14 NOTE — PHYSICAL THERAPY NOTE
Attempted to see pt for PT eval. Pt declining to participate at this time due to \"severe pain\". Transport present to take pt off the floor in w/c and PT offered to assist pt OOB to w/c. Pt declined stating he wants to go to the test in the bed. Will follow and re-attempt as able and appropriate.

## 2025-06-14 NOTE — PROGRESS NOTES
[facility]   part of [facility]     Hospitalist Progress Note     Mark Anthony Bryan Patient Status:  Observation    1950 MRN [id number]   Location [facility] 4NW-A Attending Manuelito Ferrara MD   Hosp Day # 0 PCP Elton Flores MD     Chief Complaint: Abdominal pain     Subjective:     Patient having L sided pain- does not want narcotics. Says he will leave if no answers found on CT.     Objective:    Review of Systems:   A comprehensive review of systems was completed; pertinent positive and negatives stated in subjective.    Vital signs:  Temp:  [97.5 °F (36.4 °C)-98.2 °F (36.8 °C)] 98.1 °F (36.7 °C)  Pulse:  [69-75] 69  Resp:  [16-19] 16  BP: (105-141)/(59-83) 141/83  SpO2:  [93 %-97 %] 93 %    Physical Exam:    General: No acute distress  Respiratory: No wheezes, no rhonchi  Cardiovascular: S1, S2, regular rate and rhythm  Abdomen: Soft, Non-tender, non-distended, positive bowel sounds  Neuro: No new focal deficits.   Extremities: No edema      Diagnostic Data:    Labs:  Recent Labs   Lab 25  1537 25  0557 25  0750   WBC 9.8 6.1 7.6   HGB 13.6 12.7* 12.6*   MCV 88.2 91.6 89.6   .0 202.0 204.0       Recent Labs   Lab 25  1537 25  0557 25  0802   * 98 97   BUN 18 13 12   CREATSERUM 1.29 1.13 1.13   CA 9.8 9.2 9.2   ALB 4.5 3.8 3.8   * 137 135*   K 4.6 4.6 4.2   CL 99 104 103   CO2 24.0 25.0 24.0   ALKPHO 157* 126* 127*   AST 27 30 23   ALT 30 32 28   BILT 0.9 1.0 1.1   TP 7.8 6.4 6.5       Estimated Glomerular Filtration Rate: 68 mL/min/1.73m2 (result from lab).    Recent Labs   Lab 25  1537   TROPHS 8       No results for input(s): \"PTP\", \"INR\" in the last 168 hours.               Microbiology    No results found for this visit on 25.      Imaging: Reviewed in Epic.    Medications: Scheduled Medications[1]    Assessment & Plan:      #Abdominal pain- CT no acute findings but lipase elevated  GI on Cs  EGD no acute findings  Pt  does not want ot eat from fear of pain, does not want IV narcotics, agreeable to IV tylnol    #Hyponatremia  - IV fluids     #History of cholangiocarcinoma status post Whipple and chemo  - In remission     #CAD     #Chronic systolic CHF  - Compensated     #Bradycardia status post pacemaker     #LONG on CPAP      Anna Ricketts MD    Supplementary Documentation:     Quality:  DVT Mechanical Prophylaxis:   SCDs,    DVT Pharmacologic Prophylaxis   Medication    enoxaparin (Lovenox) 40 MG/0.4ML SUBQ injection 40 mg         DVT Pharmacologic prophylaxis: Aspirin 81 mg      Code Status: Full Code  Benavidez: No urinary catheter in place  Benavidez Duration (in days):   Central line:    RENO:     Discharge is dependent on: clinical   At this point Mr. Bryan is expected to be discharge to: home     The 21st Century Cures Act makes medical notes like these available to patients in the interest of transparency. Please be advised this is a medical document. Medical documents are intended to carry relevant information, facts as evident, and the clinical opinion of the practitioner. The medical note is intended as peer to peer communication and may appear blunt or direct. It is written in medical language and may contain abbreviations or verbiage that are unfamiliar.                       [1]    amiodarone  200 mg Oral Daily    aspirin  81 mg Oral Daily    atorvastatin  20 mg Oral Nightly    metoprolol succinate ER  100 mg Oral Daily Beta Blocker    pantoprazole  40 mg Oral QAM AC    lipase-protease-amylase (Lip-Prot-Amyl)  50,000 Units Oral TID CC    sacubitril-valsartan  1 tablet Oral BID    enoxaparin  40 mg Subcutaneous Daily

## 2025-06-15 PROCEDURE — 99232 SBSQ HOSP IP/OBS MODERATE 35: CPT | Performed by: STUDENT IN AN ORGANIZED HEALTH CARE EDUCATION/TRAINING PROGRAM

## 2025-06-15 RX ORDER — HYDROMORPHONE HYDROCHLORIDE 1 MG/ML
1 INJECTION, SOLUTION INTRAMUSCULAR; INTRAVENOUS; SUBCUTANEOUS ONCE
Refills: 0 | Status: DISCONTINUED | OUTPATIENT
Start: 2025-06-15 | End: 2025-06-16

## 2025-06-15 RX ORDER — MAGNESIUM CARB/ALUMINUM HYDROX 105-160MG
296 TABLET,CHEWABLE ORAL ONCE
Status: COMPLETED | OUTPATIENT
Start: 2025-06-15 | End: 2025-06-15

## 2025-06-15 NOTE — PROGRESS NOTES
Inpatient Follow up Note    Mark Anthony Bryan Patient Status:  Observation    1950 MRN [id number]   Location [facility] 4NW-A Attending Manuelito Ferrara MD   Hosp Day # 0 PCP Elton Flores MD     Reason for Consultation   Abdominal pain  Elevated lipase     Subjective       - States still has \"tearing pain\" throughout abdomen  -Vomiting small amounts of bilious fluid when he tries to eat  -No bowel movement in several days         Objective:     BP (!) 178/89   Pulse 73   Temp 97.9 °F (36.6 °C) (Oral)   Resp 18   Ht 5' 9\" (1.753 m)   Wt 170 lb (77.1 kg)   SpO2 95%   BMI 25.10 kg/m²   Gen: AAOx2  CV: RRR with normal S1 / S2  Resp: CTA bilaterally  Abd: (+)BS, soft, tender diffusely, non-distended; no rebound or guarding  Ext: No edema or cyanosis  Skin: Warm and dry     Labs/Imaging     LABRCNTIP[PGLU:5@  No results for input(s): \"INR\" in the last 168 hours.      Recent Labs   Lab 25  1537 25  0557 25  0750   WBC 9.8 6.1 7.6   HGB 13.6 12.7* 12.6*   .0 202.0 204.0       Recent Labs   Lab 25  1537 25  0557 25  0802   * 137 135*   K 4.6 4.6 4.2   CL 99 104 103   CO2 24.0 25.0 24.0   BUN 18 13 12   CREATSERUM 1.29 1.13 1.13       Recent Labs   Lab 25  1537 25  0557 25  0802   ALT 30 32 28   AST 27 30 23     CT ABDOMEN+PELVIS(CONTRAST ONLY)(CPT=74177)  Result Date: 2025  CONCLUSION:  1. Mildly dilated loops of small bowel in the left abdomen may represent ileus. 2. Postsurgical changes in the upper abdomen status post Angelo-en-Y with mild dilatation of the pancreatic duct. 3. Aneurysmal dilatation of the infrarenal aorta.   LOCATION:  [place]   Dictated by (CST): Ernesto Snider MD on 2025 at 12:24 PM     Finalized by (CST): Ernesto Snider MD on 2025 at 12:31 PM       AST   Date/Time Value Ref Range Status   2025 08:02 AM 23 <34 U/L Final     ALT   Date/Time Value Ref Range Status   2025 08:02 AM 28 10 - 49  U/L Final     BUN   Date/Time Value Ref Range Status   06/14/2025 08:02 AM 12 9 - 23 mg/dL Final              Assessment       75 y/o male with a h/o cholangioCA Status post whipple admitted with second episode of abdominal pain, no specific findings on imaging except elevated lipase and mildly dilated pancreatic duct, recurrent mild chronic pancreatitis with ileus? No marginal ulcer        -Repeat CT performed 6/14 due to worsening pain, suggestive of mild ileus, no other acute process, labs stable  -  Constipation related? Functional? Internal Hernia?         Plan     Mag Citrate x 1 bottle  Ensure tid  CA 19-9  Consider surgical consult if symptoms worsen           Radha Zaragoza MD  1:08 PM  6/14/2025  [facility]  [phone]

## 2025-06-15 NOTE — PLAN OF CARE
Assumed care at 1930. Patient is AOx4, RA, Telemetry monitoring, Low fiber/Soft diet, poor appetite, anxious. IVF infusing at 75 ml/hr. Meds given per MD orders. Ambulates with walker and one person assistance. Patient c/o dizziness and left side abdominal pain/nausea (has declined pain medications) - states not being able to eat solid foods due to pain. Patient reported no bowel movements since 6/12/25 and verbalizes being worried about not moving his bowels. On auscultation, this RN could hear active bowel sounds on all quadrants. Patient is anxious and hoping to consult with GI tomorrow to learn CT scan results and POC. Senokot given for constipation but no bowel movements reported this shift. Fall precautions in place, bed in lowest position, call light within reach.    Addendum: Pt expressed frustration with continued pain.        Revision History    Problem: GASTROINTESTINAL - ADULT  Goal: Minimal or absence of nausea and vomiting  Description: INTERVENTIONS:  - Maintain adequate hydration with IV or PO as ordered and tolerated  - Nasogastric tube to low intermittent suction as ordered  - Evaluate effectiveness of ordered antiemetic medications  - Provide nonpharmacologic comfort measures as appropriate  - Advance diet as tolerated, if ordered  - Obtain nutritional consult as needed  - Evaluate fluid balance  6/14/2025 2251 by Binta Broussard, RN  Outcome: Progressing  6/14/2025 2251 by Binta Broussard, RN  Outcome: Progressing     Problem: PAIN - ADULT  Goal: Verbalizes/displays adequate comfort level or patient's stated pain goal  Description: INTERVENTIONS:  - Encourage pt to monitor pain and request assistance  - Assess pain using appropriate pain scale  - Administer analgesics based on type and severity of pain and evaluate response  - Implement non-pharmacological measures as appropriate and evaluate response  - Consider cultural and social influences on pain and pain management  - Manage/alleviate  anxiety  - Utilize distraction and/or relaxation techniques  - Monitor for opioid side effects  - Notify MD/LIP if interventions unsuccessful or patient reports new pain  - Anticipate increased pain with activity and pre-medicate as appropriate  6/14/2025 2251 by Binta Broussard, RN  Outcome: Progressing  6/14/2025 2251 by Binta Broussard, RN  Outcome: Progressing

## 2025-06-15 NOTE — PROGRESS NOTES
[facility]   part of [facility]     Hospitalist Progress Note     Mark Anthony Bryan Patient Status:  Observation    1950 MRN [id number]   [facility] 4NW-A Attending Manuelito Ferrara MD   Hosp Day # 0 PCP Elton Flores MD     Chief Complaint: Abdominal pain     Subjective:     Patient w left sided pain- worsened by soft diet 2 bites. Does not want to take pain meds. Discussed w him need for pain control.     Objective:    Review of Systems:   A comprehensive review of systems was completed; pertinent positive and negatives stated in subjective.    Vital signs:  Temp:  [97.5 °F (36.4 °C)-99.2 °F (37.3 °C)] 99.2 °F (37.3 °C)  Pulse:  [68-72] 69  Resp:  [18-20] 18  BP: (136-159)/(64-79) 140/64  SpO2:  [92 %-96 %] 95 %    Physical Exam:    General: No acute distress  Respiratory: No wheezes, no rhonchi  Cardiovascular: S1, S2, regular rate and rhythm  Abdomen: Soft, Non-tender, non-distended, positive bowel sounds  Neuro: No new focal deficits.   Extremities: No edema      Diagnostic Data:    Labs:  Recent Labs   Lab 25  1537 25  0557 25  0750   WBC 9.8 6.1 7.6   HGB 13.6 12.7* 12.6*   MCV 88.2 91.6 89.6   .0 202.0 204.0       Recent Labs   Lab 25  1537 25  0557 25  0802   * 98 97   BUN 18 13 12   CREATSERUM 1.29 1.13 1.13   CA 9.8 9.2 9.2   ALB 4.5 3.8 3.8   * 137 135*   K 4.6 4.6 4.2   CL 99 104 103   CO2 24.0 25.0 24.0   ALKPHO 157* 126* 127*   AST 27 30 23   ALT 30 32 28   BILT 0.9 1.0 1.1   TP 7.8 6.4 6.5       Estimated Glomerular Filtration Rate: 68 mL/min/1.73m2 (result from lab).    Recent Labs   Lab 25  1537   TROPHS 8       No results for input(s): \"PTP\", \"INR\" in the last 168 hours.               Microbiology    No results found for this visit on 25.      Imaging: Reviewed in Epic.    Medications: Scheduled Medications[1]    Assessment & Plan:      #Abdominal pain- CT no acute findings but lipase elevated- ileus  noted   GI on Cs  EGD no acute findings  Not tolerating soft diet on 6/15---> CLD  Pt refusing IV pain meds.    #Hyponatremia  - IV fluids     #History of cholangiocarcinoma status post Whipple and chemo  - In remission     #CAD     #Chronic systolic CHF  - Compensated     #Bradycardia status post pacemaker     #LONG on CPAP      Anna Ricketts MD    Supplementary Documentation:     Quality:  DVT Mechanical Prophylaxis:   SCDs,    DVT Pharmacologic Prophylaxis   Medication    enoxaparin (Lovenox) 40 MG/0.4ML SUBQ injection 40 mg         DVT Pharmacologic prophylaxis: Aspirin 81 mg      Code Status: Full Code  Benavidez: No urinary catheter in place  Benavidez Duration (in days):   Central line:    RENO:     Discharge is dependent on: clinical   At this point Mr. Bryan is expected to be discharge to: home     The 21st Century Cures Act makes medical notes like these available to patients in the interest of transparency. Please be advised this is a medical document. Medical documents are intended to carry relevant information, facts as evident, and the clinical opinion of the practitioner. The medical note is intended as peer to peer communication and may appear blunt or direct. It is written in medical language and may contain abbreviations or verbiage that are unfamiliar.                       [1]    HYDROmorphone  1 mg Intravenous Once    polyethylene glycol (PEG 3350)  17 g Oral Daily    simethicone  125 mg Oral TID CC and HS    amiodarone  200 mg Oral Daily    aspirin  81 mg Oral Daily    atorvastatin  20 mg Oral Nightly    metoprolol succinate ER  100 mg Oral Daily Beta Blocker    pantoprazole  40 mg Oral QAM AC    lipase-protease-amylase (Lip-Prot-Amyl)  50,000 Units Oral TID CC    sacubitril-valsartan  1 tablet Oral BID    enoxaparin  40 mg Subcutaneous Daily

## 2025-06-15 NOTE — PLAN OF CARE
A&oriented x4 . VSS. .Telemetry monitoring. SCDs. Ankle pumps encouraged. Not tolerating diet, reports pain after eating. Last BM today. Voiding. Pain to LUQ, declining all pain meds. Up ad pankaj. Plan is pain management, possible surg consult if pain persists. Patient updated and in agreement with plan of care. Safety precautions in place. Instructed patient to call for assistance, call light within reach.

## 2025-06-15 NOTE — PHYSICAL THERAPY NOTE
Physical Therapy    Order for PT lori received.  Have made 3 attempts to evaluate this patient.  He does not want to participate in therapy due to ongoing pain.  Pt does get up - in/out of bathroom w/ staff supervision. Note him moving le's and rolling w/ ease in bed.  Will sign off of case as pt does not want to participate in therapy.  Informed pt to have RN re-order therapy if/when he would like to participate.  Both pt and rn are aware.

## 2025-06-15 NOTE — OCCUPATIONAL THERAPY NOTE
Attempted to see patient for OT evaluation, however patient declining session d/t abdominal pain.  Educated Pt on risks of immobility and prolonged bedrest, and strongly encouraged Pt to attempt transferring to chair or at least sitting EOB for few minutes, however Pt continued to refuse. RN notified.   Patient has refused OT three days in a row.  Will sign off at this time.  Please re-order if patient becomes agreeable.  POC discussed with patient and RN.

## 2025-06-16 VITALS
OXYGEN SATURATION: 98 % | HEIGHT: 69 IN | HEART RATE: 77 BPM | DIASTOLIC BLOOD PRESSURE: 74 MMHG | SYSTOLIC BLOOD PRESSURE: 133 MMHG | WEIGHT: 170 LBS | RESPIRATION RATE: 18 BRPM | BODY MASS INDEX: 25.18 KG/M2 | TEMPERATURE: 98 F

## 2025-06-16 LAB — CANCER AG19-9 SERPL-ACNC: 29.3 U/ML (ref ?–35)

## 2025-06-16 PROCEDURE — 99239 HOSP IP/OBS DSCHRG MGMT >30: CPT | Performed by: STUDENT IN AN ORGANIZED HEALTH CARE EDUCATION/TRAINING PROGRAM

## 2025-06-16 RX ORDER — SENNA AND DOCUSATE SODIUM 50; 8.6 MG/1; MG/1
1 TABLET, FILM COATED ORAL DAILY
Qty: 90 TABLET | Refills: 0 | Status: SHIPPED | OUTPATIENT
Start: 2025-06-16

## 2025-06-16 RX ORDER — POLYETHYLENE GLYCOL 3350 17 G/17G
17 POWDER, FOR SOLUTION ORAL DAILY PRN
Qty: 100 EACH | Refills: 0 | Status: SHIPPED | OUTPATIENT
Start: 2025-06-16

## 2025-06-16 NOTE — PROGRESS NOTES
NURSING DISCHARGE NOTE    Discharged Home via Wheelchair.  Accompanied by Support staff  Belongings Taken by patient/family.  Pt is aaox4, tolerated diet well, stated that pain is tolerable, had a loose bowel movement, pt stated he feels a lot better afterwards, discharge instructions given and verbalized understanding.

## 2025-06-16 NOTE — PLAN OF CARE
Pt A&O x 4. Afebrile. VSS. RA, CPAP when asleep. C/o of abdominal pain, declines intervention. Call light within reach. Safety precautions in place. IVF infusing.  Problem: GASTROINTESTINAL - ADULT  Goal: Minimal or absence of nausea and vomiting  Description: INTERVENTIONS:  - Maintain adequate hydration with IV or PO as ordered and tolerated  - Nasogastric tube to low intermittent suction as ordered  - Evaluate effectiveness of ordered antiemetic medications  - Provide nonpharmacologic comfort measures as appropriate  - Advance diet as tolerated, if ordered  - Obtain nutritional consult as needed  - Evaluate fluid balance  Outcome: Progressing  Goal: Maintains adequate nutritional intake (undernourished)  Description: INTERVENTIONS:  - Monitor percentage of each meal consumed  - Identify factors contributing to decreased intake, treat as appropriate  - Assist with meals as needed  - Monitor I&O, WT and lab values  - Obtain nutritional consult as needed  - Optimize oral hygiene and moisture  - Encourage food from home; allow for food preferences  - Enhance eating environment  Outcome: Progressing     Problem: PAIN - ADULT  Goal: Verbalizes/displays adequate comfort level or patient's stated pain goal  Description: INTERVENTIONS:  - Encourage pt to monitor pain and request assistance  - Assess pain using appropriate pain scale  - Administer analgesics based on type and severity of pain and evaluate response  - Implement non-pharmacological measures as appropriate and evaluate response  - Consider cultural and social influences on pain and pain management  - Manage/alleviate anxiety  - Utilize distraction and/or relaxation techniques  - Monitor for opioid side effects  - Notify MD/LIP if interventions unsuccessful or patient reports new pain  - Anticipate increased pain with activity and pre-medicate as appropriate  Outcome: Progressing

## 2025-06-16 NOTE — PROGRESS NOTES
[facility]                       Gastroenterology Follow up Note - [facility]    Mark Anthony Bryan Patient Status:  Observation    1950 MRN [id number]   Location [facility] 4NW-A Attending No att. providers found   Hosp Day # 0 PCP Elton Flores MD     Reason for consultation: Abdominal pain, elevated lipase  Subjective: Patient seen and examined.  This morning he noted his abdominal pain has resolved.  He is anxious to try food to see how he feels.  He noted multiple bowel movements after the magnesium citrate yesterday.  Review of Systems:   10 point ROS completed and was negative, except for pertinent positive and negatives stated in subjective.    For PMH, PSH, FHx and SHx- please see initial consult note.     Objective: /74 (BP Location: Left arm)   Pulse 77   Temp 98.3 °F (36.8 °C) (Oral)   Resp 18   Ht 5' 9\" (1.753 m)   Wt 170 lb (77.1 kg)   SpO2 98%   BMI 25.10 kg/m²   Gen: No acute distress  Resp: no respiratory distress  Abd: Soft, non-tender, non-distended. No rebound tenderness, no guarding.   Neuro: Aox3.     Labs:    Recent Labs   Lab 25  1537 25  0557 25  0802   * 98 97   BUN 18 13 12   CREATSERUM 1.29 1.13 1.13   CA 9.8 9.2 9.2   * 137 135*   K 4.6 4.6 4.2   CL 99 104 103   CO2 24.0 25.0 24.0     Recent Labs   Lab 25  0750   RBC 3.96   HGB 12.6*   HCT 35.5*   MCV 89.6   MCH 31.8   MCHC 35.5   RDW 15.5   NEPRELIM 5.00   WBC 7.6   .0       Recent Labs   Lab 25  1537 25  0557 25  0802   ALT 30 32 28   AST 27 30 23       Assessment:  75 y/o male with a h/o cholangioCA Status post whipple admitted with second episode of abdominal pain, no specific findings on imaging except elevated lipase and mildly dilated pancreatic duct, recurrent mild chronic pancreatitis with ileus? No marginal ulcer        -Repeat CT performed  due to worsening pain, suggestive of mild ileus, no other acute  process, labs stable  -  Constipation related? Functional? Internal Hernia?  With symptoms this morning had improved after multiple bowel movements and may have been related to constipation.    Plan:  GI soft diet  Continue pantoprazole daily  Continue pancrelipase  If tolerating diet, then okay to discharge from GI perspective.  Discharge, will need to follow-up with nurse practitioner or Dr. Hess in 3-4 weeks.    Thank you for allowing us to participate in patient's care. Please call us with any questions or concerns.    Pramod Muro DO  [facility]  [phone]

## 2025-06-16 NOTE — DISCHARGE SUMMARY
[facility]     Mark Anthony Bryan Patient Status:  Observation    1950 MRN [id number]   Location [facility] [place] 4NW-A Attending Anna Ricketts MD   Hosp Day # 0 PCP Elton Flores MD     Date of Admission: 2025  Date of Discharge:   2025    Discharge Disposition: Home Health Care Services    Discharge Diagnosis:  #Abdominal pain- CT no acute findings but lipase elevated- ileus noted   GI on Cs  EGD no acute findings  Not tolerating soft diet on 6/15---> CLD  Pt refusing IV pain meds.     #Hyponatremia  - IV fluids     #History of cholangiocarcinoma status post Whipple and chemo  - In remission     #CAD     #Chronic systolic CHF  - Compensated     #Bradycardia status post pacemaker     #LONG on CPAP       History of Present Illness:   Mark Anthony Bryan is a 74 year old male with past medical history agent orange exposure, cholangiocarcinoma status post Whipple now in remission, CAD, CHF, LONG who presents with abdominal pain.  3 days ago, developed left upper quadrant abdominal pain after eating a bowl of shrimp.  Has never had this before.  Nausea but no vomiting.  Pain has been persistent but worsens anytime he tries to eat.  Is having normal bowel movements during this time.  Denies any fever, chills, chest pain, shortness of breath, lower extremity edema.     Brief Synopsis:   Pt admitted with abdominal pain- no acute findings on CT - possible ileus ?. Lipase was elevated. HE was managed conservatively with bowel rest, bowel care.  Pt tolerating PO on date of DC.     Lace+ Score: 77  59-90 High Risk  29-58 Medium Risk  0-28   Low Risk       TCM Follow-Up Recommendation:  LACE > 58: High Risk of readmission after discharge from the hospital.      Procedures during hospitalization:   no    Incidental or significant findings and recommendations (brief descriptions):  no    Lab/Test results pending at Discharge:   no    Consultants:  GI    Discharge Medication List:     Discharge  Medications        START taking these medications        Instructions Prescription details   polyethylene glycol (PEG 3350) 17 g Pack  Commonly known as: Miralax      Take 17 g by mouth daily as needed.   Quantity: 100 each  Refills: 0     senna-docusate 8.6-50 MG Tabs  Commonly known as: Senokot-S      Take 1 tablet by mouth daily.   Quantity: 90 tablet  Refills: 0            CHANGE how you take these medications        Instructions Prescription details   amiodarone 200 MG Tabs  Commonly known as: Pacerone  What changed: Another medication with the same name was removed. Continue taking this medication, and follow the directions you see here.      Take 1 tablet (200 mg total) by mouth in the morning.   Refills: 0            CONTINUE taking these medications        Instructions Prescription details   aspirin 81 MG Tbec      Take 1 tablet (81 mg total) by mouth in the morning.   Refills: 0     atorvastatin 20 MG Tabs  Commonly known as: Lipitor      Take 1 tablet (20 mg total) by mouth daily.   Quantity: 90 tablet  Refills: 0     Creon 03189-75800 units Cpep  Generic drug: Pancrelipase (Lip-Prot-Amyl)      Take 2 capsules by mouth with breakfast, with lunch, and with evening meal. One capsule with snacks   Refills: 0     denosumab 60 MG/ML Sosy  Commonly known as: Prolia      Inject 1 mL (60 mg total) into the skin every 6 (six) months.   Refills: 0     furosemide 20 MG Tabs  Commonly known as: Lasix      Take 1 tablet (20 mg total) by mouth every other day.   Refills: 0     magnesium 250 MG Tabs      Take 1 tablet (250 mg total) by mouth in the morning.   Refills: 0     metoprolol succinate  MG Tb24  Commonly known as: Toprol XL      Take 1 tablet (100 mg total) by mouth in the morning.   Refills: 0     Omeprazole 40 MG Cpdr      TAKE 1 CAPSULE(40 MG) BY MOUTH DAILY   Quantity: 90 capsule  Refills: 2     sacubitril-valsartan 24-26 MG Tabs  Commonly known as: Entresto      Take 1 tablet by mouth in the morning  and 1 tablet before bedtime.   Refills: 0            ASK your doctor about these medications        Instructions Prescription details   Creon [id number] units Cpep  Generic drug: Pancrelipase (Lip-Prot-Amyl)  Ask about: Should I take this medication?      Take 2 capsules by mouth in the morning, at noon, in the evening, and at bedtime. Meals   Stop taking on: May 24, 2025  Quantity: 240 capsule  Refills: 3     diazePAM 5 MG Tabs  Commonly known as: Valium  Ask about: Should I take this medication?      Take 1 tablet (5 mg total) by mouth 1 (one) time if needed for Anxiety.   Stop taking on: May 20, 2025  Quantity: 1 tablet  Refills: 0               Where to Get Your Medications        These medications were sent to [id number] - [place], [place] - [address], [phone], [phone]  [address] [address]      Phone: [phone]   polyethylene glycol (PEG 3350) 17 g Pack  senna-docusate 8.6-50 MG Tabs         [place]     Follow-up appointment:   No follow-up provider specified.  Appointments for Next 30 Days 2025 - 2025      None            Vital signs:  Temp:  [97.8 °F (36.6 °C)-98.3 °F (36.8 °C)] 98.3 °F (36.8 °C)  Pulse:  [69-77] 77  Resp:  [14-18] 18  BP: (118-180)/(54-91) 133/74  SpO2:  [92 %-98 %] 98 %    Physical Exam:    General: No acute distress   Lungs: clear to auscultation  Cardiovascular: S1, S2  Abdomen: Soft      -----------------------------------------------------------------------------------------------  PATIENT DISCHARGE INSTRUCTIONS: See electronic chart    Anna Ricketts MD    Total time spent on discharge plannin minutes     The  [employer] Cures Act makes medical notes like these available to patients in the interest of transparency. Please be advised this is a medical document. Medical documents are intended to carry relevant information, facts as evident, and the clinical opinion of the practitioner.  The medical note is intended as peer to peer communication and may appear blunt or direct. It is written in medical language and may contain abbreviations or verbiage that are unfamiliar.

## 2025-06-17 ENCOUNTER — PATIENT OUTREACH (OUTPATIENT)
Dept: CASE MANAGEMENT | Age: 75
End: 2025-06-17

## 2025-06-17 NOTE — PROGRESS NOTES
Hospital follow up.    Elton Flores M.D.  Family Medicine; Sports Medicine  [facility]  [address]  [place] [address] [phone]  Patient will contact the office to schedule.    Confirmed with patient.    Closing encounter.

## 2025-06-19 LAB
PHOSPHATIDYETHANOL: NEGATIVE
PHOSPHATIDYLETHANOL (PETH): NEGATIVE NG/ML

## 2025-06-23 ENCOUNTER — TELEPHONE (OUTPATIENT)
Age: 75
End: 2025-06-23

## 2025-06-23 DIAGNOSIS — M81.8 OTHER OSTEOPOROSIS WITHOUT CURRENT PATHOLOGICAL FRACTURE: Primary | ICD-10-CM

## 2025-06-23 NOTE — TELEPHONE ENCOUNTER
Spoke to patient regarding his Prolia injection. After a chart review it appears his last injection was 1/2024 in [place]. Per OV 6/27/24 he follows with Osteoporosis clinic (he does not remember such a clinic) & Orthopaedics Francoise Romero with [facility]. States he tried calling Francoise however she is not longer with [facility]. Requesting referral to continue with Prolia injections.    Dr. Flores - see pended referral for Ortho for Prolia? Or, further recommendations for Prolia?      6/27/24 - Other osteoporosis without current pathological fracture  Following with osteoporosis clinic, on Prolia

## 2025-06-23 NOTE — TELEPHONE ENCOUNTER
Patient called stating he is due for prolia shot but has not had one in 18 months-not sure if he is supposed to get another one and if so where does he go?

## 2025-06-24 NOTE — TELEPHONE ENCOUNTER
Spoke to patient, informed of referral. Would like referral information sent through [employer].

## 2025-06-30 RX ORDER — METOPROLOL SUCCINATE 100 MG/1
100 TABLET, EXTENDED RELEASE ORAL DAILY
Qty: 90 TABLET | Refills: 3 | Status: SHIPPED | OUTPATIENT
Start: 2025-06-30

## 2025-06-30 RX ORDER — AMIODARONE HYDROCHLORIDE 200 MG/1
200 TABLET ORAL DAILY
Qty: 90 TABLET | Refills: 1 | Status: SHIPPED | OUTPATIENT
Start: 2025-06-30

## 2025-07-08 ENCOUNTER — ANCILLARY ORDERS (OUTPATIENT)
Dept: CARDIOLOGY | Age: 75
End: 2025-07-08

## 2025-07-08 ENCOUNTER — ANCILLARY PROCEDURE (OUTPATIENT)
Dept: CARDIOLOGY | Age: 75
End: 2025-07-08
Attending: INTERNAL MEDICINE

## 2025-07-08 DIAGNOSIS — Z95.810 ICD (IMPLANTABLE CARDIOVERTER-DEFIBRILLATOR) IN PLACE: ICD-10-CM

## 2025-07-08 DIAGNOSIS — Z95.810 ICD (IMPLANTABLE CARDIOVERTER-DEFIBRILLATOR) IN PLACE: Primary | ICD-10-CM

## 2025-07-08 PROCEDURE — 93296 REM INTERROG EVL PM/IDS: CPT | Performed by: INTERNAL MEDICINE

## 2025-07-08 PROCEDURE — 93295 DEV INTERROG REMOTE 1/2/MLT: CPT | Performed by: INTERNAL MEDICINE

## 2025-08-20 ENCOUNTER — TELEPHONE (OUTPATIENT)
Dept: CARDIOLOGY | Age: 75
End: 2025-08-20

## 2025-08-26 ENCOUNTER — LAB ENCOUNTER (OUTPATIENT)
Dept: LAB | Facility: HOSPITAL | Age: 75
End: 2025-08-26

## 2025-08-26 DIAGNOSIS — E87.1 HYPONATREMIA: ICD-10-CM

## 2025-08-26 LAB
ANION GAP SERPL CALC-SCNC: 11 MMOL/L (ref 0–18)
BUN BLD-MCNC: 21 MG/DL (ref 9–23)
CALCIUM BLD-MCNC: 9.5 MG/DL (ref 8.7–10.6)
CHLORIDE SERPL-SCNC: 98 MMOL/L (ref 98–112)
CO2 SERPL-SCNC: 24 MMOL/L (ref 21–32)
CREAT BLD-MCNC: 1.36 MG/DL (ref 0.7–1.3)
EGFRCR SERPLBLD CKD-EPI 2021: 55 ML/MIN/1.73M2 (ref 60–?)
FASTING STATUS PATIENT QL REPORTED: YES
GLUCOSE BLD-MCNC: 98 MG/DL (ref 70–99)
OSMOLALITY SERPL CALC.SUM OF ELEC: 279 MOSM/KG (ref 275–295)
POTASSIUM SERPL-SCNC: 4.6 MMOL/L (ref 3.5–5.1)
SODIUM SERPL-SCNC: 133 MMOL/L (ref 136–145)

## 2025-08-26 PROCEDURE — 36415 COLL VENOUS BLD VENIPUNCTURE: CPT

## 2025-08-26 PROCEDURE — 80048 BASIC METABOLIC PNL TOTAL CA: CPT

## 2025-10-29 ENCOUNTER — APPOINTMENT (OUTPATIENT)
Dept: CARDIOLOGY | Age: 75
End: 2025-10-29

## 2026-05-15 ENCOUNTER — APPOINTMENT (OUTPATIENT)
Dept: CARDIOLOGY | Age: 76
End: 2026-05-15

## 2026-05-15 ENCOUNTER — APPOINTMENT (OUTPATIENT)
Dept: CARDIOLOGY | Age: 76
End: 2026-05-15
Attending: INTERNAL MEDICINE

## (undated) DEVICE — KIT VLV 5 PC AIR H2O SUCT BX ENDOGATOR CONN

## (undated) DEVICE — SYRINGE 50ML LL TIP

## (undated) DEVICE — DECANTER FLUID DSPNSR BAG BAJ DISP STRL LF ASEPTIC TRNSF

## (undated) DEVICE — SUTURE PROLENE 5-0 C-1

## (undated) DEVICE — COTTON CANNULATOME: Brand: COTTON

## (undated) DEVICE — V2 SPECIMEN COLLECTION MANIFOLD KIT: Brand: NEPTUNE

## (undated) DEVICE — BITEBLOCK ENDOSCP 60FR MAXI STRP

## (undated) DEVICE — 1200CC GUARDIAN II: Brand: GUARDIAN

## (undated) DEVICE — FORCEP RADIAL JAW 4

## (undated) DEVICE — 10FT COMBINED O2 DELIVERY/CO2 MONITORING. FILTER WITH MICROSTREAM TYPE LUER: Brand: DUAL ADULT NASAL CANNULA

## (undated) DEVICE — GAMMEX® PI HYBRID SIZE 5.5, STERILE POWDER-FREE SURGICAL GLOVE, POLYISOPRENE AND NEOPRENE BLEND: Brand: GAMMEX

## (undated) DEVICE — CATHETER 6FR PGTL FL4 FR4 CRV 100110CM FULL LGTH WIRE BRAID

## (undated) DEVICE — GUIDEWIRE STRT 10CM 260CM J CRV TPR .035IN VASC PTFE PERIPH

## (undated) DEVICE — SUTURE SILK 2-0

## (undated) DEVICE — HEMOCLIP HORIZON LG 004200

## (undated) DEVICE — CABLE PT WHITE

## (undated) DEVICE — CABLE PT BLUE

## (undated) DEVICE — KIT MFLD FOR SPEC COLL

## (undated) DEVICE — CELL SAVER 5/5+ BOWL KIT-225ML: Brand: HAEMONETICS CELL SAVER 5/5+ SYSTEMS

## (undated) DEVICE — SUTURE POLYDEK 2-0

## (undated) DEVICE — Device

## (undated) DEVICE — CATHETER 6FR IM CRV 100CM FULL LGTH WIRE BRAID ROBUST SHAFT

## (undated) DEVICE — KIT MICROINTRODUCER 4FR .018IN 40CM 7CM .018IN SFTP MNDRL

## (undated) DEVICE — 3M™ RED DOT™ MONITORING ELECTRODE WITH FOAM TAPE AND STICKY GEL, 50/BAG, 20/CASE, 72/PLT 2570: Brand: RED DOT™

## (undated) DEVICE — SOL LACT RINGERS 1000ML

## (undated) DEVICE — COVER PROBE FLX-FEEL 58X6IN OR 4 FLAG 2 SHT 24 PRINT STRL LF

## (undated) DEVICE — SUTURE WIRE DOUBLE STERNOTOMY

## (undated) DEVICE — OPEN HEART: Brand: MEDLINE INDUSTRIES, INC.

## (undated) DEVICE — SPONGE LAP 18X18 XRAY STRL

## (undated) DEVICE — DRESSING TRANS 4.75X4IN ADH HPOAL WTPRF TEGADERM PU STD STRL

## (undated) DEVICE — GOWN,SIRUS,FAB REINF,RAGLAN,XL,STERILE: Brand: MEDLINE

## (undated) DEVICE — SYRINGE 10ML GRAD N-PYRG DEHP-FR PVC FREE STRL MED LF DISP

## (undated) DEVICE — SUTURE SILK 0 FSL

## (undated) DEVICE — CELL SAVER TUBING BRAT

## (undated) DEVICE — SUTURE PROLENE 6-0 C-1

## (undated) DEVICE — BAG WST 48IN SPK FLTR RLLR CLAMP FEMALE LL TUBE VENT MERIT

## (undated) DEVICE — SHEATH 6FR 10CM 2.5CM .035IN INTRO SNAP ON DIL LOCK KINK RST

## (undated) DEVICE — ENDOSCOPY PACK UPPER: Brand: MEDLINE INDUSTRIES, INC.

## (undated) DEVICE — BOWLS UTILITY 16OZ

## (undated) DEVICE — LOCKING DEVICE RX & BIOPSY CAP

## (undated) DEVICE — SYRINGE 10ML LL TIP

## (undated) DEVICE — PDS II VLT 0 107CM AG ST3: Brand: ENDOLOOP

## (undated) DEVICE — SYRINGE 30ML LL TIP

## (undated) DEVICE — SUTURE PROLENE 7-0 CC

## (undated) DEVICE — STERILE POLYISOPRENE POWDER-FREE SURGICAL GLOVES: Brand: PROTEXIS

## (undated) DEVICE — GOWN,SIRUS,FABRIC-REINFORCED,X-LARGE: Brand: MEDLINE

## (undated) DEVICE — SOL  .9 1000ML BTL

## (undated) DEVICE — KIT CUSTOM ENDOPROCEDURE STERIS

## (undated) DEVICE — GOWN SURG XL L4 IMPRV REINFORCE SET IN SLV STRL LF DISP BLUE

## (undated) DEVICE — SUTURE PROLENE 8-0 BV-130-5

## (undated) DEVICE — BLADE SW 40.5MM 47.5MM LRG BN

## (undated) DEVICE — FILTERLINE NASAL ADULT O2/CO2

## (undated) DEVICE — GIJAW SINGLE-USE BIOPSY FORCEPS WITH NEEDLE: Brand: GIJAW

## (undated) DEVICE — GLOVE SURG TRIUMPH SZ 8

## (undated) DEVICE — GLOVE SURG 8 PROTEXIS LF CRM PF BEAD CUFF STRL PLISPRN 12IN

## (undated) DEVICE — LEAD TEMP PACING UNIPOLAR 6500

## (undated) DEVICE — GUIDEWIRE 150CM 3MM RDS J CRV .035IN VASC PTFE FX CORE LF

## (undated) DEVICE — NEEDLE FLTR 18GA 1.5IN 5UM REG WALL BLUNT BVL LL HUB DEHP-FR

## (undated) DEVICE — CELL SAVER RESERVOIR BRAT

## (undated) DEVICE — ADAPTER TBG 2 MALE LL DEHP-FR STRL LF MEDEX 1IN DISP .1ML IV

## (undated) DEVICE — Device: Brand: DEFENDO AIR/WATER/SUCTION AND BIOPSY VALVE

## (undated) DEVICE — TRAY ENDOVEIN KTV16 HARVESTING

## (undated) DEVICE — GLOVE SURG 5.5 PROTEXIS LF CRM PF BEAD CUFF STRL PLISPRN

## (undated) NOTE — LETTER
[facility] [address], [address]  Consent for Procedure/Sedation    Date: 11/19/2019    Time: 1540      I authorize the performance upon [employer] the following:  Insertion of Implantable Cardioverter Difibrillator    1.  I a ________________________________    ___________________    Witness: _________________________      Date: ___________________    Printed: 2019   3:39 PM  Patient Name: Hillary Seo        : 1950       Medical Record #: [id number]

## (undated) NOTE — LETTER
[facility] [address], [address]  Consent for Procedure/Sedation    Date: 6/25/2019    Time: 1820      1.  I authorize the performance upon Shruthi Kinney the following: Peripheral angiography, atherectomy, percutaneous translumin 8. In the event your procedure results in extended X-Ray/fluoroscopy time, you may develop a skin reaction.       Signature of Patient: _______________________________________________________    Signature of person authorized

## (undated) NOTE — MR AVS SNAPSHOT
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          Result Summary for COMP METABOLIC PANEL (14)      Component Results     Component Value Flag Ref Range Units Status    Glucose 92  70-99  mg/dL Final    BUN 7 (L) 8-20  mg/dL Final    Cr Basophil Absolute 0.05  0.00-0.10  x10(3) uL Final    Immature Granulocyte Absolute 0.04  0.00-1.00  x10(3) uL Final    Neutrophil % 56.6   % Final    Lymphocyte % 14.8   % Final    Monocyte % 18.3   % Final    Eosinophil % 8.8   % Final    Basophil % 0.8

## (undated) NOTE — LETTER
[address] [facility] FOR BLOOD OR BLOOD COMPONENTS      In the course of your treatment, it may become necessary to administer a transfusion of blood or blood components.  This form provides basic information concerning this proc explain the alternatives to you if it has not already been done. I,Mark Anthony Bryan, have read/had read to me the above. I understand the matters bearing on the decision whether or not to authorize a transfusion of blood or blood components.  I have no quest

## (undated) NOTE — ED AVS SNAPSHOT
Mr. Howard Espinal   MRN: [id number]    Department:  [facility] Emergency Department   Date of Visit:  7/28/2017           Disclosure     Insurance plans vary and the physician(s) referred by the ER may not be covered by your plan.  Please contact If you have been prescribed any medication(s), please fill your prescription right away and begin taking the medication(s) as directed    If the emergency physician has read X-rays, these will be re-interpreted by a radiologist.  If there is a significant

## (undated) NOTE — LETTER
01/07/20        [facility] [address] [id number]      Dear Aj Colvin,    [address] [facility] records indicate that you have outstanding lab work and or testing that was ordered for you and has not yet been completed:  Orders Placed This Encounter

## (undated) NOTE — Clinical Note
Alex Porter is having some abd pain, thinks it may be hernia related. It is not strangulated or tender today. Can you take a look at him sometime?   nicky

## (undated) NOTE — LETTER
08/19/19        Shruthi Nirmal  [facility] [phone]      Dear Vania Lopez,    [address] [facility] records indicate that you have outstanding lab work and or testing that was ordered for you and has not yet been completed:  Orders Placed This Encounter

## (undated) NOTE — IP AVS SNAPSHOT
[facility] [place] [address] [place], [address] ~ [phone]                Discharge Summary   3/31/2017    Mr.  [address] [facility]           Admission Information        Provider Department    3/31/2017 JACQUELINE Perez  Ultrasound · DO resume your regular diet  · DO keep a bandage on the biopsy site for at least 24 hours  · DO NOT take a hot bath or shower for at least 12 hours  · DO NOT drive or operative machinery for at least 24 hours  · DO NOT smoke for at least 24 hours  · DO N 4.56 (02/06/17)  0.45 (L) (02/06/17)  0.84 (H) (02/06/17)  0.21 (02/06/17)  0.02    (01/30/17)  71.5 (01/30/17)  8.8 (01/30/17)  14.2 (01/30/17)  4.5 (01/30/17)  0.4  (01/30/17)  4.79 (01/30/17)  0.59 (L) (01/30/17)  0.95 (H) (01/30/17)  0.30 (01/30/17)  0 Call the [employer] for assistance with your inactive [employer] account    If you have questions, you can call [phone] to talk to our [facility] Staff. Remember, [employer] is NOT to be used for urgent needs. For medical emergencies, dial 911.     Tu

## (undated) NOTE — LETTER
[facility] [address], [address]  Consent for Procedure/Sedation    Date: 6/25/2019    Time: 1815      1.  I authorize the performance upon Brayden Ruelas the following: Peripheral angiography, atherectomy, percutaneous translumin you may develop a skin reaction.       Signature of Patient: _______________________________________________________    Signature of person authorized                                           Relationship to  to consent for patient: _______________________

## (undated) NOTE — LETTER
Nydia Marquez M.D., F.A.C.S. Bessy Holbrook M.D., F.A.C.SDelonte Means M.D., Jose Alfredo Guerrero M.D., F.A.C.S. Corinne Mercury. Migue Constantino M.D., F.A.C.S. ANISH Henson M. Debera Spell A.D. Kennedy Distad, M.D., F. chance to have all of your questions and concerns answered. If there are any issues which have not been adequately addressed, we ask you to bring them forward so that we can thoroughly address them.     A patient who is fully informed and understands their treatment, among other options and the risks and benefits of the different treatment options:    Yes __X__ No _____    A [facility] surgeon as explained to me that if I should so desire, he/she is willing to explain my case and the surgical and non-surgical optio

## (undated) NOTE — MR AVS SNAPSHOT
After Visit Summary   1/20/2017    Sandra Poster    MRN: [id number]           Allergies     No Known Allergies      Your Vital Signs Were     Smoking Status                   Former Smoker                     Patient Instructions     None      [facility]lea Appointment with Yobani Lawson at [facility] in [place] ([phone])   [address] [facility]  [address] [facility] [address] [address] Laureano Garza [facility]

## (undated) NOTE — MR AVS SNAPSHOT
After Visit Summary   2/3/2017    Cj Campos    MRN: [id number]           Allergies     No Known Allergies      Your Vital Signs Were     Smoking Status                   Former Smoker                     Patient Instructions     None      Pleas

## (undated) NOTE — LETTER
[facility] [address], [address]  Consent for Procedure/Sedation    Date:     Time:        1. I authorize the performance upon "[employer] the following:  Insertion of BiVentricular Pacemaker      2.  I authorize Dr. Ajith Zamora ________________________________    ___________________    Witness: _________________________      Date: ___________________    Printed: 2019   11:16 AM  Patient Name: Dolly Eaton        : 1950       Medical Record #: [id number]

## (undated) NOTE — LETTER
November 25, 2019    [address]  [place] [id number]    Dear Omaira Blanton: It was a pleasure speaking with you over the phone recently.  To follow up, I wanted to send you some contact information to utilize when you have a question and

## (undated) NOTE — IP AVS SNAPSHOT
After Visit Summary   2/13/2017    Rick Snider    MRN: [id number]           Allergies     No Known Allergies      Your Vital Signs Were     Smoking Status                   Former Smoker                     Patient Instructions    POST-RADIATION Visit [email or web address] to learn more.

## (undated) NOTE — Clinical Note
FYI, TCM call made, see notes. NCM confirmed with patient he has a HFU on 7/19/19. NCM changed visit type to TCM HFU.

## (undated) NOTE — Clinical Note
Pt did cologuard in march through a dr in [facility]. Can we hunt this down? ? Also did stool cards for [facility] about amonth ago. Please call pt and help find this result.

## (undated) NOTE — Clinical Note
Bridgett Murray is stuggling. He is having trouble eating, lots of gas, abd pain generalized intermittently over the past 2-3 months on and off. He had a ct of the abd and pelvis in 9/19, no changes basically.  I am getting a kub today

## (undated) NOTE — MR AVS SNAPSHOT
[facility]  [address] [address] [phone]               Thank you for choosing us for your health care visit with Kamron South MD.  We are glad to serve you and happy to provide you with this summa medications. For [facility] please call [phone]. For [facility] AND [facility] please call [phone]. The radiologist may need to contact the physician, as this may affect the exam/procedure.    All herbal medication should be held at the time of meeta TAB-A-ADRIA MAXIMUM Tabs   Take 1 tablet by mouth daily. vitamin A 89291 UNITS Caps   Take 10,000 Units by mouth daily. Commonly known as:  AQUASOL A           VITAMIN B 12 OR   Take 50 mcg by mouth daily.            VITAMIN D-400 OR   Take 1 ca

## (undated) NOTE — LETTER
[address]  [address]  Authorization for Surgical Operation and Procedure     Date:___________                                                                                                         Time:__________  I hereby authorize Surgeon(s):  Radha Zaragoza MD, my physician and his/her assistants (if applicable), which may include medical students, residents, and/or fellows, to perform the following surgical operation/ procedure and administer such anesthesia as may be determined necessary by my physician:  Operation/Procedure name (s)Esophagogastroduodenoscopy with possible biopsy, cautery, polypectomy, control of bleed   on Mark Anthony Bryan   2.   I recognize that during the surgical operation/procedure, unforeseen conditions may necessitate additional or different procedures than those listed above.  I, therefore, further authorize and request that the above-named surgeon, assistants, or designees perform such procedures as are, in their judgment, necessary and desirable.    3.   My surgeon/physician has discussed prior to my surgery the potential benefits, risks and side effects of this procedure; the likelihood of achieving goals; and potential problems that might occur during recuperation.  They also discussed reasonable alternatives to the procedure, including risks, benefits, and side effects related to the alternatives and risks related to not receiving this procedure.  I have had all my questions answered and I acknowledge that no guarantee has been made as to the result that may be obtained.    4.   Should the need arise during my operation/procedure, which includes change of level of care prior to discharge, I also consent to the administration of blood and/or blood products.  Further, I understand that despite careful testing and screening of blood or blood products by collecting agencies, I may still be subject to ill effects as a result of receiving a  blood transfusion and/or blood products.  The following are some, but not all, of the potential risks that can occur: fever and allergic reactions, hemolytic reactions, transmission of diseases such as Hepatitis, AIDS and Cytomegalovirus (CMV) and fluid overload.  In the event that I wish to have an autologous transfusion of my own blood, or a directed donor transfusion, I will discuss this with my physician.  Check only if Refusing Blood or Blood Products  I understand refusal of blood or blood products as deemed necessary by my physician may have serious consequences to my condition to include possible death. I hereby assume responsibility for my refusal and release the hospital, its personnel, and my physicians from any responsibility for the consequences of my refusal.          o  Refuse      5.   I authorize the use of any specimen, organs, tissues, body parts or foreign objects that may be removed from my body during the operation/procedure for diagnosis, research or teaching purposes and their subsequent disposal by hospital authorities.  I also authorize the release of specimen test results and/or written reports to my treating physician on the hospital medical staff or other referring or consulting physicians involved in my care, at the discretion of the Pathologist or my treating physician.    6.   I consent to the photographing or videotaping of the operations or procedures to be performed, including appropriate portions of my body for medical, scientific, or educational purposes, provided my identity is not revealed by the pictures or by descriptive texts accompanying them.  If the procedure has been photographed/videotaped, the surgeon will obtain the original picture, image, videotape or CD.  The hospital will not be responsible for storage, release or maintenance of the picture, image, tape or CD.    7.   I consent to the presence of a  or observers in the operating room as deemed  necessary by my physician or their designees.    8.   I recognize that in the event my procedure results in extended X-Ray/fluoroscopy time, I may develop a skin reaction.    9. If I have a Do Not Attempt Resuscitation (DNAR) order in place, that status will be suspended while in the operating room, procedural suite, and during the recovery period unless otherwise explicitly stated by me (or a person authorized to consent on my behalf). The surgeon or my attending physician will determine when the applicable recovery period ends for purposes of reinstating the DNAR order.  10. Patients having a sterilization procedure: I understand that if the procedure is successful the results will be permanent and it will therefore be impossible for me to inseminate, conceive, or bear children.  I also understand that the procedure is intended to result in sterility, although the result has not been guaranteed.   11. I acknowledge that my physician has explained sedation/analgesia administration to me including the risk and benefits I consent to the administration of sedation/analgesia as may be necessary or desirable in the judgment of my physician.    I CERTIFY THAT I HAVE READ AND FULLY UNDERSTAND THE ABOVE CONSENT TO OPERATION and/or OTHER PROCEDURE.    _________________________________________  __________________________________  Signature of Patient     Signature of Responsible Person         ___________________________________         Printed Name of Responsible Person           _________________________________                 Relationship to Patient  _________________________________________  ______________________________  Signature of Witness          Date  Time      Patient Name: Mark Anthony Bryan     : 1950                 Printed: 2025     Medical Record #: [id number]                     Page 1 of 2                                    [address]   [address]    Consent for Anesthesia    I, Mark Anthony Bryan agree to be cared for by an anesthesiologist, who is specially trained to monitor me and give me medicine to put me to sleep or keep me comfortable during my procedure    I understand that my anesthesiologist is not an employee or agent of [facility] or [employer]. He or she works for [employer].    As the patient asking for anesthesia services, I agree to:  Allow the anesthesiologist (anesthesia doctor) to give me medicine and do additional procedures as necessary. Some examples are: Starting or using an “IV” to give me medicine, fluids or blood during my procedure, and having a breathing tube placed to help me breathe when I’m asleep (intubation). In the event that my heart stops working properly, I understand that my anesthesiologist will make every effort to sustain my life, unless otherwise directed by [facility] Do Not Resuscitate documents.  Tell my anesthesia doctor before my procedure:  If I am pregnant.  The last time that I ate or drank.  All of the medicines I take (including prescriptions, herbal supplements, and pills I can buy without a prescription (including street drugs/illegal medications). Failure to inform my anesthesiologist about these medicines may increase my risk of anesthetic complications.  If I am allergic to anything or have had a reaction to anesthesia before.  I understand how the anesthesia medicine will help me (benefits).  I understand that with any type of anesthesia medicine there are risks:  The most common risks are: nausea, vomiting, sore throat, muscle soreness, damage to my eyes, mouth, or teeth (from breathing tube placement).  Rare risks include: remembering what happened during my procedure, allergic reactions to medications, injury to my airway, heart, lungs, vision, nerves, or muscles and in extremely rare instances death.  My doctor has explained to me other choices available  to me for my care (alternatives).  Pregnant Patients (“epidural”):  I understand that the risks of having an epidural (medicine given into my back to help control pain during labor), include itching, low blood pressure, difficulty urinating, headache or slowing of the baby’s heart. Very rare risks include infection, bleeding, seizure, irregular heart rhythms and nerve injury.  Regional Anesthesia (“spinal”, “epidural”, & “nerve blocks”):  I understand that rare but potential complications include headache, bleeding, infection, seizure, irregular heart rhythms, and nerve injury.    I can change my mind about having anesthesia services at any time before I get the medicine.    _____________________________________________________________________________  Patient (or Representative) Signature/Relationship to Patient  Date   Time    _____________________________________________________________________________   Name (if used)    Language/Organization   Time    _____________________________________________________________________________  Anesthesiologist Signature     Date   Time  I have discussed the procedure and information above with the patient (or patient’s representative) and answered their questions. The patient or their representative has agreed to have anesthesia services.    _____________________________________________________________________________  Witness        Date   Time  I have verified that the signature is that of the patient or patient’s representative, and that it was signed before the procedure  Patient Name: Mark Anthony Bryan     : 1950                 Printed: 2025     Medical Record #: [id number]                     Page 2 of 2

## (undated) NOTE — MR AVS SNAPSHOT
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   Procedure                               Abnormality         Status                     -------

## (undated) NOTE — ED AVS SNAPSHOT
Sandra Poster   MRN: [id number]    Department:  [facility] Emergency Department   Date of Visit:  10/18/2019           Disclosure     Insurance plans vary and the physician(s) referred by the ER may not be covered by your plan.  Please contact you tell this physician (or your personal doctor if your instructions are to return to your personal doctor) about any new or lasting problems. The primary care or specialist physician will see patients referred from the [facility] Emergency Department.  Bubba Guaman

## (undated) NOTE — LETTER
Date: 10/25/2019    Patient Name: Efrain Luna          To Whom it may concern: This letter has been written at the [facility]'s request. The above patient was seen at the [facility] for treatment of a medical condition.       The lay

## (undated) NOTE — LETTER
[address]  [address]  Authorization for Surgical Operation and Procedure     Date:___________                                                                                                         Time:__________  I hereby authorize Surgeon(s):  Ten Brizuela DO, my physician and his/her assistants (if applicable), which may include medical students, residents, and/or fellows, to perform the following surgical operation/ procedure and administer such anesthesia as may be determined necessary by my physician:  Operation/Procedure name (s) Procedure(s):  ESOPHAGOGASTRODUODENOSCOPY (EGD) WITH POSSIBLE BIOPSY,POSSIBLE CONTROL OF BLEEDING UNDER MONITORED ANESTHESIA Cindi Bryan   2.   I recognize that during the surgical operation/procedure, unforeseen conditions may necessitate additional or different procedures than those listed above.  I, therefore, further authorize and request that the above-named surgeon, assistants, or designees perform such procedures as are, in their judgment, necessary and desirable.    3.   My surgeon/physician has discussed prior to my surgery the potential benefits, risks and side effects of this procedure; the likelihood of achieving goals; and potential problems that might occur during recuperation.  They also discussed reasonable alternatives to the procedure, including risks, benefits, and side effects related to the alternatives and risks related to not receiving this procedure.  I have had all my questions answered and I acknowledge that no guarantee has been made as to the result that may be obtained.    4.   Should the need arise during my operation/procedure, which includes change of level of care prior to discharge, I also consent to the administration of blood and/or blood products.  Further, I understand that despite careful testing and screening of blood or blood products by collecting agencies, I may still be subject to ill  effects as a result of receiving a blood transfusion and/or blood products.  The following are some, but not all, of the potential risks that can occur: fever and allergic reactions, hemolytic reactions, transmission of diseases such as Hepatitis, AIDS and Cytomegalovirus (CMV) and fluid overload.  In the event that I wish to have an autologous transfusion of my own blood, or a directed donor transfusion, I will discuss this with my physician.  Check only if Refusing Blood or Blood Products  I understand refusal of blood or blood products as deemed necessary by my physician may have serious consequences to my condition to include possible death. I hereby assume responsibility for my refusal and release the hospital, its personnel, and my physicians from any responsibility for the consequences of my refusal.          o  Refuse      5.   I authorize the use of any specimen, organs, tissues, body parts or foreign objects that may be removed from my body during the operation/procedure for diagnosis, research or teaching purposes and their subsequent disposal by hospital authorities.  I also authorize the release of specimen test results and/or written reports to my treating physician on the hospital medical staff or other referring or consulting physicians involved in my care, at the discretion of the Pathologist or my treating physician.    6.   I consent to the photographing or videotaping of the operations or procedures to be performed, including appropriate portions of my body for medical, scientific, or educational purposes, provided my identity is not revealed by the pictures or by descriptive texts accompanying them.  If the procedure has been photographed/videotaped, the surgeon will obtain the original picture, image, videotape or CD.  The hospital will not be responsible for storage, release or maintenance of the picture, image, tape or CD.    7.   I consent to the presence of a  or observers  in the operating room as deemed necessary by my physician or their designees.    8.   I recognize that in the event my procedure results in extended X-Ray/fluoroscopy time, I may develop a skin reaction.    9. If I have a Do Not Attempt Resuscitation (DNAR) order in place, that status will be suspended while in the operating room, procedural suite, and during the recovery period unless otherwise explicitly stated by me (or a person authorized to consent on my behalf). The surgeon or my attending physician will determine when the applicable recovery period ends for purposes of reinstating the DNAR order.  10. Patients having a sterilization procedure: I understand that if the procedure is successful the results will be permanent and it will therefore be impossible for me to inseminate, conceive, or bear children.  I also understand that the procedure is intended to result in sterility, although the result has not been guaranteed.   11. I acknowledge that my physician has explained sedation/analgesia administration to me including the risk and benefits I consent to the administration of sedation/analgesia as may be necessary or desirable in the judgment of my physician.    I CERTIFY THAT I HAVE READ AND FULLY UNDERSTAND THE ABOVE CONSENT TO OPERATION and/or OTHER PROCEDURE.    _________________________________________  __________________________________  Signature of Patient     Signature of Responsible Person         ___________________________________         Printed Name of Responsible Person           _________________________________                 Relationship to Patient  _________________________________________  ______________________________  Signature of Witness          Date  Time      Patient Name: Mark Anthony MCKEON Irvin     : 1950                 Printed: 2024     Medical Record #: [id number]                     Page 1 of [address] [place] [address]  [place]  [address]    Consent for Anesthesia    I, Mark Anthony Bryan agree to be cared for by an anesthesiologist, who is specially trained to monitor me and give me medicine to put me to sleep or keep me comfortable during my procedure    I understand that my anesthesiologist is not an employee or agent of [facility] or [employer]. He or she works for [employer].    As the patient asking for anesthesia services, I agree to:  Allow the anesthesiologist (anesthesia doctor) to give me medicine and do additional procedures as necessary. Some examples are: Starting or using an “IV” to give me medicine, fluids or blood during my procedure, and having a breathing tube placed to help me breathe when I’m asleep (intubation). In the event that my heart stops working properly, I understand that my anesthesiologist will make every effort to sustain my life, unless otherwise directed by [facility] Do Not Resuscitate documents.  Tell my anesthesia doctor before my procedure:  If I am pregnant.  The last time that I ate or drank.  All of the medicines I take (including prescriptions, herbal supplements, and pills I can buy without a prescription (including street drugs/illegal medications). Failure to inform my anesthesiologist about these medicines may increase my risk of anesthetic complications.  If I am allergic to anything or have had a reaction to anesthesia before.  I understand how the anesthesia medicine will help me (benefits).  I understand that with any type of anesthesia medicine there are risks:  The most common risks are: nausea, vomiting, sore throat, muscle soreness, damage to my eyes, mouth, or teeth (from breathing tube placement).  Rare risks include: remembering what happened during my procedure, allergic reactions to medications, injury to my airway, heart, lungs, vision, nerves, or muscles and in extremely rare instances death.  My doctor has explained to me  other choices available to me for my care (alternatives).  Pregnant Patients (“epidural”):  I understand that the risks of having an epidural (medicine given into my back to help control pain during labor), include itching, low blood pressure, difficulty urinating, headache or slowing of the baby’s heart. Very rare risks include infection, bleeding, seizure, irregular heart rhythms and nerve injury.  Regional Anesthesia (“spinal”, “epidural”, & “nerve blocks”):  I understand that rare but potential complications include headache, bleeding, infection, seizure, irregular heart rhythms, and nerve injury.    I can change my mind about having anesthesia services at any time before I get the medicine.    _____________________________________________________________________________  Patient (or Representative) Signature/Relationship to Patient  Date   Time    _____________________________________________________________________________   Name (if used)    Language/Organization   Time    _____________________________________________________________________________  Anesthesiologist Signature     Date   Time  I have discussed the procedure and information above with the patient (or patient’s representative) and answered their questions. The patient or their representative has agreed to have anesthesia services.    _____________________________________________________________________________  Witness        Date   Time  I have verified that the signature is that of the patient or patient’s representative, and that it was signed before the procedure  Patient Name: Mark Anthony Bryan     : 1950                 Printed: 2024     Medical Record #: [id number]                     Page 2 of 2

## (undated) NOTE — LETTER
11/12/19        [facility] [phone]      Dear Jitendra Trejo,    [facility] records indicate that you have outstanding lab work and or testing that was ordered for you and has not yet been completed:  Orders Placed This Encounter

## (undated) NOTE — MR AVS SNAPSHOT
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)    -  Primary     Cholangiocarcinoma of biliary tract ([facility]           Allergies     No Known Al vitamin E 400 UNITS Oral Cap Take 400 Units by mouth daily. Patient Instructions     None      Please Note     If a lab draw is part of your appointment, please arrive 15 minutes early.       To Do List     Ibrahima January 15, 2017     LAB: Procedure                               Abnormality         Status                     ---------                               -----------         ------                     CBC W/ DIFFERENTIAL[[id number]]          Abnormal            Final result Neutrophil Absolute Prelim 5.84  1.30-6.70  x10 (3) uL Final    Neutrophil Absolute 5.84  1.30-6.70  x10(3) uL Final    Lymphocyte Absolute 1.52  0.90-4.00  x10(3) uL Final    Monocyte Absolute 1.26 (H) 0.10-0.60  x10(3) uL Final    Eosinophil Absolute 0.

## (undated) NOTE — LETTER
To: Dr. Lilian Oliveira   Date:  10/6/2022  Fax #: [phone]   Patient Name: Leonel Washburn / Sex: 11/14/1950-A: 70 y  adult  Phone:  [phone]  CSN: [id number]        Medical Records: [id number]    Request for History & Physical for Radiology Procedure at [facility]    The above patient is scheduled to have a procedure performed in Radiology. In order for the procedure to be performed safely, a comprehensive History & Physical, to include the Review of Systems, is required within 30 days of the scheduled appointment. Procedure:   MRI with Anesthesia    Date Scheduled: 10/13/2022    Ordered by (Ordering Physician):  Sara Sotelo    Please FAX the completed H&P to THE [facility] at [phone]. For Questions: Call THE [facility] [phone]    If you cannot provide us with a comprehensive History & Physical prior to this appointment, you will need to cancel and reschedule the appointment by calling Central Scheduling [phone]. Thank you.

## (undated) NOTE — Clinical Note
Serg Pt had to be transported to ED today. 5 minutes into visit, started c/o  dizziness, decreased vision, sweats and felt like he was going to pass out.  sxs have been ongoing but today was \"the worst\" A1C 5.5% today- I really do not think his sxs are D

## (undated) NOTE — LETTER
12/23/20  [facility] [address] [phone]    Dear Vitor Welsh,    We are contacting you from Dr. Eitan Perla office. Your health is important to us.   Therefore, we are sending this friendly reminder that you have the following overdue

## (undated) NOTE — LETTER
[facility]  [address] [facility], [address] [facility]    Consent for Anesthesia   1.   I, [address] [facility] agree to be cared for by a physician anesthesiologist alone and/or with a nurse anesthetist, who is specially trained to monitor me and give me m allergic reactions to medications, injury to my airway, heart, lungs, vision, nerves, or muscles and in extremely rare instances death. 5. My doctor has explained to me other choices available to me for my care (alternatives).   6. Pregnant Patients (“epid Printed: 6/26/2019 at [facility] Record #: [id number]                                            Page 1 of 1

## (undated) NOTE — MR AVS SNAPSHOT
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 Visit [email or web address] to learn more.

## (undated) NOTE — LETTER
Nolvia Freire M.D., F.A.C.S.,F.A.C.R.S. Surgical Clearance Needed    Date: 7/28/2022                                                                       From: [facility]    Attn: DR DEWITT                                                                                Fax:     RE: Surgical Clearance               # of Pages: 1        Patient Name: Salvador De La Garza    Patient YOB: 1950    Consult For: CARDIAC CLEARANCE AND ANTI COAGULATION MANAGEMENT    Surgery: [address]    Date of Surgery: 9/14/2022 AT [facility]        This facsimile transmission is provided for your internal use only. If the reader of this message is not the intended recipient, you are hereby notified that you have received this document in error, and that any review, dissemination, distribution, or copying of this message is strictly prohibited. If you have received this communication in error, please notify us immediately by telephone and return the original message to us by mail.

## (undated) NOTE — LETTER
OUTSIDE TESTING RESULT REQUEST     IMPORTANT: FOR YOUR IMMEDIATE ATTENTION  Please FAX all test results listed below to: [phone]     Testing already done on or about:      * * * * If testing is NOT complete, arrange with patient A.S.A.P. * * * *      Patient Name: Chauncey Luna  Surgery Date:   CSN: [id number]  Medical Record: [id number]   : 1950 - A: 70 y      Sex: adult    Procedure: MRI with Anesthesia      The following Testing and Time Line are REQUIRED PER ANESTHESIA     EKG READ AND SIGNED WITHIN   90 days  BMP (requires 4 hour fast) within  60 days   History and Physical within 30 days          Thank You,   Sent by: Caty Woodard RN

## (undated) NOTE — LETTER
22    Patient: Angélica Hardy  :  Visit date: 2022    Dear  Nikki Hawley MD    Thank you for referring Angélica Hardy to my practice. Please find my assessment and plan below. Assessment   Liver mass  (primary encounter diagnosis)  Ventral hernia without obstruction or gangrene  Cholangiocarcinoma ([facility] [address]  Liver mass, left lobe    Plan   This patient presents for further care and treatment regarding an abnormal finding on physical exam in the epigastric region after a Whipple procedure. The patient has subsequently undergone a CT scan at [facility].  There is no evidence of ventral hernia. It turns out that his abnormal bulge is abnormal fat distribution after the Whipple procedure. It has bunched up his abdominal wall fat in the midline. This patient is otherwise thin and very fit. The Whipple chevron has concentrated what little fat he has in his central abdomen into a large ball. This is evident not only on the CT scan but on the physical exam.    I have personally reviewed this patient's CT scan obtained at [facility] obtained on 2022. As stated above I did not see any evidence of a hernia in the epigastrium. Unfortunately I agree with the radiologist findings, there is an enhancing lesion within the right lobe of the liver. Radiology recommends MRI with [facility] for further evaluation. Clinical exam today reveals his abdomen to be soft, nondistended, nontender, good bowel sounds. No guarding or rebound. No masses. No ascites. Liver and spleen are not palpable. He has a large chevron incision. He definitely remains with that abnormal accumulation of fat in the central epigastric region. There is no bulge on Valsalva or coughing. Bowel sounds have normal activity and normal pitch. I reviewed the CT scan with the patient and his wife. I showed them the lesion of question that I can see.   It appears to be an approximate 1 to 2 cm lesion, irregular, enhancing on contrast.    I discussed the case with Dr. Lianne Murry. We will order the MRI with [facility].  We will have him follow-up with Dr. Lianne Murry regarding this matter. In the meantime, we will cancel his robotic ventral incisional herniorrhaphy with mesh. This patient can present for further evaluation for any abnormalities of the incision, or within the abdominal cavity, in the future. I will see this patient on an as-needed basis and transfer his complete care to Dr. Lianne Murry at this time.          Sincerely,       Lilly Carlos MD   CC: Brenda Andrade MD